# Patient Record
Sex: FEMALE | Race: WHITE | NOT HISPANIC OR LATINO | Employment: OTHER | URBAN - METROPOLITAN AREA
[De-identification: names, ages, dates, MRNs, and addresses within clinical notes are randomized per-mention and may not be internally consistent; named-entity substitution may affect disease eponyms.]

---

## 2017-01-17 ENCOUNTER — ALLSCRIPTS OFFICE VISIT (OUTPATIENT)
Dept: OTHER | Facility: OTHER | Age: 62
End: 2017-01-17

## 2017-08-21 ENCOUNTER — ALLSCRIPTS OFFICE VISIT (OUTPATIENT)
Dept: OTHER | Facility: OTHER | Age: 62
End: 2017-08-21

## 2017-09-28 ENCOUNTER — TRANSCRIBE ORDERS (OUTPATIENT)
Dept: ADMINISTRATIVE | Facility: HOSPITAL | Age: 62
End: 2017-09-28

## 2017-09-28 DIAGNOSIS — Z12.31 VISIT FOR SCREENING MAMMOGRAM: Primary | ICD-10-CM

## 2017-10-02 ENCOUNTER — ALLSCRIPTS OFFICE VISIT (OUTPATIENT)
Dept: OTHER | Facility: OTHER | Age: 62
End: 2017-10-02

## 2017-10-02 DIAGNOSIS — E04.1 NONTOXIC SINGLE THYROID NODULE: ICD-10-CM

## 2017-10-02 DIAGNOSIS — Z12.31 ENCOUNTER FOR SCREENING MAMMOGRAM FOR MALIGNANT NEOPLASM OF BREAST: ICD-10-CM

## 2017-10-03 NOTE — PROGRESS NOTES
Assessment  1  Dyslipidemia (272 4) (E78 5)   2  Obesity (BMI 30 0-34 9) (278 00) (E66 9)   3  Depression (311) (F32 9)   4  Allergic rhinitis (477 9) (J30 9)   5  Mild intermittent asthma without complication (284 47) (B01 62)   6  Left thyroid nodule (241 0) (E04 1)   7  Otitis externa, left (380 10) (H60 92)    Plan  Depression    · From  Escitalopram Oxalate 10 MG Oral Tablet TAKE 1 TABLET EVERY  OTHER DAY To Escitalopram Oxalate 10 MG Oral Tablet Take 1 tablet daily  Depression, Dyslipidemia, Mild intermittent asthma without complication, Obesity (BMI  30 0-34 9), Otitis externa, left    · Follow-up visit in 6 months Evaluation and Treatment  Follow-up  Status: Hold For -  Scheduling  Requested for: 02Oct2017  Dyslipidemia, Obesity (BMI 30 0-34  9)    · (1) LIPID PANEL, FASTING; Status:Active; Requested LSA:[id number];    · (Q) CBC (INCLUDES DIFF/PLT) (REFL); Status:Active; Requested for:02Oct2017;    · (Q) COMPREHENSIVE METABOLIC PNL W/ADJUSTED CALCIUM; Status:Active; Requested for:02Oct2017;    · (Q) TSH, 3RD GENERATION; Status:Active; Requested CZJ:[id number];   Encounter for screening mammogram for breast cancer    · * MAMMO SCREENING BILATERAL W CAD; Status:Hold For - Scheduling; Requested  for:02Oct2017;   Left thyroid nodule    · US THYROID; Status:Hold For - Scheduling; Requested for:02Oct2017;   Mild intermittent asthma without complication    · ProAir  (90 Base) MCG/ACT Inhalation Aerosol Solution; USE 2 PUFFS  EVERY 4-6 HOURS AS NEEDED  Need for influenza vaccination    · Fluzone Quadrivalent Intramuscular Suspension; INJECT 0 5  ML  Intramuscular; To Be Done: [id number]  Otitis externa, left    · Ciprodex 0 3-0 1 % Otic Suspension; use 4 drops twice daily for 7 days    Discussion/Summary    1  Dyslipidemia / Obesity - recommended to follow a low-cholesterol, low-fat diet, increase exercise, lose weight  Will check labs     recommended to increase dose of Escitalopram 10 mg to 1 tablet every day office with update on symptoms in 3-4 weeks  intermittent asthma symptoms are stable  Use ProAir HFA inhaler PRN  rhinitis take Claritin 10 mg daily PRN  thyroid nodule - will check thyroid U/S    otitis externa  use Ciprodex ear drops 4 drops twice daily for 7 days  office if symptoms persist or worsen  maintenance Fluzone administered today  Check annual mammogram, schedule colonoscopy with Dr Jeanna Espinosa  followup office visit in 6 months  The patient was counseled regarding diagnostic results,-instructions for management,-risk factor reductions,-risks and benefits of treatment options,-importance of compliance with treatment  total time of encounter was 40 [id number] minutes was spent counseling  Possible side effects of new medications were reviewed with the patient/guardian today  The treatment plan was reviewed with the patient/guardian  The patient/guardian understands and agrees with the treatment plan      Chief Complaint  Patient here for routine follow up for Dyslipidemia, Depression and Otitis externa  Complains of left ear ache and feeling spongy since yesterday  Patient is here today for follow up of chronic conditions described in HPI  History of Present Illness  Patient is 41-year-old female with H/o Dyslipidemia, Obesity, Asthma, AR, Depression, left thyroid nodule  presents for a routine follow-up office visit  was seen by Dr Lawrence Mccray in August for left otitis externa, was prescribed ear drops with some improvement in symptoms  the last few days she developed swelling of left external ear canal ear feels clogged  No significant ear pain, no drainage  Denies fever or chills  current medications  Last blood test done in 6/14  patient takes Escitalopram 10 mg every other day  feeling depressed, insomnia  No suicidal ideations  No anxiety attacks  - symptoms are stable  Patient uses ProAir HFA inhaler occasionally  is allergic to cats and pollens   She takes Loratadine 10 mg daily PRN  has H/o left thyroid nodule, had Thyroid U/S done in 2012  difficulty swallowing, hoarseness of the voice  No neck pain  history is positive for breast CA n her sister  mammogram done in October 2016    had colonoscopy done in December 2012 which was normal surgeon Dr Keya Salas recommended to repeat colonoscopy in 5 years due to family history of colon CA    done in June 2014  Review of Systems    Constitutional: no fever,-no chills-and-not feeling tired  Weight has been stable  Eyes: no eye pain,-no dryness of the eyes,-eyes not red,-no purulent discharge from the eyes-and-no itching of the eyes  No visual disturbances  ENT: clogged L ear, but-no earache,-no nosebleeds,-no sore throat,-no hearing loss,-no nasal discharge-and-no hoarseness  Cardiovascular: no chest pain,-no intermittent leg claudication,-no palpitations-and-no lower extremity edema  Respiratory: no shortness of breath,-no cough,-no wheezing-and-no shortness of breath during exertion  Gastrointestinal: occasional loose bowels, but-no abdominal pain,-no nausea,-no vomiting,-no constipation-and-no blood in stools  Genitourinary: no dysuria,-no pelvic pain-and-no incontinence  Musculoskeletal: no arthralgias,-no joint swelling-and-no myalgias  Integumentary: rosacea on face, but-no itching-and-no skin wound  Neurological: no headache,-no dizziness-and-no fainting  Psychiatric: sleep disturbances-and-depression, but-not suicidal-and-no anxiety  Hematologic/Lymphatic: No complaints of swollen glands, no swollen glands in the neck, does not bleed easily, does not bruise easily  Active Problems  1  Allergic rhinitis (477 9) (J30 9)   2  Depression (311) (F32 9)   3  Encounter for screening mammogram for breast cancer (V76 12) (Z12 31)   4  Esophageal reflux (530 81) (K21 9)   5  Need for influenza vaccination (V04 81) (Z23)   6   Need for prophylactic vaccination and inoculation against influenza (V04 81) (Z23)   7  Non-toxic uninodular goiter (241 0) (E04 1)   8  Otitis externa (380 10) (H60 90)   9  Rosacea (695 3) (L71 9)   10  Visit for routine gyn exam (V72 31) (Z01 419)    Past Medical History  1  History of Acute non-recurrent maxillary sinusitis (461 0) (J01 00)   2  History of Acute upper respiratory infection (465 9) (J06 9)   3  History of Allergic dermatitis (692 9) (L23 9)   4  History of Carotid bruit (785 9) (R09 89)   5  History of Cough (786 2) (R05)   6  History of Dysphagia (787 20) (R13 10)   7  History of acute bronchitis (V12 69) (Z87 09)   8  History of acute sinusitis (V12 69) (Z87 09)    The active problems and past medical history were reviewed and updated today  Surgical History  1  History of  Section   2  History of Complete Colonoscopy    The surgical history was reviewed and updated today  Family History  Mother    1  Family history of Acute Myocardial Infarction (V17 3)   2  Family history of Polyps Of The Sigmoid Colon  Sister    3  Family history of malignant neoplasm of breast (V16 3) (Z80 3)   4  Family history of malignant neoplasm of uterus (V16 49) (Z80 49)  Brother    5  Family history of Colon Cancer (V16 0)   6  Family history of Liver Cancer  Paternal Grandfather    7  Family history of Diabetes Mellitus (V18 0)  Maternal Uncle    8  Family history of Stroke Syndrome (V17 1)    The family history was reviewed and updated today  Social History   · Being A Social Drinker   · Never A Smoker  The social history was reviewed and updated today  Current Meds   1  Escitalopram Oxalate 10 MG Oral Tablet; TAKE 1 TABLET EVERY OTHER DAY; Therapy: 80DGX6321 to (Evaluate:2018)  Requested for: 66IRY0213; Last   Rx:68Vhk1624 Ordered   2  Loratadine 10 MG Oral Tablet; take 1 tab daily; Therapy: (Abdirahman Drummond) to Recorded   3  Multi-Vitamin Oral Tablet; take 1 tab daily; Therapy: 97FLU6515 to Recorded   4   Neomycin-Polymyxin-HC 1 % Otic Solution; INSTILL 3 DROPS IN AFFECTED EAR(S) 3-4   TIMES DAILY; Therapy: 37Bfy2710 to (Evaluate:07Gjt7258)  Requested for: 86Bok9612; Last   Rx:41Ixp7025 Ordered    The medication list was reviewed and updated today  Allergies  1  No Known Drug Allergies    Vitals  Vital Signs    Recorded: 79XAT2987 08:16AM   Temperature 98 2 F, Tympanic   Heart Rate 72, L Radial   Respiration 16   Systolic 773, LUE   Diastolic 84, LUE   Height 5 ft 10 in   Weight 231 lb 12 8 oz   BMI Calculated 33 26   BSA Calculated 2 22   Pain Scale 2     Physical Exam    Constitutional   General appearance: No acute distress, well appearing and well nourished  -Obese  Eyes   Conjunctiva and lids: No swelling, erythema or discharge  Pupils and irises: Equal, round and reactive to light  Ears, Nose, Mouth, and Throat   External inspection of ears and nose: Normal     Otoscopic examination: Abnormal   The left tympanic membrane was obscured  The left external canal was swollen-and-was erythematous  Nasal mucosa, septum, and turbinates: Normal without edema or erythema  Pulmonary   Respiratory effort: No increased work of breathing or signs of respiratory distress  Auscultation of lungs: Clear to auscultation  Cardiovascular   Auscultation of heart: Normal rate and rhythm, normal S1 and S2, without murmurs  Examination of extremities for edema and/or varicosities: Normal     Carotid pulses: Normal  -no carotid bruits  Abdomen   Abdomen: Non-tender, no masses  -no abdominal bruits  Liver and spleen: No hepatomegaly or splenomegaly  Lymphatic   Palpation of lymph nodes in neck: No lymphadenopathy  -Thyroid gland is not enlarged, no palpable nodules  Musculoskeletal   Gait and station: Normal     Digits and nails: Normal without clubbing or cyanosis  Inspection/palpation of joints, bones, and muscles: Normal     Skin   Skin and subcutaneous tissue: Abnormal   Rosacea on face     Psychiatric   Mood and affect: Normal          Signatures   Electronically signed by : MISAEL Gaitan ; Oct  2 2017  9:14AM EST                       (Author)

## 2017-10-27 ENCOUNTER — HOSPITAL ENCOUNTER (OUTPATIENT)
Dept: RADIOLOGY | Age: 62
Discharge: HOME | End: 2017-10-27
Payer: COMMERCIAL

## 2017-10-27 DIAGNOSIS — Z12.31 ENCOUNTER FOR SCREENING MAMMOGRAM FOR MALIGNANT NEOPLASM OF BREAST: ICD-10-CM

## 2017-10-27 PROCEDURE — G0202 SCR MAMMO BI INCL CAD: HCPCS

## 2017-10-28 LAB
A/G RATIO (HISTORICAL): 1.8 (CALC) (ref 1–2.5)
ALBUMIN SERPL BCP-MCNC: 4.5 G/DL (ref 3.6–5.1)
ALP SERPL-CCNC: 77 U/L (ref 33–130)
ALT SERPL W P-5'-P-CCNC: 49 U/L (ref 6–29)
AST SERPL W P-5'-P-CCNC: 36 U/L (ref 10–35)
BASOPHILS # BLD AUTO: 0.5 %
BASOPHILS # BLD AUTO: 31 CELLS/UL (ref 0–200)
BILIRUB SERPL-MCNC: 0.6 MG/DL (ref 0.2–1.2)
BUN SERPL-MCNC: 14 MG/DL (ref 7–25)
BUN/CREA RATIO (HISTORICAL): ABNORMAL (CALC) (ref 6–22)
CALCIUM (ADJUSTED FOR ALBUMIN) (HISTORICAL): 9.5 MG/DL (CALC) (ref 8.6–10.2)
CALCIUM SERPL-MCNC: 9.6 MG/DL (ref 8.6–10.4)
CHLORIDE SERPL-SCNC: 105 MMOL/L (ref 98–110)
CHOLEST SERPL-MCNC: 184 MG/DL
CHOLEST/HDLC SERPL: 4.6 (CALC)
CO2 SERPL-SCNC: 29 MMOL/L (ref 20–31)
CREAT SERPL-MCNC: 0.94 MG/DL (ref 0.5–0.99)
DEPRECATED RDW RBC AUTO: 13.8 % (ref 11–15)
EGFR AFRICAN AMERICAN (HISTORICAL): 75 ML/MIN/1.73M2
EGFR-AMERICAN CALC (HISTORICAL): 65 ML/MIN/1.73M2
EOSINOPHIL # BLD AUTO: 161 CELLS/UL (ref 15–500)
EOSINOPHIL # BLD AUTO: 2.6 %
GAMMA GLOBULIN (HISTORICAL): 2.5 G/DL (CALC) (ref 1.9–3.7)
GLUCOSE (HISTORICAL): 100 MG/DL (ref 65–99)
HCT VFR BLD AUTO: 42.4 % (ref 35–45)
HDLC SERPL-MCNC: 40 MG/DL
HGB BLD-MCNC: 14 G/DL (ref 11.7–15.5)
LDL CHOLESTEROL (HISTORICAL): 121 MG/DL (CALC)
LYMPHOCYTES # BLD AUTO: 1891 CELLS/UL (ref 850–3900)
LYMPHOCYTES # BLD AUTO: 30.5 %
MCH RBC QN AUTO: 27.2 PG (ref 27–33)
MCHC RBC AUTO-ENTMCNC: 33 G/DL (ref 32–36)
MCV RBC AUTO: 82.5 FL (ref 80–100)
MONOCYTES # BLD AUTO: 378 CELLS/UL (ref 200–950)
MONOCYTES (HISTORICAL): 6.1 %
NEUTROPHILS # BLD AUTO: 3739 CELLS/UL (ref 1500–7800)
NEUTROPHILS # BLD AUTO: 60.3 %
NON-HDL-CHOL (CHOL-HDL) (HISTORICAL): 144 MG/DL (CALC)
PLATELET # BLD AUTO: 239 THOUSAND/UL (ref 140–400)
PMV BLD AUTO: 11 FL (ref 7.5–12.5)
POTASSIUM SERPL-SCNC: 3.9 MMOL/L (ref 3.5–5.3)
RBC # BLD AUTO: 5.14 MILLION/UL (ref 3.8–5.1)
SODIUM SERPL-SCNC: 141 MMOL/L (ref 135–146)
TOTAL PROTEIN (HISTORICAL): 7 G/DL (ref 6.1–8.1)
TRIGL SERPL-MCNC: 118 MG/DL
TSH SERPL DL<=0.05 MIU/L-ACNC: 3.5 MIU/L (ref 0.4–4.5)
WBC # BLD AUTO: 6.2 THOUSAND/UL (ref 3.8–10.8)

## 2017-11-05 ENCOUNTER — GENERIC CONVERSION - ENCOUNTER (OUTPATIENT)
Dept: OTHER | Facility: OTHER | Age: 62
End: 2017-11-05

## 2017-11-05 DIAGNOSIS — R92.1 MAMMOGRAPHIC CALCIFICATION FOUND ON DIAGNOSTIC IMAGING OF BREAST: ICD-10-CM

## 2017-11-05 DIAGNOSIS — R79.89 OTHER SPECIFIED ABNORMAL FINDINGS OF BLOOD CHEMISTRY: ICD-10-CM

## 2017-11-05 DIAGNOSIS — Z01.419 ENCOUNTER FOR GYNECOLOGICAL EXAMINATION WITHOUT ABNORMAL FINDING: ICD-10-CM

## 2017-11-08 ENCOUNTER — HOSPITAL ENCOUNTER (OUTPATIENT)
Dept: MAMMOGRAPHY | Facility: CLINIC | Age: 62
Discharge: HOME | End: 2017-11-08
Payer: COMMERCIAL

## 2017-11-08 ENCOUNTER — GENERIC CONVERSION - ENCOUNTER (OUTPATIENT)
Dept: OTHER | Facility: OTHER | Age: 62
End: 2017-11-08

## 2017-11-08 DIAGNOSIS — R92.1 MAMMOGRAPHIC CALCIFICATION FOUND ON DIAGNOSTIC IMAGING OF BREAST: ICD-10-CM

## 2017-11-08 PROCEDURE — G0206 DX MAMMO INCL CAD UNI: HCPCS

## 2017-11-08 NOTE — PROGRESS NOTES
Met with patient and Dr Samson Kelly  regarding recommendation for;      __X___ RIGHT ______LEFT      _____Ultrasound guided  ___X___Stereotactic  Breast biopsy  ___X__Verbalized understanding        Blood thinners:  _____yes ___X__no    Date stopped: __N/A_________    Biopsy teaching sheet given:  ___X____yes ______no

## 2017-11-15 ENCOUNTER — HOSPITAL ENCOUNTER (OUTPATIENT)
Dept: MAMMOGRAPHY | Facility: CLINIC | Age: 62
Discharge: HOME | End: 2017-11-15
Payer: COMMERCIAL

## 2017-11-15 ENCOUNTER — HOSPITAL ENCOUNTER (OUTPATIENT)
Dept: MAMMOGRAPHY | Facility: CLINIC | Age: 62
Discharge: HOME | End: 2017-11-15
Admitting: RADIOLOGY
Payer: COMMERCIAL

## 2017-11-15 ENCOUNTER — GENERIC CONVERSION - ENCOUNTER (OUTPATIENT)
Dept: FAMILY MEDICINE CLINIC | Facility: CLINIC | Age: 62
End: 2017-11-15

## 2017-11-15 VITALS — SYSTOLIC BLOOD PRESSURE: 124 MMHG | DIASTOLIC BLOOD PRESSURE: 76 MMHG | HEART RATE: 80 BPM

## 2017-11-15 DIAGNOSIS — R92.0 MICROCALCIFICATIONS OF THE BREAST: ICD-10-CM

## 2017-11-15 DIAGNOSIS — R92.1 MAMMOGRAPHIC CALCIFICATION FOUND ON DIAGNOSTIC IMAGING OF BREAST: ICD-10-CM

## 2017-11-15 PROCEDURE — 19082 BX BREAST ADD LESION STRTCTC: CPT

## 2017-11-15 PROCEDURE — 88342 IMHCHEM/IMCYTCHM 1ST ANTB: CPT | Performed by: FAMILY MEDICINE

## 2017-11-15 PROCEDURE — 88341 IMHCHEM/IMCYTCHM EA ADD ANTB: CPT | Performed by: FAMILY MEDICINE

## 2017-11-15 PROCEDURE — 88305 TISSUE EXAM BY PATHOLOGIST: CPT | Performed by: FAMILY MEDICINE

## 2017-11-15 PROCEDURE — 19081 BX BREAST 1ST LESION STRTCTC: CPT

## 2017-11-15 RX ORDER — LIDOCAINE HYDROCHLORIDE AND EPINEPHRINE BITARTRATE 20; .01 MG/ML; MG/ML
10 INJECTION, SOLUTION SUBCUTANEOUS ONCE
Status: COMPLETED | OUTPATIENT
Start: 2017-11-15 | End: 2017-11-15

## 2017-11-15 RX ORDER — LIDOCAINE HYDROCHLORIDE 10 MG/ML
5 INJECTION, SOLUTION INFILTRATION; PERINEURAL ONCE
Status: COMPLETED | OUTPATIENT
Start: 2017-11-15 | End: 2017-11-15

## 2017-11-15 RX ADMIN — LIDOCAINE HYDROCHLORIDE AND EPINEPHRINE 10 ML: 20; 10 INJECTION, SOLUTION INFILTRATION; PERINEURAL at 09:37

## 2017-11-15 RX ADMIN — LIDOCAINE HYDROCHLORIDE 5 ML: 10 INJECTION, SOLUTION INFILTRATION; PERINEURAL at 09:15

## 2017-11-15 RX ADMIN — LIDOCAINE HYDROCHLORIDE AND EPINEPHRINE 10 ML: 20; 10 INJECTION, SOLUTION INFILTRATION; PERINEURAL at 09:15

## 2017-11-15 NOTE — PROGRESS NOTES
Procedure type: (1ST SITE)    _____ultrasound guided __X___stereotactic    Breast:    _____Left __X___Right    Location: MEDIAL    Needle: 9G STANDARD EVIVA    # of passes:  2 CORES WITH CALCS (SPECIMEN A)  REMAINING CORES WITHOUT CALCS (SPECIMEN B)    Clip: [id number] Double  [place] 13/CYLINDER    Performed by: Dr Juventino Bang held for 5 minutes by: Yolande Rodarte    Steri Strips:    __X___yes _____no    Band aid:    __X___yes_____no    Tape and guaze:    __X___yes _____no    Tolerated procedure:    __X___yes _____no      Procedure type: (2ND SITE)    _____ultrasound guided __X___stereotactic    Breast:    _____Left __X___Right    Location: LATERAL    Needle: 9G STANDARD EVIVA    # of passes:  3 CORES WITH CALCS (SPECIMEN C)  REMAINING CORES WITHOUT CALCS (SPECIMEN D)    Clip: [id number] EVIVA 2S 13    Performed by: Dr Juventino Bang held for 5 minutes by: Yolande Hani Strips:    __X___yes _____no    Band aid:    __X___yes_____no    Tape and guaze:    __X___yes _____no    Tolerated procedure:    __X___yes _____no

## 2017-11-17 ENCOUNTER — ALLSCRIPTS OFFICE VISIT (OUTPATIENT)
Dept: OTHER | Facility: OTHER | Age: 62
End: 2017-11-17

## 2017-11-19 NOTE — PROGRESS NOTES
Assessment    1  Encounter for gynecological examination with Papanicolaou smear of cervix (V72 31) (Z01 419)    Plan   · (1) THIN PREP PAP WITH IMAGING; Status:Active; Requested for:17Nov2017;    Perform:[facility]; FWI:[id number]; Ordered;for gynecological examination with Papanicolaou smear of cervix; Ordered By:Morganelli, Hobart Cooks; Maturation index required? : No  : postmenopausal  HPV? : Regardless of Interpretation    Discussion/Summary  health maintenance visit Currently, she eats a poor diet and has an inadequate exercise regimen  the risks and benefits of cervical cancer screening were discussed HPV and Pap Co-testing Done Today Breast cancer screening: the risks and benefits of breast cancer screening were discussed, self breast exam technique was taught, monthly self breast exam was advised and mammogram is current  Colorectal cancer screening: the risks and benefits of colorectal cancer screening were discussed, colorectal cancer screening is current and colorectal cancer screening is managed by Saida Lott  The immunizations are up to date  Advice and education were given regarding nutrition, aerobic exercise, weight bearing exercise, weight loss, calcium supplements, vitamin D supplements, sunscreen use, self skin examination and seat belt use  Patient discussion: discussed with the patient  Routine GYN exam and PAP performed today  with mammogram- just had right breast biopsy earlier this week- results still pending  with colonoscopy, had 12/2012 with Dr Saida Lott, due for repeat this upcoming December 2017- already received reminder card from Dr Shilpa Fritz office and plans to schedule this  are also UTD  as scheduled  Possible side effects of new medications were reviewed with the patient/guardian today  The treatment plan was reviewed with the patient/guardian   The patient/guardian understands and agrees with the treatment plan     Self Referrals: No      Chief Complaint  patient is here for a pelvic exam, she is post menopausal      Advance Directives  Advance Directive [facility]:  NO - Patient does not have an advance health care directive  History of Present Illness  HPI: Pt presents by herself today for a routine GYN exam/ PAP  acute complaints today, feels well  PAP 6/2014 which was normal   did just have a right breast biopsy done with our [address] [facility] on Wednesday, 11/15/17 for two calcification clusters (performed by Dr Emerald Perez one steri strip in place, no drainage, bleeding, rednessdiscomfort   [facility]: The patient is being seen for a gynecology evaluation  General Health: The patient's health since the last visit is described as good  She has regular dental visits  The patient reports her last dental visit was < 6 months  Dental problems: no tooth pain  -- She complains of vision problems  Vision care includes an eye examination within the last year  -- She denies hearing loss  Immunizations status: up to date  Lifestyle:  She does not have a healthy diet  -- She has weight concerns  Weight control issues: obesity  -- She exercises regularly  She exercises less than three times a week for less than 30 minutes per session  Exercise includes walking -- She does not use tobacco -- She consumes alcohol  She reports occasional alcohol use-- and-- Rare use  -- She denies drug use  Reproductive health: the patient is postmenopausal--   she reports no menstrual problems  -- she is not sexually active  -- pregnancy history: G 4P 3,-- 3(miscarriages: 1 )  Screening: Cervical cancer screening includes a pap smear performed 6/2014  Breast cancer screening includes a mammogram performed 10/2017  Colorectal cancer screening includes a colonoscopy performed 2012  Metabolic screening includes lipid profile performed 10/2017,-- glucose screening performed 10/2017-- and-- thyroid function test performed 10/2017      Cardiovascular risk factors: stress,-- obesity-- and-- sedentary lifestyle, but-- no hypertension,-- no diabetes,-- no tobacco use,-- no illicit drug use-- and-- no family history of cardiovascular disease  General health risks: no abnormal cervical cytology  Safety elements used: seat belt,-- safe driving habits,-- sunscreen-- and-- smoke detector  Review of Systems  no pelvic pain,-- no pelvic pressure,-- no vaginal pain,-- no vaginal discharge,-- no vaginal itching,-- no vaginal lump or mass,-- no vaginal odor,-- no nonmenstrual bleeding,-- no vulvar pain,-- no vulvar itching,-- no vulvar lump or mass,-- no labial swelling,-- no postmenopausal bleeding,-- no dysuria,-- no bladder pain,-- no hematuria,-- no burning sensation during urination,-- no change in urinary frequency,-- no feelings of urinary urgency,-- no flank pain,-- no abnormal urethral discharge,-- urine is not foul-smelling,-- no incomplete emptying of bladder,-- initiating urination does not require straining,-- no painful inability to urinate,-- urine stream was not smaller,-- urinary stream does not start and stop,-- no oliguria,-- urine not cloudy-- and-- no urinary loss of control  Constitutional: no fever,-- not feeling poorly,-- no chills-- and-- not feeling tired  Cardiovascular: no chest pain,-- no intermittent leg claudication,-- no palpitations-- and-- no lower extremity edema  Respiratory: no shortness of breath,-- no cough,-- no wheezing-- and-- no shortness of breath during exertion  Breasts: as noted in HPI  Gastrointestinal: no abdominal pain,-- no nausea,-- no constipation,-- no diarrhea-- and-- no blood in stools  Genitourinary: no dysuria  Musculoskeletal: no myalgias  Integumentary: skin wound, but-- as noted in HPI-- and-- no rashes  Neurological: no headache-- and-- no dizziness  ROS reviewed  Active Problems  1  Allergic rhinitis (497 9) (J30 9)   2  Breast calcification, right (793 89) (R92 1)   3  Depression (311) (T59 9)   4  Dyslipidemia (272 4) (E78 5)   5  Elevated LFTs (790 6) (R79 89)   6  Encounter for screening mammogram for breast cancer (V76 12) (Z12 31)   7  Esophageal reflux (530 81) (K21 9)   8  Left thyroid nodule (241 0) (E04 1)   9  Mild intermittent asthma without complication (523 19) (K58 66)   10  Need for influenza vaccination (V04 81) (Z23)   11  Need for prophylactic vaccination and inoculation against influenza (V04 81) (Z23)   12  Non-toxic uninodular goiter (241 0) (E04 1)   13  Obesity (BMI 30 0-34 9) (278 00) (E66 9)   14  Otitis externa (380 10) (H60 90)   15  Otitis externa, left (380 10) (H60 92)   16  Rosacea (695 3) (L71 9)   17  Visit for routine gyn exam (V72 31) (Z01 419)    Past Medical History   · History of Acute non-recurrent maxillary sinusitis (461 0) (J01 00)   · History of Acute upper respiratory infection (465 9) (J06 9)   · History of Allergic dermatitis (692 9) (L23 9)   · History of Carotid bruit (785 9) (R09 89)   · History of Cough (786 2) (R05)   · History of Dysphagia (787 20) (R13 10)   · History of acute bronchitis (V12 69) (Z87 09)   · History of acute sinusitis (V12 69) (Z87 09)    The active problems and past medical history were reviewed and updated today  Surgical History   · History of  Section   · History of Complete Colonoscopy    Family History   · Family history of Acute Myocardial Infarction (V17 3)   · Family history of Polyps Of The Sigmoid Colon   · Family history of malignant neoplasm of breast (V16 3) (Z80 3)   · Family history of malignant neoplasm of uterus (V16 49) (Z80 49)   · Family history of Colon Cancer (V16 0)   · Family history of Liver Cancer   · Family history of Diabetes Mellitus (V18 0)   · Family history of Stroke Syndrome (V17 1)    Social History     · Being A Social Drinker   · Never A Smoker  The social history was reviewed and updated today  The social history was reviewed and is unchanged  Current Meds   1   Ciprodex 0 3-0 1 % Otic Suspension; use 4 drops twice daily for 7 days; Therapy: 05ZRB9164 to (Last Rx:02Oct2017)  Requested for: 02Oct2017 Ordered   2  Escitalopram Oxalate 10 MG Oral Tablet; Take 1 tablet daily; Therapy: 33KBD1268 to (Leydi Jay)  Requested for: 33QIR7238; Last Rx:07Nov2017 Ordered   3  Loratadine 10 MG Oral Tablet; take 1 tab daily; Therapy: (Ian Mari) to Recorded   4  Multi-Vitamin Oral Tablet; take 1 tab daily; Therapy: 85EAO0312 to Recorded   5  Neomycin-Polymyxin-HC 1 % Otic Solution; INSTILL 3 DROPS IN AFFECTED EAR(S) 3-4 TIMES DAILY; Therapy: 58Lnk5006 to (Evaluate:39Gpw3786)  Requested for: 21Aug2017; Last Rx:21Aug2017 Ordered   6  ProAir  (90 Base) MCG/ACT Inhalation Aerosol Solution; USE 2 PUFFS EVERY 4-6 HOURS AS NEEDED; Therapy: 96RHH8826 to (Last Rx:02Oct2017) Ordered    Allergies  1  No Known Drug Allergies    Vitals   Recorded: 48ZLL5427 01:07PM   Temperature 98 2 F   Heart Rate 76   Respiration 16   Systolic 410   Diastolic 60   Height 5 ft 10 in   Weight 232 lb 6 4 oz   BMI Calculated 33 35   BSA Calculated 2 22       Physical Exam   Constitutional  General appearance: No acute distress, well appearing and well nourished  obese  Neck  Neck: Normal, supple, trachea midline, no masses  Thyroid: Normal, no thyromegaly  Pulmonary  Respiratory effort: No increased work of breathing or signs of respiratory distress  Cardiovascular  Auscultation of heart: Normal rate and rhythm, normal S1 and S2, no murmurs  Peripheral vascular exam: Normal pulses Throughout  Genitourinary  External genitalia: Normal and no lesions appreciated  Vagina: Normal, no lesions or dryness appreciated  Urethra: Normal    Urethral meatus: Normal    Bladder: Normal, soft, non-tender and no prolapse or masses appreciated  Cervix: Normal, no palpable masses  Uterus: Normal, non-tender, not enlarged, and no palpable masses     Adnexa/parametria: Normal, non-tender and no fullness or masses appreciated  Chest  Breasts: Normal and no dimpling or skin changes noted  Abdomen  Abdomen: Normal, non-tender, and no organomegaly noted  The abdomen was obese  Liver and spleen: No hepatomegaly or splenomegaly  Examination for hernias: No hernias appreciated  Lymphatic  Palpation of lymph nodes in neck, axillae, groin and/or other locations: No lymphadenopathy or masses noted  Skin  Skin and subcutaneous tissue: Normal skin turgor and no rashes  Palpation of skin and subcutaneous tissue: Normal    Psychiatric  Orientation to person, place, and time: Normal    Mood and affect: Normal        Attending Note  Collaborating Physician Note: Collaborating Note: I did not interview and examine the patient-- and-- I agree with the Advanced Practitioner note  Future Appointments    Date/Time Provider Specialty Site   04/02/2018 08:45 AM MISAEL Ramos  Family Medicine [facility]       Signatures   Electronically signed by : Conrado Tuttle [address] [place] [address]; Nov 17 2017  1:44PM EST                       (Author)    Electronically signed by :  Torey Keane MD; Nov 17 2017  3:07PM EST                       (Author)

## 2017-11-20 ENCOUNTER — GENERIC CONVERSION - ENCOUNTER (OUTPATIENT)
Dept: OTHER | Facility: OTHER | Age: 62
End: 2017-11-20

## 2017-11-21 ENCOUNTER — LAB CONVERSION - ENCOUNTER (OUTPATIENT)
Dept: OTHER | Facility: OTHER | Age: 62
End: 2017-11-21

## 2017-11-21 LAB
ADDITIONAL INFORMATION (HISTORICAL): NORMAL
ADEQUACY: (HISTORICAL): NORMAL
COMMENT (HISTORICAL): NORMAL
CYTOTECHNOLOGIST: (HISTORICAL): NORMAL
HPV MRNA E6/E7 (HISTORICAL): NOT DETECTED
INTERPRETATION (HISTORICAL): NORMAL
LMP (HISTORICAL): NORMAL
PREV. BX: (HISTORICAL): NORMAL
PREV. PAP (HISTORICAL): NORMAL
SOURCE (HISTORICAL): NORMAL

## 2017-11-22 ENCOUNTER — GENERIC CONVERSION - ENCOUNTER (OUTPATIENT)
Dept: OTHER | Facility: OTHER | Age: 62
End: 2017-11-22

## 2017-11-24 ENCOUNTER — GENERIC CONVERSION - ENCOUNTER (OUTPATIENT)
Dept: OTHER | Facility: OTHER | Age: 62
End: 2017-11-24

## 2017-12-01 ENCOUNTER — APPOINTMENT (OUTPATIENT)
Dept: RADIOLOGY | Facility: CLINIC | Age: 62
End: 2017-12-01
Payer: COMMERCIAL

## 2017-12-01 ENCOUNTER — APPOINTMENT (OUTPATIENT)
Dept: LAB | Facility: CLINIC | Age: 62
End: 2017-12-01
Payer: COMMERCIAL

## 2017-12-01 ENCOUNTER — GENERIC CONVERSION - ENCOUNTER (OUTPATIENT)
Dept: OTHER | Facility: OTHER | Age: 62
End: 2017-12-01

## 2017-12-01 ENCOUNTER — TRANSCRIBE ORDERS (OUTPATIENT)
Dept: LAB | Facility: CLINIC | Age: 62
End: 2017-12-01

## 2017-12-01 ENCOUNTER — GENERIC CONVERSION - ENCOUNTER (OUTPATIENT)
Dept: SURGICAL ONCOLOGY | Facility: CLINIC | Age: 62
End: 2017-12-01

## 2017-12-01 DIAGNOSIS — D05.01 LOBULAR CARCINOMA IN SITU OF RIGHT BREAST: Primary | ICD-10-CM

## 2017-12-01 DIAGNOSIS — D05.01 LOBULAR CARCINOMA IN SITU OF RIGHT BREAST: ICD-10-CM

## 2017-12-01 LAB
ALBUMIN SERPL BCP-MCNC: 3.8 G/DL (ref 3.5–5)
ALP SERPL-CCNC: 85 U/L (ref 46–116)
ALT SERPL W P-5'-P-CCNC: 64 U/L (ref 12–78)
ANION GAP SERPL CALCULATED.3IONS-SCNC: 7 MMOL/L (ref 4–13)
AST SERPL W P-5'-P-CCNC: 36 U/L (ref 5–45)
ATRIAL RATE: 76 BPM
BASOPHILS # BLD AUTO: 0.02 THOUSANDS/ΜL (ref 0–0.1)
BASOPHILS NFR BLD AUTO: 0 % (ref 0–1)
BILIRUB SERPL-MCNC: 0.3 MG/DL (ref 0.2–1)
BUN SERPL-MCNC: 15 MG/DL (ref 5–25)
CALCIUM SERPL-MCNC: 9.9 MG/DL (ref 8.3–10.1)
CHLORIDE SERPL-SCNC: 106 MMOL/L (ref 100–108)
CO2 SERPL-SCNC: 28 MMOL/L (ref 21–32)
CREAT SERPL-MCNC: 0.96 MG/DL (ref 0.6–1.3)
EOSINOPHIL # BLD AUTO: 0.15 THOUSAND/ΜL (ref 0–0.61)
EOSINOPHIL NFR BLD AUTO: 2 % (ref 0–6)
ERYTHROCYTE [DISTWIDTH] IN BLOOD BY AUTOMATED COUNT: 14 % (ref 11.6–15.1)
GFR SERPL CREATININE-BSD FRML MDRD: 63 ML/MIN/1.73SQ M
GLUCOSE SERPL-MCNC: 93 MG/DL (ref 65–140)
HCT VFR BLD AUTO: 43.6 % (ref 34.8–46.1)
HGB BLD-MCNC: 14.2 G/DL (ref 11.5–15.4)
LYMPHOCYTES # BLD AUTO: 2.16 THOUSANDS/ΜL (ref 0.6–4.47)
LYMPHOCYTES NFR BLD AUTO: 31 % (ref 14–44)
MCH RBC QN AUTO: 27 PG (ref 26.8–34.3)
MCHC RBC AUTO-ENTMCNC: 32.6 G/DL (ref 31.4–37.4)
MCV RBC AUTO: 83 FL (ref 82–98)
MONOCYTES # BLD AUTO: 0.43 THOUSAND/ΜL (ref 0.17–1.22)
MONOCYTES NFR BLD AUTO: 6 % (ref 4–12)
NEUTROPHILS # BLD AUTO: 4.29 THOUSANDS/ΜL (ref 1.85–7.62)
NEUTS SEG NFR BLD AUTO: 61 % (ref 43–75)
P AXIS: 55 DEGREES
PLATELET # BLD AUTO: 249 THOUSANDS/UL (ref 149–390)
PMV BLD AUTO: 10.9 FL (ref 8.9–12.7)
POTASSIUM SERPL-SCNC: 4.3 MMOL/L (ref 3.5–5.3)
PR INTERVAL: 138 MS
PROT SERPL-MCNC: 7.6 G/DL (ref 6.4–8.2)
QRS AXIS: 23 DEGREES
QRSD INTERVAL: 86 MS
QT INTERVAL: 392 MS
QTC INTERVAL: 441 MS
RBC # BLD AUTO: 5.26 MILLION/UL (ref 3.81–5.12)
SODIUM SERPL-SCNC: 141 MMOL/L (ref 136–145)
T WAVE AXIS: 19 DEGREES
VENTRICULAR RATE: 76 BPM
WBC # BLD AUTO: 7.05 THOUSAND/UL (ref 4.31–10.16)

## 2017-12-01 PROCEDURE — 80053 COMPREHEN METABOLIC PANEL: CPT

## 2017-12-01 PROCEDURE — 85025 COMPLETE CBC W/AUTO DIFF WBC: CPT

## 2017-12-01 PROCEDURE — 93005 ELECTROCARDIOGRAM TRACING: CPT

## 2017-12-01 PROCEDURE — 71020 HB CHEST X-RAY 2VW FRONTAL&LATL: CPT

## 2017-12-01 PROCEDURE — 36415 COLL VENOUS BLD VENIPUNCTURE: CPT

## 2017-12-04 ENCOUNTER — ANESTHESIA EVENT (OUTPATIENT)
Dept: PERIOP | Facility: HOSPITAL | Age: 62
End: 2017-12-04
Payer: COMMERCIAL

## 2017-12-15 ENCOUNTER — ALLSCRIPTS OFFICE VISIT (OUTPATIENT)
Dept: OTHER | Facility: OTHER | Age: 62
End: 2017-12-15

## 2017-12-19 ENCOUNTER — GENERIC CONVERSION - ENCOUNTER (OUTPATIENT)
Dept: SURGICAL ONCOLOGY | Facility: CLINIC | Age: 62
End: 2017-12-19

## 2017-12-19 ENCOUNTER — ANESTHESIA (OUTPATIENT)
Dept: PERIOP | Facility: HOSPITAL | Age: 62
End: 2017-12-19
Payer: COMMERCIAL

## 2017-12-19 ENCOUNTER — CONVERSION ENCOUNTER (OUTPATIENT)
Dept: MAMMOGRAPHY | Facility: HOSPITAL | Age: 62
End: 2017-12-19

## 2017-12-19 ENCOUNTER — HOSPITAL ENCOUNTER (OUTPATIENT)
Dept: MAMMOGRAPHY | Facility: HOSPITAL | Age: 62
Discharge: HOME | End: 2017-12-19
Attending: SURGERY
Payer: COMMERCIAL

## 2017-12-19 ENCOUNTER — APPOINTMENT (OUTPATIENT)
Dept: MAMMOGRAPHY | Facility: HOSPITAL | Age: 62
End: 2017-12-19
Payer: COMMERCIAL

## 2017-12-19 ENCOUNTER — HOSPITAL ENCOUNTER (OUTPATIENT)
Facility: HOSPITAL | Age: 62
Setting detail: OUTPATIENT SURGERY
Discharge: HOME | End: 2017-12-19
Attending: SURGERY | Admitting: SURGERY
Payer: COMMERCIAL

## 2017-12-19 VITALS
HEART RATE: 84 BPM | TEMPERATURE: 98.1 F | SYSTOLIC BLOOD PRESSURE: 161 MMHG | RESPIRATION RATE: 16 BRPM | BODY MASS INDEX: 32.93 KG/M2 | OXYGEN SATURATION: 94 % | DIASTOLIC BLOOD PRESSURE: 82 MMHG | HEIGHT: 70 IN | WEIGHT: 230 LBS

## 2017-12-19 DIAGNOSIS — D05.01 LOBULAR CARCINOMA IN SITU (LCIS) OF RIGHT BREAST: ICD-10-CM

## 2017-12-19 DIAGNOSIS — D05.01 BREAST NEOPLASM, TIS (LCIS), RIGHT: ICD-10-CM

## 2017-12-19 PROCEDURE — 19281 PERQ DEVICE BREAST 1ST IMAG: CPT

## 2017-12-19 PROCEDURE — 88342 IMHCHEM/IMCYTCHM 1ST ANTB: CPT | Performed by: SURGERY

## 2017-12-19 PROCEDURE — 88307 TISSUE EXAM BY PATHOLOGIST: CPT | Performed by: SURGERY

## 2017-12-19 PROCEDURE — 88341 IMHCHEM/IMCYTCHM EA ADD ANTB: CPT | Performed by: SURGERY

## 2017-12-19 PROCEDURE — 19282 PERQ DEVICE BREAST EA IMAG: CPT

## 2017-12-19 RX ORDER — SODIUM CHLORIDE, SODIUM LACTATE, POTASSIUM CHLORIDE, CALCIUM CHLORIDE 600; 310; 30; 20 MG/100ML; MG/100ML; MG/100ML; MG/100ML
125 INJECTION, SOLUTION INTRAVENOUS CONTINUOUS
Status: DISCONTINUED | OUTPATIENT
Start: 2017-12-19 | End: 2017-12-19 | Stop reason: HOSPADM

## 2017-12-19 RX ORDER — MIDAZOLAM HYDROCHLORIDE 1 MG/ML
INJECTION INTRAMUSCULAR; INTRAVENOUS AS NEEDED
Status: DISCONTINUED | OUTPATIENT
Start: 2017-12-19 | End: 2017-12-19 | Stop reason: SURG

## 2017-12-19 RX ORDER — ONDANSETRON 2 MG/ML
INJECTION INTRAMUSCULAR; INTRAVENOUS AS NEEDED
Status: DISCONTINUED | OUTPATIENT
Start: 2017-12-19 | End: 2017-12-19 | Stop reason: SURG

## 2017-12-19 RX ORDER — PROPOFOL 10 MG/ML
INJECTION, EMULSION INTRAVENOUS AS NEEDED
Status: DISCONTINUED | OUTPATIENT
Start: 2017-12-19 | End: 2017-12-19 | Stop reason: SURG

## 2017-12-19 RX ORDER — FENTANYL CITRATE 50 UG/ML
INJECTION, SOLUTION INTRAMUSCULAR; INTRAVENOUS AS NEEDED
Status: DISCONTINUED | OUTPATIENT
Start: 2017-12-19 | End: 2017-12-19 | Stop reason: SURG

## 2017-12-19 RX ORDER — LIDOCAINE HYDROCHLORIDE 10 MG/ML
INJECTION, SOLUTION INFILTRATION; PERINEURAL AS NEEDED
Status: DISCONTINUED | OUTPATIENT
Start: 2017-12-19 | End: 2017-12-19 | Stop reason: SURG

## 2017-12-19 RX ORDER — LABETALOL HYDROCHLORIDE 5 MG/ML
INJECTION, SOLUTION INTRAVENOUS AS NEEDED
Status: DISCONTINUED | OUTPATIENT
Start: 2017-12-19 | End: 2017-12-19 | Stop reason: SURG

## 2017-12-19 RX ORDER — BUPIVACAINE HYDROCHLORIDE AND EPINEPHRINE 2.5; 5 MG/ML; UG/ML
INJECTION, SOLUTION INFILTRATION; PERINEURAL AS NEEDED
Status: DISCONTINUED | OUTPATIENT
Start: 2017-12-19 | End: 2017-12-19 | Stop reason: HOSPADM

## 2017-12-19 RX ORDER — ONDANSETRON 2 MG/ML
4 INJECTION INTRAMUSCULAR; INTRAVENOUS ONCE AS NEEDED
Status: DISCONTINUED | OUTPATIENT
Start: 2017-12-19 | End: 2017-12-19 | Stop reason: HOSPADM

## 2017-12-19 RX ORDER — FENTANYL CITRATE/PF 50 MCG/ML
25 SYRINGE (ML) INJECTION
Status: DISCONTINUED | OUTPATIENT
Start: 2017-12-19 | End: 2017-12-19 | Stop reason: HOSPADM

## 2017-12-19 RX ORDER — KETOROLAC TROMETHAMINE 30 MG/ML
INJECTION, SOLUTION INTRAMUSCULAR; INTRAVENOUS AS NEEDED
Status: DISCONTINUED | OUTPATIENT
Start: 2017-12-19 | End: 2017-12-19 | Stop reason: SURG

## 2017-12-19 RX ORDER — OXYCODONE HYDROCHLORIDE AND ACETAMINOPHEN 5; 325 MG/1; MG/1
1 TABLET ORAL EVERY 4 HOURS PRN
Status: DISCONTINUED | OUTPATIENT
Start: 2017-12-19 | End: 2017-12-19 | Stop reason: HOSPADM

## 2017-12-19 RX ORDER — LIDOCAINE WITH 8.4% SOD BICARB 0.9%(10ML)
5 SYRINGE (ML) INJECTION ONCE
Status: COMPLETED | OUTPATIENT
Start: 2017-12-19 | End: 2017-12-19

## 2017-12-19 RX ADMIN — DEXAMETHASONE SODIUM PHOSPHATE 10 MG: 10 INJECTION INTRAMUSCULAR; INTRAVENOUS at 12:10

## 2017-12-19 RX ADMIN — LIDOCAINE HYDROCHLORIDE 50 MG: 10 INJECTION, SOLUTION INFILTRATION; PERINEURAL at 11:50

## 2017-12-19 RX ADMIN — SODIUM CHLORIDE, SODIUM LACTATE, POTASSIUM CHLORIDE, AND CALCIUM CHLORIDE: .6; .31; .03; .02 INJECTION, SOLUTION INTRAVENOUS at 11:46

## 2017-12-19 RX ADMIN — FENTANYL CITRATE 25 MCG: 50 INJECTION INTRAMUSCULAR; INTRAVENOUS at 13:15

## 2017-12-19 RX ADMIN — FENTANYL CITRATE 25 MCG: 50 INJECTION INTRAMUSCULAR; INTRAVENOUS at 13:08

## 2017-12-19 RX ADMIN — FENTANYL CITRATE 25 MCG: 50 INJECTION INTRAMUSCULAR; INTRAVENOUS at 12:05

## 2017-12-19 RX ADMIN — ONDANSETRON 4 MG: 2 INJECTION INTRAMUSCULAR; INTRAVENOUS at 12:10

## 2017-12-19 RX ADMIN — MIDAZOLAM HYDROCHLORIDE 2 MG: 1 INJECTION, SOLUTION INTRAMUSCULAR; INTRAVENOUS at 11:45

## 2017-12-19 RX ADMIN — CEFAZOLIN SODIUM 2000 MG: 2 SOLUTION INTRAVENOUS at 11:50

## 2017-12-19 RX ADMIN — FENTANYL CITRATE 25 MCG: 50 INJECTION INTRAMUSCULAR; INTRAVENOUS at 12:20

## 2017-12-19 RX ADMIN — FENTANYL CITRATE 50 MCG: 50 INJECTION INTRAMUSCULAR; INTRAVENOUS at 11:55

## 2017-12-19 RX ADMIN — KETOROLAC TROMETHAMINE 30 MG: 30 INJECTION, SOLUTION INTRAMUSCULAR at 12:12

## 2017-12-19 RX ADMIN — PROPOFOL 200 MG: 10 INJECTION, EMULSION INTRAVENOUS at 11:50

## 2017-12-19 RX ADMIN — OXYCODONE HYDROCHLORIDE AND ACETAMINOPHEN 1 TABLET: 5; 325 TABLET ORAL at 14:00

## 2017-12-19 RX ADMIN — LABETALOL HYDROCHLORIDE 5 MG: 5 INJECTION, SOLUTION INTRAVENOUS at 12:04

## 2017-12-19 RX ADMIN — LIDOCAINE HYDROCHLORIDE 4 ML: 10 INJECTION, SOLUTION INFILTRATION; PERINEURAL at 09:45

## 2017-12-19 NOTE — OP NOTE
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 [D05 01]    Procedure(s) (LRB):  BREAST LUMPECTOMY; BREAST BRACKETED NEEDLE LOCALIZATION (BRACKETED NEEDLE LOC AT 0900) (Right)    Specimen(s):  ID Type Source Tests Collected by Time Destination   1 : right breast lumpectomy Tissue Breast, Right TISSUE Marika Price MD 2017 1215    2 : right breast inferior margin Tissue Breast, Right TISSUE EXAM Vero Crowe MD 2017 1216        Estimated Blood Loss:   Minimal    Drains:       Anesthesia Type:   General    Operative Indications:  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 [D05 01]      Operative Findings:  Good specimen radiograph, close at inferior region so additional tissue with some calcifications were removed    Complications:   None    Procedure and Technique:  I previously reviewed the needle localization images  The region was bracketed with two wires  Lumpectomy  The patient was brought to the operation room and placed under general anesthesia  Attention was paid to ensure appropriate padding and correct positioning  The right breast was prepped and draped in a sterile fashion  I initiated a time-out, identifying the patient, the correct side and the above procedure  All parties agreed with the time out  The planned incision was then injected with 0 25% Marcaine and epinephrine for local anesthesia  The incision was sharply incised  The localizing wires were used to guide the dissection  Superior, inferior, medial and lateral planes were developed around the localizing wires and the specimen truncated posteriorly with electrocautery just beyond the tip of the wires    The specimen was then inked for orientation purposes  A specimen radiograph was taken in two dimensions and an additional inferior region was removed to optimize complete removal of the area  The additional margin was inked on the most distal area  All specimens were sent to pathology for permanent analysis  Superficial bleeding controlled with electrocautery and the wound was extensively irrigated  The wound was closed with two layers, an inner layer of 3-0 vicryl and a subcuticular layer of 4-0 monocryl  Histocryl and steri-strips were applied     A qualified resident physician was not available    Patient Disposition:  PACU     SIGNATURE: Nichole Gibbs MD  DATE: December 19, 2017  TIME: 12:35 PM

## 2017-12-19 NOTE — ANESTHESIA POSTPROCEDURE EVALUATION
Post-Op Assessment Note      CV Status:  Stable    Mental Status:  Alert    Hydration Status:  Stable    PONV Controlled:  None    Airway Patency:  Patent    Post Op Vitals Reviewed: Yes          Staff: Anesthesiologist           BP   157/74   Temp   97   Pulse 73   7Resp   12   SpO2   97

## 2017-12-19 NOTE — DISCHARGE INSTRUCTIONS
Your wound is closed with sutures on the inside, they will dissolve over several weeks  Your incision is covered with steri-stips (white pieces of tape that will come off in 1-2 weeks)  They are waterproof  You may shower 24 hr after your surgery  Use soap and water and lightly pat dry  Do not perform any heavy lifting or strenuous physical activity for 7 days  You may resume all pre-operative medications and return to your usual diet  Pain medication:  A prescription should have been given to you in the office  Use as directed  If you can take Aleve (over the counter) take one pill every 12 hours for 3 days  This will decrease the amount of pain pills that you will need  If you prefer ibuprofen that is acceptable as well  Ice packs applied to the incision area also work very well and may decrease the need for pain medicine  Please wear good breast support for the first week  A surgical bra should be provided postoperatively by the nurses, but if other apparel works better that is OK too  Call our office at [phone] if you have any questions or if you have any of the following:   -  Redness, swelling, unusual drainage or heavy bleeding from the wound  -  Fever greater than 101 degrees Fahrenheit    -  Pain not relieved by medication      Your follow-up appointment is with Dr Kendra Lang at the [facility] on:    Jan 5, 2018@ 11:30 AM

## 2017-12-19 NOTE — ANESTHESIA PREPROCEDURE EVALUATION
Review of Systems/Medical History  Patient summary reviewed  Chart reviewed  No history of anesthetic complications     Cardiovascular  Negative cardio ROS    Pulmonary  Asthma: , ,        GI/Hepatic    GERD ,        Negative  ROS No prostatic disorder,        Endo/Other  Negative endo/other ROS      GYN       Hematology  Negative hematology ROS      Musculoskeletal  Negative musculoskeletal ROS        Neurology  Negative neurology ROS      Psychology           Physical Exam    Airway    Mallampati score: II  TM Distance: >3 FB  Neck ROM: full     Dental   No notable dental hx     Cardiovascular  Comment: Negative ROS, Cardiovascular exam normal    Pulmonary  Pulmonary exam normal     Other Findings        Anesthesia Plan  ASA Score- 2       Anesthesia Type- general with ASA Monitors  Additional Monitors:   Airway Plan: LMA  Comment: Plan discussed  Consent  Obtained          Induction- intravenous  Informed Consent- Anesthetic plan and risks discussed with patient and spouse

## 2018-01-05 ENCOUNTER — GENERIC CONVERSION - ENCOUNTER (OUTPATIENT)
Dept: OTHER | Facility: OTHER | Age: 63
End: 2018-01-05

## 2018-01-05 ENCOUNTER — TRANSCRIBE ORDERS (OUTPATIENT)
Dept: ADMINISTRATIVE | Facility: HOSPITAL | Age: 63
End: 2018-01-05

## 2018-01-05 DIAGNOSIS — D05.01 LOBULAR CARCINOMA IN SITU OF RIGHT BREAST: Primary | ICD-10-CM

## 2018-01-06 NOTE — PROGRESS NOTES
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; Ordered By: Michelle Sharp Performed:  Due: 68UXH9696  Care Summary provided  : Yes   · (1) BUN; Status:Active; Requested FQF:25RCR9734; Perform:[facility]; WOS:23OND9523;MBHHKYL; For: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; Ordered By:Marco Rivera;   · (1) CREATININE; Status:Active; Requested WXW:25QQH7242; Perform:[facility]; EJJ:38RMU3431;QFYXHJY; For: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; Ordered By:Marco Rivera;   · * MRI BREAST BILATERAL W OR WO CONTRAST; Status:Need Information - Financial    Authorization; Requested [id number]; Perform:[facility]; JKY:[id number];YNTUNAU; For: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; Ordered By:Marco Rivera;    Discussion/Summary      I reviewed the pathology with the patient  I explained that she had LCIS the continued to the edge and is most likely in other portions of the breast and possibly the other breast  The goal of the surgery is not to remove all of it as to make sure there is no cancer at the site of the radiographic abnormality  I explained the various risk calculator sore place her risk of developing cancer in her lifetime somewhere between 35 and 50 percent  I have recommended she see Dr Ross Bernheim for an opinion regarding chemoprevention   I have also recommended she have clinical breast exams twice a year and annual mammography is in addition to her mammogram  Patient is agreeable to this plan  We will see her back in 6 months following her MRI  Chief Complaint   Post op appointment  Right breast lumpectomy performed on 2017  Pre op diagnosis LCIS and remains LCIS on final excision  Separate inferior margin section sent to pathology with LCIS in 7/7 sections  TC risk calculated and her lifetime risk is 53 3%, average risk is 8 1%  Patient came to visit with results of her sister's genetic testing which shows a variant of uncertain significance in the CHEK2 and the MLH1 genes  Her sister had a diagnosis of breast cancer which was treated with mastectomy and immediate reconstruction with post operative tamoxifen  Post-Op   HPI: See chief complaint  Diagnosis and Stagin: Right breast lobular carcinoma in situ    Treatment History: 2017: Right breast needle localization lumpectomy      Review of Systems        Constitutional: The patient denies new or recent history of general fatigue, no recent weight loss, no change in appetite  Eyes: No complaints of visual problems, no scleral icterus  ENT: no complaints of ear pain, no hoarseness, no difficulty swallowing,-- no tinnitus-- and-- no new masses in head, oral cavity, or neck  Cardiovascular: No complaints of chest pain, no palpitations, no ankle edema  Respiratory: No complaints of shortness of breath, no cough  Gastrointestinal: No complaints of jaundice, no bloody stools, no pale stools  Genitourinary: No complaints of dysuria, no hematuria, no nocturia, no frequent urination, no urethral discharge  Musculoskeletal: No complaints of weakness, paralysis, joint stiffness or arthralgias,  Integumentary: right breast post op ecchymosis and swelling, but-- no rashes,-- no itching,-- no breast pain,-- no skin lesions,-- no skin wound,-- no breast lump-- and-- no nipple discharge        Neurological: No complaints of convulsions, no seizures, no dizziness  Hematologic/Lymphatic: No complaints of easy bruising  Active Problems   1  Depression (311) (F32 9)   2  Dyslipidemia (272 4) (E78 5)   3  Elevated LFTs (790 6) (R79 89)   4  Esophageal reflux (530 81) (K21 9)   5  Left thyroid nodule (241 0) (E04 1)   6  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 (233 0) (D05 01)   7  Mild intermittent asthma without complication (866 54) (K14 38)   8  Non-toxic uninodular goiter (241 0) (E04 1)   9  Obesity (BMI 30 0-34 9) (278 00) (E66 9)   10  Otitis externa (380 10) (H60 90)   11  Otitis externa, left (380 10) (H60 92)   12  Rosacea (695 3) (L71 9)    Social History    · Being A Social Drinker   · Former smoker (R01 92) (V39 431)    Current Meds    1  Escitalopram Oxalate 10 MG Oral Tablet; Take 1 tablet daily; Therapy: 32GPY3006 to (Heber Aguirre)  Requested for: 88BTU6790; Last     Rx:07Nov2017 Ordered   2  Multi-Vitamin Oral Tablet; take 1 tab daily; Therapy: 70XEJ9741 to Recorded   3  ProAir  (90 Base) MCG/ACT Inhalation Aerosol Solution; USE 2 PUFFS EVERY     4-6 HOURS AS NEEDED; Therapy: 42DAU5230 to (Last Rx:02Oct2017) Ordered    Allergies   1  No Known Drug Allergies    Vitals    Recorded: 51GKK6894 11:32AM   Temperature 97 4 F   Heart Rate 89   Respiration 16   Systolic 244   Diastolic 80   Height 5 ft 10 in   Weight 232 lb    BMI Calculated 33 29   BSA Calculated 2 23   O2 Saturation 97     Physical Exam        Additional Exam:  Patient's wound is healing quite well  Future Appointments      Date/Time Provider Specialty Site   04/02/2018 08:45 AM MISAEL Hernandez   [address]     Signatures    Electronically signed by : Eric Dumont MD; Jan 5 2018 12:32PM EST                       (Author)

## 2018-01-06 NOTE — PROGRESS NOTES
Chief Complaint   Post op appointment  Right breast lumpectomy performed on 12/19/2017  Pre op diagnosis LCIS and remains LCIS on final excision  Separate inferior margin section sent to pathology with LCIS in 7/7 sections  TC risk calculated and her lifetime risk is 53 3%, average risk is 8 1%  Patient came to visit with results of her sister's genetic testing which shows a variant of uncertain significance in the CHEK2 and the MLH1 genes  Her sister had a diagnosis of breast cancer which was treated with mastectomy and immediate reconstruction with post operative tamoxifen  Post-Op   See chief complaint  2017: Right breast lobular carcinoma in situ    2017: Right breast needle localization lumpectomy      Review of Systems        Constitutional: The patient denies new or recent history of general fatigue, no recent weight loss, no change in appetite  Eyes: No complaints of visual problems, no scleral icterus  ENT: no complaints of ear pain, no hoarseness, no difficulty swallowing,-- no tinnitus-- and-- no new masses in head, oral cavity, or neck  Cardiovascular: No complaints of chest pain, no palpitations, no ankle edema  Respiratory: No complaints of shortness of breath, no cough  Gastrointestinal: No complaints of jaundice, no bloody stools, no pale stools  Genitourinary: No complaints of dysuria, no hematuria, no nocturia, no frequent urination, no urethral discharge  Musculoskeletal: No complaints of weakness, paralysis, joint stiffness or arthralgias,  Integumentary: right breast post op ecchymosis and swelling, but-- no rashes,-- no itching,-- no breast pain,-- no skin lesions,-- no skin wound,-- no breast lump-- and-- no nipple discharge  Neurological: No complaints of convulsions, no seizures, no dizziness  Hematologic/Lymphatic: No complaints of easy bruising  Active Problems   1  Depression (311) (F32 9)   2   Dyslipidemia (272 4) (E78 5) 3  Elevated LFTs (790 6) (R79 89)   4  Esophageal reflux (530 81) (K21 9)   5  Left thyroid nodule (241 0) (E04 1)   6  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 (233 0) (D05 01)   7  Mild intermittent asthma without complication (240 98) (A20 95)   8  Non-toxic uninodular goiter (241 0) (E04 1)   9  Obesity (BMI 30 0-34 9) (278 00) (E66 9)   10  Otitis externa (380 10) (H60 90)   11  Otitis externa, left (380 10) (H60 92)   12  Rosacea (695 3) (L71 9)    Social History    · Being A Social Drinker   · Former smoker (T98 20) (I35 817)    Current Meds    1  Escitalopram Oxalate 10 MG Oral Tablet; Take 1 tablet daily; Therapy: 73JCY0245 to (Flavio Fan)  Requested for: 61DJR7503; Last     Rx:07Nov2017 Ordered   2  Multi-Vitamin Oral Tablet; take 1 tab daily; Therapy: 75ERD5231 to Recorded   3  ProAir  (90 Base) MCG/ACT Inhalation Aerosol Solution; USE 2 PUFFS EVERY     4-6 HOURS AS NEEDED; Therapy: 90BZC8974 to (Last Rx:02Oct2017) Ordered    Allergies   1  No Known Drug Allergies    Vitals    Recorded: 92ONZ5514 11:32AM   Temperature 97 4 F   Heart Rate 89   Respiration 16   Systolic 519   Diastolic 80   Height 5 ft 10 in   Weight 232 lb    BMI Calculated 33 29   BSA Calculated 2 23   O2 Saturation 97     Physical Exam        Additional Exam:  Patient's wound is healing quite well  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    · Follow-up visit in 6 months Evaluation and Treatment  Follow-up  Status: Hold For -    Scheduling  Requested for: 71JLY6643   Ordered; For: 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; Ordered By: Jacquelin Friedman Performed:  Due: 38YDJ6297   · 1 - Roger Wasserman MD, [place]  (Medical Oncology) Co-Management  *  Status: Active     Requested for: 18JTJ7564   Ordered; For: 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; Ordered By: Jacquelin Friedman Performed:  Due: 22GOR7180  Care Summary provided  : Yes   · (1) BUN; Status:Active; Requested NWT:48TFZ6457; Perform:[facility]; LKP:59CSF7657;FVLPRQQ; For: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; Ordered By:Marco Rivera;    Discussion/Summary      I reviewed the pathology with the patient  I explained that she had LCIS the continued to the edge and is most likely in other portions of the breast and possibly the other breast  The goal of the surgery is not to remove all of it as to make sure there is no cancer at the site of the radiographic abnormality  I explained the various risk calculator sore place her risk of developing cancer in her lifetime somewhere between 35 and 50 percent  I have recommended she see Dr Cassandria Lombard for an opinion regarding chemoprevention  I have also recommended she have clinical breast exams twice a year and annual mammography is in addition to her mammogram  Patient is agreeable to this plan  We will see her back in 6 months following her MRI        Future Appointments     Signatures    Electronically signed by : Inderjit Eli MD; Jan 5 2018 12:32PM EST                       (Author)

## 2018-01-11 NOTE — RESULT NOTES
Verified Results  THINPREP TIS AND HPV mRNA E6/E7 49PQA9647 12:00AM Scarlet Baldwin     Test Name Result Flag Reference   CLINICAL INFORMATION: Postmenopausal     LMP: POSTMENOPAUSAL     PREV  PAP: NONE GIVEN     PREV  BX: NONE GIVEN     SOURCE: Cervix     STATEMENT OF ADEQUACY:      SATISFACTORY FOR EVALUATION   INTERPRETATION/RESULT:      Negative for intraepithelial lesion or malignancy  Atrophic pattern; predominantly parabasal cells   COMMENT:      This Pap test has been evaluated with computer  assisted technology  CYTOTECHNOLOGIST:      FLYNN MARTINEZ(ASCP)  CT screening location: [address] [place] [address]   HPV mRNA E6/E7 Not Detected  Not Detected   This test was performed using the APTIMA HPV Assay ([employer] )  This assay detects E6/E7 viral messenger RNA (mRNA) from 14  high-risk HPV types (16,18,31,33,35,39,45,51,52,56,58,59,66,68)

## 2018-01-12 VITALS
BODY MASS INDEX: 33.43 KG/M2 | SYSTOLIC BLOOD PRESSURE: 118 MMHG | TEMPERATURE: 98.2 F | HEART RATE: 60 BPM | HEIGHT: 70 IN | DIASTOLIC BLOOD PRESSURE: 80 MMHG | RESPIRATION RATE: 16 BRPM | WEIGHT: 233.5 LBS

## 2018-01-13 VITALS
RESPIRATION RATE: 16 BRPM | SYSTOLIC BLOOD PRESSURE: 134 MMHG | TEMPERATURE: 98.2 F | HEIGHT: 70 IN | DIASTOLIC BLOOD PRESSURE: 84 MMHG | WEIGHT: 231.8 LBS | BODY MASS INDEX: 33.18 KG/M2 | HEART RATE: 72 BPM

## 2018-01-14 VITALS
BODY MASS INDEX: 33.27 KG/M2 | HEIGHT: 70 IN | RESPIRATION RATE: 16 BRPM | SYSTOLIC BLOOD PRESSURE: 108 MMHG | WEIGHT: 232.4 LBS | DIASTOLIC BLOOD PRESSURE: 60 MMHG | HEART RATE: 76 BPM | TEMPERATURE: 98.2 F

## 2018-01-14 VITALS
HEART RATE: 86 BPM | TEMPERATURE: 98.9 F | BODY MASS INDEX: 32.55 KG/M2 | RESPIRATION RATE: 16 BRPM | WEIGHT: 227.38 LBS | SYSTOLIC BLOOD PRESSURE: 132 MMHG | HEIGHT: 70 IN | DIASTOLIC BLOOD PRESSURE: 84 MMHG

## 2018-01-15 ENCOUNTER — ALLSCRIPTS OFFICE VISIT (OUTPATIENT)
Dept: OTHER | Facility: OTHER | Age: 63
End: 2018-01-15

## 2018-01-15 NOTE — PROGRESS NOTES
Chief Complaint  Patient here for regular flu vaccine administrated on the left deltoid, Lot  [id number], Exp 4/30/2017  Active Problems    1  Acute bronchitis (466 0) (J20 9)   2  Allergic dermatitis (692 9) (L23 9)   3  Allergic rhinitis (477 9) (J30 9)   4  Asthma (493 90) (J45 909)   5  Carotid bruit (785 9) (R09 89)   6  Cough (786 2) (R05)   7  Depression (311) (F32 9)   8  Encounter for screening mammogram for breast cancer (V76 12) (Z12 31)   9  Esophageal reflux (530 81) (K21 9)   10  Need for influenza vaccination (V04 81) (Z23)   11  Need for prophylactic vaccination and inoculation against influenza (V04 81) (Z23)   12  Non-toxic uninodular goiter (241 0) (E04 1)   13  Rosacea (695 3) (L71 9)   14  Visit for routine gyn exam (V72 31) (Z01 419)    Current Meds   1  Azithromycin 250 MG Oral Tablet; TAKE 2 TABLETS ON DAY 1 THEN TAKE 1 TABLET A   DAY FOR 4 DAYS; Therapy: 08CPE6956 to (Last BZ:93YZE1706)  Requested for: 56YSG5231 Ordered   2  Escitalopram Oxalate 10 MG Oral Tablet; TAKE 1 TABLET EVERY OTHER DAY; Therapy: 26NFV4206 to (Evaluate:59Snp7586)  Requested for: 34UYE3942; Last   Rx:26Wud2292 Ordered   3  Fluticasone Propionate 50 MCG/ACT Nasal Suspension; USE 2 SPRAYS IN EACH   NOSTRIL ONCE DAILY; Therapy: 41ZGA6153 to (Evaluate:43Jxn1517)  Requested for: 60BXD2017; Last   Rx:70Fxr5948 Ordered   4  Loratadine 10 MG Oral Tablet; take 1 tab daily; Therapy: (JIBRAUAL:37IHN8200) to Recorded   5  Multi-Vitamin Oral Tablet; take 1 tab daily; Therapy: 51WJB3702 to Recorded   6  Promethazine-DM 6 25-15 MG/5ML Oral Syrup; TAKE 5 ML EVERY 4 TO 6 HOURS AS   NEEDED FOR COUGH; Therapy: 53SYV2209 to (Last QK:81ILG9306)  Requested for: 01CXK3498 Ordered    Allergies    1   No Known Drug Allergies    Vitals  Signs    Temperature: 98 2 F  Weight: 266 lb 6 0 oz  BMI Calculated: 38 22  BSA Calculated: 2 36    Plan  Need for influenza vaccination    · Flulaval Quadrivalent Intramuscular Suspension    Signatures   Electronically signed by : MISAEL Herrera ; Oct 18 2016  5:07PM EST                       (Author)

## 2018-01-15 NOTE — RESULT NOTES
Verified Results  * MAMMO SCREENING BILATERAL W CAD Jaspal 81 09:04AM Miracle Rivera Order Number: [id number]    - Patient Instructions: To schedule this appointment, please contact Central Scheduling at [phone]  Do not wear any perfume, powder, lotion or deodorant on breast or underarm area  Please bring your doctors order, referral (if needed) and insurance information with you on the day of the test  Failure to bring this information may result in this test being rescheduled  Arrive 15 minutes prior to your appointment time to register  On the day of your test, please bring any prior mammogram or breast studies with you that were not performed at a [facility]  Failure to bring prior exams may result in your test needing to be rescheduled  Test Name Result Flag Reference   MAMMO SCREENING BILATERAL W CAD (Report)     Patient History:   Patient is postmenopausal    Family history of breast cancer at age 47 and endometrial cancer    at age 58 in sister, colorectal cancer at age 48 in brother,    colorectal cancer at age 40 in mother, breast cancer at age 48 in   maternal cousin, endometrial cancer at age 61 in paternal    cousin, colorectal cancer at age 61 in paternal cousin, prostate    cancer at age 48 in maternal cousin  Took hormonal contraceptives for 3 years  Patient is a former smoker, and smoked for 2 years  Patient's    BMI is 33 2  Reason for exam: screening, asymptomatic  Mammo Screening Bilateral W CAD: October 27, 2017 - Check In #:    [de-identified]   Bilateral CC and MLO view(s) were taken  Technologist: RT Aguilar(R)(M)   Prior study comparison: October 26, 2016, mammo screening    bilateral W CAD performed at [address] [place] [address]  October 21, 2015, digital bilateral screening mammogram performed   [facility]  October 15, 2014, digital    bilateral screening mammogram performed at [facility]  October 9, 2013, digital bilateral screening mammogram    performed at [facility]  October 1, 2012,    digital bilateral screening mammogram performed at [facility]  There are scattered fibroglandular densities  No dominant soft tissue mass, architectural distortion are noted    in either breast  The skin and nipple contours are within normal   limits  Small cluster of calcifications are not noted in the    retroareolar right breast  Spot magnification views recommended  Musa Soto BI-RADSï¾® Assessments: BiRad:0 - Incomplete: needs additional    imaging evaluation     A breast health care nurse from our facility will be contacting    the patient regarding the need for additional imaging  Recommendation:   Further imaging  Analyzed by CAD     The patient is scheduled in a reminder system for screening    mammography  8-10% of cancers will be missed on mammography  Management of a    palpable abnormality must be based on clinical grounds  Patients   will be notified of their results via letter from our facility  Accredited by [employer] and FDA  Transcription Location: [facility] 98: [id number]     Risk Value(s):   Tyrer-Cuzick 10 Year: 7 000%, Tyrer-Cuzick Lifetime: 15 300%,    Myriad Table: 1 5%, BJ 5 Year: 3 0%, NCI Lifetime: 12 4%, MRS    : Based on personal and/or family history,    consideration of hereditary risk assessment may be warranted     Signed by:   Shakila Stanton MD   10/27/17     (Q) CBC (INCLUDES DIFF/PLT) (REFL) [id number] 07:56AM Monik Hale     Test Name Result Flag Reference   WHITE BLOOD CELL COUNT 6 2 Thousand/uL  3 8-10 8   RED BLOOD CELL COUNT 5 14 Million/uL H 3 80-5 10   HEMOGLOBIN 14 0 g/dL  11 7-15 5   HEMATOCRIT 42 4 %  35 0-45 0   MCV 82 5 fL  80 0-100 0   MCH 27 2 pg  27 0-33 0   MCHC 33 0 g/dL  32 0-36 0   RDW 13 8 %  11 0-15 0   PLATELET COUNT 624 Thousand/uL  140-400 MPV 11 0 fL  7 5-12 5   ABSOLUTE NEUTROPHILS 3739 cells/uL  4739-4415   ABSOLUTE LYMPHOCYTES 1891 cells/uL  850-3900   ABSOLUTE MONOCYTES 378 cells/uL  200-950   ABSOLUTE EOSINOPHILS 161 cells/uL     ABSOLUTE BASOPHILS 31 cells/uL  0-200   NEUTROPHILS 60 3 %     LYMPHOCYTES 30 5 %     MONOCYTES 6 1 %     EOSINOPHILS 2 6 %     BASOPHILS 0 5 %       (Q) COMPREHENSIVE METABOLIC PNL W/ADJUSTED CALCIUM 27Oct2017 07:56AM [employer]     Test Name Result Flag Reference   GLUCOSE 100 mg/dL H 65-99   Fasting reference interval     For someone without known diabetes, a glucose value  between 100 and 125 mg/dL is consistent with  prediabetes and should be confirmed with a  follow-up test    UREA NITROGEN (BUN) 14 mg/dL  7-25   CREATININE 0 94 mg/dL  0 50-0 99   For patients >52years of age, the reference limit  for Creatinine is approximately 13% higher for people  identified as -American  eGFR NON-AFR   AMERICAN 65 mL/min/1 73m2  > OR = 60   eGFR AFRICAN AMERICAN 75 mL/min/1 73m2  > OR = 60   BUN/CREATININE RATIO   7-92   NOT APPLICABLE (calc)   SODIUM 141 mmol/L  135-146   POTASSIUM 3 9 mmol/L  3 5-5 3   CHLORIDE 105 mmol/L     CARBON DIOXIDE 29 mmol/L  20-31   CALCIUM 9 6 mg/dL  8 6-10 4   CALCIUM (ADJUSTED FOR$ALBUMIN) 9 5 mg/dL (calc)  8 6-10 2   PROTEIN, TOTAL 7 0 g/dL  6 1-8 1   ALBUMIN 4 5 g/dL  3 6-5 1   GLOBULIN 2 5 g/dL (calc)  1 9-3 7   ALBUMIN/GLOBULIN RATIO 1 8 (calc)  1 0-2 5   BILIRUBIN, TOTAL 0 6 mg/dL  0 2-1 2   ALKALINE PHOSPHATASE 77 U/L     AST 36 U/L H 10-35   ALT 49 U/L H 6-29     (Q) TSH, 3RD GENERATION 27Oct2017 07:56AM Nancie Figueroa   REPORT COMMENT:  FASTING:YES  AN UPDATE OR CORRECTION HAS BEEN MADE TO NAME     Test Name Result Flag Reference   TSH 3 50 mIU/L  0 40-4 50     (1) LIPID PANEL, FASTING 27Oct2017 07:56AM [facility]     Test Name Result Flag Reference   CHOLESTEROL, TOTAL 184 mg/dL  <200   HDL CHOLESTEROL 40 mg/dL L >46   TRIGLICERIDES 118 mg/dL  <150   LDL-CHOLESTEROL 121 mg/dL (calc) H    Reference range: <100     Desirable range <100 mg/dL for patients with CHD or  diabetes and <70 mg/dL for diabetic patients with  known heart disease  LDL-C is now calculated using the Ramiro-Diego   calculation, which is a validated novel method providing   better accuracy than the Friedewald equation in the   estimation of LDL-C  Jayla Pizarro  [facility] Peter  [facility]0;396(32): 0129-2270   (http://[employer]/faq/XPN429)   CHOL/HDLC RATIO 4 6 (calc)  <5 0   NON HDL CHOLESTEROL 144 mg/dL (calc) H <130   For patients with diabetes plus 1 major ASCVD risk   factor, treating to a non-HDL-C goal of <100 mg/dL   (LDL-C of <70 mg/dL) is considered a therapeutic   option         Signatures   Electronically signed by : MISAEL Schaefer ; Nov 5 2017 11:12AM EST                       (Author)

## 2018-01-16 NOTE — CONSULTS
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; Ordered By: Dulce Krishna Performed:  Due: 16NXR7084    Discussion/Summary      A 78-year-old postmenopausal woman with 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  She underwent lumpectomy  There was no evidence of invasive carcinoma  She presents today to discuss possible chemoprevention  She has strong family history of colon cancer in her mother as well as brother  Her sister had breast cancer as well as endometrial cancer at a young age  Her sister has variant of CHEK2 and MLH1  We had extensive discussion regarding future risk of breast cancer  Since she has lobular carcinoma in situ, her lifetime risk of breast cancer is at least 30 %, up to 50%  Therefore, chemoprevention with aromatase inhibitor can be considered  With aromatase inhibitor, her risk of breast cancer will be half  Side effect of aromatase inhibitor was thoroughly discussed, including but not limited to hot flashes, musculoskeletal symptom as well as bone mineral density loss  There is no survival advantage of using aromatase inhibitor  After lengthy discussion, she feels most comfortable not to take chemoprevention and continue with close surveillance  We also discussed genetics testing  With her strong family history of colorectal cancer, uterine cancer, I think it is reasonable to have genetic testing  I will refer her our genetic counselor   She is in agreement with my recommendations  She is going to be followed by Dr Quentin Hargrove  She may see me p r n  basis  Chief Complaint   Lobular carcinoma in situ in the right breast  Diagnosed in December 2017  History of Present Illness   A 50-year-old postmenopausal woman who was recently found to have abnormal calcification in the right breast  Therefore, she underwent right breast biopsy in November 2017 which showed lobular carcinoma in situ  She subsequently underwent lumpectomy in December 19, 2017 which showed lobular carcinoma in situ  There was no evidence of invasive carcinoma  Therefore, she presents today to discuss chemoprevention  She has strong family history of colon cancer  Her mother had colon cancer at age of 39  Her brother also had colon cancer at age of 48  Her sister who is currently 70years old had breast cancer at age of 39 as well as uterine cancer at age 48  Her sister has CHEK2 and MLH1 variant of undetermined significance  She has not had genetic testing yet  She feels well  She has mild soreness in the right breast  She denied any pain  She has no respiratory symptoms  She is up to date with colonoscopy  Her performance status is normal       Review of Systems        Constitutional: No fever, no chills, feels well, no tiredness, no recent weight gain or weight loss  Eyes: No complaints of eye pain, no red eyes, no eyesight problems, no discharge, no dry eyes, no itching of eyes  ENT: no complaints of earache, no loss of hearing, no nose bleeds, no nasal discharge, no sore throat, no hoarseness  Cardiovascular: No complaints of slow heart rate, no fast heart rate, no chest pain, no palpitations, no leg claudication, no lower extremity edema  Respiratory: No complaints of shortness of breath, no wheezing, no cough, no SOB on exertion, no orthopnea, no PND        Gastrointestinal: No complaints of abdominal pain, no constipation, no nausea or vomiting, no diarrhea, no bloody stools  Genitourinary: No complaints of dysuria, no incontinence, no pelvic pain, no dysmenorrhea, no vaginal discharge or bleeding  Musculoskeletal: No complaints of arthralgias, no myalgias, no joint swelling or stiffness, no limb pain or swelling  Integumentary: No complaints of skin rash or lesions, no itching, no skin wounds, no breast pain or lump  Neurological: No complaints of headache, no confusion, no convulsions, no numbness, no dizziness or fainting, no tingling, no limb weakness, no difficulty walking  Psychiatric: Not suicidal, no sleep disturbance, no anxiety or depression, no change in personality, no emotional problems  Endocrine: No complaints of proptosis, no hot flashes, no muscle weakness, no deepening of the voice, no feelings of weakness  Hematologic/Lymphatic: No complaints of swollen glands, no swollen glands in the neck, does not bleed easily, does not bruise easily  ROS reviewed  Active Problems   1  Depression (311) (F32 9)   2  Dyslipidemia (272 4) (E78 5)   3  Elevated LFTs (790 6) (R79 89)   4  Esophageal reflux (530 81) (K21 9)   5  Left thyroid nodule (241 0) (E04 1)   6  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 (233 0) (D05 01)   7  Mild intermittent asthma without complication (440 54) (H88 00)   8  Non-toxic uninodular goiter (241 0) (E04 1)   9  Obesity (BMI 30 0-34 9) (278 00) (E66 9)   10  Otitis externa (380 10) (H60 90)   11  Otitis externa, left (380 10) (H60 92)   12  Rosacea (695 3) (L71 9)    Past Medical History   1  History of Acute non-recurrent maxillary sinusitis (461 0) (J01 00)   2  History of Acute upper respiratory infection (465 9) (J06 9)   3  History of Allergic dermatitis (692 9) (L23 9)   4  History of Carotid bruit (785 9) (R09 89)   5  History of Cough (786 2) (R05)   6  History of Dysphagia (787 20) (R13 10)   7   History of Encounter for gynecological examination with Papanicolaou smear of cervix (V72 31) (Z01 419)   8  History of acute bronchitis (V12 69) (Z87 09)   9  History of acute sinusitis (V12 69) (Z87 09)   10  History of allergic rhinitis (V12 69) (Z87 09)   11  History of influenza vaccination (V49 89) (Z92 29)   12  History of influenza vaccination (V49 89) (Z92 29)   13  History of screening mammography (V15 89) (Z92 89)   14  History of Visit for routine gyn exam (V72 31) (Z01 419)     The active problems and past medical history were reviewed and updated today  Surgical History   1  History of Biopsy Breast Percutaneous Needle Core   2  History of  Section   3  History of Complete Colonoscopy   4  History of Right Breast Lumpectomy     The surgical history was reviewed and updated today  Family History   Mother    1  Family history of Acute Myocardial Infarction (V17 3)   2  Family history of Polyps Of The Sigmoid Colon  Sister    3  Family history of malignant neoplasm of breast (V16 3) (Z80 3)   4  Family history of malignant neoplasm of uterus (V16 49) (Z80 49)  Brother    5  Family history of liver cancer (V16 0) (Z80 0)   6  Family history of Liver Cancer  Maternal Grandmother    7  Family history of malignant neoplasm of uterus (V16 49) (Z80 49)  Paternal Grandmother    6  Family history of malignant neoplasm of uterus (V16 49) (Z80 49)  Paternal Grandfather    5  Family history of Diabetes Mellitus (V18 0)  Paternal Aunt    8  Family history of malignant neoplasm of kidney (V16 51) (Z80 51)  Maternal Uncle    6  Family history of Stroke Syndrome (V17 1)  Paternal Uncle    15  Family history of Pancreatic carcinoma     The family history was reviewed and updated today  Social History    · Being A Social Drinker   · Former smoker (U43 86) (X12 213)  The social history was reviewed and updated today  The social history was reviewed and is unchanged  Current Meds    1  Escitalopram Oxalate 10 MG Oral Tablet; Take 1 tablet daily;      Therapy: 47KEW7693 to (Cong Morley)  Requested for: 02BZI0674; Last     Rx:08Jan2018 Ordered   2  Multi-Vitamin Oral Tablet; take 1 tab daily; Therapy: 14USM3402 to Recorded   3  ProAir  (90 Base) MCG/ACT Inhalation Aerosol Solution; USE 2 PUFFS EVERY     4-6 HOURS AS NEEDED; Therapy: 89YTG0367 to (Last Rx:02Oct2017) Ordered     The medication list was reviewed and updated today  Allergies   1  No Known Drug Allergies    Vitals   Vital Signs    Recorded: 96LZY4335 04:21PM   Temperature 98 8 F   Heart Rate 107   Respiration 16   Systolic 583   Diastolic 86   Height 5 ft 9 5 in   Weight 230 lb    BMI Calculated 33 48   BSA Calculated 2 2   O2 Saturation 96     Physical Exam        Constitutional      General appearance: No acute distress, well appearing and well nourished  Eyes      Conjunctiva and lids: No swelling, erythema or discharge  Pupils and irises: Equal, round and reactive to light  Ears, Nose, Mouth, and Throat      External inspection of ears and nose: Normal        Otoscopic examination: Tympanic membranes translucent with normal light reflex  Canals patent without erythema  Nasal mucosa, septum, and turbinates: Normal without edema or erythema  Oropharynx: Normal with no erythema, edema, exudate or lesions  Pulmonary      Respiratory effort: No increased work of breathing or signs of respiratory distress  Auscultation of lungs: Clear to auscultation  Cardiovascular      Palpation of heart: Normal PMI, no thrills  Auscultation of heart: Normal rate and rhythm, normal S1 and S2, without murmurs  Examination of extremities for edema and/or varicosities: Normal        Carotid pulses: Normal        Abdomen      Abdomen: Non-tender, no masses  Liver and spleen: No hepatomegaly or splenomegaly  Lymphatic      Palpation of lymph nodes in neck: No lymphadenopathy         Musculoskeletal      Gait and station: Normal        Digits and nails: Normal without clubbing or cyanosis  Inspection/palpation of joints, bones, and muscles: Normal        Skin      Skin and subcutaneous tissue: Normal without rashes or lesions  Neurologic      Cranial nerves: Cranial nerves 2-12 intact  Reflexes: 2+ and symmetric  Sensation: No sensory loss  Psychiatric      Orientation to person, place, and time: Normal        Mood and affect: Normal        Additional Exam:  Breast exam; lumpectomy scar at 9:00 position in her right breast with no palpable abnormalities  Left breast exam is negative  ECOG 0       Results/Data      Results      Pathology Lumpectomy showed lobular carcinoma in situ  No invasive carcinoma  Radiology Chest x-ray was negative for pulmonary disease  Lab Results CBC and CMP are within normal limits  Future Appointments      Date/Time Provider Specialty Site   04/02/2018 08:45 AM MISAEL Hernandez   [address]   07/11/2018 09:00 AM Kaylen Mckeon MD Surgical Oncology [facility]     Signatures    Electronically signed by : MISAEL Powell ; Omar 15 2018  4:55PM EST                       (Author)

## 2018-01-16 NOTE — RESULT NOTES
Verified Results  [facility] DIAGNOSTIC RIGHT W CAD [id number] 08:14AM [place] Order Number: [id number]    - Patient Instructions: To schedule this appointment, please contact Central Scheduling at [phone]  Do not wear any perfume, powder, lotion or deodorant on breast or underarm area  Please bring your doctors order, referral (if needed) and insurance information with you on the day of the test  Failure to bring this information may result in this test being rescheduled  Arrive 15 minutes prior to your appointment time to register  On the day of your test, please bring any prior mammogram or breast studies with you that were not performed at a [facility]  Failure to bring prior exams may result in your test needing to be rescheduled  Test Name Result Flag Reference   MAMMO DIAGNOSTIC RIGHT W CAD (Report)     Patient History:   Patient is postmenopausal    Family history of breast cancer at age 47 and endometrial cancer    at age 58 in sister, colorectal cancer at age 48 in brother,    colorectal cancer at age 40 in mother, breast cancer at age 48 in   maternal cousin, endometrial cancer at age 61 in paternal    cousin, colorectal cancer at age 61 in paternal cousin, prostate    cancer at age 48 in maternal cousin  Took hormonal contraceptives for 3 years  Patient is a former smoker, and smoked for 2 years  Patient's    BMI is 33 2  Reason for exam: addl evaluation requested from abnormal    screening  16-year-old woman recalled from screening mammogram 10/27/2017    for right breast calcifications  Family history significant for breast cancer at age 47 in sister  Mammo Diagnostic Right W CAD: November 8, 2017 - Check In #:    [de-identified]   CC and MLO view(s) were taken of the right breast      Technologist: Ernestine Mena RT(R)(M)   Prior study comparison: October 27, 2017, mammo screening    bilateral W CAD, performed at [address] [place] [address]  October 26, 2016, mammo screening bilateral W CAD, performed at    [address] [place] [address]  There are scattered fibroglandular densities  Diagnostic    mammography of the right breast was performed  There are 2    groups of new round to amorphous calcifications in the right    breast, one retroareolar and one upper-outer quadrant at 10:00,    both in the anterior 3rd depth  Both groups appears similar in    size and morphology  Elsewhere in the right breast, there are no suspicious masses or    areas of architectural distortion  1  2 groups of similar appearing indeterminate calcifications in   the right breast are new since prior screening mammogram     Stereotactic biopsy of both groups is recommended  ACR BI-RADSï¾® Assessments: BiRad:4 - Suspicious     Recommendation:   Stereotactic biopsy of the right breast    I personally discussed these findings and recommendations with    the patient, reflecting my level of concern (4A)  Analyzed by CAD     The patient is scheduled in a reminder system for screening    mammography  8-10% of cancers will be missed on mammography  Management of a    palpable abnormality must be based on clinical grounds  Patients   will be notified of their results via letter from our facility  Accredited by [employer] and FDA  Transcription Location: [facility] 98: [id number]     Risk Value(s):   Tyrer-Cuzick 10 Year: 7 000%, Tyrer-Cuzick Lifetime: 15 300%,    Myriad Table: 1 5%, BJ 5 Year: 3 0%, NCI Lifetime: 12 4%, MRS    : Based on personal and/or family history,    consideration of hereditary risk assessment may be warranted  Signed by:   Ezra Serrano MD   11/8/17       Plan  Breast calcification, right    · MAMMO STEREOTACTIC BREAST BIOPSY RIGHT (ALL INC);  Status:Hold For -  Scheduling; Requested LWR:[id number];

## 2018-01-17 NOTE — RESULT NOTES
Verified Results  MAMMO PATHOLOGY REPORT (NO CHARGE) [id number] 08:20AM [place] Warren     Test Name Result Flag Reference   MAMMO PATHOLOGY REPORT (NO CHARGE) (Report)     Mammo Pathology Letter: November 15, 2017 - Check In #: [de-identified]   DEAR DR Tracie Gee,     Thank you for the recent [facility]    [facility] of the right breast x 2  The results of her recent    procedure have been made available to me from pathology  The results are consistent with lobular carcinoma in situ  The    more medial site suggests focal changes suspicious for    microinvasion  The pathology is concordant with the radiographic appearance  The recommendation is for surgical consult/excision  Our nurse navigators, depending on your known office preferences,   will ensure that the results and recommendations have been    communicated to the patient  If there is any clarification    needed, please do not hesitate to contact the [address] at [phone]  Thank you,   MISEAL Juarez       Transcription Location: [facility] 98: [id number]

## 2018-01-22 VITALS
HEART RATE: 91 BPM | TEMPERATURE: 98 F | WEIGHT: 231.01 LBS | HEIGHT: 70 IN | SYSTOLIC BLOOD PRESSURE: 128 MMHG | BODY MASS INDEX: 33.07 KG/M2 | OXYGEN SATURATION: 97 % | DIASTOLIC BLOOD PRESSURE: 86 MMHG | RESPIRATION RATE: 16 BRPM

## 2018-01-23 VITALS
DIASTOLIC BLOOD PRESSURE: 86 MMHG | SYSTOLIC BLOOD PRESSURE: 130 MMHG | HEART RATE: 107 BPM | TEMPERATURE: 98.8 F | WEIGHT: 230 LBS | BODY MASS INDEX: 32.93 KG/M2 | HEIGHT: 70 IN | OXYGEN SATURATION: 96 % | RESPIRATION RATE: 16 BRPM

## 2018-01-23 NOTE — CONSULTS
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  Diagnosed in December 2017  History of Present Illness  A 79-year-old postmenopausal woman who was recently found to have abnormal calcification in the right breast  Therefore, she underwent right breast biopsy in November 2017 which showed lobular carcinoma in situ  She subsequently underwent lumpectomy in December 19, 2017 which showed lobular carcinoma in situ  There was no evidence of invasive carcinoma  Therefore, she presents today to discuss chemoprevention  She has strong family history of colon cancer  Her mother had colon cancer at age of 39  Her brother also had colon cancer at age of 48  Her sister who is currently 70years old had breast cancer at age of 39 as well as uterine cancer at age 48  Her sister has CHEK2 and MLH1 variant of undetermined significance  She has not had genetic testing yet  She feels well  She has mild soreness in the right breast  She denied any pain  She has no respiratory symptoms  She is up to date with colonoscopy  Her performance status is normal       Review of Systems    Constitutional: No fever, no chills, feels well, no tiredness, no recent weight gain or weight loss  Eyes: No complaints of eye pain, no red eyes, no eyesight problems, no discharge, no dry eyes, no itching of eyes  ENT: no complaints of earache, no loss of hearing, no nose bleeds, no nasal discharge, no sore throat, no hoarseness  Cardiovascular: No complaints of slow heart rate, no fast heart rate, no chest pain, no palpitations, no leg claudication, no lower extremity edema  Respiratory: No complaints of shortness of breath, no wheezing, no cough, no SOB on exertion, no orthopnea, no PND  Gastrointestinal: No complaints of abdominal pain, no constipation, no nausea or vomiting, no diarrhea, no bloody stools     Genitourinary: No complaints of dysuria, no incontinence, no pelvic pain, no dysmenorrhea, no vaginal discharge or bleeding  Musculoskeletal: No complaints of arthralgias, no myalgias, no joint swelling or stiffness, no limb pain or swelling  Integumentary: No complaints of skin rash or lesions, no itching, no skin wounds, no breast pain or lump  Neurological: No complaints of headache, no confusion, no convulsions, no numbness, no dizziness or fainting, no tingling, no limb weakness, no difficulty walking  Psychiatric: Not suicidal, no sleep disturbance, no anxiety or depression, no change in personality, no emotional problems  Endocrine: No complaints of proptosis, no hot flashes, no muscle weakness, no deepening of the voice, no feelings of weakness  Hematologic/Lymphatic: No complaints of swollen glands, no swollen glands in the neck, does not bleed easily, does not bruise easily  ROS reviewed  Active Problems    1  Depression (311) (F32 9)   2  Dyslipidemia (272 4) (E78 5)   3  Elevated LFTs (790 6) (R79 89)   4  Esophageal reflux (530 81) (K21 9)   5  Left thyroid nodule (241 0) (E04 1)   6  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 (233 0) (D05 01)   7  Mild intermittent asthma without complication (366 94) (Z97 56)   8  Non-toxic uninodular goiter (241 0) (E04 1)   9  Obesity (BMI 30 0-34 9) (278 00) (E66 9)   10  Otitis externa (380 10) (H60 90)   11  Otitis externa, left (380 10) (H60 92)   12   Rosacea (695 3) (L71 9)    Past Medical History    · History of Acute non-recurrent maxillary sinusitis (461 0) (J01 00)   · History of Acute upper respiratory infection (465 9) (J06 9)   · History of Allergic dermatitis (692 9) (L23 9)   · History of Carotid bruit (785 9) (R09 89)   · History of Cough (786 2) (R05)   · History of Dysphagia (787 20) (R13 10)   · History of Encounter for gynecological examination with Papanicolaou smear of cervix  (V72 31) (Z01 419)   · History of acute bronchitis (V12 69) (Z87 09)   · History of acute sinusitis (V12 69) (Z87 09)   · History of allergic rhinitis (V12 69) (Z87 09)   · History of influenza vaccination (V49 89) (Z92 29)   · History of influenza vaccination (V49 89) (Z92 29)   · History of screening mammography (V15 89) (Z92 89)   · History of Visit for routine gyn exam (V72 31) (Z01 419)    The active problems and past medical history were reviewed and updated today  Surgical History    · History of Biopsy Breast Percutaneous Needle Core   · History of  Section   · History of Complete Colonoscopy   · History of Right Breast Lumpectomy    The surgical history was reviewed and updated today  Family History    · Family history of Acute Myocardial Infarction (V17 3)   · Family history of Polyps Of The Sigmoid Colon    · Family history of malignant neoplasm of breast (V16 3) (Z80 3)   · Family history of malignant neoplasm of uterus (V16 49) (Z80 49)    · Family history of liver cancer (V16 0) (Z80 0)   · Family history of Liver Cancer    · Family history of malignant neoplasm of uterus (V16 49) (Z80 49)    · Family history of malignant neoplasm of uterus (V16 49) (Z80 49)    · Family history of Diabetes Mellitus (V18 0)    · Family history of malignant neoplasm of kidney (V16 51) (Z80 51)    · Family history of Stroke Syndrome (V17 1)    · Family history of Pancreatic carcinoma    The family history was reviewed and updated today  Social History    · Being A Social Drinker   · Former smoker (I99 64) (Q63 624)  The social history was reviewed and updated today  The social history was reviewed and is unchanged  Current Meds   1  Escitalopram Oxalate 10 MG Oral Tablet; Take 1 tablet daily; Therapy: 26QAM3884 to ([phone])  Requested for: 40JRU7431; Last   Rx:2018 Ordered   2  Multi-Vitamin Oral Tablet; take 1 tab daily; Therapy: 17TUN7630 to Recorded   3  ProAir  (90 Base) MCG/ACT Inhalation Aerosol Solution; USE 2 PUFFS EVERY   4-6 HOURS AS NEEDED;    Therapy: 90IYZ5768 to (Last Rx:2017) Ordered    The medication list was reviewed and updated today  Allergies    1  No Known Drug Allergies    Vitals   Recorded: 09SKM4582 04:21PM   Temperature 98 8 F   Heart Rate 107   Respiration 16   Systolic 143   Diastolic 86   Height 5 ft 9 5 in   Weight 230 lb    BMI Calculated 33 48   BSA Calculated 2 2   O2 Saturation 96     Physical Exam    Constitutional   General appearance: No acute distress, well appearing and well nourished  Eyes   Conjunctiva and lids: No swelling, erythema or discharge  Pupils and irises: Equal, round and reactive to light  Ears, Nose, Mouth, and Throat   External inspection of ears and nose: Normal     Otoscopic examination: Tympanic membranes translucent with normal light reflex  Canals patent without erythema  Nasal mucosa, septum, and turbinates: Normal without edema or erythema  Oropharynx: Normal with no erythema, edema, exudate or lesions  Pulmonary   Respiratory effort: No increased work of breathing or signs of respiratory distress  Auscultation of lungs: Clear to auscultation  Cardiovascular   Palpation of heart: Normal PMI, no thrills  Auscultation of heart: Normal rate and rhythm, normal S1 and S2, without murmurs  Examination of extremities for edema and/or varicosities: Normal     Carotid pulses: Normal     Abdomen   Abdomen: Non-tender, no masses  Liver and spleen: No hepatomegaly or splenomegaly  Lymphatic   Palpation of lymph nodes in neck: No lymphadenopathy  Musculoskeletal   Gait and station: Normal     Digits and nails: Normal without clubbing or cyanosis  Inspection/palpation of joints, bones, and muscles: Normal     Skin   Skin and subcutaneous tissue: Normal without rashes or lesions  Neurologic   Cranial nerves: Cranial nerves 2-12 intact  Reflexes: 2+ and symmetric  Sensation: No sensory loss  Psychiatric   Orientation to person, place, and time: Normal     Mood and affect: Normal     Additional Exam:   Breast exam; lumpectomy scar at 9:00 position in her right breast with no palpable abnormalities  Left breast exam is negative  ECOG 0       Results/Data    Results   Pathology Lumpectomy showed lobular carcinoma in situ  No invasive carcinoma  Radiology Chest x-ray was negative for pulmonary disease  Lab Results CBC and CMP are within normal limits  Assessment    1  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 (233 0) (D05 01)    Plan  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    · *1 -  Oncology Genetic Counseling Co-Management  *  Status: Hold For - Scheduling   Requested for: 04TMT8782   Ordered; For: 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; Ordered By: Holly Donaldson Performed:  Due: 13NVJ5125  Care Summary provided  : Yes   · Follow-up PRN Evaluation and Treatment  Follow-up  Status: Complete  Done:  45BVA9731   Ordered; For: 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; Ordered By: Holly Donaldson Performed:  Due: 35IJH2742    Discussion/Summary    A 16-year-old postmenopausal woman with 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  She underwent lumpectomy  There was no evidence of invasive carcinoma  She presents today to discuss possible chemoprevention  She has strong family history of colon cancer in her mother as well as brother  Her sister had breast cancer as well as endometrial cancer at a young age  Her sister has variant of CHEK2 and MLH1  We had extensive discussion regarding future risk of breast cancer  Since she has lobular carcinoma in situ, her lifetime risk of breast cancer is at least 30 %, up to 50%  Therefore, chemoprevention with aromatase inhibitor can be considered  With aromatase inhibitor, her risk of breast cancer will be half  Side effect of aromatase inhibitor was thoroughly discussed, including but not limited to hot flashes, musculoskeletal symptom as well as bone mineral density loss   There is no survival advantage of using aromatase inhibitor  After lengthy discussion, she feels most comfortable not to take chemoprevention and continue with close surveillance  We also discussed genetics testing  With her strong family history of colorectal cancer, uterine cancer, I think it is reasonable to have genetic testing  I will refer her our genetic counselor  She is in agreement with my recommendations  She is going to be followed by Dr Shelley Meyers  She may see me p r n  basis        Signatures   Electronically signed by : Newton Sever, M D ; Omar 15 2018  4:55PM EST                       (Author)

## 2018-01-24 VITALS
HEIGHT: 70 IN | TEMPERATURE: 97.4 F | RESPIRATION RATE: 16 BRPM | OXYGEN SATURATION: 97 % | HEART RATE: 89 BPM | DIASTOLIC BLOOD PRESSURE: 80 MMHG | BODY MASS INDEX: 33.21 KG/M2 | SYSTOLIC BLOOD PRESSURE: 120 MMHG | WEIGHT: 232 LBS

## 2018-02-01 ENCOUNTER — APPOINTMENT (OUTPATIENT)
Dept: LAB | Facility: CLINIC | Age: 63
End: 2018-02-01
Payer: COMMERCIAL

## 2018-02-01 ENCOUNTER — OFFICE VISIT (OUTPATIENT)
Dept: GYNECOLOGIC ONCOLOGY | Facility: CLINIC | Age: 63
End: 2018-02-01

## 2018-02-01 ENCOUNTER — TRANSCRIBE ORDERS (OUTPATIENT)
Dept: LAB | Facility: CLINIC | Age: 63
End: 2018-02-01

## 2018-02-01 DIAGNOSIS — D05.01 LOBULAR CARCINOMA IN SITU OF RIGHT BREAST: ICD-10-CM

## 2018-02-01 DIAGNOSIS — Z80.49 FAMILY HISTORY OF MALIGNANT NEOPLASM OF UTERUS: Primary | ICD-10-CM

## 2018-02-01 DIAGNOSIS — D05.01 LOBULAR CARCINOMA IN SITU OF RIGHT BREAST: Primary | ICD-10-CM

## 2018-02-01 DIAGNOSIS — Z80.3 FAMILY HISTORY OF MALIGNANT NEOPLASM OF BREAST: ICD-10-CM

## 2018-02-01 DIAGNOSIS — Z80.0 FAMILY HISTORY OF MALIGNANT NEOPLASM OF GASTROINTESTINAL TRACT: ICD-10-CM

## 2018-02-01 PROCEDURE — 36415 COLL VENOUS BLD VENIPUNCTURE: CPT

## 2018-02-01 PROCEDURE — 99999 PR OFFICE/OUTPT VISIT,PROCEDURE ONLY: CPT | Performed by: GENETIC COUNSELOR, MS

## 2018-02-02 LAB — MISCELLANEOUS LAB TEST RESULT: NORMAL

## 2018-03-30 RX ORDER — ALBUTEROL SULFATE 90 UG/1
2 AEROSOL, METERED RESPIRATORY (INHALATION) DAILY
COMMUNITY
Start: 2017-10-02 | End: 2019-02-19

## 2018-03-31 PROBLEM — E66.811 OBESITY (BMI 30.0-34.9): Status: ACTIVE | Noted: 2017-10-02

## 2018-03-31 PROBLEM — R79.89 ELEVATED LFTS: Status: ACTIVE | Noted: 2017-11-05

## 2018-03-31 PROBLEM — E66.9 OBESITY (BMI 30.0-34.9): Status: ACTIVE | Noted: 2017-10-02

## 2018-03-31 PROBLEM — D05.01 LOBULAR CARCINOMA IN SITU (LCIS) OF RIGHT BREAST: Status: ACTIVE | Noted: 2017-11-22

## 2018-03-31 PROBLEM — E04.1 LEFT THYROID NODULE: Status: ACTIVE | Noted: 2017-10-02

## 2018-03-31 PROBLEM — J45.20 MILD INTERMITTENT ASTHMA WITHOUT COMPLICATION: Status: ACTIVE | Noted: 2017-10-02

## 2018-03-31 PROBLEM — E78.5 DYSLIPIDEMIA: Status: ACTIVE | Noted: 2017-10-02

## 2018-04-02 ENCOUNTER — OFFICE VISIT (OUTPATIENT)
Dept: FAMILY MEDICINE CLINIC | Facility: CLINIC | Age: 63
End: 2018-04-02
Payer: COMMERCIAL

## 2018-04-02 VITALS
RESPIRATION RATE: 16 BRPM | WEIGHT: 233.6 LBS | TEMPERATURE: 97.6 F | BODY MASS INDEX: 33.44 KG/M2 | HEIGHT: 70 IN | HEART RATE: 80 BPM | DIASTOLIC BLOOD PRESSURE: 76 MMHG | SYSTOLIC BLOOD PRESSURE: 120 MMHG

## 2018-04-02 DIAGNOSIS — H91.93 BILATERAL HEARING LOSS, UNSPECIFIED HEARING LOSS TYPE: ICD-10-CM

## 2018-04-02 DIAGNOSIS — F41.8 DEPRESSION WITH ANXIETY: ICD-10-CM

## 2018-04-02 DIAGNOSIS — E78.5 DYSLIPIDEMIA: Primary | ICD-10-CM

## 2018-04-02 DIAGNOSIS — J30.1 CHRONIC SEASONAL ALLERGIC RHINITIS DUE TO POLLEN: ICD-10-CM

## 2018-04-02 DIAGNOSIS — E66.9 OBESITY (BMI 30.0-34.9): ICD-10-CM

## 2018-04-02 DIAGNOSIS — J45.20 MILD INTERMITTENT ASTHMA WITHOUT COMPLICATION: ICD-10-CM

## 2018-04-02 DIAGNOSIS — E04.1 LEFT THYROID NODULE: ICD-10-CM

## 2018-04-02 DIAGNOSIS — D05.01 LOBULAR CARCINOMA IN SITU (LCIS) OF RIGHT BREAST: ICD-10-CM

## 2018-04-02 PROCEDURE — 99214 OFFICE O/P EST MOD 30 MIN: CPT | Performed by: FAMILY MEDICINE

## 2018-04-02 RX ORDER — LORATADINE 10 MG/1
TABLET ORAL
Qty: 30 TABLET | Refills: 3 | Status: SHIPPED | OUTPATIENT
Start: 2018-04-02 | End: 2021-08-18 | Stop reason: ALTCHOICE

## 2018-04-02 NOTE — PROGRESS NOTES
Assessment/Plan: 1  Dyslipidemia / Obesity - recommended to follow a low-cholesterol, low-fat diet  Encouraged regular exercise, weight reduction       2  Depression  symptoms are stable  Continue Escitalopram 10 mg one tablet daily  3 R breast CA / S/P lumpectomy in 12/17  - follow-up with surgical oncologist Dr Shellie Santoyo      4 Mild intermittent asthma  symptoms are stable  Use ProAir HFA inhaler PRN  5 BL hearing loss - referred to ENT Dr Timo Robert for evaluation, audiology testing  6 L thyroid nodule - schedule thyroid U/S to assess stability  Schedule follow-up visit in 6 months  Check labs prior to next visit         No problem-specific Assessment & Plan notes found for this encounter  Diagnoses and all orders for this visit:    Dyslipidemia  -     Comprehensive metabolic panel; Future  -     Lipid panel; Future  -     TSH, 3rd generation with T4 reflex; Future    Depression with anxiety  -     CBC and differential; Future  -     TSH, 3rd generation with T4 reflex; Future    Obesity (BMI 30 0-34  9)    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    Bilateral hearing loss, unspecified hearing loss type  -     Ambulatory Referral to Otolaryngology; Future    Chronic seasonal allergic rhinitis due to pollen  -     loratadine (CLARITIN) 10 mg tablet; Take 1 tablet daily PRN    Mild intermittent asthma without complication    Left thyroid nodule  -     US thyroid; Future          Subjective:      Patient ID: Jayla Lewis is a 60 y o  female  HPI     Patient is 28-year-old female with  Dyslipidemia, Obesity, Asthma, AR, Depression,  R breast CA   She presents for 6 month  follow-up office visit  Reviewed current medications, last blood test results from 12/17  Depression - patient takes Escitalopram 10 mg daily  Mood has been stable  Denies anxiety attacks  No suicidal ideations  PGQ -9 score is 5  Asthma - symptoms are stable  No recent asthma exacerbations    Patient did not use  ProAir HFA inhaler since last visit  Patient is allergic to cats and pollens  She takes Loratadine 10 mg daily PRN  Patient was diagnosed with R breast CA last year,  had right breast lumpectomy performed by surgical oncologist Dr Deisy Ricks in 12/17  She did genetic testing and was not recommended additional radiation or chemotherapy  Family history is positive for breast CA in her sister, colon CA in her mother and brother  C/o mild BL hearing loss, worse on L side  No recurrent ear infections  No tinnitus  Denies earache, dizziness  Patient has H/o left thyroid nodule, had Thyroid U/S done in 2012  She did not schedule thyroid U/S as discussed at the last visit  Denies difficulty swallowing, hoarseness of the voice  No neck pain  Patient had colonoscopy done in December 2012 which was normal  Colorectal surgeon Dr Kadeem Buckner recommended to repeat colonoscopy in 5 years due to family history of colon CA  Pneumovax done in June 2014, Flu shot in 10/17  The following portions of the patient's history were reviewed and updated as appropriate: allergies, current medications, past family history, past medical history, past social history, past surgical history and problem list     Review of Systems   Constitutional: Positive for fatigue (mild)  Negative for activity change, appetite change, chills, diaphoresis and fever  HENT: Positive for hearing loss (mild BL hearing loss, L > R)  Negative for congestion, ear discharge, ear pain, nosebleeds, postnasal drip, sore throat and trouble swallowing  Eyes: Negative for discharge, redness, itching and visual disturbance  Respiratory: Negative for cough, chest tightness, shortness of breath and wheezing  Cardiovascular: Negative for chest pain, palpitations and leg swelling  Gastrointestinal: Negative for abdominal pain, constipation, diarrhea, nausea and vomiting     Genitourinary: Negative for difficulty urinating, dysuria, frequency, hematuria and pelvic pain  Musculoskeletal: Negative for arthralgias, back pain, gait problem, joint swelling and myalgias  Skin: Negative for rash and wound  Neurological: Negative for dizziness, syncope, weakness, numbness and headaches  Hematological: Negative  Psychiatric/Behavioral: Negative for suicidal ideas  Depression / Anxiety - symptoms are stable  Objective:      /76   Pulse 80   Temp 97 6 °F (36 4 °C) (Axillary)   Resp 16   Ht 5' 9 5" (1 765 m)   Wt 106 kg (233 lb 9 6 oz)   BMI 34 00 kg/m²        Physical Exam   Constitutional: She is oriented to person, place, and time  She appears well-developed and well-nourished  Obese   HENT:   Head: Normocephalic and atraumatic  Right Ear: External ear normal    Left Ear: External ear normal    Eyes: Conjunctivae are normal  Pupils are equal, round, and reactive to light  Cardiovascular: Normal rate, regular rhythm and normal heart sounds  No murmur heard  Pulmonary/Chest: Effort normal and breath sounds normal  She has no wheezes  She has no rales  Abdominal: Soft  Bowel sounds are normal  There is no tenderness  Musculoskeletal: Normal range of motion  She exhibits no edema, tenderness or deformity  Neurological: She is alert and oriented to person, place, and time  Coordination normal    Skin: Skin is warm and dry  No rash noted  Psychiatric: She has a normal mood and affect  Her behavior is normal  Judgment and thought content normal    Nursing note and vitals reviewed

## 2018-04-13 ENCOUNTER — HOSPITAL ENCOUNTER (OUTPATIENT)
Dept: RADIOLOGY | Age: 63
Discharge: HOME | End: 2018-04-13
Payer: COMMERCIAL

## 2018-04-13 DIAGNOSIS — E04.1 LEFT THYROID NODULE: ICD-10-CM

## 2018-04-13 PROCEDURE — 76536 US EXAM OF HEAD AND NECK: CPT

## 2018-05-01 ENCOUNTER — DOCUMENTATION (OUTPATIENT)
Dept: GYNECOLOGIC ONCOLOGY | Facility: CLINIC | Age: 63
End: 2018-05-01

## 2018-05-01 NOTE — PROGRESS NOTES
Genetic Testing Results  Genetic testing results are NEGATIVE for mutations and large rearrangements in:  APC, OSBALDO, BARD1, BMPR1A, BRCA1, BRCA2, BRIP1, CDH1, CDK4,CDKN2A, CHEK2, DICER1, HOXB13, EPCAM (del/dup only), GREM1 (del/dup only) MLH1, MRE11A, MSH2, MSH6, MUTYH, NBN, NF1, PALB2, PMS2, POLD1, POLE, PTEN, RAD50, RAD51C, RAD51D, SMAD4, SMARCA4, STK11, and TP53  (Jamison Or)        Discussion  Genetic testing results were disclosed to the patient  No mutations were identified in any of the hereditary cancer genes tested  We discussed that while reassuring, these results do not explain the history of cancer in the family, and it is possible that an unidentifiable mutation or a mutation in another gene(s) is leading to an increased risk of cancer in the family  Therefore it is important that the patient and family members maintain regular cancer screening as directed by their physicians  The patients questions were answered and she was encouraged to call with any future questions or concerns

## 2018-06-01 LAB
BUN SERPL-MCNC: 13 MG/DL (ref 7–25)
CREAT SERPL-MCNC: 1.05 MG/DL (ref 0.5–0.99)
SL AMB EGFR AFRICAN AMERICAN: 65 ML/MIN/1.73M2
SL AMB EGFR NON AFRICAN AMERICAN: 56 ML/MIN/1.73M2

## 2018-06-05 DIAGNOSIS — D05.01 LOBULAR CARCINOMA IN SITU OF RIGHT BREAST: ICD-10-CM

## 2018-06-08 ENCOUNTER — HOSPITAL ENCOUNTER (OUTPATIENT)
Dept: RADIOLOGY | Facility: HOSPITAL | Age: 63
Discharge: HOME | End: 2018-06-08
Attending: SURGERY
Payer: COMMERCIAL

## 2018-06-08 DIAGNOSIS — D05.01 LOBULAR CARCINOMA IN SITU OF RIGHT BREAST: ICD-10-CM

## 2018-06-08 PROCEDURE — 0159T HB CAD BREAST MRI: CPT

## 2018-06-08 PROCEDURE — A9585 GADOBUTROL INJECTION: HCPCS | Performed by: SURGERY

## 2018-06-08 PROCEDURE — C8908 MRI W/O FOL W/CONT, BREAST,: HCPCS

## 2018-06-08 RX ADMIN — GADOBUTROL 10 ML: 604.72 INJECTION INTRAVENOUS at 14:46

## 2018-06-14 ENCOUNTER — TELEPHONE (OUTPATIENT)
Dept: SURGICAL ONCOLOGY | Facility: CLINIC | Age: 63
End: 2018-06-14

## 2018-06-14 DIAGNOSIS — F41.8 DEPRESSION WITH ANXIETY: Primary | ICD-10-CM

## 2018-06-14 DIAGNOSIS — R92.8 ABNORMAL FINDINGS ON DIAGNOSTIC IMAGING OF BREAST: Primary | ICD-10-CM

## 2018-06-14 RX ORDER — ESCITALOPRAM OXALATE 10 MG/1
TABLET ORAL
Qty: 90 TABLET | Refills: 1 | Status: SHIPPED | OUTPATIENT
Start: 2018-06-14 | End: 2019-04-09 | Stop reason: SDUPTHER

## 2018-06-14 NOTE — TELEPHONE ENCOUNTER
As per Dr Shira Abel, pt was scheduled for a left breast ultrasound with possible biopsy at the [facility] through Kristen Brandon RN  Appt is scheduled for 06/18/18 at 10 AM  Pt informed, her questions were reviewed and she verbalized understanding

## 2018-06-14 NOTE — TELEPHONE ENCOUNTER
Called patient explained that her MRI showed some activity in the left side for which the 1 on ultrasound  The patient is expecting a phone call to coordinate this    We have a follow-up visit scheduled for August

## 2018-06-18 ENCOUNTER — HOSPITAL ENCOUNTER (OUTPATIENT)
Dept: ULTRASOUND IMAGING | Facility: CLINIC | Age: 63
Discharge: HOME | End: 2018-06-18

## 2018-06-18 ENCOUNTER — HOSPITAL ENCOUNTER (OUTPATIENT)
Dept: ULTRASOUND IMAGING | Facility: CLINIC | Age: 63
Discharge: HOME | End: 2018-06-18
Payer: COMMERCIAL

## 2018-06-18 DIAGNOSIS — R92.8 ABNORMAL MRI, BREAST: ICD-10-CM

## 2018-06-18 DIAGNOSIS — R92.8 ABNORMAL FINDINGS ON DIAGNOSTIC IMAGING OF BREAST: ICD-10-CM

## 2018-06-18 PROCEDURE — 76642 ULTRASOUND BREAST LIMITED: CPT

## 2018-08-15 ENCOUNTER — OFFICE VISIT (OUTPATIENT)
Dept: SURGICAL ONCOLOGY | Facility: CLINIC | Age: 63
End: 2018-08-15
Payer: COMMERCIAL

## 2018-08-15 VITALS
SYSTOLIC BLOOD PRESSURE: 122 MMHG | HEIGHT: 70 IN | HEART RATE: 78 BPM | DIASTOLIC BLOOD PRESSURE: 70 MMHG | WEIGHT: 232.5 LBS | BODY MASS INDEX: 33.28 KG/M2 | RESPIRATION RATE: 16 BRPM | TEMPERATURE: 97.8 F

## 2018-08-15 DIAGNOSIS — D05.01 LOBULAR CARCINOMA IN SITU (LCIS) OF RIGHT BREAST: Primary | ICD-10-CM

## 2018-08-15 DIAGNOSIS — Z12.31 SCREENING MAMMOGRAM, ENCOUNTER FOR: ICD-10-CM

## 2018-08-15 PROCEDURE — 99213 OFFICE O/P EST LOW 20 MIN: CPT | Performed by: SURGERY

## 2018-08-15 NOTE — PROGRESS NOTES
Surgical Oncology Follow Up       [address] [facility],[address] [place] [address]  CANCER CARE [facility] SURGICAL ONCOLOGY [address]  1955  [id number]  [address] [facility],[address] [place] [address]  CANCER [facility] SURGICAL ONCOLOGY [address] [address]    Chief Complaint   Patient presents with    Follow-up     6 month follow up LCIS          Assessment & Plan:   The patient has a history of LCIS with an elevated risk  She has had a recent MRI which demonstrated abnormality however the ultrasound correlate for this was not merritorious of biopsy however they recommended a six-month follow-up MRI which will be due in December  The patient is due for screening mammogram in November, I will move that to after the MRI  We will see her following that  The patient's clinical breast exam shows no worrisome findings  Cancer History:      No history exists  Interval History:   See above    Review of Systems:   Review of Systems   All other systems reviewed and are negative  Past Medical History     Patient Active Problem List   Diagnosis    Family history of malignant neoplasm of uterus    Family history of malignant neoplasm of gastrointestinal tract    Family history of malignant neoplasm of breast    Depression with anxiety    Dyslipidemia    Elevated LFTs    Esophageal reflux    Left thyroid nodule    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    Mild intermittent asthma without complication    Obesity (BMI 30 0-34  9)    Rosacea     Past Medical History:   Diagnosis Date    Allergic rhinitis     last assessed-10/2/2017    Asthma     Carotid bruit     R-last assessed-6/10/2014    Dysphagia     last assessed-2013    GERD (gastroesophageal reflux disease)      Past Surgical History:   Procedure Laterality Date    BREAST BIOPSY      percutaneous needle core    BREAST LUMPECTOMY Right      SECTION      COLONOSCOPY complete-12/3/2012-internal/external hemorrhoids, perianal or excoriation, mild sigmoid diverticulosis    [place] PERQ DEVICE PLACEMT BREAST LOC 1ST LES W GUIDNCE Right 12/19/2017    Procedure: BREAST LUMPECTOMY; BREAST BRACKETED NEEDLE LOCALIZATION (BRACKETED NEEDLE LOC AT 0900); Surgeon: Vanessa Coffey MD;  [facility];  Service: Surgical Oncology    VAGINAL DILATATION  1978    d&e     Family History   Problem Relation Age of Onset    Heart attack Mother         acute MI    Colon polyps Mother         polyps of the sigmoid colon    Breast cancer Sister     Uterine cancer Sister     Liver cancer Brother         x2    Uterine cancer Maternal Grandmother     Uterine cancer Paternal Grandmother     Diabetes Paternal Grandfather     Stroke Maternal Uncle     Kidney cancer Paternal Aunt     Pancreatic cancer Paternal Uncle      Social History     Social History    Marital status: /Civil Union     Spouse name: N/A    Number of children: N/A    Years of education: N/A     Occupational History    Not on file       Social History Main Topics    Smoking status: Former Smoker     Years: 2 00     Types: Cigarettes     Quit date: 4/21/1976    Smokeless tobacco: Never Used    Alcohol use 0 6 oz/week     1 Glasses of wine per week      Comment: per allscripts, social drinker    Drug use: No    Sexual activity: Not on file     Other Topics Concern    Not on file     Social History Narrative    No narrative on file       Current Outpatient Prescriptions:     albuterol (PROAIR HFA) 90 mcg/act inhaler, Inhale 2 puffs daily, Disp: , Rfl:     escitalopram (LEXAPRO) 10 mg tablet, TAKE 1 TABLET DAILY, Disp: 90 tablet, Rfl: 1    loratadine (CLARITIN) 10 mg tablet, Take 1 tablet daily PRN, Disp: 30 tablet, Rfl: 3    Multiple Vitamin (MULTI-VITAMIN DAILY PO), Take by mouth, Disp: , Rfl:   Allergies   Allergen Reactions    Other      SEASONAL       Physical Exam:     Vitals:    08/15/18 0824   BP: 122/70   Pulse: 78   Resp: 16   Temp: 97 8 °F (36 6 °C)     Physical Exam   Pulmonary/Chest:     Both breasts were examined in the sitting and supine position  There are no worrisome skin lesions, no nipple retraction and no nipple discharge  There are no dominant masses, axillary adenopathy or supraclavicular adenopathy on either side  Results:   I have reviewed her MRI and ultrasound results  We will coordinate a follow-up MRI and mammogram as outlined above  Advance Care Planning/Advance Directives:  I discussed the disease status, treatment plans and follow-up with the patient

## 2018-08-15 NOTE — LETTER
August 15, 2018     Garry Ge, [address]    Patient: Simone Cobb   YOB: 1955   Date of Visit: 8/15/2018       Dear Dr Suzy Melissa:    Thank you for referring Danuta Mistry to me for evaluation  Below are my notes for this consultation  If you have questions, please do not hesitate to call me  I look forward to following your patient along with you  Sincerely,        Pauline Cadena MD        CC: No Recipients  Pauline Cadena MD  8/15/2018  8:46 AM  Sign at close encounter     Surgical Oncology Follow Up       [address] [place] [address] [address]  1955  [id number]  [address] [facility],[address]  [facility] [address] [address]    Chief Complaint   Patient presents with    Follow-up     6 month follow up LCIS          Assessment & Plan:   The patient has a history of LCIS with an elevated risk  She has had a recent MRI which demonstrated abnormality however the ultrasound correlate for this was not merritorious of biopsy however they recommended a six-month follow-up MRI which will be due in December  The patient is due for screening mammogram in November, I will move that to after the MRI  We will see her following that  The patient's clinical breast exam shows no worrisome findings  Cancer History:      No history exists  Interval History:   See above    Review of Systems:   Review of Systems   All other systems reviewed and are negative        Past Medical History     Patient Active Problem List   Diagnosis    Family history of malignant neoplasm of uterus    Family history of malignant neoplasm of gastrointestinal tract    Family history of malignant neoplasm of breast    Depression with anxiety    Dyslipidemia    Elevated LFTs    Esophageal reflux    Left thyroid nodule    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    Mild intermittent asthma without complication    Obesity (BMI 30 0-34  9)    Rosacea     Past Medical History:   Diagnosis Date    Allergic rhinitis     last assessed-10/2/2017    Asthma     Carotid bruit     R-last assessed-6/10/2014    Dysphagia     last assessed-2013    GERD (gastroesophageal reflux disease)      Past Surgical History:   Procedure Laterality Date    BREAST BIOPSY      percutaneous needle core    BREAST LUMPECTOMY Right      SECTION      COLONOSCOPY      complete-12/3/2012-internal/external hemorrhoids, perianal or excoriation, mild sigmoid diverticulosis    AL PERQ DEVICE PLACEMT BREAST LOC 1ST LES W GUIDNCE Right 2017    Procedure: BREAST LUMPECTOMY; BREAST BRACKETED NEEDLE LOCALIZATION (BRACKETED NEEDLE LOC AT 0900); Surgeon: Baylee Cisneros MD;  [facility];  Service: Surgical Oncology    VAGINAL DILATATION      d&e     Family History   Problem Relation Age of Onset    Heart attack Mother         acute MI    Colon polyps Mother         polyps of the sigmoid colon    Breast cancer Sister     Uterine cancer Sister     Liver cancer Brother         x2    Uterine cancer Maternal Grandmother     Uterine cancer Paternal Grandmother     Diabetes Paternal Grandfather     Stroke Maternal Uncle     Kidney cancer Paternal Aunt     Pancreatic cancer Paternal Uncle      Social History     Social History    Marital status: /Civil Union     Spouse name: N/A    Number of children: N/A    Years of education: N/A     Occupational History    Not on file       Social History Main Topics    Smoking status: Former Smoker     Years: 2 00     Types: Cigarettes     Quit date: 1976    Smokeless tobacco: Never Used    Alcohol use 0 6 oz/week     1 Glasses of wine per week      Comment: per allscripts, social drinker    Drug use: No    Sexual activity: Not on file     Other Topics Concern    Not on file Social History Narrative    No narrative on file       Current Outpatient Prescriptions:     albuterol (PROAIR HFA) 90 mcg/act inhaler, Inhale 2 puffs daily, Disp: , Rfl:     escitalopram (LEXAPRO) 10 mg tablet, TAKE 1 TABLET DAILY, Disp: 90 tablet, Rfl: 1    loratadine (CLARITIN) 10 mg tablet, Take 1 tablet daily PRN, Disp: 30 tablet, Rfl: 3    Multiple Vitamin (MULTI-VITAMIN DAILY PO), Take by mouth, Disp: , Rfl:   Allergies   Allergen Reactions    Other      SEASONAL       Physical Exam:     Vitals:    08/15/18 0824   BP: 122/70   Pulse: 78   Resp: 16   Temp: 97 8 °F (36 6 °C)     Physical Exam   Pulmonary/Chest:     Both breasts were examined in the sitting and supine position  There are no worrisome skin lesions, no nipple retraction and no nipple discharge  There are no dominant masses, axillary adenopathy or supraclavicular adenopathy on either side  Results:   I have reviewed her MRI and ultrasound results  We will coordinate a follow-up MRI and mammogram as outlined above  Advance Care Planning/Advance Directives:  I discussed the disease status, treatment plans and follow-up with the patient

## 2018-09-29 LAB
ALBUMIN SERPL-MCNC: 4.2 G/DL (ref 3.6–5.1)
ALBUMIN/GLOB SERPL: 1.5 (CALC) (ref 1–2.5)
ALP SERPL-CCNC: 74 U/L (ref 33–130)
ALT SERPL-CCNC: 49 U/L (ref 6–29)
AST SERPL-CCNC: 39 U/L (ref 10–35)
BASOPHILS # BLD AUTO: 28 CELLS/UL (ref 0–200)
BASOPHILS NFR BLD AUTO: 0.5 %
BILIRUB SERPL-MCNC: 0.4 MG/DL (ref 0.2–1.2)
BUN SERPL-MCNC: 13 MG/DL (ref 7–25)
BUN/CREAT SERPL: ABNORMAL (CALC) (ref 6–22)
CALCIUM SERPL-MCNC: 9.4 MG/DL (ref 8.6–10.4)
CHLORIDE SERPL-SCNC: 106 MMOL/L (ref 98–110)
CHOLEST SERPL-MCNC: 170 MG/DL
CHOLEST/HDLC SERPL: 4.5 (CALC)
CO2 SERPL-SCNC: 27 MMOL/L (ref 20–32)
CREAT SERPL-MCNC: 0.93 MG/DL (ref 0.5–0.99)
EOSINOPHIL # BLD AUTO: 190 CELLS/UL (ref 15–500)
EOSINOPHIL NFR BLD AUTO: 3.4 %
ERYTHROCYTE [DISTWIDTH] IN BLOOD BY AUTOMATED COUNT: 14 % (ref 11–15)
GLOBULIN SER CALC-MCNC: 2.8 G/DL (CALC) (ref 1.9–3.7)
GLUCOSE SERPL-MCNC: 100 MG/DL (ref 65–99)
HCT VFR BLD AUTO: 42.5 % (ref 35–45)
HDLC SERPL-MCNC: 38 MG/DL
HGB BLD-MCNC: 14.2 G/DL (ref 11.7–15.5)
LDLC SERPL CALC-MCNC: 108 MG/DL (CALC)
LYMPHOCYTES # BLD AUTO: 1719 CELLS/UL (ref 850–3900)
LYMPHOCYTES NFR BLD AUTO: 30.7 %
MCH RBC QN AUTO: 27.6 PG (ref 27–33)
MCHC RBC AUTO-ENTMCNC: 33.4 G/DL (ref 32–36)
MCV RBC AUTO: 82.7 FL (ref 80–100)
MONOCYTES # BLD AUTO: 342 CELLS/UL (ref 200–950)
MONOCYTES NFR BLD AUTO: 6.1 %
NEUTROPHILS # BLD AUTO: 3321 CELLS/UL (ref 1500–7800)
NEUTROPHILS NFR BLD AUTO: 59.3 %
NONHDLC SERPL-MCNC: 132 MG/DL (CALC)
PLATELET # BLD AUTO: 229 THOUSAND/UL (ref 140–400)
PMV BLD REES-ECKER: 10.6 FL (ref 7.5–12.5)
POTASSIUM SERPL-SCNC: 3.8 MMOL/L (ref 3.5–5.3)
PROT SERPL-MCNC: 7 G/DL (ref 6.1–8.1)
RBC # BLD AUTO: 5.14 MILLION/UL (ref 3.8–5.1)
SL AMB EGFR AFRICAN AMERICAN: 75 ML/MIN/1.73M2
SL AMB EGFR NON AFRICAN AMERICAN: 65 ML/MIN/1.73M2
SODIUM SERPL-SCNC: 140 MMOL/L (ref 135–146)
TRIGL SERPL-MCNC: 127 MG/DL
TSH SERPL-ACNC: 2.86 MIU/L (ref 0.4–4.5)
WBC # BLD AUTO: 5.6 THOUSAND/UL (ref 3.8–10.8)

## 2018-10-02 ENCOUNTER — OFFICE VISIT (OUTPATIENT)
Dept: FAMILY MEDICINE CLINIC | Facility: CLINIC | Age: 63
End: 2018-10-02
Payer: COMMERCIAL

## 2018-10-02 VITALS
HEART RATE: 60 BPM | WEIGHT: 232.6 LBS | SYSTOLIC BLOOD PRESSURE: 134 MMHG | BODY MASS INDEX: 33.86 KG/M2 | TEMPERATURE: 97.4 F | RESPIRATION RATE: 16 BRPM | DIASTOLIC BLOOD PRESSURE: 80 MMHG

## 2018-10-02 DIAGNOSIS — D05.01 LOBULAR CARCINOMA IN SITU (LCIS) OF RIGHT BREAST: ICD-10-CM

## 2018-10-02 DIAGNOSIS — E66.9 OBESITY (BMI 30.0-34.9): ICD-10-CM

## 2018-10-02 DIAGNOSIS — R79.89 ELEVATED LFTS: ICD-10-CM

## 2018-10-02 DIAGNOSIS — E04.1 LEFT THYROID NODULE: ICD-10-CM

## 2018-10-02 DIAGNOSIS — F41.8 DEPRESSION WITH ANXIETY: ICD-10-CM

## 2018-10-02 DIAGNOSIS — J45.20 MILD INTERMITTENT ASTHMA WITHOUT COMPLICATION: ICD-10-CM

## 2018-10-02 DIAGNOSIS — Z12.11 SCREENING FOR COLON CANCER: ICD-10-CM

## 2018-10-02 DIAGNOSIS — E78.5 DYSLIPIDEMIA: Primary | ICD-10-CM

## 2018-10-02 DIAGNOSIS — Z23 NEED FOR INFLUENZA VACCINATION: ICD-10-CM

## 2018-10-02 PROCEDURE — 99214 OFFICE O/P EST MOD 30 MIN: CPT | Performed by: FAMILY MEDICINE

## 2018-10-02 PROCEDURE — 90682 RIV4 VACC RECOMBINANT DNA IM: CPT

## 2018-10-02 PROCEDURE — 90471 IMMUNIZATION ADMIN: CPT

## 2018-10-02 NOTE — ASSESSMENT & PLAN NOTE
Goal for HDL > 45  Recommended to follow low-cholesterol, low-fat diet, increase exercise  Will recheck lipid panel in 6 months

## 2018-10-02 NOTE — ASSESSMENT & PLAN NOTE
Patient is S/P right breast lumpectomy in December 2017  Follow- up with surgical oncologist Deepa Malik

## 2018-10-02 NOTE — ASSESSMENT & PLAN NOTE
Liver enzymes are mildly elevated likely secondary to fatty liver  Recommended to follow a low fat diet, lose weight  Will recheck with next blood work

## 2018-10-02 NOTE — ASSESSMENT & PLAN NOTE
Thyroid U/S done in April 2018 showed 2 small stable left thyroid nodules  Will repeat thyroid U/S next year

## 2018-10-02 NOTE — PROGRESS NOTES
Chief Complaint   Patient presents with    Follow-up     6 month follow up  Health Maintenance   Topic Date Due    Pneumococcal PPSV23 Highest Risk Adult (3 of 3 - PCV13) 01/01/2017    CRC Screening: Colonoscopy  12/03/2017    INFLUENZA VACCINE  09/01/2018    Depression Screening PHQ  04/02/2019    MAMMOGRAM  12/19/2019    PAP SMEAR  11/17/2020    DTaP,Tdap,and Td Vaccines (2 - Td) 06/07/2023     Assessment/Plan:    Dyslipidemia  Goal for HDL > 45  Recommended to follow low-cholesterol, low-fat diet, increase exercise  Will recheck lipid panel in 6 months  Elevated LFTs  Liver enzymes are mildly elevated likely secondary to fatty liver  Recommended to follow a low fat diet, lose weight  Will recheck with next blood work  Obesity (BMI 30 0-34  9)  Encouraged weight reduction  Mild intermittent asthma without complication  Symptoms are stable  Use Ventolin HFA inhaler PRN  Left thyroid nodule  Thyroid U/S done in April 2018 showed 2 small stable left thyroid nodules  Will repeat thyroid U/S next year  Depression with anxiety  Mood has been stable  Continue Escitalopram 10 mg daily  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  Patient is S/P right breast lumpectomy in December 2017  Follow- up with surgical oncologist Maricela Mcardle  HM: Flublok administered today  Discussed a new vaccine for shingles  Patient will check with insurance regarding coverage  Schedule colonoscopy with Dr Litzy Munoz  Schedule follow-up office visit in 6 months  Check labs prior to next visit  Diagnoses and all orders for this visit:    Dyslipidemia  -     Comprehensive metabolic panel; Future  -     Lipid panel; Future  -     CBC and differential; Future    Elevated LFTs  -     Comprehensive metabolic panel; Future    Obesity (BMI 30 0-34  9)    Mild intermittent asthma without complication    Left thyroid nodule    Depression with anxiety    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    Screening for colon cancer  -     Ambulatory referral to Gastroenterology; Future    Need for influenza vaccination  -     influenza vaccine, [id number], quadrivalent, recombinant, PF, 0 5 mL, for patients 18 yr+ (FLUBLOK)          Subjective:      Patient ID: [place] is a 58 y o  female  HPI     Patient presents for 6 month follow-up of chronic medical conditions  Patient has H/o dyslipidemia, obesity, asthma, allergic rhinitis, depression, right breast CA  Reviewed current medications, blood work results from September 28, 2018  TSH 2 86, cholesterol 170, HDL 38, triglycerides 127,   , AST 39, ALT 49  Fasting blood sugar 100, creatinine 0 93  Patient denies chest pain, shortness of breath, dizziness  She has not been exercising for the past few months due to hot weather  Weight has been stable  She plans to start walking  Asthma -symptoms are stable  Patient uses ProAir HFA inhaler occasionally  No recent asthma exacerbations  Patient is allergic to cats and pollens  She takes Claritin 10 mg daily PRN during allergy seasons  Patient was diagnosed with right breast CA last year, had R breast lumpectomy performed by surgical oncologist Dr Randi Herr in December 2017  She was seen last month for follow-up visit by Dr Adan Ramachandran   who recommended to schedule MRI of both breast in December  Genetic testing was negative for Jiang syndrome  Family history is positive for breast CA in her sister, colon CA in her mother and brother  Patient has history of left thyroid nodules  She had thyroid U/S  done in April 2018 which showed   2 small stable thyroid nodules  She denies difficulty swallowing, neck pain, hoarseness of the voice  Depression - mood has been stable  Currently taking Escitalopram 10 mg daily  No suicidal ideations      Colonoscopy done in December 2012 by colorectal surgeon Dr Christina Gibbons who recommended to repeat colonoscopy in 5 years due to family history of colon cancer  Pneumovax done in 2014  The following portions of the patient's history were reviewed and updated as appropriate: current medications, past family history, past medical history, past social history, past surgical history and problem list     Review of Systems   Constitutional: Negative for activity change, appetite change, chills, fatigue and fever  HENT: Positive for hearing loss (mild) and postnasal drip  Negative for congestion, ear pain, nosebleeds, sinus pressure, sore throat, tinnitus and trouble swallowing  Eyes: Negative for pain, discharge, redness, itching and visual disturbance  Respiratory: Negative for cough, chest tightness, shortness of breath and wheezing  Cardiovascular: Negative for chest pain, palpitations and leg swelling  Gastrointestinal: Negative for abdominal pain, blood in stool, constipation, diarrhea, nausea and vomiting  Genitourinary: Negative for difficulty urinating, dysuria, flank pain, frequency, hematuria and pelvic pain  Musculoskeletal: Negative for arthralgias, back pain, gait problem, joint swelling and myalgias  Skin: Negative for rash and wound  Neurological: Negative for dizziness, syncope and headaches  Hematological: Negative  Psychiatric/Behavioral: Negative for sleep disturbance and suicidal ideas  The patient is not nervous/anxious  Depression - mood has been stable         Objective:      /80 (BP Location: Right arm, Patient Position: Sitting, Cuff Size: Large)   Pulse 60   Temp (!) 97 4 °F (36 3 °C) (Tympanic)   Resp 16   Wt 106 kg (232 lb 9 6 oz)   BMI 33 86 kg/m²          Physical Exam   Constitutional: She appears well-developed and well-nourished  Obese   HENT:   Head: Normocephalic and atraumatic  Right Ear: External ear normal    Mouth/Throat: Oropharynx is clear and moist    Eyes: Pupils are equal, round, and reactive to light   Conjunctivae are normal    Neck: Normal range of motion  Neck supple  No JVD present  No thyromegaly present  Cardiovascular: Normal rate, regular rhythm and normal heart sounds  No murmur heard  No carotid bruits BL  No BL LE edema   Pulmonary/Chest: Effort normal and breath sounds normal  She has no wheezes  She has no rales  Abdominal: Soft  Bowel sounds are normal  There is no tenderness  Musculoskeletal: Normal range of motion  She exhibits no edema, tenderness or deformity  Skin: Skin is warm and dry  Rosacea on face   Psychiatric: She has a normal mood and affect  Her behavior is normal    Nursing note and vitals reviewed

## 2018-11-28 LAB
BUN SERPL-MCNC: 15 MG/DL (ref 7–25)
CREAT SERPL-MCNC: 1.02 MG/DL (ref 0.5–0.99)
SL AMB EGFR AFRICAN AMERICAN: 67 ML/MIN/1.73M2
SL AMB EGFR NON AFRICAN AMERICAN: 58 ML/MIN/1.73M2

## 2018-12-03 ENCOUNTER — HOSPITAL ENCOUNTER (OUTPATIENT)
Dept: RADIOLOGY | Facility: HOSPITAL | Age: 63
Discharge: HOME | End: 2018-12-03
Attending: SURGERY
Payer: COMMERCIAL

## 2018-12-03 DIAGNOSIS — D05.01 LOBULAR CARCINOMA IN SITU (LCIS) OF RIGHT BREAST: ICD-10-CM

## 2018-12-03 PROCEDURE — A9585 GADOBUTROL INJECTION: HCPCS | Performed by: SURGERY

## 2018-12-03 PROCEDURE — C8908 MRI W/O FOL W/CONT, BREAST,: HCPCS

## 2018-12-03 RX ADMIN — GADOBUTROL 10 ML: 604.72 INJECTION INTRAVENOUS at 15:58

## 2018-12-17 ENCOUNTER — HOSPITAL ENCOUNTER (OUTPATIENT)
Dept: MAMMOGRAPHY | Facility: HOSPITAL | Age: 63
Discharge: HOME | End: 2018-12-17
Attending: SURGERY
Payer: COMMERCIAL

## 2018-12-17 VITALS — HEIGHT: 70 IN | WEIGHT: 225 LBS | BODY MASS INDEX: 32.21 KG/M2

## 2018-12-17 DIAGNOSIS — Z12.31 SCREENING MAMMOGRAM, ENCOUNTER FOR: ICD-10-CM

## 2018-12-17 PROCEDURE — 77063 BREAST TOMOSYNTHESIS BI: CPT

## 2018-12-17 PROCEDURE — 77067 SCR MAMMO BI INCL CAD: CPT

## 2019-02-19 ENCOUNTER — OFFICE VISIT (OUTPATIENT)
Dept: SURGICAL ONCOLOGY | Facility: CLINIC | Age: 64
End: 2019-02-19
Payer: COMMERCIAL

## 2019-02-19 VITALS
BODY MASS INDEX: 33.36 KG/M2 | HEART RATE: 102 BPM | TEMPERATURE: 98.1 F | WEIGHT: 233 LBS | HEIGHT: 70 IN | DIASTOLIC BLOOD PRESSURE: 80 MMHG | RESPIRATION RATE: 16 BRPM | SYSTOLIC BLOOD PRESSURE: 122 MMHG

## 2019-02-19 DIAGNOSIS — R92.8 ABNORMAL MRI, BREAST: ICD-10-CM

## 2019-02-19 DIAGNOSIS — Z80.3 FAMILY HISTORY OF MALIGNANT NEOPLASM OF BREAST: ICD-10-CM

## 2019-02-19 DIAGNOSIS — Z86.000 HISTORY OF LOBULAR CARCINOMA IN SITU (LCIS) OF BREAST: Primary | ICD-10-CM

## 2019-02-19 PROBLEM — Z91.89 INCREASED RISK OF BREAST CANCER: Status: ACTIVE | Noted: 2019-02-19

## 2019-02-19 PROCEDURE — 99213 OFFICE O/P EST LOW 20 MIN: CPT | Performed by: NURSE PRACTITIONER

## 2019-02-19 RX ORDER — IBUPROFEN 200 MG
1 CAPSULE ORAL DAILY
COMMUNITY

## 2019-02-19 NOTE — PROGRESS NOTES
Surgical Oncology Follow Up       [address]  [facility] [place] [address]  1955  [id number]      Chief Complaint   Patient presents with    Breast Cancer     Pt is here for 6 month follow up        Assessment/Plan:  1  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  - 6 mo f/u visit    2  Family history of malignant neoplasm of breast  - genetic testing negative  - recommend colonoscopy- pt will call dr Vahid Philip to schedule    3  Abnormal MRI, breast  - BUN; Future  - Creatinine, serum; Future  - MRI breast bilateral w wo contrast w cad; Future       Discussion/Summary:  Patient is a 74-year-old female who presents today for a six-month follow-up visit for a history of LCIS and an increased risk of breast cancer  She underwent a lumpectomy by Dr Brenda Bhatti in December 2017  She underwent genetic testing which was negative  I have calculated her lifetime tie review 6 risk to be 37 2%  She has met with Medical Oncology and has elected not to pursue chemoprevention  We are following her with twice a year clinical breast exams, annual 3D mammography and breast MRIs  She had an MRI of the breast performed on December 3, 2018 which revealed a stable 9 x 7 mm lesion at the 2 o'clock position of the left breast (benign appearing on u/s) and stable subcentimeter enhancing nodules throughout the right breast   Continued MRI surveillance was recommended- BIRADS 3  She had a bilateral 3D screening mammogram on December 17, 2018 which was BI-RADS 2  I will order a bilateral breast MRI for June 2019 and we will see the patient back in 6 months or sooner if the need arises  I did encourage her to follow up with Dr Rock Sierra to schedule a colonoscopy as she is overdue  Also encouraged healthy diet, achieving a healthy weight and regular exercise  She was instructed to call with any new concerns or symptoms    All of her questions were answered  History of Present Illness:     Genetic testing results are NEGATIVE for mutations and large rearrangements in:  APC, OSBALDO, BARD1, BMPR1A, BRCA1, BRCA2, BRIP1, CDH1, CDK4,CDKN2A, CHEK2, DICER1, HOXB13, EPCAM (del/dup only), GREM1 (del/dup only) MLH1, MRE11A, MSH2, MSH6, MUTYH, NBN, NF1, PALB2, PMS2, POLD1, POLE, PTEN, RAD50, RAD51C, RAD51D, SMAD4, SMARCA4, STK11, and TP53  (Jumana Mann)      -Interval History:  Patient presents today for six-month follow-up visit for LCIS and the increased risk of breast cancer  She had an MRI of the breast performed on December 3, 2018 which revealed a stable 9 x 7 mm lesion at the 2 o'clock position of the left breast (benign appearing on u/s) and stable subcentimeter enhancing nodules throughout the right breast   Continued MRI surveillance was recommended  She had a bilateral 3D screening mammogram on December 17, 2018 which was BI-RADS 2  Review of Systems:  Review of Systems   Constitutional: Negative for activity change, appetite change, chills, fatigue, fever and unexpected weight change  HENT: Negative for trouble swallowing  Eyes: Negative for pain, redness and visual disturbance  Respiratory: Negative for cough, shortness of breath and wheezing  Cardiovascular: Negative for chest pain, palpitations and leg swelling  Gastrointestinal: Negative for abdominal pain, constipation, diarrhea, nausea and vomiting  Endocrine: Negative for cold intolerance and heat intolerance  Musculoskeletal: Negative for arthralgias, back pain, gait problem and myalgias  Skin: Negative for color change and rash  Neurological: Negative for dizziness, syncope, light-headedness, numbness and headaches  Hematological: Negative for adenopathy  Psychiatric/Behavioral: Negative for agitation and confusion  All other systems reviewed and are negative        Patient Active Problem List   Diagnosis    Family history of malignant neoplasm of uterus    Family history of malignant neoplasm of gastrointestinal tract    Family history of malignant neoplasm of breast    Depression with anxiety    Dyslipidemia    Elevated LFTs    Esophageal reflux    Left thyroid nodule    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    Mild intermittent asthma without complication    Obesity (BMI 30 0-34  9)    Rosacea    Increased risk of breast cancer     Past Medical History:   Diagnosis Date    Allergic rhinitis     last assessed-10/2/2017    Asthma     Carotid bruit     R-last assessed-6/10/2014    Dysphagia     last assessed-2013    GERD (gastroesophageal reflux disease)      Past Surgical History:   Procedure Laterality Date    BREAST BIOPSY      percutaneous needle core    BREAST LUMPECTOMY Right      SECTION      COLONOSCOPY      complete-12/3/2012-internal/external hemorrhoids, perianal or excoriation, mild sigmoid diverticulosis    MAMMO NEEDLE LOCALIZATION RIGHT (ALL INC) Right 2017    MAMMO NEEDLE LOCALIZATION RIGHT (ALL INC) EACH ADD Right 2017    MAMMO STEREOTACTIC BREAST BIOPSY RIGHT (ALL INC) Right 11/15/2017    MAMMO STEREOTACTIC BREAST BIOPSY RIGHT (ALL INC) EACH ADD Right 11/15/2017    MA PERQ DEVICE PLACEMT BREAST LOC 1ST LES W GUIDNCE Right 2017    Procedure: BREAST LUMPECTOMY; BREAST BRACKETED NEEDLE LOCALIZATION (BRACKETED NEEDLE LOC AT 0900);   Surgeon: Judith Edgar MD;  [facility];  Service: Surgical Oncology    VAGINAL DILATATION      d&e     Family History   Problem Relation Age of Onset    Heart attack Mother         acute MI    Colon polyps Mother         polyps of the sigmoid colon    Breast cancer Sister     Uterine cancer Sister     Liver cancer Brother         x2    Uterine cancer Maternal Grandmother     Uterine cancer Paternal Grandmother     Diabetes Paternal Grandfather     Stroke Maternal Uncle     Kidney cancer Paternal Aunt     Pancreatic cancer Paternal Uncle      Social History     Socioeconomic History    Marital status: /Civil Union     Spouse name: Not on file    Number of children: Not on file    Years of education: Not on file    Highest education level: Not on file   Occupational History    Not on file   Social Needs    Financial resource strain: Not on file    Food insecurity:     Worry: Not on file     Inability: Not on file    Transportation needs:     Medical: Not on file     Non-medical: Not on file   Tobacco Use    Smoking status: Former Smoker     Years: 2 00     Types: Cigarettes     Last attempt to quit: 1976     Years since quittin 8    Smokeless tobacco: Never Used   Substance and Sexual Activity    Alcohol use:  Yes     Alcohol/week: 0 6 oz     Types: 1 Glasses of wine per week     Comment: per allscripts, social drinker    Drug use: No    Sexual activity: Not on file   Lifestyle    Physical activity:     Days per week: Not on file     Minutes per session: Not on file    Stress: Not on file   Relationships    Social connections:     Talks on phone: Not on file     Gets together: Not on file     Attends [employer] service: Not on file     Active member of club or organization: Not on file     Attends meetings of clubs or organizations: Not on file     Relationship status: Not on file    Intimate partner violence:     Fear of current or ex partner: Not on file     Emotionally abused: Not on file     Physically abused: Not on file     Forced sexual activity: Not on file   Other Topics Concern    Not on file   Social History Narrative    Not on file       Current Outpatient Medications:     calcium citrate (CALCITRATE) 950 MG tablet, Take 1 tablet by mouth daily, Disp: , Rfl:     escitalopram (LEXAPRO) 10 mg tablet, TAKE 1 TABLET DAILY, Disp: 90 tablet, Rfl: 1    loratadine (CLARITIN) 10 mg tablet, Take 1 tablet daily PRN, Disp: 30 tablet, Rfl: 3    Multiple Vitamin (MULTI-VITAMIN DAILY PO), Take by mouth, Disp: , Rfl:   Allergies   Allergen Reactions    Other Sneezing     SEASONAL     Vitals:    02/19/19 0857   BP: 122/80   Pulse: 102   Resp: 16   Temp: 98 1 °F (36 7 °C)       Physical Exam   Constitutional: She is oriented to person, place, and time  Vital signs are normal  She appears well-developed and well-nourished  No distress  HENT:   Head: Normocephalic and atraumatic  Neck: Normal range of motion  Cardiovascular: Normal rate, regular rhythm and normal heart sounds  Pulmonary/Chest: Effort normal and breath sounds normal    Bilateral breasts were examined in the sitting and supine position  Right breast surgical scar  There are no masses, skin nodules, nipple changes or nipple discharge  There is no bilateral supraclavicular or axillary lymphadenopathy noted  Abdominal: Soft  Normal appearance  She exhibits no mass  There is no hepatosplenomegaly  There is no tenderness  Musculoskeletal: Normal range of motion  Lymphadenopathy:     She has no axillary adenopathy  Right: No supraclavicular adenopathy present  Left: No supraclavicular adenopathy present  Neurological: She is alert and oriented to person, place, and time  Skin: Skin is warm, dry and intact  No rash noted  She is not diaphoretic  Psychiatric: She has a normal mood and affect  Her speech is normal    Vitals reviewed  Advance Care Planning/Advance Directives:  Discussed disease status and treatment goals with the patient

## 2019-04-02 LAB
ALBUMIN SERPL-MCNC: 4.2 G/DL (ref 3.6–5.1)
ALBUMIN/GLOB SERPL: 1.4 (CALC) (ref 1–2.5)
ALP SERPL-CCNC: 84 U/L (ref 33–130)
ALT SERPL-CCNC: 56 U/L (ref 6–29)
AST SERPL-CCNC: 53 U/L (ref 10–35)
BASOPHILS # BLD AUTO: 43 CELLS/UL (ref 0–200)
BASOPHILS NFR BLD AUTO: 0.6 %
BILIRUB SERPL-MCNC: 0.4 MG/DL (ref 0.2–1.2)
BUN SERPL-MCNC: 15 MG/DL (ref 7–25)
BUN/CREAT SERPL: ABNORMAL (CALC) (ref 6–22)
CALCIUM SERPL-MCNC: 9.9 MG/DL (ref 8.6–10.4)
CHLORIDE SERPL-SCNC: 105 MMOL/L (ref 98–110)
CHOLEST SERPL-MCNC: 201 MG/DL
CHOLEST/HDLC SERPL: 4.9 (CALC)
CO2 SERPL-SCNC: 29 MMOL/L (ref 20–32)
CREAT SERPL-MCNC: 0.96 MG/DL (ref 0.5–0.99)
EOSINOPHIL # BLD AUTO: 149 CELLS/UL (ref 15–500)
EOSINOPHIL NFR BLD AUTO: 2.1 %
ERYTHROCYTE [DISTWIDTH] IN BLOOD BY AUTOMATED COUNT: 13.8 % (ref 11–15)
GLOBULIN SER CALC-MCNC: 3 G/DL (CALC) (ref 1.9–3.7)
GLUCOSE SERPL-MCNC: 97 MG/DL (ref 65–99)
HCT VFR BLD AUTO: 43.9 % (ref 35–45)
HDLC SERPL-MCNC: 41 MG/DL
HGB BLD-MCNC: 14.8 G/DL (ref 11.7–15.5)
LDLC SERPL CALC-MCNC: 133 MG/DL (CALC)
LYMPHOCYTES # BLD AUTO: 2173 CELLS/UL (ref 850–3900)
LYMPHOCYTES NFR BLD AUTO: 30.6 %
MCH RBC QN AUTO: 28.6 PG (ref 27–33)
MCHC RBC AUTO-ENTMCNC: 33.7 G/DL (ref 32–36)
MCV RBC AUTO: 84.7 FL (ref 80–100)
MONOCYTES # BLD AUTO: 362 CELLS/UL (ref 200–950)
MONOCYTES NFR BLD AUTO: 5.1 %
NEUTROPHILS # BLD AUTO: 4374 CELLS/UL (ref 1500–7800)
NEUTROPHILS NFR BLD AUTO: 61.6 %
NONHDLC SERPL-MCNC: 160 MG/DL (CALC)
PLATELET # BLD AUTO: 255 THOUSAND/UL (ref 140–400)
PMV BLD REES-ECKER: 10.9 FL (ref 7.5–12.5)
POTASSIUM SERPL-SCNC: 4.4 MMOL/L (ref 3.5–5.3)
PROT SERPL-MCNC: 7.2 G/DL (ref 6.1–8.1)
RBC # BLD AUTO: 5.18 MILLION/UL (ref 3.8–5.1)
SL AMB EGFR AFRICAN AMERICAN: 72 ML/MIN/1.73M2
SL AMB EGFR NON AFRICAN AMERICAN: 62 ML/MIN/1.73M2
SODIUM SERPL-SCNC: 142 MMOL/L (ref 135–146)
TRIGL SERPL-MCNC: 143 MG/DL
WBC # BLD AUTO: 7.1 THOUSAND/UL (ref 3.8–10.8)

## 2019-04-09 ENCOUNTER — OFFICE VISIT (OUTPATIENT)
Dept: FAMILY MEDICINE CLINIC | Facility: CLINIC | Age: 64
End: 2019-04-09
Payer: COMMERCIAL

## 2019-04-09 VITALS
DIASTOLIC BLOOD PRESSURE: 60 MMHG | TEMPERATURE: 97.6 F | BODY MASS INDEX: 33.44 KG/M2 | HEIGHT: 70 IN | HEART RATE: 84 BPM | SYSTOLIC BLOOD PRESSURE: 120 MMHG | WEIGHT: 233.6 LBS | RESPIRATION RATE: 16 BRPM

## 2019-04-09 DIAGNOSIS — Z12.11 SCREENING FOR COLON CANCER: ICD-10-CM

## 2019-04-09 DIAGNOSIS — J45.20 MILD INTERMITTENT ASTHMA WITHOUT COMPLICATION: ICD-10-CM

## 2019-04-09 DIAGNOSIS — E04.1 LEFT THYROID NODULE: ICD-10-CM

## 2019-04-09 DIAGNOSIS — L30.9 HAND ECZEMA: ICD-10-CM

## 2019-04-09 DIAGNOSIS — Z11.59 ENCOUNTER FOR HEPATITIS C VIRUS SCREENING TEST FOR HIGH RISK PATIENT: ICD-10-CM

## 2019-04-09 DIAGNOSIS — F41.8 DEPRESSION WITH ANXIETY: ICD-10-CM

## 2019-04-09 DIAGNOSIS — R79.89 ELEVATED LFTS: ICD-10-CM

## 2019-04-09 DIAGNOSIS — Z91.89 ENCOUNTER FOR HEPATITIS C VIRUS SCREENING TEST FOR HIGH RISK PATIENT: ICD-10-CM

## 2019-04-09 DIAGNOSIS — E78.5 DYSLIPIDEMIA: Primary | ICD-10-CM

## 2019-04-09 DIAGNOSIS — E66.9 OBESITY (BMI 30.0-34.9): ICD-10-CM

## 2019-04-09 PROCEDURE — 3008F BODY MASS INDEX DOCD: CPT | Performed by: FAMILY MEDICINE

## 2019-04-09 PROCEDURE — 99215 OFFICE O/P EST HI 40 MIN: CPT | Performed by: FAMILY MEDICINE

## 2019-04-09 PROCEDURE — 1036F TOBACCO NON-USER: CPT | Performed by: FAMILY MEDICINE

## 2019-04-09 PROCEDURE — 1101F PT FALLS ASSESS-DOCD LE1/YR: CPT | Performed by: FAMILY MEDICINE

## 2019-04-09 RX ORDER — ESCITALOPRAM OXALATE 10 MG/1
TABLET ORAL
Qty: 180 TABLET | Refills: 1
Start: 2019-04-09 | End: 2019-04-19 | Stop reason: SDUPTHER

## 2019-04-10 ENCOUNTER — TELEPHONE (OUTPATIENT)
Dept: FAMILY MEDICINE CLINIC | Facility: CLINIC | Age: 64
End: 2019-04-10

## 2019-04-19 DIAGNOSIS — F41.8 DEPRESSION WITH ANXIETY: ICD-10-CM

## 2019-04-19 RX ORDER — ESCITALOPRAM OXALATE 10 MG/1
TABLET ORAL
Qty: 60 TABLET | Refills: 6 | Status: SHIPPED | OUTPATIENT
Start: 2019-04-19 | End: 2019-05-08 | Stop reason: ALTCHOICE

## 2019-04-26 ENCOUNTER — OFFICE VISIT (OUTPATIENT)
Dept: BEHAVIORAL/MENTAL HEALTH CLINIC | Facility: CLINIC | Age: 64
End: 2019-04-26
Payer: COMMERCIAL

## 2019-04-26 ENCOUNTER — TELEPHONE (OUTPATIENT)
Dept: FAMILY MEDICINE CLINIC | Facility: CLINIC | Age: 64
End: 2019-04-26

## 2019-04-26 DIAGNOSIS — F41.8 DEPRESSION WITH ANXIETY: Primary | ICD-10-CM

## 2019-04-26 PROCEDURE — 90834 PSYTX W PT 45 MINUTES: CPT | Performed by: SOCIAL WORKER

## 2019-05-08 ENCOUNTER — OFFICE VISIT (OUTPATIENT)
Dept: FAMILY MEDICINE CLINIC | Facility: CLINIC | Age: 64
End: 2019-05-08
Payer: COMMERCIAL

## 2019-05-08 VITALS
WEIGHT: 233.6 LBS | BODY MASS INDEX: 33.44 KG/M2 | SYSTOLIC BLOOD PRESSURE: 120 MMHG | RESPIRATION RATE: 16 BRPM | TEMPERATURE: 97.4 F | OXYGEN SATURATION: 98 % | HEART RATE: 80 BPM | DIASTOLIC BLOOD PRESSURE: 80 MMHG | HEIGHT: 70 IN

## 2019-05-08 DIAGNOSIS — F51.04 CHRONIC INSOMNIA: Primary | ICD-10-CM

## 2019-05-08 DIAGNOSIS — F41.8 DEPRESSION WITH ANXIETY: ICD-10-CM

## 2019-05-08 PROCEDURE — 3008F BODY MASS INDEX DOCD: CPT | Performed by: FAMILY MEDICINE

## 2019-05-08 PROCEDURE — 99214 OFFICE O/P EST MOD 30 MIN: CPT | Performed by: FAMILY MEDICINE

## 2019-05-08 RX ORDER — LANOLIN ALCOHOL/MO/W.PET/CERES
3 CREAM (GRAM) TOPICAL
Qty: 30 TABLET | Refills: 5
Start: 2019-05-08 | End: 2019-10-08 | Stop reason: ALTCHOICE

## 2019-05-08 RX ORDER — PAROXETINE HYDROCHLORIDE 20 MG/1
TABLET, FILM COATED ORAL
Qty: 30 TABLET | Refills: 5 | Status: SHIPPED | OUTPATIENT
Start: 2019-05-08 | End: 2019-11-18 | Stop reason: SDUPTHER

## 2019-06-03 ENCOUNTER — HOSPITAL ENCOUNTER (OUTPATIENT)
Dept: RADIOLOGY | Facility: HOSPITAL | Age: 64
Discharge: HOME | End: 2019-06-03
Payer: COMMERCIAL

## 2019-06-03 DIAGNOSIS — R92.8 ABNORMAL MRI, BREAST: ICD-10-CM

## 2019-06-03 PROCEDURE — C8908 MRI W/O FOL W/CONT, BREAST,: HCPCS

## 2019-06-03 PROCEDURE — A9585 GADOBUTROL INJECTION: HCPCS | Performed by: NURSE PRACTITIONER

## 2019-06-03 RX ADMIN — GADOBUTROL 10 ML: 604.72 INJECTION INTRAVENOUS at 14:43

## 2019-06-05 ENCOUNTER — TELEPHONE (OUTPATIENT)
Dept: SURGICAL ONCOLOGY | Facility: CLINIC | Age: 64
End: 2019-06-05

## 2019-06-06 ENCOUNTER — TELEPHONE (OUTPATIENT)
Dept: SURGICAL ONCOLOGY | Facility: CLINIC | Age: 64
End: 2019-06-06

## 2019-06-06 DIAGNOSIS — R92.8 ABNORMAL MRI, BREAST: Primary | ICD-10-CM

## 2019-06-12 ENCOUNTER — TELEPHONE (OUTPATIENT)
Dept: SURGICAL ONCOLOGY | Facility: CLINIC | Age: 64
End: 2019-06-12

## 2019-06-18 ENCOUNTER — HOSPITAL ENCOUNTER (OUTPATIENT)
Dept: RADIOLOGY | Facility: HOSPITAL | Age: 64
Discharge: HOME | End: 2019-06-18
Payer: COMMERCIAL

## 2019-06-18 DIAGNOSIS — R92.8 ABNORMAL MRI, BREAST: ICD-10-CM

## 2019-06-18 PROCEDURE — 88341 IMHCHEM/IMCYTCHM EA ADD ANTB: CPT | Performed by: PATHOLOGY

## 2019-06-18 PROCEDURE — 88342 IMHCHEM/IMCYTCHM 1ST ANTB: CPT | Performed by: PATHOLOGY

## 2019-06-18 PROCEDURE — 88305 TISSUE EXAM BY PATHOLOGIST: CPT | Performed by: PATHOLOGY

## 2019-06-18 PROCEDURE — A9585 GADOBUTROL INJECTION: HCPCS | Performed by: NURSE PRACTITIONER

## 2019-06-18 PROCEDURE — 19085 BX BREAST 1ST LESION MR IMAG: CPT

## 2019-06-18 RX ORDER — LIDOCAINE HYDROCHLORIDE 10 MG/ML
5 INJECTION, SOLUTION EPIDURAL; INFILTRATION; INTRACAUDAL; PERINEURAL ONCE
Status: COMPLETED | OUTPATIENT
Start: 2019-06-18 | End: 2019-06-18

## 2019-06-18 RX ORDER — LIDOCAINE HYDROCHLORIDE AND EPINEPHRINE BITARTRATE 20; .01 MG/ML; MG/ML
20 INJECTION, SOLUTION SUBCUTANEOUS ONCE
Status: COMPLETED | OUTPATIENT
Start: 2019-06-18 | End: 2019-06-18

## 2019-06-18 RX ADMIN — LIDOCAINE HYDROCHLORIDE 5 ML: 10 INJECTION, SOLUTION EPIDURAL; INFILTRATION; INTRACAUDAL; PERINEURAL at 07:50

## 2019-06-18 RX ADMIN — LIDOCAINE HYDROCHLORIDE,EPINEPHRINE BITARTRATE 20 ML: 20; .01 INJECTION, SOLUTION INFILTRATION; PERINEURAL at 07:50

## 2019-06-18 RX ADMIN — GADOBUTROL 10 ML: 604.72 INJECTION INTRAVENOUS at 08:47

## 2019-06-21 ENCOUNTER — TELEPHONE (OUTPATIENT)
Dept: SURGICAL ONCOLOGY | Facility: CLINIC | Age: 64
End: 2019-06-21

## 2019-07-11 ENCOUNTER — OFFICE VISIT (OUTPATIENT)
Dept: SURGICAL ONCOLOGY | Facility: CLINIC | Age: 64
End: 2019-07-11
Payer: MEDICARE

## 2019-07-11 ENCOUNTER — TELEPHONE (OUTPATIENT)
Dept: SURGICAL ONCOLOGY | Facility: CLINIC | Age: 64
End: 2019-07-11

## 2019-07-11 VITALS
BODY MASS INDEX: 33.64 KG/M2 | DIASTOLIC BLOOD PRESSURE: 80 MMHG | TEMPERATURE: 98.3 F | HEIGHT: 70 IN | RESPIRATION RATE: 16 BRPM | SYSTOLIC BLOOD PRESSURE: 120 MMHG | HEART RATE: 93 BPM | WEIGHT: 235 LBS

## 2019-07-11 DIAGNOSIS — S21.002A: Primary | ICD-10-CM

## 2019-07-11 PROCEDURE — 99212 OFFICE O/P EST SF 10 MIN: CPT | Performed by: NURSE PRACTITIONER

## 2019-07-11 NOTE — PROGRESS NOTES
Surgical Oncology Follow Up       [address] [facility],[address]  [facility] [place]  [address] [facility] [address]    Deena Jones  1955  [id number]      Chief Complaint   Patient presents with    Breast Cancer     Patient is here for skin tear in left breast        Assessment/Plan:  1  Wound, breast, left, initial encounter  - Xeroform to wound daily  - Call with s/s infection or no improvement in wound  - f/u as scheduled next month       Discussion/Summary:  Patient is a 74-year-old female with a history of LCIS who underwent a left breast biopsy on June 18, 2019  When she removed the Band-Aids, she appreciated a skin tear  She presents today for evaluation as she feels that the wound is not healing  On today's exam, this does not appear infected at this time  I recommended that the patient apply 0 form to the wound and monitor for signs or symptoms of infection  I did instruct her to call with any fevers or chills, purulent discharge or erythema  I will plan to see her back as previously scheduled next month or sooner if the need arises  All of her questions were answered  History of Present Illness:     -Interval History:  Patient presents today for a wound check  She states that after her biopsy, which she removed the Band-Aid, she noted a skin tear of the left lateral breast   She has been applying antibiotic ointment for 1 week however then stopped antibiotic ointment and was using peroxide to dry the wound  She denies fevers or chills but does report some tenderness at the wound  She appreciates a faint discoloration surrounding the wound  Denies discharge  Review of Systems:  Review of Systems   Constitutional: Negative for chills, fatigue and fever  Skin: Positive for wound         Patient Active Problem List   Diagnosis    Family history of malignant neoplasm of uterus    Family history of malignant neoplasm of gastrointestinal tract    Family history of malignant neoplasm of breast    Depression with anxiety    Dyslipidemia    Elevated LFTs    Esophageal reflux    Left thyroid nodule    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    Mild intermittent asthma without complication    Obesity (BMI 30 0-34  9)    Rosacea    Increased risk of breast cancer    Abnormal MRI, breast    Hand eczema    Chronic insomnia    Wound, breast, left, initial encounter     Past Medical History:   Diagnosis Date    Allergic rhinitis     last assessed-10/2/2017    Asthma     Carotid bruit     R-last assessed-6/10/2014    Dysphagia     last assessed-2013    GERD (gastroesophageal reflux disease)      Past Surgical History:   Procedure Laterality Date    BREAST BIOPSY      percutaneous needle core    BREAST LUMPECTOMY Right      SECTION      COLONOSCOPY      complete-12/3/2012-internal/external hemorrhoids, perianal or excoriation, mild sigmoid diverticulosis    MAMMO NEEDLE LOCALIZATION RIGHT (ALL INC) Right 2017    MAMMO NEEDLE LOCALIZATION RIGHT (ALL INC) EACH ADD Right 2017    MAMMO STEREOTACTIC BREAST BIOPSY RIGHT (ALL INC) Right 11/15/2017    MAMMO STEREOTACTIC BREAST BIOPSY RIGHT (ALL INC) EACH ADD Right 11/15/2017    MRI BREAST BIOPSY LEFT (ALL INCLUSIVE) Left 2019    DE PERQ DEVICE PLACEMT BREAST LOC 1ST LES W GUIDNCE Right 2017    Procedure: BREAST LUMPECTOMY; BREAST BRACKETED NEEDLE LOCALIZATION (BRACKETED NEEDLE LOC AT 0900);   Surgeon: Jossue Uriostegui MD;  Location: [facility];  Service: Surgical Oncology    VAGINAL DILATATION      d&e     Family History   Problem Relation Age of Onset    Heart attack Mother         acute MI    Colon polyps Mother         polyps of the sigmoid colon    Breast cancer Sister     Uterine cancer Sister     Liver cancer Brother         x2    Uterine cancer Maternal Grandmother     Uterine cancer Paternal Grandmother     Diabetes Paternal Grandfather     Stroke Maternal Uncle     Kidney cancer Paternal Aunt     Pancreatic cancer Paternal Uncle      Social History     Socioeconomic History    Marital status: /Civil Union     Spouse name: Not on file    Number of children: Not on file    Years of education: Not on file    Highest education level: Not on file   Occupational History    Not on file   Social Needs    Financial resource strain: Not on file    Food insecurity:     Worry: Not on file     Inability: Not on file    Transportation needs:     Medical: Not on file     Non-medical: Not on file   Tobacco Use    Smoking status: Former Smoker     Years: 2 00     Types: Cigarettes     Last attempt to quit: 1976     Years since quittin 2    Smokeless tobacco: Never Used   Substance and Sexual Activity    Alcohol use:  Yes     Alcohol/week: 1 0 standard drinks     Types: 1 Glasses of wine per week     Comment: per allscripts, social drinker    Drug use: No    Sexual activity: Not on file   Lifestyle    Physical activity:     Days per week: Not on file     Minutes per session: Not on file    Stress: Not on file   Relationships    Social connections:     Talks on phone: Not on file     Gets together: Not on file     Attends Protestant service: Not on file     Active member of club or organization: Not on file     Attends meetings of clubs or organizations: Not on file     Relationship status: Not on file    Intimate partner violence:     Fear of current or ex partner: Not on file     Emotionally abused: Not on file     Physically abused: Not on file     Forced sexual activity: Not on file   Other Topics Concern    Not on file   Social History Narrative    Not on file       Current Outpatient Medications:     calcium citrate (CALCITRATE) 950 MG tablet, Take 1 tablet by mouth daily, Disp: , Rfl:     loratadine (CLARITIN) 10 mg tablet, Take 1 tablet daily PRN, Disp: 30 tablet, Rfl: 3    Multiple Vitamin (MULTI-VITAMIN DAILY PO), Take by mouth, Disp: , Rfl:     PARoxetine (PAXIL) 20 mg tablet, Take 1/2 tab  daily for 1 week, then increase dose to 1 tab  daily, Disp: 30 tablet, Rfl: 5    fluocinonide (LIDEX) 0 05 % cream, Apply topically 2 (two) times a day (Patient not taking: Reported on 5/8/2019), Disp: 30 g, Rfl: 1    melatonin 3 mg, Take 1 tablet (3 mg total) by mouth daily at bedtime (Patient not taking: Reported on 7/11/2019), Disp: 30 tablet, Rfl: 5  Allergies   Allergen Reactions    Other Sneezing     SEASONAL     Vitals:    07/11/19 1256   BP: 120/80   Pulse: 93   Resp: 16   Temp: 98 3 °F (36 8 °C)       Physical Exam   Constitutional: She is oriented to person, place, and time  She appears well-developed and well-nourished  HENT:   Head: Normocephalic and atraumatic  Pulmonary/Chest: Effort normal    Left lateral breast wound  No drainage  This is a faint discoloration of the breast but no erythema  Is not warm to touch  No fluctuance  Two well healing biopsy sites  See photo  Neurological: She is alert and oriented to person, place, and time  Psychiatric: She has a normal mood and affect  Vitals reviewed  Media Information      Document Information     Clinical Image - Mobile Device      07/11/2019 1:13 PM   Attached To: Office Visit on 7/11/19 with Elie sAtudillo, [address] [place] [address] Planning/Advance Directives:  Discussed disease status and treatment goals with the patient

## 2019-07-11 NOTE — TELEPHONE ENCOUNTER
Spoke with patient who states that the skin tear from the steri strip removal is not healing  No fevers  Some tenderness at the site  I recommended the patient be evaluated  Appt given at 1230 today

## 2019-08-16 ENCOUNTER — OFFICE VISIT (OUTPATIENT)
Dept: FAMILY MEDICINE CLINIC | Facility: CLINIC | Age: 64
End: 2019-08-16
Payer: MEDICARE

## 2019-08-16 VITALS
DIASTOLIC BLOOD PRESSURE: 74 MMHG | HEART RATE: 78 BPM | SYSTOLIC BLOOD PRESSURE: 110 MMHG | WEIGHT: 231.8 LBS | TEMPERATURE: 97.6 F | BODY MASS INDEX: 33.18 KG/M2 | RESPIRATION RATE: 16 BRPM | HEIGHT: 70 IN | OXYGEN SATURATION: 96 %

## 2019-08-16 DIAGNOSIS — J45.20 MILD INTERMITTENT ASTHMA WITHOUT COMPLICATION: ICD-10-CM

## 2019-08-16 DIAGNOSIS — K21.9 GASTROESOPHAGEAL REFLUX DISEASE, ESOPHAGITIS PRESENCE NOT SPECIFIED: ICD-10-CM

## 2019-08-16 DIAGNOSIS — E66.9 OBESITY (BMI 30.0-34.9): ICD-10-CM

## 2019-08-16 DIAGNOSIS — E78.5 DYSLIPIDEMIA: Primary | ICD-10-CM

## 2019-08-16 DIAGNOSIS — R79.89 ELEVATED LFTS: ICD-10-CM

## 2019-08-16 DIAGNOSIS — F41.8 DEPRESSION WITH ANXIETY: ICD-10-CM

## 2019-08-16 PROCEDURE — 99214 OFFICE O/P EST MOD 30 MIN: CPT | Performed by: FAMILY MEDICINE

## 2019-08-16 RX ORDER — RANITIDINE 300 MG/1
300 TABLET ORAL
Qty: 30 TABLET | Refills: 5 | Status: SHIPPED | OUTPATIENT
Start: 2019-08-16 | End: 2019-10-29

## 2019-08-16 NOTE — ASSESSMENT & PLAN NOTE
PHQ 9 score 12  Patient reports no improvement in symptoms after starting on Paroxetine 20 mg daily  Continues with insomnia  She stopped taking Melatonin      Recommended evaluation by psychiatrist

## 2019-08-16 NOTE — PROGRESS NOTES
Chief Complaint   Patient presents with    Follow-up     3 month follow up     Health Maintenance   Topic Date Due    Hepatitis C Screening  1955   [employer] Annual Wellness Visit (AWV)  1955    CRC Screening: Colonoscopy  12/03/2017    Pneumococcal Vaccine: 65+ Years (1 of 2 - PCV13) 04/03/2019    INFLUENZA VACCINE  10/10/2019 (Originally 7/1/2019)    Fall Risk  04/09/2020    Depression Screening PHQ  04/09/2020    Urinary Incontinence Screening  04/09/2020    BMI: Followup Plan  04/09/2020    BMI: Adult  08/16/2020    MAMMOGRAM  12/17/2020    DTaP,Tdap,and Td Vaccines (2 - Td) 06/07/2023    Pneumococcal Vaccine: Pediatrics (0 to 5 Years) and At-Risk Patients (6 to 59 Years)  Aged Out    HEPATITIS B VACCINES  Aged Out     Assessment/Plan:    Dyslipidemia  Discussed a low-cholesterol, low-fat diet with patient  Encouraged regular exercise  Depression with anxiety  PHQ 9 score 12  Patient reports no improvement in symptoms after starting on Paroxetine 20 mg daily  Continues with insomnia  She stopped taking Melatonin  Recommended evaluation by psychiatrist       Mild intermittent asthma without complication  Symptoms are stable  Use Ventolin HFA inhaler PRN  Esophageal reflux  Discussed anti-reflux measures with patient  Recommended to avoid caffeine, fried, fatty foods, tomato sauce, late meals  Start Zantac 300 mg at bedtime  Recommended to call with update on symptoms in 4 weeks  If continues with heartburn will refer o gastroenterologist, EGD  Elevated LFTs  Recommended to get blood work done and schedule U/S abdomen as ordered in April  Obesity (BMI 30 0-34  9)  Encouraged weight reduction  Schedule follow-up office visit 6 months  Diagnoses and all orders for this visit:    Dyslipidemia    Depression with anxiety  -     Ambulatory referral to Psychiatry;  Future    Mild intermittent asthma without complication    Gastroesophageal reflux disease, esophagitis presence not specified  -     ranitidine (ZANTAC) 300 MG tablet; Take 1 tablet (300 mg total) by mouth daily at bedtime    Elevated LFTs    Obesity (BMI 30 0-34  9)          Subjective:      Patient ID: Mikaela Shore is a 71 y o  female  HPI     Patient presents for 3 month follow-up office visit  PMHx: Dyslipidemia, [place], Obesity, elevated LFT's, GERD, Depression, Anxiety, Insomnia, H/o R breast CA  Reviewed current medications  Patient did not go for blood work as was ordered in April 2019  Patient was seen in May 2019 for Depression, Anxiety, insomnia  She requested to change medication from Escitalopram to Paroxetine 20 mg daily  Patient states that she did not see any significant improvement in symptoms after starting on Paxil  Still c/o feeling depressed, nervous, having difficulty to sleep at night  Denies suicidal ideations  She was seen by Sherry Sharp LCSW for psychotherapy  She would like to see a psychiatrist to discuss medical regimen  Asthma- symptoms are stable  No recent asthma exacerbations  Patient has allergies to cats and pollens  She takes Claritin 10 mg daily PRN during allergy seasons  Denies tobacco use  Patient denies chest pain, shortness of breath, dizziness  No chest tightness or wheezing  Patient c/o heartburn mainly at night time  Denies abdominal pain, nausea, vomiting, diarrhea  Patient states that she was taking medication for GERD  in the past but it made her feel nauseous  Obesity - weight has been stable  Patient does not exercise on a regular basis  Family history is positive for breast CA in her sister, colon cancer in her mother and brother  Genetic testing was negative for Jiang syndrome        Patient was diagnosed with R breast CA in 2017, S/P R breast lumpectomy performed by surgical oncologist Dr Nichole Gibbs in 12/17      Patient had MRI of BL  breasts done in 6/19 which showed left breast non mass enhancement  She was recommended to proceed with left breast biopsy which was done June 18, 2019  No evidence of malignancy  Patient had colonoscopy performed by colorectal surgeon Dr Keya Salas  in 2012  Pneumovax done in January 2016  The following portions of the patient's history were reviewed and updated as appropriate: allergies, current medications, past medical history, past social history, past surgical history and problem list     Review of Systems   Constitutional: Positive for fatigue  Negative for activity change, appetite change, chills and fever (mild)  HENT: Negative for congestion, ear pain, hearing loss, mouth sores, nosebleeds, postnasal drip, sore throat, tinnitus and trouble swallowing  Eyes: Negative for pain, discharge, redness, itching and visual disturbance  Respiratory: Negative for cough, chest tightness, shortness of breath and wheezing  Cardiovascular: Negative for chest pain, palpitations and leg swelling  Gastrointestinal: Negative for abdominal pain, blood in stool, constipation, diarrhea, nausea and vomiting         C/o heartburn at night   Genitourinary: Negative for difficulty urinating, dysuria, flank pain, frequency, hematuria and pelvic pain  Musculoskeletal: Negative for arthralgias, back pain, gait problem, joint swelling, myalgias and neck pain  Skin: Negative for rash and wound  Neurological: Negative for dizziness, syncope and headaches  Hematological: Negative  Psychiatric/Behavioral: Positive for sleep disturbance  Negative for suicidal ideas  Depression/ Anxiety          Objective:      /74 (BP Location: Left arm, Patient Position: Sitting, Cuff Size: Adult)   Pulse 78   Temp 97 6 °F (36 4 °C) (Tympanic)   Resp 16   Ht 5' 9 5" (1 765 m)   Wt 105 kg (231 lb 12 8 oz)   SpO2 96%   BMI 33 74 kg/m²        Physical Exam   Constitutional: She appears well-developed and well-nourished     Obese HENT:   Head: Normocephalic and atraumatic  Right Ear: External ear normal    Left Ear: External ear normal    Mouth/Throat: Oropharynx is clear and moist    Eyes: Pupils are equal, round, and reactive to light  Conjunctivae are normal    Cardiovascular: Normal rate, regular rhythm and normal heart sounds  No murmur heard  No carotid bruits BL  No BL LE edema   Pulmonary/Chest: Effort normal and breath sounds normal    Abdominal: Soft  Bowel sounds are normal  There is no tenderness  Musculoskeletal: Normal range of motion  She exhibits no edema, tenderness or deformity  Skin: Skin is warm and dry  Rosacea on face   Psychiatric:   Patient feels depressed, anxious   Nursing note and vitals reviewed

## 2019-08-16 NOTE — ASSESSMENT & PLAN NOTE
Discussed anti-reflux measures with patient  Recommended to avoid caffeine, fried, fatty foods, tomato sauce, late meals  Start Zantac 300 mg at bedtime  Recommended to call with update on symptoms in 4 weeks  If continues with heartburn will refer o gastroenterologist, EGD

## 2019-08-20 ENCOUNTER — OFFICE VISIT (OUTPATIENT)
Dept: SURGICAL ONCOLOGY | Facility: CLINIC | Age: 64
End: 2019-08-20
Payer: MEDICARE

## 2019-08-20 ENCOUNTER — LAB (OUTPATIENT)
Dept: LAB | Facility: CLINIC | Age: 64
End: 2019-08-20
Payer: MEDICARE

## 2019-08-20 VITALS
HEART RATE: 96 BPM | WEIGHT: 233 LBS | DIASTOLIC BLOOD PRESSURE: 90 MMHG | HEIGHT: 70 IN | SYSTOLIC BLOOD PRESSURE: 140 MMHG | BODY MASS INDEX: 33.36 KG/M2 | RESPIRATION RATE: 16 BRPM | TEMPERATURE: 98 F

## 2019-08-20 DIAGNOSIS — Z91.89 ENCOUNTER FOR HEPATITIS C VIRUS SCREENING TEST FOR HIGH RISK PATIENT: ICD-10-CM

## 2019-08-20 DIAGNOSIS — Z80.3 FAMILY HISTORY OF MALIGNANT NEOPLASM OF BREAST: ICD-10-CM

## 2019-08-20 DIAGNOSIS — R92.8 ABNORMAL MRI, BREAST: Primary | ICD-10-CM

## 2019-08-20 DIAGNOSIS — Z11.59 ENCOUNTER FOR HEPATITIS C VIRUS SCREENING TEST FOR HIGH RISK PATIENT: ICD-10-CM

## 2019-08-20 DIAGNOSIS — R79.89 ELEVATED LFTS: ICD-10-CM

## 2019-08-20 DIAGNOSIS — Z12.31 VISIT FOR SCREENING MAMMOGRAM: ICD-10-CM

## 2019-08-20 DIAGNOSIS — Z86.000 HISTORY OF LOBULAR CARCINOMA IN SITU (LCIS) OF BREAST: ICD-10-CM

## 2019-08-20 DIAGNOSIS — E78.5 DYSLIPIDEMIA: ICD-10-CM

## 2019-08-20 LAB
ALBUMIN SERPL BCP-MCNC: 3.8 G/DL (ref 3.5–5)
ALP SERPL-CCNC: 87 U/L (ref 46–116)
ALT SERPL W P-5'-P-CCNC: 68 U/L (ref 12–78)
ANION GAP SERPL CALCULATED.3IONS-SCNC: 11 MMOL/L (ref 4–13)
AST SERPL W P-5'-P-CCNC: 51 U/L (ref 5–45)
BILIRUB SERPL-MCNC: 0.4 MG/DL (ref 0.2–1)
BUN SERPL-MCNC: 13 MG/DL (ref 5–25)
CALCIUM SERPL-MCNC: 9.3 MG/DL (ref 8.3–10.1)
CHLORIDE SERPL-SCNC: 107 MMOL/L (ref 100–108)
CHOLEST SERPL-MCNC: 177 MG/DL (ref 50–200)
CO2 SERPL-SCNC: 26 MMOL/L (ref 21–32)
CREAT SERPL-MCNC: 1.03 MG/DL (ref 0.6–1.3)
GFR SERPL CREATININE-BSD FRML MDRD: 57 ML/MIN/1.73SQ M
GLUCOSE P FAST SERPL-MCNC: 100 MG/DL (ref 65–99)
HBV SURFACE AG SER QL: NORMAL
HCV AB SER QL: NORMAL
HDLC SERPL-MCNC: 37 MG/DL (ref 40–60)
LDLC SERPL CALC-MCNC: 115 MG/DL (ref 0–100)
NONHDLC SERPL-MCNC: 140 MG/DL
POTASSIUM SERPL-SCNC: 4 MMOL/L (ref 3.5–5.3)
PROT SERPL-MCNC: 7.8 G/DL (ref 6.4–8.2)
SODIUM SERPL-SCNC: 144 MMOL/L (ref 136–145)
TRIGL SERPL-MCNC: 125 MG/DL

## 2019-08-20 PROCEDURE — 80061 LIPID PANEL: CPT

## 2019-08-20 PROCEDURE — 86038 ANTINUCLEAR ANTIBODIES: CPT

## 2019-08-20 PROCEDURE — 99213 OFFICE O/P EST LOW 20 MIN: CPT | Performed by: NURSE PRACTITIONER

## 2019-08-20 PROCEDURE — 87340 HEPATITIS B SURFACE AG IA: CPT

## 2019-08-20 PROCEDURE — 36415 COLL VENOUS BLD VENIPUNCTURE: CPT

## 2019-08-20 PROCEDURE — 86803 HEPATITIS C AB TEST: CPT

## 2019-08-20 PROCEDURE — 80053 COMPREHEN METABOLIC PANEL: CPT

## 2019-08-20 NOTE — PROGRESS NOTES
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    3  Family history of malignant neoplasm of breast    4  Visit for screening mammogram  - Mammo screening bilateral w 3d & cad; Future    Discussion/Summary:  Patient is a 76-year-old female who presents today for a six-month follow-up visit for a personal history of LCIS and a family history of breast cancer  She underwent a lumpectomy by Dr Shira Abel in December of 2017  She underwent genetic testing which was negative  She does have an elevated Tyrer- Cuzick risk  We have been following her with twice a year clinical breast exams, annual mammography an annual breast MRI  She had a bilateral 3D mammogram performed on December 17, 2018 which was BI-RADS 2, category 1 density  She underwent a bilateral breast MRI on [place] 3, 2019 which revealed linear non mass enhancement of the left breast at the 2 o'clock position  A biopsy was recommended  This was performed on June 18, 2019 and pathology revealed intraductal micropapillomata, ductal hyperplasia and columnar cell changes  There was no evidence of atypia or malignancy  The pathology was concordant and surgical consultation was recommended  I discussed her case with Dr Shira Abel and we have recommended a short-term follow-up MRI  The patient has no new complaints today and there are no concerns on today's exam   Her superficial skin wound is almost completely healed  I have recommended continuing the topical antibiotic ointment until it is completely healed    I will order a bilateral mammogram and a bilateral breast MRI for December and we will plan to see the patient back in 6 months or sooner if the need arises  She was instructed to call with any new concerns or symptoms prior to that time  I have also recommended the patient have a colonoscopy as she is now overdue and does have a family history of colon cancer  She is in agreement with this plan  All of her questions were answered  History of Present Illness:     -Interval History:  Patient presents today for a six-month follow-up visit for an increased risk of breast cancer  Her she underwent a left breast biopsy in June of 2019 and pathology was benign  She did have a left breast superficial wound from a skin tear from the Steri-Strips after her biopsy  She states this is essentially completely healed  She notices no changes on self-breast exam   Denies changes in family history  Denies headaches, back pain, bone pain, cough, shortness of breath, abdominal pain  Review of Systems:  Review of Systems   Constitutional: Negative for activity change, appetite change, chills, fatigue, fever and unexpected weight change  HENT: Negative for trouble swallowing  Eyes: Negative for pain, redness and visual disturbance  Respiratory: Negative for cough, shortness of breath and wheezing  Cardiovascular: Negative for chest pain, palpitations and leg swelling  Gastrointestinal: Negative for abdominal pain, constipation, diarrhea, nausea and vomiting  Endocrine: Negative for cold intolerance and heat intolerance  Musculoskeletal: Negative for arthralgias, back pain, gait problem and myalgias  Skin: Negative for color change and rash  Neurological: Negative for dizziness, syncope, light-headedness, numbness and headaches  Hematological: Negative for adenopathy  Psychiatric/Behavioral: Negative for agitation and confusion  All other systems reviewed and are negative        Patient Active Problem List   Diagnosis    Family history of malignant neoplasm of uterus    Family history of malignant neoplasm of gastrointestinal tract    Family history of malignant neoplasm of breast    Depression with anxiety    Dyslipidemia    Elevated LFTs    Esophageal reflux    Left thyroid nodule    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    Mild intermittent asthma without complication    Obesity (BMI 30 0-34  9)    Rosacea    Increased risk of breast cancer    Abnormal MRI, breast    Hand eczema    Chronic insomnia    Wound, breast, left, initial encounter     Past Medical History:   Diagnosis Date    Allergic rhinitis     last assessed-10/2/2017    Asthma     Carotid bruit     R-last assessed-6/10/2014    Dysphagia     last assessed-2013    GERD (gastroesophageal reflux disease)      Past Surgical History:   Procedure Laterality Date    BREAST BIOPSY      percutaneous needle core    BREAST LUMPECTOMY Right      SECTION      COLONOSCOPY      complete-12/3/2012-internal/external hemorrhoids, perianal or excoriation, mild sigmoid diverticulosis    MAMMO NEEDLE LOCALIZATION RIGHT (ALL INC) Right 2017    MAMMO NEEDLE LOCALIZATION RIGHT (ALL INC) EACH ADD Right 2017    MAMMO STEREOTACTIC BREAST BIOPSY RIGHT (ALL INC) Right 11/15/2017    MAMMO STEREOTACTIC BREAST BIOPSY RIGHT (ALL INC) EACH ADD Right 11/15/2017    MRI BREAST BIOPSY LEFT (ALL INCLUSIVE) Left 2019    SD PERQ DEVICE PLACEMT BREAST LOC 1ST LES W GUIDNCE Right 2017    Procedure: BREAST LUMPECTOMY; BREAST BRACKETED NEEDLE LOCALIZATION (BRACKETED NEEDLE LOC AT 0900);   Surgeon: Melinda Padilla MD;  [facility];  Service: Surgical Oncology    VAGINAL DILATATION      d&e     Family History   Problem Relation Age of Onset    Heart attack Mother         acute MI    Colon polyps Mother         polyps of the sigmoid colon    Breast cancer Sister     Uterine cancer Sister     Liver cancer Brother         x2    Uterine cancer Maternal Grandmother     Uterine cancer Paternal Grandmother     Diabetes Paternal Grandfather     Stroke Maternal Uncle     Kidney cancer Paternal Aunt     Pancreatic cancer Paternal Uncle      Social History     Socioeconomic History    Marital status: /Civil Union     Spouse name: Not on file    Number of children: Not on file    Years of education: Not on file    Highest education level: Not on file   Occupational History    Not on file   Social Needs    Financial resource strain: Not on file    Food insecurity:     Worry: Not on file     Inability: Not on file    Transportation needs:     Medical: Not on file     Non-medical: Not on file   Tobacco Use    Smoking status: Former Smoker     Years: 2 00     Types: Cigarettes     Last attempt to quit: 1976     Years since quittin 3    Smokeless tobacco: Never Used   Substance and Sexual Activity    Alcohol use:  Yes     Alcohol/week: 1 0 standard drinks     Types: 1 Glasses of wine per week     Comment: per allscripts, social drinker    Drug use: No    Sexual activity: Not on file   Lifestyle    Physical activity:     Days per week: Not on file     Minutes per session: Not on file    Stress: Not on file   Relationships    Social connections:     Talks on phone: Not on file     Gets together: Not on file     Attends Tenriism service: Not on file     Active member of club or organization: Not on file     Attends meetings of clubs or organizations: Not on file     Relationship status: Not on file    Intimate partner violence:     Fear of current or ex partner: Not on file     Emotionally abused: Not on file     Physically abused: Not on file     Forced sexual activity: Not on file   Other Topics Concern    Not on file   Social History Narrative    Not on file       Current Outpatient Medications:     calcium citrate (CALCITRATE) 950 MG tablet, Take 1 tablet by mouth daily, Disp: , Rfl:     loratadine (CLARITIN) 10 mg tablet, Take 1 tablet daily PRN, Disp: 30 tablet, Rfl: 3    Multiple Vitamin (MULTI-VITAMIN DAILY PO), Take by mouth, Disp: , Rfl:     PARoxetine (PAXIL) 20 mg tablet, Take 1/2 tab  daily for 1 week, then increase dose to 1 tab  daily, Disp: 30 tablet, Rfl: 5    ranitidine (ZANTAC) 300 MG tablet, Take 1 tablet (300 mg total) by mouth daily at bedtime, Disp: 30 tablet, Rfl: 5    fluocinonide (LIDEX) 0 05 % cream, Apply topically 2 (two) times a day (Patient not taking: Reported on 5/8/2019), Disp: 30 g, Rfl: 1    melatonin 3 mg, Take 1 tablet (3 mg total) by mouth daily at bedtime (Patient not taking: Reported on 7/11/2019), Disp: 30 tablet, Rfl: 5  Allergies   Allergen Reactions    Other Sneezing     SEASONAL     Vitals:    08/20/19 0849   BP: 140/90   Pulse:    Resp:    Temp:        Physical Exam   Constitutional: She is oriented to person, place, and time  Vital signs are normal  She appears well-developed and well-nourished  No distress  HENT:   Head: Normocephalic and atraumatic  Neck: Normal range of motion  Cardiovascular: Normal rate, regular rhythm and normal heart sounds  Pulmonary/Chest: Effort normal and breath sounds normal    Bilateral breasts were examined in the sitting and supine position  The left breast wound is almost completely healed except for a small superficial skin opening  Right breast surgical scar present  There are no masses, skin nodules, nipple changes or nipple discharge  There is no bilateral supraclavicular or axillary lymphadenopathy noted  Abdominal: Soft  Normal appearance  She exhibits no mass  There is no hepatosplenomegaly  There is no tenderness  Musculoskeletal: Normal range of motion  Lymphadenopathy:     She has no axillary adenopathy  Right: No supraclavicular adenopathy present  Left: No supraclavicular adenopathy present  Neurological: She is alert and oriented to person, place, and time     Skin: Skin is warm, dry and intact  No rash noted  She is not diaphoretic  Psychiatric: She has a normal mood and affect  Her speech is normal    Vitals reviewed  Advance Care Planning/Advance Directives:  Discussed disease status and treatment goals with the patient

## 2019-08-21 LAB — RYE IGE QN: NEGATIVE

## 2019-08-26 DIAGNOSIS — R79.89 ELEVATED LFTS: ICD-10-CM

## 2019-08-26 DIAGNOSIS — E78.5 DYSLIPIDEMIA: ICD-10-CM

## 2019-08-26 DIAGNOSIS — E66.9 OBESITY (BMI 30.0-34.9): Primary | ICD-10-CM

## 2019-10-03 ENCOUNTER — HOSPITAL ENCOUNTER (OUTPATIENT)
Dept: ULTRASOUND IMAGING | Facility: HOSPITAL | Age: 64
Discharge: HOME | End: 2019-10-03
Payer: MEDICARE

## 2019-10-03 DIAGNOSIS — R79.89 ELEVATED LFTS: ICD-10-CM

## 2019-10-03 PROCEDURE — 76700 US EXAM ABDOM COMPLETE: CPT

## 2019-10-06 PROBLEM — Z00.00 WELCOME TO MEDICARE PREVENTIVE VISIT: Status: ACTIVE | Noted: 2019-10-06

## 2019-10-06 PROBLEM — K76.0 FATTY LIVER: Status: ACTIVE | Noted: 2019-10-06

## 2019-10-08 ENCOUNTER — OFFICE VISIT (OUTPATIENT)
Dept: FAMILY MEDICINE CLINIC | Facility: CLINIC | Age: 64
End: 2019-10-08
Payer: MEDICARE

## 2019-10-08 VITALS
HEART RATE: 76 BPM | BODY MASS INDEX: 33.07 KG/M2 | RESPIRATION RATE: 16 BRPM | SYSTOLIC BLOOD PRESSURE: 124 MMHG | TEMPERATURE: 97.9 F | WEIGHT: 231 LBS | DIASTOLIC BLOOD PRESSURE: 72 MMHG | OXYGEN SATURATION: 96 % | HEIGHT: 70 IN

## 2019-10-08 DIAGNOSIS — E78.5 DYSLIPIDEMIA: ICD-10-CM

## 2019-10-08 DIAGNOSIS — R79.89 ELEVATED LFTS: ICD-10-CM

## 2019-10-08 DIAGNOSIS — F41.8 DEPRESSION WITH ANXIETY: ICD-10-CM

## 2019-10-08 DIAGNOSIS — Z00.00 WELCOME TO MEDICARE PREVENTIVE VISIT: Primary | ICD-10-CM

## 2019-10-08 DIAGNOSIS — Z23 NEED FOR INFLUENZA VACCINATION: ICD-10-CM

## 2019-10-08 DIAGNOSIS — Z12.11 SCREENING FOR COLON CANCER: ICD-10-CM

## 2019-10-08 DIAGNOSIS — K76.0 FATTY LIVER: ICD-10-CM

## 2019-10-08 DIAGNOSIS — R94.31 ABNORMAL EKG: ICD-10-CM

## 2019-10-08 DIAGNOSIS — E66.9 OBESITY (BMI 30.0-34.9): ICD-10-CM

## 2019-10-08 PROCEDURE — G0008 ADMIN INFLUENZA VIRUS VAC: HCPCS

## 2019-10-08 PROCEDURE — 90662 IIV NO PRSV INCREASED AG IM: CPT

## 2019-10-08 PROCEDURE — G0402 INITIAL PREVENTIVE EXAM: HCPCS

## 2019-10-08 PROCEDURE — G0403 EKG FOR INITIAL PREVENT EXAM: HCPCS

## 2019-10-08 NOTE — ASSESSMENT & PLAN NOTE
Patient has mild elevation of LFT's  She had abdominal U/S which showed mild liver infiltration  Recommended to follow a low-fat diet, increase exercise, work on weight reduction

## 2019-10-08 NOTE — PATIENT INSTRUCTIONS
Medicare Preventive Visit Patient Instructions  Thank you for completing your Welcome to Medicare Visit or Medicare Annual Wellness Visit today  Your next wellness visit will be due in one year (10/8/2020)  The screening/preventive services that you may require over the next 5-10 years are detailed below  Some tests may not apply to you based off risk factors and/or age  Screening tests ordered at today's visit but not completed yet may show as past due  Also, please note that scanned in results may not display below  Preventive Screenings:  Service Recommendations Previous Testing/Comments   Colorectal Cancer Screening  * Colonoscopy    * Fecal Occult Blood Test (FOBT)/Fecal Immunochemical Test (FIT)  * Fecal DNA/Cologuard Test  * Flexible Sigmoidoscopy Age: 54-65 years old   Colonoscopy: every 10 years (may be performed more frequently if at higher risk)  OR  FOBT/FIT: every 1 year  OR  Cologuard: every 3 years  OR  Sigmoidoscopy: every 5 years  Screening may be recommended earlier than age 48 if at higher risk for colorectal cancer  Also, an individualized decision between you and your healthcare provider will decide whether screening between the ages of 74-80 would be appropriate  Colonoscopy: 12/03/2012  FOBT/FIT: Not on file  Cologuard: Not on file  Sigmoidoscopy: Not on file    Screening Current     Breast Cancer Screening Age: 36 years old  Frequency: every 1-2 years  Not required if history of left and right mastectomy Mammogram: 12/17/2018    Screening Current   Cervical Cancer Screening Between the ages of 21-29, pap smear recommended once every 3 years  Between the ages of 33-67, can perform pap smear with HPV co-testing every 5 years     Recommendations may differ for women with a history of total hysterectomy, cervical cancer, or abnormal pap smears in past  Pap Smear: Not on file    Screening Not Indicated   Hepatitis C Screening Once for adults born between 1945 and 1965  More frequently in patients at high risk for Hepatitis C Hep C Antibody: 08/20/2019    Screening Current   Diabetes Screening 1-2 times per year if you're at risk for diabetes or have pre-diabetes Fasting glucose: 100 mg/dL   A1C: No results in last 5 years    Screening Current   Cholesterol Screening Once every 5 years if you don't have a lipid disorder  May order more often based on risk factors  Lipid panel: 08/20/2019    Screening Current     Other Preventive Screenings Covered by Medicare:  1  Abdominal Aortic Aneurysm (AAA) Screening: covered once if your at risk  You're considered to be at risk if you have a family history of AAA  2  Lung Cancer Screening: covers low dose CT scan once per year if you meet all of the following conditions: (1) Age 50-69; (2) No signs or symptoms of lung cancer; (3) Current smoker or have quit smoking within the last 15 years; (4) You have a tobacco smoking history of at least 30 pack years (packs per day multiplied by number of years you smoked); (5) You get a written order from a healthcare provider  3  Glaucoma Screening: covered annually if you're considered high risk: (1) You have diabetes OR (2) Family history of glaucoma OR (3)  aged 48 and older OR (3)  American aged 72 and older  3  Osteoporosis Screening: covered every 2 years if you meet one of the following conditions: (1) You're estrogen deficient and at risk for osteoporosis based off medical history and other findings; (2) Have a vertebral abnormality; (3) On glucocorticoid therapy for more than 3 months; (4) Have primary hyperparathyroidism; (5) On osteoporosis medications and need to assess response to drug therapy  · Last bone density test (DXA Scan): Not on file  5  HIV Screening: covered annually if you're between the age of 12-76  Also covered annually if you are younger than 13 and older than 72 with risk factors for HIV infection   For pregnant patients, it is covered up to 3 times per pregnancy  Immunizations:  Immunization Recommendations   Influenza Vaccine Annual influenza vaccination during flu season is recommended for all persons aged >= 6 months who do not have contraindications   Pneumococcal Vaccine (Prevnar and Pneumovax)  * Prevnar = PCV13  * Pneumovax = PPSV23   Adults 25-60 years old: 1-3 doses may be recommended based on certain risk factors  Adults 72 years old: Prevnar (PCV13) vaccine recommended followed by Pneumovax (PPSV23) vaccine  If already received PPSV23 since turning 65, then PCV13 recommended at least one year after PPSV23 dose  Hepatitis B Vaccine 3 dose series if at intermediate or high risk (ex: diabetes, end stage renal disease, liver disease)   Tetanus (Td) Vaccine - COST NOT COVERED BY MEDICARE PART B Following completion of primary series, a booster dose should be given every 10 years to maintain immunity against tetanus  Td may also be given as tetanus wound prophylaxis  Tdap Vaccine - COST NOT COVERED BY MEDICARE PART B Recommended at least once for all adults  For pregnant patients, recommended with each pregnancy  Shingles Vaccine (Shingrix) - COST NOT COVERED BY MEDICARE PART B  2 shot series recommended in those aged 48 and above     Health Maintenance Due:      Topic Date Due    CRC Screening: Colonoscopy  12/03/2017    MAMMOGRAM  12/17/2020    Hepatitis C Screening  Completed     Immunizations Due:      Topic Date Due    Pneumococcal Vaccine: 65+ Years (1 of 2 - PCV13) 04/03/2019     Advance Directives   What are advance directives? Advance directives are legal documents that state your wishes and plans for medical care  These plans are made ahead of time in case you lose your ability to make decisions for yourself  Advance directives can apply to any medical decision, such as the treatments you want, and if you want to donate organs  What are the types of advance directives?   There are many types of advance directives, and each state has rules about how to use them  You may choose a combination of any of the following:  · Living will: This is a written record of the treatment you want  You can also choose which treatments you do not want, which to limit, and which to stop at a certain time  This includes surgery, medicine, IV fluid, and tube feedings  · Durable power of  for healthcare [facility]): This is a written record that states who you want to make healthcare choices for you when you are unable to make them for yourself  This person, called a proxy, is usually a family member or a friend  You may choose more than 1 proxy  · Do not resuscitate (DNR) order:  A DNR order is used in case your heart stops beating or you stop breathing  It is a request not to have certain forms of treatment, such as CPR  A DNR order may be included in other types of advance directives  · Medical directive: This covers the care that you want if you are in a coma, near death, or unable to make decisions for yourself  You can list the treatments you want for each condition  Treatment may include pain medicine, surgery, blood transfusions, dialysis, IV or tube feedings, and a ventilator (breathing machine)  · Values history: This document has questions about your views, beliefs, and how you feel and think about life  This information can help others choose the care that you would choose  Why are advance directives important? An advance directive helps you control your care  Although spoken wishes may be used, it is better to have your wishes written down  Spoken wishes can be misunderstood, or not followed  Treatments may be given even if you do not want them  An advance directive may make it easier for your family to make difficult choices about your care     Weight Management   Why it is important to manage your weight:  Being overweight increases your risk of health conditions such as heart disease, high blood pressure, type 2 diabetes, and certain types of cancer  It can also increase your risk for osteoarthritis, sleep apnea, and other respiratory problems  Aim for a slow, steady weight loss  Even a small amount of weight loss can lower your risk of health problems  How to lose weight safely:  A safe and healthy way to lose weight is to eat fewer calories and get regular exercise  You can lose up about 1 pound a week by decreasing the number of calories you eat by 500 calories each day  Healthy meal plan for weight management:  A healthy meal plan includes a variety of foods, contains fewer calories, and helps you stay healthy  A healthy meal plan includes the following:  · Eat whole-grain foods more often  A healthy meal plan should contain fiber  Fiber is the part of grains, fruits, and vegetables that is not broken down by your body  Whole-grain foods are healthy and provide extra fiber in your diet  Some examples of whole-grain foods are whole-wheat breads and pastas, oatmeal, brown rice, and bulgur  · Eat a variety of vegetables every day  Include dark, leafy greens such as spinach, kale, bryce greens, and mustard greens  Eat yellow and orange vegetables such as carrots, sweet potatoes, and winter squash  · Eat a variety of fruits every day  Choose fresh or canned fruit (canned in its own juice or light syrup) instead of juice  Fruit juice has very little or no fiber  · Eat low-fat dairy foods  Drink fat-free (skim) milk or 1% milk  Eat fat-free yogurt and low-fat cottage cheese  Try low-fat cheeses such as mozzarella and other reduced-fat cheeses  · Choose meat and other protein foods that are low in fat  Choose beans or other legumes such as split peas or lentils  Choose fish, skinless poultry (chicken or turkey), or lean cuts of red meat (beef or pork)  Before you cook meat or poultry, cut off any visible fat  · Use less fat and oil  Try baking foods instead of frying them   Add less fat, such as margarine, sour cream, regular salad dressing and mayonnaise to foods  Eat fewer high-fat foods  Some examples of high-fat foods include french fries, doughnuts, ice cream, and cakes  · Eat fewer sweets  Limit foods and drinks that are high in sugar  This includes candy, cookies, regular soda, and sweetened drinks  Exercise:  Exercise at least 30 minutes per day on most days of the week  Some examples of exercise include walking, biking, dancing, and swimming  You can also fit in more physical activity by taking the stairs instead of the elevator or parking farther away from stores  Ask your healthcare provider about the best exercise plan for you  © Copyright [employer] 2018 Information is for End User's use only and may not be sold, redistributed or otherwise used for commercial purposes   All illustrations and images included in CareNotes® are the copyrighted property of A D A M , Inc  or [address] [place] [address] [facility]

## 2019-10-08 NOTE — ASSESSMENT & PLAN NOTE
Mood has been stable  Continue Paxil 20 mg daily  Schedule appointment with psychiatrist as discussed previously

## 2019-10-08 NOTE — ASSESSMENT & PLAN NOTE
Fluzone high-dose administered today  Discussed Shingrix  Vaccination  Patient will check with insurance regarding coverage  Recommended Prevnar 13 vaccination  Patient would like to wait till next visit 3 months  Schedule colonoscopy with colorectal surgeon Dr Pio Higginbotham    Patient is up-to-date with screening mammogram

## 2019-10-08 NOTE — ASSESSMENT & PLAN NOTE
EKG done in the office today showed sinus rhythm, 71 bpm   Probable inferior infarct  No acute ST-T wave changes  No prior EKG available for comparison        Family history is positive for CAD, MI in her mother and grandmother  Will refer patient to cardiologist for further evaluation

## 2019-10-08 NOTE — PROGRESS NOTES
Assessment and Plan:     Problem List Items Addressed This Visit        Digestive    Fatty liver     Patient has mild elevation of LFT's  She had abdominal U/S which showed mild liver infiltration  Recommended to follow a low-fat diet, increase exercise, work on weight reduction  Relevant Orders    Comprehensive metabolic panel       Other    Depression with anxiety     Mood has been stable  Continue Paxil 20 mg daily  Schedule appointment with psychiatrist as discussed previously  Dyslipidemia     Follow a low-cholesterol, low-fat diet  Encouraged regular exercise  Relevant Orders    TSH, 3rd generation with Free T4 reflex    Lipid panel    Elevated LFTs    Relevant Orders    Comprehensive metabolic panel    Obesity (BMI 30 0-34  9)     Discussed dietary and lifestyle modifications  Encouraged weight reduction  Relevant Orders    CBC and differential    Comprehensive metabolic panel    Welcome to Medicare preventive visit - Primary     Fluzone high-dose administered today  Discussed Shingrix  Vaccination  Patient will check with insurance regarding coverage  Recommended Prevnar 13 vaccination  Patient would like to wait till next visit 3 months  Schedule colonoscopy with colorectal surgeon Dr Matthew Johnson  Patient is up-to-date with screening mammogram          Relevant Orders    POCT ECG (Completed)    Abnormal EKG     EKG done in the office today showed sinus rhythm, 71 bpm   Probable inferior infarct  No acute ST-T wave changes  No prior EKG available for comparison        Family history is positive for CAD, MI in her mother and grandmother  Will refer patient to cardiologist for further evaluation           Relevant Orders    Ambulatory referral to Cardiology      Other Visit Diagnoses     Screening for colon cancer        Relevant Orders    Ambulatory referral to Colorectal Surgery    Need for influenza vaccination        Relevant Orders    influenza vaccine, [id number], high-dose, PF 0 5 mL (FLUZONE HIGH-DOSE) (Completed)           Preventive health issues were discussed with patient, and age appropriate screening tests were ordered as noted in patient's After Visit Summary  Personalized health advice and appropriate referrals for health education or preventive services given if needed, as noted in patient's After Visit Summary  History of Present Illness:     Patient presents for Welcome to Medicare visit  Patient Care Team:  Raffaele Carlisle MD as PCP - General  Twin Anthony, Cindi Sylvester MD as Surgeon (Surgical Oncology)  Bharath Lemus as Nurse Practitioner (Surgical Oncology)     Review of Systems:     Review of Systems   Constitutional: Negative for activity change, appetite change, chills, fatigue and fever  HENT: Negative for congestion, dental problem, ear pain, mouth sores, nosebleeds, postnasal drip, sore throat, tinnitus and trouble swallowing  Eyes: Negative for pain, discharge, redness, itching and visual disturbance  Wears glasses   Respiratory: Negative for cough, chest tightness, shortness of breath and wheezing  Cardiovascular: Negative for chest pain, palpitations and leg swelling  Gastrointestinal: Negative for abdominal pain, blood in stool, constipation, diarrhea, nausea and vomiting  Genitourinary: Negative for difficulty urinating, dysuria, flank pain, frequency, hematuria, pelvic pain and urgency  Musculoskeletal: Negative for arthralgias, back pain, gait problem, joint swelling, myalgias and neck pain  Skin: Negative for rash and wound  Neurological: Positive for dizziness (occasionally when looking up)  Negative for seizures, syncope, numbness and headaches  Hematological: Negative for adenopathy  Does not bruise/bleed easily  Psychiatric/Behavioral: Positive for sleep disturbance  Negative for suicidal ideas          Anxiety / Depression - mood has been stable      Problem List:     Patient Active Problem List   Diagnosis    Family history of malignant neoplasm of uterus    Family history of malignant neoplasm of gastrointestinal tract    Family history of malignant neoplasm of breast    Depression with anxiety    Dyslipidemia    Elevated LFTs    Esophageal reflux    Left thyroid nodule    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    Mild intermittent asthma without complication    Obesity (BMI 30 0-34  9)    Rosacea    Increased risk of breast cancer    Abnormal MRI, breast    Hand eczema    Chronic insomnia    Wound, breast, left, initial encounter    Welcome to Medicare preventive visit    Fatty liver    Abnormal EKG      Past Medical and Surgical History:     Past Medical History:   Diagnosis Date    Allergic rhinitis     last assessed-10/2/2017    Asthma     Carotid bruit     R-last assessed-6/10/2014    Dysphagia     last assessed-2013    GERD (gastroesophageal reflux disease)      Past Surgical History:   Procedure Laterality Date    BREAST BIOPSY      percutaneous needle core    BREAST LUMPECTOMY Right      SECTION      COLONOSCOPY      complete-12/3/2012-internal/external hemorrhoids, perianal or excoriation, mild sigmoid diverticulosis    MAMMO NEEDLE LOCALIZATION RIGHT (ALL INC) Right 2017    MAMMO NEEDLE LOCALIZATION RIGHT (ALL INC) EACH ADD Right 2017    MAMMO STEREOTACTIC BREAST BIOPSY RIGHT (ALL INC) Right 11/15/2017    MAMMO STEREOTACTIC BREAST BIOPSY RIGHT (ALL INC) EACH ADD Right 11/15/2017    MRI BREAST BIOPSY LEFT (ALL INCLUSIVE) Left 2019    NM PERQ DEVICE PLACEMT BREAST LOC 1ST LES W GUIDNCE Right 2017    Procedure: BREAST LUMPECTOMY; BREAST BRACKETED NEEDLE LOCALIZATION (BRACKETED NEEDLE LOC AT 0900);   Surgeon: Lizeth Rhoades MD;  Location: [facility];  Service: Surgical Oncology    VAGINAL DILATATION      d&e      Family History:     Family History   Problem Relation Age of Onset    Heart attack Mother         acute MI    Colon polyps Mother         polyps of the sigmoid colon    Breast cancer Sister     Uterine cancer Sister     Liver cancer Brother         x2    Uterine cancer Maternal Grandmother     Uterine cancer Paternal Grandmother     Diabetes Paternal Grandfather     Stroke Maternal Uncle     Kidney cancer Paternal Aunt     Pancreatic cancer Paternal Uncle       Social History:     Social History     Socioeconomic History    Marital status: /Civil Union     Spouse name: None    Number of children: None    Years of education: None    Highest education level: None   Occupational History    None   Social Needs    Financial resource strain: None    Food insecurity:     Worry: None     Inability: None    Transportation needs:     Medical: None     Non-medical: None   Tobacco Use    Smoking status: Former Smoker     Years: 2 00     Types: Cigarettes     Last attempt to quit: 1976     Years since quittin 4    Smokeless tobacco: Never Used   Substance and Sexual Activity    Alcohol use:  Yes     Alcohol/week: 1 0 standard drinks     Types: 1 Glasses of wine per week     Comment: per allscripts, social drinker    Drug use: No    Sexual activity: None   Lifestyle    Physical activity:     Days per week: None     Minutes per session: None    Stress: None   Relationships    Social connections:     Talks on phone: None     Gets together: None     Attends [employer] service: None     Active member of club or organization: None     Attends meetings of clubs or organizations: None     Relationship status: None    Intimate partner violence:     Fear of current or ex partner: None     Emotionally abused: None     Physically abused: None     Forced sexual activity: None   Other Topics Concern    None   Social History Narrative    None      Medications and Allergies:     Current Outpatient Medications   Medication Sig Dispense Refill    calcium citrate (CALCITRATE) 950 MG tablet Take 1 tablet by mouth daily      fluocinonide (LIDEX) 0 05 % cream Apply topically 2 (two) times a day 30 g 1    loratadine (CLARITIN) 10 mg tablet Take 1 tablet daily PRN 30 tablet 3    Multiple Vitamin (MULTI-VITAMIN DAILY PO) Take by mouth      PARoxetine (PAXIL) 20 mg tablet Take 1/2 tab  daily for 1 week, then increase dose to 1 tab  daily 30 tablet 5    ranitidine (ZANTAC) 300 MG tablet Take 1 tablet (300 mg total) by mouth daily at bedtime 30 tablet 5     No current facility-administered medications for this visit  No Known Allergies   Immunizations:     Immunization History   Administered Date(s) Administered    INFLUENZA 01/07/2013, 10/14/2013, 10/18/2016, 10/02/2017    Influenza Quadrivalent, 6-35 Months IM 10/16/2015, 10/18/2016, 10/02/2017    Influenza TIV (IM) 01/07/2013, 10/14/2013, 10/02/2014    Influenza, high dose seasonal 0 5 mL 10/08/2019    Influenza, recombinant, quadrivalent,injectable, preservative free 10/02/2018    Pneumococcal Polysaccharide PPV23 06/10/2014, 01/01/2016    Tdap 06/07/2013    Varicella 01/01/2016    Zoster 07/08/2014      Health Maintenance:         Topic Date Due    CRC Screening: Colonoscopy  12/03/2017    MAMMOGRAM  12/17/2020    Hepatitis C Screening  Completed         Topic Date Due    Pneumococcal Vaccine: 65+ Years (1 of 2 - PCV13) 04/03/2019      Medicare Screening Tests and Risk Assessments:     [place] is here for her Welcome to Medicare visit  Health Risk Assessment:   Patient rates overall health as good  Patient feels that their physical health rating is same  Eyesight was rated as same  Hearing was rated as same  Patient feels that their emotional and mental health rating is same  Pain experienced in the last 7 days has been none  Depression Screening:   PHQ-2 Score: 0  PHQ-9 Score: 0      Fall Risk Screening:    In the past year, patient has experienced: no history of falling in past year Urinary Incontinence Screening:   Patient has not leaked urine accidently in the last six months  Home Safety:  Patient does not have trouble with stairs inside or outside of their home  Patient has working smoke alarms and has working carbon monoxide detector  Home safety hazards include: none  Nutrition:   Current diet is Regular and Limited junk food  Medications:   Patient is not currently taking any over-the-counter supplements  Patient is able to manage medications  Activities of Daily Living (ADLs)/Instrumental Activities of Daily Living (IADLs):   Walk and transfer into and out of bed and chair?: Yes  Dress and groom yourself?: Yes    Bathe or shower yourself?: Yes    Feed yourself?  Yes  Do your laundry/housekeeping?: Yes  Manage your money, pay your bills and track your expenses?: Yes  Make your own meals?: Yes    Do your own shopping?: Yes    Previous Hospitalizations:   Any hospitalizations or ED visits within the last 12 months?: No      Advance Care Planning:   Living will: No    Durable POA for healthcare: No    Advanced directive: No    Advanced directive counseling given: Yes    Five wishes given: Yes    End of Life Decisions reviewed with patient: Yes      PREVENTIVE SCREENINGS      Cardiovascular Screening:    General: Screening Current      Diabetes Screening:     General: Screening Current      Colorectal Cancer Screening:     General: Screening Current      Breast Cancer Screening:     General: Screening Current      Cervical Cancer Screening:    General: Screening Not Indicated      Osteoporosis Screening:    General: Risks and Benefits Discussed      Abdominal Aortic Aneurysm (AAA) Screening:        General: Risks and Benefits Discussed and Screening Not Indicated      Lung Cancer Screening:     General: Screening Not Indicated      Hepatitis C Screening:    General: Screening Current    Other Counseling Topics:   Car/seat belt/driving safety, skin self-exam and calcium and vitamin D intake and regular weightbearing exercise  No exam data present     Physical Exam:     /72 (BP Location: Left arm, Patient Position: Sitting, Cuff Size: Adult)   Pulse 76   Temp 97 9 °F (36 6 °C) (Tympanic)   Resp 16   Ht 5' 9 5" (1 765 m)   Wt 105 kg (231 lb)   SpO2 96%   BMI 33 62 kg/m²     Physical Exam   Constitutional: She is oriented to person, place, and time  She appears well-developed and well-nourished  Obese   HENT:   Head: Normocephalic and atraumatic  Right Ear: External ear normal    Left Ear: External ear normal    Mouth/Throat: Oropharynx is clear and moist    Eyes: Pupils are equal, round, and reactive to light  Conjunctivae are normal    Neck: Normal range of motion  Neck supple  No JVD present  Cardiovascular: Normal rate, regular rhythm and normal heart sounds  No murmur heard  No carotid bruits BL  No BL LE edema   Pulmonary/Chest: Effort normal and breath sounds normal    Abdominal: Soft  Bowel sounds are normal  There is no tenderness  Musculoskeletal: Normal range of motion  She exhibits no edema, tenderness or deformity  Neurological: She is alert and oriented to person, place, and time  No cranial nerve deficit  Coordination normal    Skin: Skin is warm and dry  Rosacea on face   Psychiatric: She has a normal mood and affect  Her behavior is normal    Nursing note and vitals reviewed  Mini-cog score 4  EKG done in the office today showed sinus rhythm, 71 bpm   Probable inferior infarct  No acute ST-T wave changes  No prior EKG available for comparison  Family history is positive for CAD, MI in her mother and grandmother  Will refer patient to cardiologist for further evaluation      Elias Peralta MD

## 2019-10-29 ENCOUNTER — CONSULT (OUTPATIENT)
Dept: CARDIOLOGY CLINIC | Facility: CLINIC | Age: 64
End: 2019-10-29
Payer: MEDICARE

## 2019-10-29 VITALS
HEIGHT: 70 IN | WEIGHT: 229.5 LBS | HEART RATE: 76 BPM | SYSTOLIC BLOOD PRESSURE: 120 MMHG | BODY MASS INDEX: 32.86 KG/M2 | DIASTOLIC BLOOD PRESSURE: 70 MMHG | OXYGEN SATURATION: 98 %

## 2019-10-29 DIAGNOSIS — R94.31 ABNORMAL EKG: ICD-10-CM

## 2019-10-29 DIAGNOSIS — E78.5 DYSLIPIDEMIA: Primary | ICD-10-CM

## 2019-10-29 PROCEDURE — 99203 OFFICE O/P NEW LOW 30 MIN: CPT | Performed by: INTERNAL MEDICINE

## 2019-10-29 NOTE — PROGRESS NOTES
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  The patient currently is asymptomatic from a cardiac point of view denies any chest pain, perhaps mild dyspnea class 1 in the setting of a mild asthma, she does not use inhalers  She admits to be sedentary, and admitted to be have poor dietary habits  Her most recent lipid profile total cholesterol 177, HDL 37, , she is not on any therapy for that  There is family history of premature coronary disease in the paternal grandparents  Patient is nonsmoker  She does have liver steatosis and mildly elevated LFTs  Patient Active Problem List   Diagnosis    Family history of malignant neoplasm of uterus    Family history of malignant neoplasm of gastrointestinal tract    Family history of malignant neoplasm of breast    Depression with anxiety    Dyslipidemia    Elevated LFTs    Esophageal reflux    Left thyroid nodule    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    Mild intermittent asthma without complication    Obesity (BMI 30 0-34  9)    Rosacea    Increased risk of breast cancer    Abnormal MRI, breast    Hand eczema    Chronic insomnia    Wound, breast, left, initial encounter    Welcome to Medicare preventive visit    Fatty liver    Abnormal EKG     Past Medical History:   Diagnosis Date    Allergic rhinitis     last assessed-10/2/2017    Asthma     Carotid bruit     R-last assessed-6/10/2014    Dysphagia     last assessed-6/6/2013    GERD (gastroesophageal reflux disease)      Social History     Socioeconomic History    Marital status: /Civil Union     Spouse name: Not on file    Number of children: Not on file    Years of education: Not on file    Highest education level: Not on file   Occupational History    Not on file   Social Needs    Financial resource strain: Not on file    Food insecurity:     Worry: Not on file     Inability: Not on file    Transportation needs:     Medical: Not on file     Non-medical: Not on file   Tobacco Use    Smoking status: Former Smoker     Years: 2 00     Types: Cigarettes     Last attempt to quit: 1976     Years since quittin 5    Smokeless tobacco: Never Used   Substance and Sexual Activity    Alcohol use:  Yes     Alcohol/week: 1 0 standard drinks     Types: 1 Glasses of wine per week     Comment: per allscripts, social drinker    Drug use: No    Sexual activity: Not on file   Lifestyle    Physical activity:     Days per week: Not on file     Minutes per session: Not on file    Stress: Not on file   Relationships    Social connections:     Talks on phone: Not on file     Gets together: Not on file     Attends Hinduism service: Not on file     Active member of club or organization: Not on file     Attends meetings of clubs or organizations: Not on file     Relationship status: Not on file    Intimate partner violence:     Fear of current or ex partner: Not on file     Emotionally abused: Not on file     Physically abused: Not on file     Forced sexual activity: Not on file   Other Topics Concern    Not on file   Social History Narrative    Not on file      Family History   Problem Relation Age of Onset    Heart attack Mother         acute MI    Colon polyps Mother         polyps of the sigmoid colon    Breast cancer Sister     Uterine cancer Sister     Liver cancer Brother         x2    Uterine cancer Maternal Grandmother     Uterine cancer Paternal Grandmother     Diabetes Paternal Grandfather     Stroke Maternal Uncle     Kidney cancer Paternal Aunt     Pancreatic cancer Paternal Uncle      Past Surgical History:   Procedure Laterality Date    BREAST BIOPSY      percutaneous needle core    BREAST LUMPECTOMY Right      SECTION  1984    COLONOSCOPY      complete-12/3/2012-internal/external hemorrhoids, perianal or excoriation, mild sigmoid diverticulosis    MAMMO NEEDLE LOCALIZATION RIGHT (ALL INC) Right 2017    MAMMO NEEDLE LOCALIZATION RIGHT (ALL INC) EACH ADD Right 2017    MAMMO STEREOTACTIC BREAST BIOPSY RIGHT (ALL INC) Right 11/15/2017    MAMMO STEREOTACTIC BREAST BIOPSY RIGHT (ALL INC) EACH ADD Right 11/15/2017    MRI BREAST BIOPSY LEFT (ALL INCLUSIVE) Left 2019    TN PERQ DEVICE PLACEMT BREAST LOC 1ST LES W GUIDNCE Right 2017    Procedure: BREAST LUMPECTOMY; BREAST BRACKETED NEEDLE LOCALIZATION (BRACKETED NEEDLE LOC AT 0900);   Surgeon: Jayda Perez MD;  [facility];  Service: Surgical Oncology    VAGINAL DILATATION  1978    d&e       Current Outpatient Medications:     calcium citrate (CALCITRATE) 950 MG tablet, Take 1 tablet by mouth daily, Disp: , Rfl:     fluocinonide (LIDEX) 0 05 % cream, Apply topically 2 (two) times a day, Disp: 30 g, Rfl: 1    loratadine (CLARITIN) 10 mg tablet, Take 1 tablet daily PRN, Disp: 30 tablet, Rfl: 3    Multiple Vitamin (MULTI-VITAMIN DAILY PO), Take by mouth, Disp: , Rfl:     PARoxetine (PAXIL) 20 mg tablet, Take 1/2 tab  daily for 1 week, then increase dose to 1 tab  daily, Disp: 30 tablet, Rfl: 5  No Known Allergies    Labs:  Lab on 2019   Component Date Value    Hepatitis C Ab 2019 Non-reactive     Sodium 2019 144     Potassium 2019 4 0     Chloride 2019 107     CO2 2019 26     ANION GAP 2019 11     BUN 2019 13     Creatinine 2019 1 03     Glucose, Fasting 2019 100*    Calcium 2019 9 3     AST 2019 51*    ALT 08/20/2019 68     Alkaline Phosphatase 08/20/2019 87     Total Protein 08/20/2019 7 8     Albumin 08/20/2019 3 8     Total Bilirubin 08/20/2019 0 40     eGFR 08/20/2019 57     GÓMEZ 08/20/2019 Negative     Hepatitis B Surface Ag 08/20/2019 Non-reactive     Cholesterol 08/20/2019 177     Triglycerides 08/20/2019 125     HDL, Direct 08/20/2019 37*    LDL Calculated 08/20/2019 115*    Non-HDL-Chol (CHOL-HDL) 08/20/2019 [address] on 06/18/2019   Component Date Value    Case Report 06/18/2019                      Value:Surgical Pathology Report                         Case: [id number]                                   Authorizing Provider:  Harrie Boxer, CRNP    Collected:           06/18/2019 0281              Ordering Location:     [address] [place] [address]      Received:            06/18/2019 [facility],[address] MRI                                                                 Pathologist:           Alexi Zee MD                                                         Specimen:    Breast, Left, left breast biopsy                                                           Final Diagnosis 06/18/2019                      Value: This result contains rich text formatting which cannot be displayed here   Note 06/18/2019                      Value: This result contains rich text formatting which cannot be displayed here   Additional Information 06/18/2019                      Value: This result contains rich text formatting which cannot be displayed here  Harris Moose Gross Description 06/18/2019                      Value: This result contains rich text formatting which cannot be displayed here   Clinical Information 06/18/2019                      Value:Left breast biopsy 7 passes     Imaging: Us Abdomen Complete    Result Date: 10/4/2019  Narrative: ABDOMEN ULTRASOUND, COMPLETE INDICATION:   R94 5: Abnormal results of liver function studies   COMPARISON: None TECHNIQUE:   Real-time ultrasound of the abdomen was performed with a curvilinear transducer with both volumetric sweeps and still imaging techniques  FINDINGS: PANCREAS: Pancreatic tail obscured by overlying bowel gas  Visualized portions of the pancreas are within normal limits  AORTA AND IVC:  Visualized portions are normal for patient age  LIVER: Size:  Within normal range  The liver measures 15 9 cm in the midclavicular line  Contour:  Surface contour is smooth  Parenchyma:  Elevated parenchymal echogenicity  Diminished through transmission  No evidence of suspicious mass  Limited imaging of the main portal vein shows it to be patent and hepatopetal  BILIARY: The gallbladder is normal in caliber  No wall thickening or pericholecystic fluid  No stones or sludge identified  No sonographic Mena's sign  No intrahepatic biliary dilatation  CBD measures 4 mm  No choledocholithiasis  KIDNEY: Right kidney measures 11 9 x 4 1 cm  Within normal limits  Left kidney measures 11 8 x 5 8 cm  Within normal limits  SPLEEN: Measures 13 1 x 12 6 x 4 5 cm  Within normal limits  ASCITES:  None  Impression: Pancreatic tail obscured by overlying bowel gas  Mild fatty infiltration of liver  Mild splenomegaly  Otherwise unremarkable exam  Workstation performed: [id number]       Review of Systems:  Review of Systems   Constitutional: Positive for fatigue  Negative for activity change, appetite change, chills, diaphoresis, fever and unexpected weight change  HENT: Negative for hearing loss, nosebleeds and trouble swallowing  Eyes: Negative for visual disturbance  Respiratory: Positive for cough, shortness of breath and wheezing  Negative for apnea, choking, chest tightness and stridor  Cardiovascular: Negative for chest pain, palpitations and leg swelling  Gastrointestinal: Negative for abdominal distention, abdominal pain, anal bleeding, blood in stool, constipation, diarrhea and nausea     Endocrine: Negative for cold intolerance and heat intolerance  Genitourinary: Negative for difficulty urinating, dysuria, flank pain, frequency, hematuria and urgency  Musculoskeletal: Negative for arthralgias, back pain, gait problem, joint swelling and myalgias  Skin: Negative for color change, pallor, rash and wound  Allergic/Immunologic: Negative for immunocompromised state  Neurological: Negative for dizziness, syncope, facial asymmetry, speech difficulty, weakness, light-headedness, numbness and headaches  Hematological: Does not bruise/bleed easily  Psychiatric/Behavioral: Negative for decreased concentration and sleep disturbance  The patient is nervous/anxious  Physical Exam:  Physical Exam   Constitutional: She is oriented to person, place, and time  She appears well-developed  No distress  HENT:   Head: Normocephalic  Eyes: Conjunctivae are normal  No scleral icterus  Neck: No JVD present  No tracheal deviation present  No thyromegaly present  Cardiovascular: Normal rate, regular rhythm and normal heart sounds  Exam reveals no gallop and no friction rub  No murmur heard  Pulmonary/Chest: Effort normal and breath sounds normal  No stridor  No respiratory distress  She has no wheezes  She has no rales  She exhibits no tenderness  Abdominal: Soft  Bowel sounds are normal  She exhibits no distension and no mass  There is no tenderness  There is no rebound and no guarding  Musculoskeletal: She exhibits no edema  Neurological: She is alert and oriented to person, place, and time  Skin: Skin is warm and dry  Capillary refill takes less than 2 seconds  No rash noted  She is not diaphoretic  No erythema  Psychiatric: She has a normal mood and affect  Her behavior is normal  Judgment and thought content normal    Vitals reviewed  Discussion/Summary:  Abnormal EKG suggestive coronary disease, will do noninvasive evaluation  I asked her to start an aspirin regimen 81 mg daily  Consideration for statin therapy however given her elevated LFTs will hold on that  Asked him to be more adherent to low-cholesterol diet  And as well as regular exercise if above testing is unrevealing  This note was completed in part utilizing [employer] fluency direct voice recognition software  Grammatical errors, random word insertion, spelling mistakes, and incomplete sentences may be an occasional consequence of the system secondary to software limitations, ambient noise and hardware issues  At the time of dictation, efforts were made to edit, clarify and /or correct errors  Please read the chart carefully and recognize, using context, where substitutions have occurred  If you have any questions or concerns about the context, text or information contained within the body of this dictation, please contact myself, the provider, for further clarification

## 2019-11-18 DIAGNOSIS — F41.8 DEPRESSION WITH ANXIETY: ICD-10-CM

## 2019-11-18 RX ORDER — PAROXETINE HYDROCHLORIDE 20 MG/1
TABLET, FILM COATED ORAL
Qty: 30 TABLET | Refills: 5 | Status: SHIPPED | OUTPATIENT
Start: 2019-11-18 | End: 2020-05-19 | Stop reason: SDUPTHER

## 2019-12-05 ENCOUNTER — HOSPITAL ENCOUNTER (OUTPATIENT)
Dept: RADIOLOGY | Facility: HOSPITAL | Age: 64
Discharge: HOME | End: 2019-12-05
Payer: MEDICARE

## 2019-12-05 DIAGNOSIS — R92.8 ABNORMAL MRI, BREAST: ICD-10-CM

## 2019-12-05 PROCEDURE — A9585 GADOBUTROL INJECTION: HCPCS | Performed by: NURSE PRACTITIONER

## 2019-12-05 PROCEDURE — C8908 MRI W/O FOL W/CONT, BREAST,: HCPCS

## 2019-12-05 PROCEDURE — C8937 CAD BREAST MRI: HCPCS

## 2019-12-05 RX ADMIN — GADOBUTROL 10 ML: 604.72 INJECTION INTRAVENOUS at 16:52

## 2019-12-09 ENCOUNTER — HOSPITAL ENCOUNTER (OUTPATIENT)
Dept: NON INVASIVE DIAGNOSTICS | Facility: CLINIC | Age: 64
Discharge: HOME | End: 2019-12-09
Payer: MEDICARE

## 2019-12-09 DIAGNOSIS — E78.5 DYSLIPIDEMIA: ICD-10-CM

## 2019-12-09 DIAGNOSIS — R94.31 ABNORMAL EKG: ICD-10-CM

## 2019-12-09 LAB
CHEST PAIN STATEMENT: NORMAL
MAX DIASTOLIC BP: 92 MMHG
MAX HEART RATE: 155 BPM
MAX PREDICTED HEART RATE: 155 BPM
MAX. SYSTOLIC BP: 220 MMHG
PROTOCOL NAME: NORMAL
REASON FOR TERMINATION: NORMAL
TARGET HR FORMULA: NORMAL
TEST INDICATION: NORMAL
TIME IN EXERCISE PHASE: NORMAL

## 2019-12-09 PROCEDURE — 93016 CV STRESS TEST SUPVJ ONLY: CPT | Performed by: INTERNAL MEDICINE

## 2019-12-09 PROCEDURE — 93306 TTE W/DOPPLER COMPLETE: CPT

## 2019-12-09 PROCEDURE — 93017 CV STRESS TEST TRACING ONLY: CPT

## 2019-12-09 PROCEDURE — 93018 CV STRESS TEST I&R ONLY: CPT | Performed by: INTERNAL MEDICINE

## 2019-12-09 PROCEDURE — 93306 TTE W/DOPPLER COMPLETE: CPT | Performed by: INTERNAL MEDICINE

## 2019-12-18 ENCOUNTER — HOSPITAL ENCOUNTER (OUTPATIENT)
Dept: MAMMOGRAPHY | Facility: HOSPITAL | Age: 64
Discharge: HOME | End: 2019-12-18
Payer: MEDICARE

## 2019-12-18 DIAGNOSIS — Z12.31 VISIT FOR SCREENING MAMMOGRAM: ICD-10-CM

## 2019-12-18 PROCEDURE — 77063 BREAST TOMOSYNTHESIS BI: CPT

## 2019-12-18 PROCEDURE — 77067 SCR MAMMO BI INCL CAD: CPT

## 2020-01-03 ENCOUNTER — LAB REQUISITION (OUTPATIENT)
Dept: LAB | Facility: HOSPITAL | Age: 65
End: 2020-01-03
Payer: MEDICARE

## 2020-01-03 DIAGNOSIS — Z80.0 FAMILY HISTORY OF MALIGNANT NEOPLASM OF DIGESTIVE ORGANS: ICD-10-CM

## 2020-01-03 DIAGNOSIS — D12.2 BENIGN NEOPLASM OF ASCENDING COLON: ICD-10-CM

## 2020-01-03 PROCEDURE — 88305 TISSUE EXAM BY PATHOLOGIST: CPT | Performed by: PATHOLOGY

## 2020-02-11 ENCOUNTER — APPOINTMENT (OUTPATIENT)
Dept: LAB | Facility: CLINIC | Age: 65
End: 2020-02-11
Payer: MEDICARE

## 2020-02-11 DIAGNOSIS — E78.5 DYSLIPIDEMIA: ICD-10-CM

## 2020-02-11 DIAGNOSIS — R79.89 ELEVATED LFTS: ICD-10-CM

## 2020-02-11 DIAGNOSIS — K76.0 FATTY LIVER: ICD-10-CM

## 2020-02-11 DIAGNOSIS — E66.9 OBESITY (BMI 30.0-34.9): ICD-10-CM

## 2020-02-11 LAB
ALBUMIN SERPL BCP-MCNC: 3.7 G/DL (ref 3.5–5)
ALP SERPL-CCNC: 90 U/L (ref 46–116)
ALT SERPL W P-5'-P-CCNC: 73 U/L (ref 12–78)
ANION GAP SERPL CALCULATED.3IONS-SCNC: 9 MMOL/L (ref 4–13)
AST SERPL W P-5'-P-CCNC: 68 U/L (ref 5–45)
BASOPHILS # BLD AUTO: 0.03 THOUSANDS/ΜL (ref 0–0.1)
BASOPHILS NFR BLD AUTO: 1 % (ref 0–1)
BILIRUB SERPL-MCNC: 0.44 MG/DL (ref 0.2–1)
BUN SERPL-MCNC: 15 MG/DL (ref 5–25)
CALCIUM SERPL-MCNC: 9.5 MG/DL (ref 8.3–10.1)
CHLORIDE SERPL-SCNC: 107 MMOL/L (ref 100–108)
CHOLEST SERPL-MCNC: 190 MG/DL (ref 50–200)
CO2 SERPL-SCNC: 28 MMOL/L (ref 21–32)
CREAT SERPL-MCNC: 0.95 MG/DL (ref 0.6–1.3)
EOSINOPHIL # BLD AUTO: 0.17 THOUSAND/ΜL (ref 0–0.61)
EOSINOPHIL NFR BLD AUTO: 3 % (ref 0–6)
ERYTHROCYTE [DISTWIDTH] IN BLOOD BY AUTOMATED COUNT: 13.7 % (ref 11.6–15.1)
GFR SERPL CREATININE-BSD FRML MDRD: 63 ML/MIN/1.73SQ M
GLUCOSE P FAST SERPL-MCNC: 102 MG/DL (ref 65–99)
HCT VFR BLD AUTO: 44.7 % (ref 34.8–46.1)
HDLC SERPL-MCNC: 39 MG/DL
HGB BLD-MCNC: 14.8 G/DL (ref 11.5–15.4)
IMM GRANULOCYTES # BLD AUTO: 0.01 THOUSAND/UL (ref 0–0.2)
IMM GRANULOCYTES NFR BLD AUTO: 0 % (ref 0–2)
LDLC SERPL CALC-MCNC: 128 MG/DL (ref 0–100)
LYMPHOCYTES # BLD AUTO: 1.88 THOUSANDS/ΜL (ref 0.6–4.47)
LYMPHOCYTES NFR BLD AUTO: 30 % (ref 14–44)
MCH RBC QN AUTO: 28.5 PG (ref 26.8–34.3)
MCHC RBC AUTO-ENTMCNC: 33.1 G/DL (ref 31.4–37.4)
MCV RBC AUTO: 86 FL (ref 82–98)
MONOCYTES # BLD AUTO: 0.37 THOUSAND/ΜL (ref 0.17–1.22)
MONOCYTES NFR BLD AUTO: 6 % (ref 4–12)
NEUTROPHILS # BLD AUTO: 3.76 THOUSANDS/ΜL (ref 1.85–7.62)
NEUTS SEG NFR BLD AUTO: 60 % (ref 43–75)
NONHDLC SERPL-MCNC: 151 MG/DL
NRBC BLD AUTO-RTO: 0 /100 WBCS
PLATELET # BLD AUTO: 246 THOUSANDS/UL (ref 149–390)
PMV BLD AUTO: 10.7 FL (ref 8.9–12.7)
POTASSIUM SERPL-SCNC: 3.8 MMOL/L (ref 3.5–5.3)
PROT SERPL-MCNC: 7.7 G/DL (ref 6.4–8.2)
RBC # BLD AUTO: 5.19 MILLION/UL (ref 3.81–5.12)
SODIUM SERPL-SCNC: 144 MMOL/L (ref 136–145)
T4 FREE SERPL-MCNC: 1 NG/DL (ref 0.76–1.46)
TRIGL SERPL-MCNC: 116 MG/DL
TSH SERPL DL<=0.05 MIU/L-ACNC: 3.87 UIU/ML (ref 0.36–3.74)
WBC # BLD AUTO: 6.22 THOUSAND/UL (ref 4.31–10.16)

## 2020-02-11 PROCEDURE — 80053 COMPREHEN METABOLIC PANEL: CPT

## 2020-02-11 PROCEDURE — 84439 ASSAY OF FREE THYROXINE: CPT

## 2020-02-11 PROCEDURE — 85025 COMPLETE CBC W/AUTO DIFF WBC: CPT

## 2020-02-11 PROCEDURE — 84443 ASSAY THYROID STIM HORMONE: CPT

## 2020-02-11 PROCEDURE — 80061 LIPID PANEL: CPT

## 2020-02-11 PROCEDURE — 36415 COLL VENOUS BLD VENIPUNCTURE: CPT

## 2020-02-19 ENCOUNTER — OFFICE VISIT (OUTPATIENT)
Dept: FAMILY MEDICINE CLINIC | Facility: CLINIC | Age: 65
End: 2020-02-19
Payer: MEDICARE

## 2020-02-19 VITALS
WEIGHT: 232 LBS | OXYGEN SATURATION: 97 % | HEIGHT: 70 IN | SYSTOLIC BLOOD PRESSURE: 126 MMHG | HEART RATE: 80 BPM | BODY MASS INDEX: 33.21 KG/M2 | RESPIRATION RATE: 16 BRPM | DIASTOLIC BLOOD PRESSURE: 74 MMHG | TEMPERATURE: 97.3 F

## 2020-02-19 DIAGNOSIS — Z23 NEED FOR PNEUMOCOCCAL VACCINATION: ICD-10-CM

## 2020-02-19 DIAGNOSIS — R79.89 ELEVATED LFTS: ICD-10-CM

## 2020-02-19 DIAGNOSIS — E78.5 DYSLIPIDEMIA: Primary | ICD-10-CM

## 2020-02-19 DIAGNOSIS — K21.9 GASTROESOPHAGEAL REFLUX DISEASE, ESOPHAGITIS PRESENCE NOT SPECIFIED: ICD-10-CM

## 2020-02-19 DIAGNOSIS — F41.8 DEPRESSION WITH ANXIETY: ICD-10-CM

## 2020-02-19 DIAGNOSIS — E66.9 OBESITY (BMI 30.0-34.9): ICD-10-CM

## 2020-02-19 DIAGNOSIS — K76.0 FATTY LIVER: ICD-10-CM

## 2020-02-19 PROCEDURE — 1036F TOBACCO NON-USER: CPT | Performed by: FAMILY MEDICINE

## 2020-02-19 PROCEDURE — 90670 PCV13 VACCINE IM: CPT

## 2020-02-19 PROCEDURE — 99214 OFFICE O/P EST MOD 30 MIN: CPT | Performed by: FAMILY MEDICINE

## 2020-02-19 PROCEDURE — 4040F PNEUMOC VAC/ADMIN/RCVD: CPT | Performed by: FAMILY MEDICINE

## 2020-02-19 PROCEDURE — 3008F BODY MASS INDEX DOCD: CPT | Performed by: FAMILY MEDICINE

## 2020-02-19 PROCEDURE — G0009 ADMIN PNEUMOCOCCAL VACCINE: HCPCS

## 2020-02-19 NOTE — PROGRESS NOTES
Chief Complaint   Patient presents with    Follow-up     6 month follow up     Health Maintenance   Topic Date Due    HIV Screening  04/03/1970    BMI: Followup Plan  04/09/2020    Fall Risk  10/08/2020    Medicare Annual Wellness Visit (AWV)  10/08/2020    Cervical Cancer Screening  11/17/2020    BMI: Adult  02/19/2021    Pneumococcal Vaccine: 65+ Years (2 of 2 - PPSV23) 02/19/2021    MAMMOGRAM  12/18/2021    CRC Screening: Colonoscopy  01/03/2023    DTaP,Tdap,and Td Vaccines (2 - Td) 06/07/2023    Hepatitis C Screening  Completed    Influenza Vaccine  Completed    Pneumococcal Vaccine: Pediatrics (0 to 5 Years) and At-Risk Patients (6 to 59 Years)  Aged Out    HIB Vaccine  Aged Out    Hepatitis B Vaccine  Aged Out    IPV Vaccine  Aged Out    Hepatitis A Vaccine  Aged Out    Meningococcal ACWY Vaccine  Aged Out    HPV Vaccine  Aged Out     Assessment/Plan:    Dyslipidemia  Discussed dietary and lifestyle modifications  Recommended follow a low-cholesterol, low-fat diet, increase exercise  Rcheck lipid panel in 6 months  Depression with anxiety  Mood has been stable  Continue Paxil 20 mg daily  Elevated LFTs  Patient has elevated LFTs likely secondary to fatty liver  Follow a low-fat diet, lose weight  Obesity (BMI 30 0-34  9)  Patient will start regular exercise, will work on weight reduction  Fatty liver  Abdominal U/S done in 10/19 showed mild liver infiltration  Will continue to monitor LFT's  Esophageal reflux  Symptoms improved  Recommended to avoid caffeine, fried, fatty foods, late meals  Take Tums PRN  HM: Prevnar 13 administered today      I have spent 25 minutes with Patient  today in which greater than 50% of this time was spent in counseling/coordination of care regarding Diagnostic results, Risks and benefits of tx options, Intructions for management, Patient and family education, Importance of tx compliance, Risk factor reductions and Impressions  Schedule follow-up office visit in 6 months  Check labs prior to next visit  Diagnoses and all orders for this visit:    Dyslipidemia  -     Comprehensive metabolic panel; Future  -     TSH, 3rd generation with Free T4 reflex; Future  -     Lipid panel; Future    Depression with anxiety    Elevated LFTs  -     Comprehensive metabolic panel; Future    Obesity (BMI 30 0-34 9)  -     Comprehensive metabolic panel; Future    Fatty liver    Gastroesophageal reflux disease, esophagitis presence not specified    Need for pneumococcal vaccination  -     PNEUMOCOCCAL CONJUGATE VACCINE 13-VALENT GREATER THAN 6 MONTHS    Other orders  -     aspirin 81 MG tablet; Take 81 mg by mouth daily          Subjective:      Patient ID: Markie Maldonado is a 71 y o  female  HPI    Patient is 40-year-old female with PMHx of Dyslipidemia, [place], Obesity, elevated LFT's, GERD, Depression, Anxiety, Insomnia, H/o R breast CA  Reviewed current medications, blood test results February 11, 2020  Fasting blood sugar 102, creatinine 0 95,  potassium 3 8  Cholesterol 190, triglycerides 116, , HDL 39  TSH 3 866, T4 free 1 00  Patient was seen in October 2019 for welcome to Medicare visit  EKG showed possible old inferior infarct  She was referred to cardiology for evaluation  She was evaluated by cardiologist Dr Milton Bravo on October 29, 2019  Exercise stress test done in 12/19 showed no evidence of exercise induced ischemia  Patient was advised to start on Aspirin 81 mg daily  Statin therapy was not initiated due to elevated liver enzymes  Patient denies chest pain, shortness of breath, dizziness  Depression/ Anxiety - mood has stable  Patient takes Paroxetine 20 mg daily  Dyslipidemia - patient states that she was not  following a good diet over the holidays  She plans to start on a healthy diet, start exercise, work on weight reduction      Patient has allergies to cats and pollens  She takes Claritin 10 mg daily PRN during allergy seasons  Denies tobacco use  GERD - symptoms are stable  Family history is positive for breast CA in her sister, colon cancer in her mother and brother  Genetic testing was negative for Jiang syndrome  Patient was diagnosed with R breast CA in 2017, S/P right breast lumpectomy performed by surgical oncologist Dr Henny Browning  Patient had BL screening mammogram done in 12/19 which showed no evidence of malignancy  Colonoscopy was performed by colorectal surgeon Dr Reji Vann in 2012  Pneumovax done in January 2016  She received Flu shot in October 2019  The following portions of the patient's history were reviewed and updated as appropriate: allergies, current medications, past medical history, past social history, past surgical history and problem list     Review of Systems   Constitutional: Negative for activity change, appetite change, fatigue and fever  HENT: Negative for congestion, ear pain, hearing loss, mouth sores, nosebleeds, sore throat, tinnitus and trouble swallowing  Eyes: Negative for pain, discharge, redness, itching and visual disturbance  Respiratory: Negative for cough, chest tightness, shortness of breath and wheezing  Cardiovascular: Negative for chest pain and leg swelling  Gastrointestinal: Negative for abdominal pain, blood in stool, constipation, diarrhea, nausea and vomiting  Genitourinary: Negative for difficulty urinating, dysuria, flank pain, frequency, hematuria and pelvic pain  Musculoskeletal: Negative for arthralgias, back pain, gait problem, joint swelling, myalgias and neck pain  Skin: Negative for rash  Neurological: Negative for dizziness and headaches  Hematological: Negative  Psychiatric/Behavioral: Negative for sleep disturbance and suicidal ideas  Anxiety / Depression - mood has been stable             Objective:      /74 (BP Location: Left arm, Patient Position: Sitting, Cuff Size: Adult)   Pulse 80   Temp (!) 97 3 °F (36 3 °C) (Tympanic)   Resp 16   Ht 5' 9 5" (1 765 m)   Wt 105 kg (232 lb)   SpO2 97%   BMI 33 77 kg/m²          Physical Exam   Constitutional: She appears well-developed and well-nourished  Obese   HENT:   Head: Normocephalic and atraumatic  Right Ear: External ear normal    Left Ear: External ear normal    Mouth/Throat: Oropharynx is clear and moist    Eyes: Pupils are equal, round, and reactive to light  Conjunctivae are normal    Cardiovascular: Normal rate, regular rhythm and normal heart sounds  No murmur heard  No carotid bruits BL  No BL LE edema   Pulmonary/Chest: Effort normal and breath sounds normal    Abdominal: Soft  Bowel sounds are normal  There is no tenderness  Musculoskeletal: Normal range of motion  She exhibits no edema, tenderness or deformity  Skin: Skin is warm and dry  Rash noted  Rosacea on face   Psychiatric: She has a normal mood and affect  Nursing note and vitals reviewed

## 2020-02-19 NOTE — ASSESSMENT & PLAN NOTE
Discussed dietary and lifestyle modifications  Recommended follow a low-cholesterol, low-fat diet, increase exercise  Rcheck lipid panel in 6 months

## 2020-02-26 ENCOUNTER — OFFICE VISIT (OUTPATIENT)
Dept: SURGICAL ONCOLOGY | Facility: CLINIC | Age: 65
End: 2020-02-26
Payer: MEDICARE

## 2020-02-26 VITALS
BODY MASS INDEX: 34.51 KG/M2 | HEIGHT: 69 IN | HEART RATE: 84 BPM | TEMPERATURE: 98 F | RESPIRATION RATE: 16 BRPM | SYSTOLIC BLOOD PRESSURE: 124 MMHG | DIASTOLIC BLOOD PRESSURE: 76 MMHG | WEIGHT: 233 LBS

## 2020-02-26 DIAGNOSIS — R92.8 ABNORMAL MRI, BREAST: ICD-10-CM

## 2020-02-26 DIAGNOSIS — Z86.000 HISTORY OF LOBULAR CARCINOMA IN SITU (LCIS) OF BREAST: Primary | ICD-10-CM

## 2020-02-26 DIAGNOSIS — Z91.89 INCREASED RISK OF BREAST CANCER: ICD-10-CM

## 2020-02-26 PROCEDURE — 99213 OFFICE O/P EST LOW 20 MIN: CPT | Performed by: NURSE PRACTITIONER

## 2020-02-26 PROCEDURE — 3008F BODY MASS INDEX DOCD: CPT | Performed by: NURSE PRACTITIONER

## 2020-02-26 PROCEDURE — 1036F TOBACCO NON-USER: CPT | Performed by: NURSE PRACTITIONER

## 2020-02-26 PROCEDURE — 4040F PNEUMOC VAC/ADMIN/RCVD: CPT | Performed by: NURSE PRACTITIONER

## 2020-02-26 NOTE — PROGRESS NOTES
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  - 6 mo f/u visit    2  Increased risk of breast cancer  - MRI breast bilateral w and wo contrast w cad; Future    3  Abnormal MRI, breast  - MRI breast bilateral w and wo contrast w cad; Future      Discussion/Summary: Patient is a 69-year-old female who presents today for a six-month follow-up visit for a personal history of LCIS and a family history of breast cancer  She underwent a lumpectomy by Dr Giulia Merritt in December of 2017  She underwent genetic testing which was negative  She is a high risk patient  We have been following her with twice a year clinical breast exams, annual mammography and annual breast MRI  She had a bilateral 3D mammogram performed on December 17, 2018 which was BI-RADS 2, category 1 density  She underwent a bilateral breast MRI on [place] 3, 2019 which revealed linear non mass enhancement of the left breast at the 2 o'clock position  A biopsy  was performed on June 18, 2019 and pathology revealed intraductal micropapillomata, ductal hyperplasia and columnar cell changes  There was no evidence of atypia or malignancy  The pathology was concordant and surgical consultation was recommended  I discussed her case with Dr Giulia Merritt and recommended a short-term follow-up MRI  She had the follow up MRI on December 5, 2019 which revealed stable linear area of non mass enhancement in the upper outer left breast   A six-month follow-up MRI was recommended to ensure stability  She has had a bilateral 3D screening mammogram on December 18, 2019 which was BI-RADS 2, category 1 density   There are no concerns on today's exam   I will repeat her MRI in June and we will plan to see the patient back in 6 months or sooner if the need arises  She was instructed to call with any new concerns or symptoms prior to that time  All of her questions were answered today  History of Present Illness:     Genetic testing results are NEGATIVE for mutations and large rearrangements in:  APC, OSBALDO, BARD1, BMPR1A, BRCA1, BRCA2, BRIP1, CDH1, CDK4,CDKN2A, CHEK2, DICER1, HOXB13, EPCAM (del/dup only), GREM1 (del/dup only) MLH1, MRE11A, MSH2, MSH6, MUTYH, NBN, NF1, PALB2, PMS2, POLD1, POLE, PTEN, RAD50, RAD51C, RAD51D, SMAD4, SMARCA4, STK11, and TP53  ([facility])      -Interval History:  Patient presents today for a six-month follow-up visit for LCIS and an abnormal breast MRI  She had an MRI on December 5, 2019 which revealed stable linear area of non mass enhancement in the upper outer left breast   A six-month follow-up MRI was recommended to ensure stability  She has had a bilateral 3D screening mammogram on December 18, 2019 which was BI-RADS 2, category 1 density  She notices no changes on self-exam  She has no other complaints today  Review of Systems:  Review of Systems   Constitutional: Negative for activity change, appetite change, chills, fatigue, fever and unexpected weight change  HENT: Negative for trouble swallowing  Eyes: Negative for pain, redness and visual disturbance  Respiratory: Negative for cough, shortness of breath and wheezing  Cardiovascular: Negative for chest pain, palpitations and leg swelling  Gastrointestinal: Negative for abdominal pain, constipation, diarrhea, nausea and vomiting  Endocrine: Negative for cold intolerance and heat intolerance  Musculoskeletal: Negative for arthralgias, back pain, gait problem and myalgias  Skin: Negative for color change and rash  Neurological: Negative for dizziness, syncope, light-headedness, numbness and headaches  Hematological: Negative for adenopathy  Psychiatric/Behavioral: Negative for agitation and confusion  All other systems reviewed and are negative  Patient Active Problem List   Diagnosis    Family history of malignant neoplasm of uterus    Family history of malignant neoplasm of gastrointestinal tract    Family history of malignant neoplasm of breast    Depression with anxiety    Dyslipidemia    Elevated LFTs    Esophageal reflux    Left thyroid nodule    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    Mild intermittent asthma without complication    Obesity (BMI 30 0-34  9)    Rosacea    Increased risk of breast cancer    Abnormal MRI, breast    Hand eczema    Chronic insomnia    Wound, breast, left, initial encounter    Welcome to Medicare preventive visit    Fatty liver    Abnormal EKG     Past Medical History:   Diagnosis Date    Allergic rhinitis     last assessed-10/2/2017    Asthma     Carotid bruit     R-last assessed-6/10/2014    Dysphagia     last assessed-2013    GERD (gastroesophageal reflux disease)      Past Surgical History:   Procedure Laterality Date    BREAST BIOPSY      percutaneous needle core    BREAST LUMPECTOMY Right      SECTION      COLONOSCOPY      complete-12/3/2012-internal/external hemorrhoids, perianal or excoriation, mild sigmoid diverticulosis    MAMMO NEEDLE LOCALIZATION RIGHT (ALL INC) Right 2017    MAMMO NEEDLE LOCALIZATION RIGHT (ALL INC) EACH ADD Right 2017    MAMMO STEREOTACTIC BREAST BIOPSY RIGHT (ALL INC) Right 11/15/2017    MAMMO STEREOTACTIC BREAST BIOPSY RIGHT (ALL INC) EACH ADD Right 11/15/2017    MRI BREAST BIOPSY LEFT (ALL INCLUSIVE) Left 2019    [place] PERQ DEVICE PLACEMT BREAST LOC 1ST LES W GUIDNCE Right 2017    Procedure: BREAST LUMPECTOMY; BREAST BRACKETED NEEDLE LOCALIZATION (BRACKETED NEEDLE LOC AT 0900);   Surgeon: Anahi Wilkinson MD;  [facility]: [facility];  Service: Surgical Oncology    VAGINAL DILATATION      d&e Family History   Problem Relation Age of Onset    Heart attack Mother         acute MI    Colon polyps Mother         polyps of the sigmoid colon    Breast cancer Sister 47    Uterine cancer Sister 59    Liver cancer Brother         x2    Uterine cancer Maternal Grandmother     Uterine cancer Paternal Grandmother     Diabetes Paternal Grandfather     Stroke Maternal Uncle     Kidney cancer Paternal Aunt     Pancreatic cancer Paternal Uncle     No Known Problems Daughter     No Known Problems Daughter     No Known Problems Son     No Known Problems Brother     No Known Problems Brother     No Known Problems Brother     No Known Problems Brother     No Known Problems Brother      Social History     Socioeconomic History    Marital status: /Civil Union     Spouse name: Not on file    Number of children: Not on file    Years of education: Not on file    Highest education level: Not on file   Occupational History    Not on file   Social Needs    Financial resource strain: Not on file    Food insecurity:     Worry: Not on file     Inability: Not on file    Transportation needs:     Medical: Not on file     Non-medical: Not on file   Tobacco Use    Smoking status: Former Smoker     Years: 2 00     Types: Cigarettes     Last attempt to quit: 1976     Years since quittin 8    Smokeless tobacco: Never Used   Substance and Sexual Activity    Alcohol use:  Yes     Alcohol/week: 1 0 standard drinks     Types: 1 Glasses of wine per week     Comment: per allscripts, social drinker    Drug use: No    Sexual activity: Not on file   Lifestyle    Physical activity:     Days per week: Not on file     Minutes per session: Not on file    Stress: Not on file   Relationships    Social connections:     Talks on phone: Not on file     Gets together: Not on file     Attends Oriental orthodox service: Not on file     Active member of club or organization: Not on file     Attends meetings of clubs or organizations: Not on file     Relationship status: Not on file    Intimate partner violence:     Fear of current or ex partner: Not on file     Emotionally abused: Not on file     Physically abused: Not on file     Forced sexual activity: Not on file   Other Topics Concern    Not on file   Social History Narrative    Not on file       Current Outpatient Medications:     aspirin 81 MG tablet, Take 81 mg by mouth daily, Disp: , Rfl:     calcium citrate (CALCITRATE) 950 MG tablet, Take 1 tablet by mouth daily, Disp: , Rfl:     fluocinonide (LIDEX) 0 05 % cream, Apply topically 2 (two) times a day, Disp: 30 g, Rfl: 1    loratadine (CLARITIN) 10 mg tablet, Take 1 tablet daily PRN, Disp: 30 tablet, Rfl: 3    Multiple Vitamin (MULTI-VITAMIN DAILY PO), Take by mouth, Disp: , Rfl:     PARoxetine (PAXIL) 20 mg tablet, Take 1 tab  daily, Disp: 30 tablet, Rfl: 5  No Known Allergies  Vitals:    02/26/20 0756   BP: 124/76   Pulse: 84   Resp: 16   Temp: 98 °F (36 7 °C)       Physical Exam   Constitutional: She is oriented to person, place, and time  Vital signs are normal  She appears well-developed and well-nourished  No distress  HENT:   Head: Normocephalic and atraumatic  Neck: Normal range of motion  Cardiovascular: Normal rate, regular rhythm and normal heart sounds  Pulmonary/Chest: Effort normal and breath sounds normal    Bilateral breasts were examined in the sitting and supine position  Right breast surgical scar present  Left breast with well healed wound  There are no masses, skin nodules, nipple changes or nipple discharge  There is no bilateral supraclavicular or axillary lymphadenopathy noted  Abdominal: Soft  Normal appearance  She exhibits no mass  There is no hepatosplenomegaly  There is no tenderness  Musculoskeletal: Normal range of motion  Lymphadenopathy:     She has no axillary adenopathy  Right: No supraclavicular adenopathy present          Left: No supraclavicular adenopathy present  Neurological: She is alert and oriented to person, place, and time  Skin: Skin is warm, dry and intact  No rash noted  She is not diaphoretic  Psychiatric: She has a normal mood and affect  Her speech is normal    Vitals reviewed  Advance Care Planning/Advance Directives:  Discussed disease status and treatment goals with the patient

## 2020-05-19 DIAGNOSIS — F41.8 DEPRESSION WITH ANXIETY: ICD-10-CM

## 2020-05-19 RX ORDER — PAROXETINE HYDROCHLORIDE 20 MG/1
20 TABLET, FILM COATED ORAL DAILY
Qty: 90 TABLET | Refills: 3 | Status: SHIPPED | OUTPATIENT
Start: 2020-05-19 | End: 2021-05-10

## 2020-06-08 ENCOUNTER — HOSPITAL ENCOUNTER (OUTPATIENT)
Dept: RADIOLOGY | Facility: HOSPITAL | Age: 65
Discharge: HOME | End: 2020-06-08
Payer: MEDICARE

## 2020-06-08 VITALS — BODY MASS INDEX: 32.75 KG/M2 | WEIGHT: 225 LBS

## 2020-06-08 DIAGNOSIS — Z91.89 INCREASED RISK OF BREAST CANCER: ICD-10-CM

## 2020-06-08 DIAGNOSIS — R92.8 ABNORMAL MRI, BREAST: ICD-10-CM

## 2020-06-08 PROCEDURE — C8908 MRI W/O FOL W/CONT, BREAST,: HCPCS

## 2020-06-08 PROCEDURE — C8937 CAD BREAST MRI: HCPCS

## 2020-06-08 PROCEDURE — A9585 GADOBUTROL INJECTION: HCPCS | Performed by: NURSE PRACTITIONER

## 2020-06-08 RX ADMIN — GADOBUTROL 10 ML: 604.72 INJECTION INTRAVENOUS at 14:26

## 2020-08-13 ENCOUNTER — APPOINTMENT (OUTPATIENT)
Dept: LAB | Facility: CLINIC | Age: 65
End: 2020-08-13
Payer: MEDICARE

## 2020-08-13 DIAGNOSIS — E66.9 OBESITY (BMI 30.0-34.9): ICD-10-CM

## 2020-08-13 DIAGNOSIS — R79.89 ELEVATED LFTS: ICD-10-CM

## 2020-08-13 DIAGNOSIS — E78.5 DYSLIPIDEMIA: ICD-10-CM

## 2020-08-13 LAB
ALBUMIN SERPL BCP-MCNC: 3.7 G/DL (ref 3.5–5)
ALP SERPL-CCNC: 85 U/L (ref 46–116)
ALT SERPL W P-5'-P-CCNC: 73 U/L (ref 12–78)
ANION GAP SERPL CALCULATED.3IONS-SCNC: 12 MMOL/L (ref 4–13)
AST SERPL W P-5'-P-CCNC: 60 U/L (ref 5–45)
BILIRUB SERPL-MCNC: 0.45 MG/DL (ref 0.2–1)
BUN SERPL-MCNC: 13 MG/DL (ref 5–25)
CALCIUM SERPL-MCNC: 9.3 MG/DL (ref 8.3–10.1)
CHLORIDE SERPL-SCNC: 103 MMOL/L (ref 100–108)
CHOLEST SERPL-MCNC: 177 MG/DL (ref 50–200)
CO2 SERPL-SCNC: 25 MMOL/L (ref 21–32)
CREAT SERPL-MCNC: 0.98 MG/DL (ref 0.6–1.3)
GFR SERPL CREATININE-BSD FRML MDRD: 60 ML/MIN/1.73SQ M
GLUCOSE P FAST SERPL-MCNC: 100 MG/DL (ref 65–99)
HDLC SERPL-MCNC: 38 MG/DL
LDLC SERPL CALC-MCNC: 119 MG/DL (ref 0–100)
NONHDLC SERPL-MCNC: 139 MG/DL
POTASSIUM SERPL-SCNC: 3.7 MMOL/L (ref 3.5–5.3)
PROT SERPL-MCNC: 7.6 G/DL (ref 6.4–8.2)
SODIUM SERPL-SCNC: 140 MMOL/L (ref 136–145)
TRIGL SERPL-MCNC: 98 MG/DL
TSH SERPL DL<=0.05 MIU/L-ACNC: 3.6 UIU/ML (ref 0.36–3.74)

## 2020-08-13 PROCEDURE — 80061 LIPID PANEL: CPT

## 2020-08-13 PROCEDURE — 36415 COLL VENOUS BLD VENIPUNCTURE: CPT

## 2020-08-13 PROCEDURE — 84443 ASSAY THYROID STIM HORMONE: CPT

## 2020-08-13 PROCEDURE — 80053 COMPREHEN METABOLIC PANEL: CPT

## 2020-08-19 ENCOUNTER — OFFICE VISIT (OUTPATIENT)
Dept: FAMILY MEDICINE CLINIC | Facility: CLINIC | Age: 65
End: 2020-08-19
Payer: MEDICARE

## 2020-08-19 VITALS
WEIGHT: 236 LBS | HEART RATE: 82 BPM | TEMPERATURE: 98 F | OXYGEN SATURATION: 97 % | HEIGHT: 69 IN | RESPIRATION RATE: 14 BRPM | BODY MASS INDEX: 34.96 KG/M2 | SYSTOLIC BLOOD PRESSURE: 140 MMHG | DIASTOLIC BLOOD PRESSURE: 78 MMHG

## 2020-08-19 DIAGNOSIS — K76.0 FATTY LIVER: ICD-10-CM

## 2020-08-19 DIAGNOSIS — E78.5 DYSLIPIDEMIA: Primary | ICD-10-CM

## 2020-08-19 DIAGNOSIS — Z86.000 HISTORY OF LOBULAR CARCINOMA IN SITU (LCIS) OF BREAST: ICD-10-CM

## 2020-08-19 DIAGNOSIS — R79.89 ELEVATED LFTS: ICD-10-CM

## 2020-08-19 DIAGNOSIS — K21.9 GASTROESOPHAGEAL REFLUX DISEASE, ESOPHAGITIS PRESENCE NOT SPECIFIED: ICD-10-CM

## 2020-08-19 DIAGNOSIS — E66.9 OBESITY (BMI 30.0-34.9): ICD-10-CM

## 2020-08-19 DIAGNOSIS — F41.8 DEPRESSION WITH ANXIETY: ICD-10-CM

## 2020-08-19 PROCEDURE — 4040F PNEUMOC VAC/ADMIN/RCVD: CPT | Performed by: FAMILY MEDICINE

## 2020-08-19 PROCEDURE — 99214 OFFICE O/P EST MOD 30 MIN: CPT | Performed by: FAMILY MEDICINE

## 2020-08-19 PROCEDURE — 1036F TOBACCO NON-USER: CPT | Performed by: FAMILY MEDICINE

## 2020-08-19 PROCEDURE — 3008F BODY MASS INDEX DOCD: CPT | Performed by: FAMILY MEDICINE

## 2020-08-19 PROCEDURE — 1160F RVW MEDS BY RX/DR IN RCRD: CPT | Performed by: FAMILY MEDICINE

## 2020-08-19 NOTE — PROGRESS NOTES
Chief Complaint   Patient presents with    Follow-up     6 month follow up     Health Maintenance   Topic Date Due    BMI: Followup Plan  04/09/2020    Influenza Vaccine  07/01/2020    Fall Risk  10/08/2020    Medicare Annual Wellness Visit (AWV)  10/08/2020    Cervical Cancer Screening  11/17/2020    Pneumococcal Vaccine: 65+ Years (2 of 2 - PPSV23) 02/19/2021    BMI: Adult  08/19/2021    MAMMOGRAM  12/18/2021    Colonoscopy Surveillance  01/03/2023    DTaP,Tdap,and Td Vaccines (2 - Td) 06/07/2023    Colorectal Cancer Screening  01/03/2030    Hepatitis C Screening  Completed    HIB Vaccine  Aged Out    Hepatitis B Vaccine  Aged Out    IPV Vaccine  Aged Out    Hepatitis A Vaccine  Aged Out    Meningococcal ACWY Vaccine  Aged Out    HPV Vaccine  Aged Out     Assessment/Plan:    Dyslipidemia  Lipid panel improved since last visit  Goal for HDL >  45  Recommended to follow a low-cholesterol, low-fat diet, increase exercise  Recheck lipid panel in 6 months  Elevated LFTs  Patient has elevated AST level  Recommended follow a low-fat diet, work on weight reduction, avoid alcohol  Recheck CMP in 6 months  Fatty liver  U/S abdomen done in October 2019 showed mild liver infiltration  Recommended to follow a low-fat diet, lose weight  Will continue to monitor LFT's  Esophageal reflux  Symptoms improved  Advised patient to avoid fried, fatty foods, caffeine, late night meals  Takes Tums PRN  Obesity (BMI 30 0-34  9)  Encouraged regular exercise, weight reduction  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  Patient is S/P right breast lumpectomy in December 2017  She is getting breast MRI every 6 months, screening mammogram annually  Follow- up with surgical oncologist Dr Tyra Paez  Depression with anxiety  Symptoms are stable  Continue Paroxetine 20 mg daily  HM: recommended annual Flu vaccination  Discussed Shingrix vaccine      I have spent 25 minutes with Patient  today in which greater than 50% of this time was spent in counseling/coordination of care regarding Diagnostic results, Risks and benefits of tx options, Intructions for management, Patient and family education, Importance of tx compliance, Risk factor reductions and Impressions  Schedule follow-up office visit and medicare AWV in 6 months  Check labs prior to next visit  Diagnoses and all orders for this visit:    Dyslipidemia  -     Lipid panel; Future    Elevated LFTs  -     Comprehensive metabolic panel; Future    Fatty liver  -     Comprehensive metabolic panel; Future    Gastroesophageal reflux disease, esophagitis presence not specified    Obesity (BMI 30 0-34  9)    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    Depression with anxiety          Subjective:      Patient ID: Michelle Moyer is a 76 y o  female  HPI     Patient presents for 6 month follow-up visit  PMHx: Dyslipidemia, [place], Obesity, elevated LFT's, GERD, Depression, Anxiety, Insomnia, H/o R breast CA  Reviewed all current medications, blood work results from August 13, 2020  TSH 3 603, potassium 3 7, fasting blood sugar 100, creatinine 0 98  Cholesterol 177, triglycerides 98, HDL 38,   AST 60, ALT 73  Patient denies chest pain, shortness of breath, dizziness  She walks a few times per week  Dyslipidemia- lipid panel improved since last visit  Patient tries to follow a low-cholesterol diet  She was seen by cardiologist Dr Al Shane  in October 2019  Exercise stress test done in 12/ 2019 showed no evidence of exercise-induced ischemia  Patient was advised to start Aspirin 81 mg daily  Statin therapy was not initiated due to elevated liver enzymes  Patient has allergies to cats and pollens  She takes Claritin 10 mg daily PRN during allergy seasons  Denies tobacco use  Depression/anxiety mood has been stable    Currently taking paroxetine 20 mg daily  Reports no side effects  GERD - symptoms are stable  Patient has positive family history for breast CA in her sister, colon cancer in her mother and brother  Genetic testing was negative for Jiang syndrome  Patient was diagnosed with R breast CA in 2017, S/P right breast lumpectomy performed by surgical oncologist Dr Shameka Villegas  Patient is getting screening mammogram annually and MRI of the breasts every 6 months  Her breast MRI done in June 2020 showed no evidence of malignancy  Last mammogram done in December 2019  Colonoscopy was performed by colorectal surgeon Dr Brook Zayas in January 2020  Two polyps were removed  Patient was advised to repeat colonoscopy in 3 years  The following portions of the patient's history were reviewed and updated as appropriate: allergies, current medications, past medical history, past social history, past surgical history and problem list     Review of Systems   Constitutional: Negative for activity change, appetite change, chills, fatigue and fever  HENT: Negative for congestion, ear pain, hearing loss, mouth sores, nosebleeds, sore throat, tinnitus and trouble swallowing  Eyes: Negative for pain, discharge, redness, itching and visual disturbance  Respiratory: Negative for cough, chest tightness, shortness of breath and wheezing  Cardiovascular: Negative for chest pain, palpitations and leg swelling  Gastrointestinal: Negative for abdominal pain, blood in stool, constipation, diarrhea, nausea and vomiting  Denies heartburn   Genitourinary: Negative for difficulty urinating, dysuria, flank pain, frequency, hematuria and pelvic pain  Musculoskeletal: Negative for arthralgias, back pain, gait problem, joint swelling, myalgias and neck pain  Skin: Positive for rash (rosacea on face)  Negative for wound  Neurological: Negative for dizziness, syncope, numbness and headaches  Hematological: Negative  Psychiatric/Behavioral: Positive for sleep disturbance  Negative for suicidal ideas  Anxiety / Depression - mood has been stable         Objective:      /78 (BP Location: Right arm, Patient Position: Sitting, Cuff Size: Large)   Pulse 82   Temp 98 °F (36 7 °C) (Oral)   Resp 14   Ht 5' 9" (1 753 m)   Wt 107 kg (236 lb)   SpO2 97%   BMI 34 85 kg/m²          Physical Exam  Vitals signs and nursing note reviewed  Constitutional:       Appearance: Normal appearance  She is obese  HENT:      Head: Normocephalic and atraumatic  Eyes:      Conjunctiva/sclera: Conjunctivae normal       Pupils: Pupils are equal, round, and reactive to light  Neck:      Musculoskeletal: Normal range of motion and neck supple  Vascular: No carotid bruit  Cardiovascular:      Rate and Rhythm: Normal rate and regular rhythm  Heart sounds: No murmur  Pulmonary:      Effort: Pulmonary effort is normal       Breath sounds: Normal breath sounds  Abdominal:      General: Bowel sounds are normal  There is no distension  Palpations: Abdomen is soft  Tenderness: There is no abdominal tenderness  Musculoskeletal: Normal range of motion  General: No swelling, tenderness or deformity  Right lower leg: No edema  Left lower leg: No edema  Skin:     General: Skin is warm and dry  Comments: Rosacea on face   Neurological:      Mental Status: She is alert  Sensory: No sensory deficit  Motor: No weakness        Coordination: Coordination normal       Gait: Gait normal    Psychiatric:         Mood and Affect: Mood normal

## 2020-08-19 NOTE — ASSESSMENT & PLAN NOTE
Symptoms improved  Advised patient to avoid fried, fatty foods, caffeine, late night meals  Takes Tums PRN

## 2020-08-19 NOTE — ASSESSMENT & PLAN NOTE
Patient is S/P right breast lumpectomy in December 2017  She is getting breast MRI every 6 months, screening mammogram annually  Follow- up with surgical oncologist Dr Aime Bravo

## 2020-08-19 NOTE — ASSESSMENT & PLAN NOTE
Patient has elevated AST level  Recommended follow a low-fat diet, work on weight reduction, avoid alcohol  Recheck CMP in 6 months

## 2020-08-19 NOTE — ASSESSMENT & PLAN NOTE
U/S abdomen done in October 2019 showed mild liver infiltration  Recommended to follow a low-fat diet, lose weight  Will continue to monitor LFT's

## 2020-08-19 NOTE — ASSESSMENT & PLAN NOTE
Lipid panel improved since last visit  Goal for HDL >  45  Recommended to follow a low-cholesterol, low-fat diet, increase exercise  Recheck lipid panel in 6 months

## 2020-09-03 ENCOUNTER — OFFICE VISIT (OUTPATIENT)
Dept: SURGICAL ONCOLOGY | Facility: CLINIC | Age: 65
End: 2020-09-03
Payer: MEDICARE

## 2020-09-03 VITALS
HEIGHT: 69 IN | WEIGHT: 236 LBS | BODY MASS INDEX: 34.96 KG/M2 | DIASTOLIC BLOOD PRESSURE: 88 MMHG | RESPIRATION RATE: 16 BRPM | TEMPERATURE: 97.7 F | HEART RATE: 74 BPM | SYSTOLIC BLOOD PRESSURE: 140 MMHG

## 2020-09-03 DIAGNOSIS — Z91.89 INCREASED RISK OF BREAST CANCER: Primary | ICD-10-CM

## 2020-09-03 DIAGNOSIS — Z12.31 VISIT FOR SCREENING MAMMOGRAM: ICD-10-CM

## 2020-09-03 DIAGNOSIS — Z86.000 HISTORY OF LOBULAR CARCINOMA IN SITU (LCIS) OF BREAST: ICD-10-CM

## 2020-09-03 DIAGNOSIS — Z80.3 FAMILY HISTORY OF MALIGNANT NEOPLASM OF BREAST: ICD-10-CM

## 2020-09-03 PROCEDURE — 99213 OFFICE O/P EST LOW 20 MIN: CPT | Performed by: NURSE PRACTITIONER

## 2020-09-03 NOTE — PROGRESS NOTES
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    4  Visit for screening mammogram  - Mammo screening bilateral w 3d & cad; Future    Discussion/Summary: Patient is a 71-year-old female who presents today for a six-month follow-up visit for a personal history of LCIS and a family history of breast cancer  She did undergo genetic testing which was negative   She underwent a lumpectomy by Dr Pratik Richardson in December of 2017  She underwent genetic testing which was negative  Nato Purvis is a high risk patient  We have been following her with twice a year clinical breast exams, annual mammography and annual breast MRI   She had a bilateral breast MRI on June 8, 2020 which was BI-RADS 2  She had a bilateral 3D screening mammogram on December 18, 2019 which was BI-RADS 2, category 1 density  She has no new complaints today and there are no concerns on today's exam   I will order a bilateral mammogram for December and I will plan to see the patient back in 6 months or sooner should the need arise  She was instructed to call with any new concerns or symptoms prior to that time  All of her questions were answered today  History of Present Illness:   Genetic testing results are NEGATIVE for mutations and large rearrangements in:  APC, OSBALDO, BARD1, BMPR1A, BRCA1, BRCA2, BRIP1, CDH1, CDK4,CDKN2A, CHEK2, DICER1, HOXB13, EPCAM (del/dup only), GREM1 (del/dup only) MLH1, MRE11A, MSH2, MSH6, MUTYH, NBN, NF1, PALB2, PMS2, POLD1, POLE, PTEN, RAD50, RAD51C, RAD51D, SMAD4, SMARCA4, STK11, and TP53   [facility])      -Interval History:  Patient presents today for a follow-up visit for an increased risk of breast cancer  She notices no changes on her self breast exam   She had a bilateral breast MRI in June which revealed no worrisome findings  She reports no changes in her family history  Review of Systems:  Review of Systems   Constitutional: Negative for activity change, appetite change, chills, fatigue, fever and unexpected weight change  HENT: Negative for trouble swallowing  Eyes: Negative for pain, redness and visual disturbance  Respiratory: Negative for cough, shortness of breath and wheezing  Cardiovascular: Negative for chest pain, palpitations and leg swelling  Gastrointestinal: Negative for abdominal pain, constipation, diarrhea, nausea and vomiting  Endocrine: Negative for cold intolerance and heat intolerance  Musculoskeletal: Negative for arthralgias, back pain, gait problem and myalgias  Skin: Negative for color change and rash  Neurological: Negative for dizziness, syncope, light-headedness, numbness and headaches  Hematological: Negative for adenopathy  Psychiatric/Behavioral: Negative for agitation and confusion  All other systems reviewed and are negative  Patient Active Problem List   Diagnosis    Family history of malignant neoplasm of uterus    Family history of malignant neoplasm of gastrointestinal tract    Family history of malignant neoplasm of breast    Depression with anxiety    Dyslipidemia    Elevated LFTs    Esophageal reflux    Left thyroid nodule    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    Mild intermittent asthma without complication    Obesity (BMI 30 0-34  9)    Rosacea    Increased risk of breast cancer    Abnormal MRI, breast    Hand eczema    Chronic insomnia    Wound, breast, left, initial encounter    Welcome to Medicare preventive visit    Fatty liver    Abnormal EKG     Past Medical History: Diagnosis Date    Allergic rhinitis     last assessed-10/2/2017    Asthma     Carotid bruit     R-last assessed-6/10/2014    Dysphagia     last assessed-2013    GERD (gastroesophageal reflux disease)      Past Surgical History:   Procedure Laterality Date    BREAST BIOPSY      percutaneous needle core    BREAST LUMPECTOMY Right      SECTION  1984    COLONOSCOPY      complete-12/3/2012-internal/external hemorrhoids, perianal or excoriation, mild sigmoid diverticulosis    MAMMO NEEDLE LOCALIZATION RIGHT (ALL INC) Right 2017    MAMMO NEEDLE LOCALIZATION RIGHT (ALL INC) EACH ADD Right 2017    MAMMO STEREOTACTIC BREAST BIOPSY RIGHT (ALL INC) Right 11/15/2017    MAMMO STEREOTACTIC BREAST BIOPSY RIGHT (ALL INC) EACH ADD Right 11/15/2017    MRI BREAST BIOPSY LEFT (ALL INCLUSIVE) Left 2019    OR PERQ DEVICE PLACEMT BREAST LOC 1ST LES W GUIDNCE Right 2017    Procedure: BREAST LUMPECTOMY; BREAST BRACKETED NEEDLE LOCALIZATION (BRACKETED NEEDLE LOC AT 0900);   Surgeon: Elias Ochoa MD;  [facility];  Service: Surgical Oncology    VAGINAL DILATATION      d&e     Family History   Problem Relation Age of Onset    Heart attack Mother         acute MI    Colon polyps Mother         polyps of the sigmoid colon    Breast cancer Sister 47    Uterine cancer Sister 59    Liver cancer Brother         x2    Uterine cancer Maternal Grandmother     Uterine cancer Paternal Grandmother     Diabetes Paternal Grandfather     Stroke Maternal Uncle     Kidney cancer Paternal Aunt     Pancreatic cancer Paternal Uncle     No Known Problems Daughter     No Known Problems Daughter     No Known Problems Son     No Known Problems Brother     No Known Problems Brother     No Known Problems Brother     No Known Problems Brother     No Known Problems Brother      Social History     Socioeconomic History    Marital status: /Civil Union     Spouse name: Not on file    Number of children: Not on file    Years of education: Not on file    Highest education level: Not on file   Occupational History    Not on file   Social Needs    Financial resource strain: Not on file    Food insecurity     Worry: Not on file     Inability: Not on file    Transportation needs     Medical: Not on file     Non-medical: Not on file   Tobacco Use    Smoking status: Former Smoker     Years: 2 00     Types: Cigarettes     Last attempt to quit: 1976     Years since quittin 4    Smokeless tobacco: Never Used   Substance and Sexual Activity    Alcohol use:  Yes     Alcohol/week: 1 0 standard drinks     Types: 1 Glasses of wine per week     Comment: per allscripts, social drinker    Drug use: No    Sexual activity: Not on file   Lifestyle    Physical activity     Days per week: Not on file     Minutes per session: Not on file    Stress: Not on file   Relationships    Social connections     Talks on phone: Not on file     Gets together: Not on file     Attends [employer] service: Not on file     Active member of club or organization: Not on file     Attends meetings of clubs or organizations: Not on file     Relationship status: Not on file    Intimate partner violence     Fear of current or ex partner: Not on file     Emotionally abused: Not on file     Physically abused: Not on file     Forced sexual activity: Not on file   Other Topics Concern    Not on file   Social History Narrative    Not on file       Current Outpatient Medications:     aspirin 81 MG tablet, Take 81 mg by mouth daily, Disp: , Rfl:     calcium citrate (CALCITRATE) 950 MG tablet, Take 1 tablet by mouth daily, Disp: , Rfl:     fluocinonide (LIDEX) 0 05 % cream, Apply topically 2 (two) times a day, Disp: 30 g, Rfl: 1    loratadine (CLARITIN) 10 mg tablet, Take 1 tablet daily PRN, Disp: 30 tablet, Rfl: 3    Multiple Vitamin (MULTI-VITAMIN DAILY PO), Take by mouth, Disp: , Rfl:     PARoxetine (PAXIL) 20 mg tablet, Take 1 tablet (20 mg total) by mouth daily Take 1 tab  daily, Disp: 90 tablet, Rfl: 3  No Known Allergies  Vitals:    09/03/20 1109   BP: 140/88   Pulse: 74   Resp: 16   Temp: 97 7 °F (36 5 °C)       Physical Exam  Vitals signs reviewed  Constitutional:       General: She is not in acute distress  Appearance: Normal appearance  She is well-developed  She is not diaphoretic  HENT:      Head: Normocephalic and atraumatic  Neck:      Musculoskeletal: Normal range of motion  Cardiovascular:      Rate and Rhythm: Normal rate and regular rhythm  Heart sounds: Normal heart sounds  Pulmonary:      Effort: Pulmonary effort is normal       Breath sounds: Normal breath sounds  Chest:      Breasts:         Right: Skin change (surgical scar present) present  No swelling, bleeding, inverted nipple, mass, nipple discharge or tenderness  Left: No swelling, bleeding, inverted nipple, mass, nipple discharge, skin change or tenderness  Abdominal:      Palpations: Abdomen is soft  There is no mass  Tenderness: There is no abdominal tenderness  Musculoskeletal: Normal range of motion  Lymphadenopathy:      Upper Body:      Right upper body: No supraclavicular or axillary adenopathy  Left upper body: No supraclavicular or axillary adenopathy  Skin:     General: Skin is warm and dry  Findings: No rash  Neurological:      Mental Status: She is alert and oriented to person, place, and time  Psychiatric:         Speech: Speech normal          Advance Care Planning/Advance Directives:  Discussed disease status and treatment goals with the patient

## 2020-10-09 ENCOUNTER — IMMUNIZATIONS (OUTPATIENT)
Dept: FAMILY MEDICINE CLINIC | Facility: CLINIC | Age: 65
End: 2020-10-09
Payer: MEDICARE

## 2020-10-09 DIAGNOSIS — Z23 ENCOUNTER FOR IMMUNIZATION: Primary | ICD-10-CM

## 2020-10-09 PROCEDURE — 90662 IIV NO PRSV INCREASED AG IM: CPT

## 2020-10-09 PROCEDURE — G0008 ADMIN INFLUENZA VIRUS VAC: HCPCS

## 2020-12-03 ENCOUNTER — OFFICE VISIT (OUTPATIENT)
Dept: CARDIOLOGY CLINIC | Facility: CLINIC | Age: 65
End: 2020-12-03
Payer: MEDICARE

## 2020-12-03 VITALS
HEART RATE: 84 BPM | HEIGHT: 69 IN | WEIGHT: 233.4 LBS | SYSTOLIC BLOOD PRESSURE: 128 MMHG | BODY MASS INDEX: 34.57 KG/M2 | DIASTOLIC BLOOD PRESSURE: 82 MMHG

## 2020-12-03 DIAGNOSIS — E78.5 DYSLIPIDEMIA: ICD-10-CM

## 2020-12-03 DIAGNOSIS — R94.31 ABNORMAL EKG: Primary | ICD-10-CM

## 2020-12-03 PROCEDURE — 99213 OFFICE O/P EST LOW 20 MIN: CPT | Performed by: INTERNAL MEDICINE

## 2021-02-13 DIAGNOSIS — Z23 ENCOUNTER FOR IMMUNIZATION: ICD-10-CM

## 2021-02-17 ENCOUNTER — LAB (OUTPATIENT)
Dept: LAB | Facility: AMBULARY SURGERY CENTER | Age: 66
End: 2021-02-17
Payer: MEDICARE

## 2021-02-17 ENCOUNTER — IMMUNIZATIONS (OUTPATIENT)
Dept: FAMILY MEDICINE CLINIC | Facility: HOSPITAL | Age: 66
End: 2021-02-17
Payer: MEDICARE

## 2021-02-17 DIAGNOSIS — E78.5 DYSLIPIDEMIA: ICD-10-CM

## 2021-02-17 DIAGNOSIS — R79.89 ELEVATED LFTS: ICD-10-CM

## 2021-02-17 DIAGNOSIS — K76.0 FATTY LIVER: ICD-10-CM

## 2021-02-17 DIAGNOSIS — Z23 ENCOUNTER FOR IMMUNIZATION: Primary | ICD-10-CM

## 2021-02-17 LAB
ALBUMIN SERPL BCP-MCNC: 3.8 G/DL (ref 3.5–5)
ALP SERPL-CCNC: 102 U/L (ref 46–116)
ALT SERPL W P-5'-P-CCNC: 70 U/L (ref 12–78)
ANION GAP SERPL CALCULATED.3IONS-SCNC: 6 MMOL/L (ref 4–13)
AST SERPL W P-5'-P-CCNC: 64 U/L (ref 5–45)
BILIRUB SERPL-MCNC: 0.96 MG/DL (ref 0.2–1)
BUN SERPL-MCNC: 13 MG/DL (ref 5–25)
CALCIUM SERPL-MCNC: 9.8 MG/DL (ref 8.3–10.1)
CHLORIDE SERPL-SCNC: 110 MMOL/L (ref 100–108)
CHOLEST SERPL-MCNC: 193 MG/DL (ref 50–200)
CO2 SERPL-SCNC: 27 MMOL/L (ref 21–32)
CREAT SERPL-MCNC: 0.91 MG/DL (ref 0.6–1.3)
GFR SERPL CREATININE-BSD FRML MDRD: 66 ML/MIN/1.73SQ M
GLUCOSE P FAST SERPL-MCNC: 99 MG/DL (ref 65–99)
HDLC SERPL-MCNC: 43 MG/DL
LDLC SERPL CALC-MCNC: 127 MG/DL (ref 0–100)
NONHDLC SERPL-MCNC: 150 MG/DL
POTASSIUM SERPL-SCNC: 4 MMOL/L (ref 3.5–5.3)
PROT SERPL-MCNC: 7.6 G/DL (ref 6.4–8.2)
SODIUM SERPL-SCNC: 143 MMOL/L (ref 136–145)
TRIGL SERPL-MCNC: 117 MG/DL

## 2021-02-17 PROCEDURE — 80061 LIPID PANEL: CPT

## 2021-02-17 PROCEDURE — 91300 SARS-COV-2 / COVID-19 MRNA VACCINE (PFIZER-BIONTECH) 30 MCG: CPT

## 2021-02-17 PROCEDURE — 80053 COMPREHEN METABOLIC PANEL: CPT

## 2021-02-17 PROCEDURE — 36415 COLL VENOUS BLD VENIPUNCTURE: CPT

## 2021-02-17 PROCEDURE — 0001A SARS-COV-2 / COVID-19 MRNA VACCINE (PFIZER-BIONTECH) 30 MCG: CPT

## 2021-02-18 ENCOUNTER — TELEMEDICINE (OUTPATIENT)
Dept: FAMILY MEDICINE CLINIC | Facility: CLINIC | Age: 66
End: 2021-02-18
Payer: MEDICARE

## 2021-02-18 VITALS — BODY MASS INDEX: 33.74 KG/M2 | SYSTOLIC BLOOD PRESSURE: 122 MMHG | DIASTOLIC BLOOD PRESSURE: 88 MMHG | WEIGHT: 228.5 LBS

## 2021-02-18 DIAGNOSIS — Z00.00 MEDICARE ANNUAL WELLNESS VISIT, SUBSEQUENT: ICD-10-CM

## 2021-02-18 DIAGNOSIS — R79.89 ELEVATED LFTS: ICD-10-CM

## 2021-02-18 DIAGNOSIS — E78.5 DYSLIPIDEMIA: Primary | ICD-10-CM

## 2021-02-18 DIAGNOSIS — Z86.000 HISTORY OF LOBULAR CARCINOMA IN SITU (LCIS) OF BREAST: ICD-10-CM

## 2021-02-18 DIAGNOSIS — L30.9 HAND ECZEMA: ICD-10-CM

## 2021-02-18 DIAGNOSIS — Z13.820 SCREENING FOR OSTEOPOROSIS: ICD-10-CM

## 2021-02-18 DIAGNOSIS — K76.0 FATTY LIVER: ICD-10-CM

## 2021-02-18 DIAGNOSIS — F41.8 DEPRESSION WITH ANXIETY: ICD-10-CM

## 2021-02-18 DIAGNOSIS — E66.9 OBESITY (BMI 30.0-34.9): ICD-10-CM

## 2021-02-18 DIAGNOSIS — Z78.0 MENOPAUSE: ICD-10-CM

## 2021-02-18 DIAGNOSIS — K21.9 GASTROESOPHAGEAL REFLUX DISEASE, UNSPECIFIED WHETHER ESOPHAGITIS PRESENT: ICD-10-CM

## 2021-02-18 PROCEDURE — G0438 PPPS, INITIAL VISIT: HCPCS | Performed by: FAMILY MEDICINE

## 2021-02-18 PROCEDURE — 1123F ACP DISCUSS/DSCN MKR DOCD: CPT | Performed by: FAMILY MEDICINE

## 2021-02-18 PROCEDURE — 99214 OFFICE O/P EST MOD 30 MIN: CPT | Performed by: FAMILY MEDICINE

## 2021-02-18 NOTE — ASSESSMENT & PLAN NOTE
Abdominal ultrasound done in October 2019 showed fatty liver  Follow a low fat diet, work on weight reduction

## 2021-02-18 NOTE — PROGRESS NOTES
Assessment/Plan:    Dyslipidemia  HDL 43 -  improved  Continue to follow a low-cholesterol, low-fat diet, regular exercise  Recheck lipid panel 6 months  Elevated LFTs  AST level remains elevated  Recommended patient to follow a low-fat diet, lose weight, avoid alcohol  Will continue to monitor labs  Fatty liver  Abdominal ultrasound done in October 2019 showed fatty liver  Follow a low fat diet, work on weight reduction  Obesity (BMI 30 0-34  9)  Recommended regular exercise and weight management  Esophageal reflux  Symptoms improved  Take Tums PRN  Hand eczema  Advised patient to use moisturizing cream for dry skin  Refilled prescription for Fluocinonide 0 05% cream - apply twice daily PRN to affected areas on hands  Depression with anxiety  Mood has been stable  Continue Paxil 20 mg daily  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  Patient is S/P right breast lumpectomy in December 2017  Follow-up with surgical oncologist Dr Shawna Jenkins  HM: patient is up-to-date with his screening colonoscopy, mammogram, immunizations  Schedule DEXA scan  Schedule follow-up office visit in 6 months  Check labs prior to next visit  Diagnoses and all orders for this visit:    Dyslipidemia  -     Lipid panel; Future  -     TSH, 3rd generation with Free T4 reflex; Future    Fatty liver  -     Comprehensive metabolic panel; Future    Elevated LFTs  -     Comprehensive metabolic panel; Future    Obesity (BMI 30 0-34  9)    Depression with anxiety    Gastroesophageal reflux disease, unspecified whether esophagitis present  -     CBC and differential; Future    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    Medicare annual wellness visit, subsequent    Screening for osteoporosis  -     DXA bone density spine hip and pelvis; Future    Menopause  -     DXA bone density spine hip and pelvis;  Future    Hand eczema  -     fluocinonide (LIDEX) 0 05 % cream; Apply topically 2 (two) times a day          Subjective:      Patient ID: Juanita Odonnell is a 76 y o  female  HPI     Patient presents for virtual video follow-up office visit  PMHx: Dyslipidemia, [place], Obesity, elevated LFT's, GERD, Depression, Anxiety, Insomnia, H/o R breast CA  Reviewed all current medications, blood test results from February 17, 2021  Cholesterol 193, triglycerides 117, HDL 43 (  improved from 38 in August 2020)   Potassium 4 0, creatinine 0 91, AST 64, ALT 70  Dyslipidemia - patient follows a low-cholesterol diet  She was exercising 3 times per week from September till  December  Due to cold weather she does not walk outside, remains active at home  Denies chest pain  SOB, dizziness  Patient was seen by cardiologist Dr Fausto Woodard in 1/2020  Dr Fausto Woodard recommended to take aspirin 81 mg daily  Statin therapy was not started due to elevated liver enzymes  Family history is positive for Hyperlipidemia, MI in her mother  Patient has allergies to cats and pollens  She takes Claritin 10 mg daily PRN during allergy seasons  GERD - symptoms improved  C/o red scaly patches on hand due to eczema  Requesting prescription refill for Fluocinonide 0 05% cream     Patient has positive family history for breast CA in her sister, colon cancer in her mother and brother  Genetic testing was negative for Jiang syndrome  Patient was diagnosed with right breast CA in 2017, S/P right breast lumpectomy performed by surgical oncologist Dr Pasha Kingston  Patient stopped going for MRI breast every 6 months as per oncology  She will continue annual mammography  Colonoscopy done by colorectal surgeon Dr Pio Higginbotham in January 2020  Two polyps were removed  Patient was recommended to repeat colonoscopy in 3 years  She received COVID vaccine # 1 yesterday  Reports no side effects      The following portions of the patient's history were reviewed and updated as appropriate: allergies, current medications, past family history, past social history, past surgical history and problem list     Review of Systems   Constitutional: Negative for activity change, appetite change, chills, fatigue and fever  HENT: Negative for congestion, ear pain, hearing loss, mouth sores, nosebleeds, sore throat, tinnitus and trouble swallowing  Eyes: Negative for pain, discharge, redness, itching and visual disturbance  Respiratory: Negative for cough, chest tightness, shortness of breath and wheezing  Cardiovascular: Negative for chest pain, palpitations and leg swelling  Gastrointestinal: Negative for abdominal pain, blood in stool, constipation, diarrhea, nausea and vomiting  Denies heartburn   Genitourinary: Negative for difficulty urinating, dysuria, flank pain, frequency and hematuria  Neurological: Negative for dizziness, syncope and headaches  Hematological: Negative  Psychiatric/Behavioral: Negative for sleep disturbance and suicidal ideas  Anxiety / Depression - stable         Objective:      /88   Wt 104 kg (228 lb 8 oz)   BMI 33 74 kg/m²          Physical Exam  Vitals signs and nursing note reviewed  Constitutional:       Appearance: Normal appearance  She is obese  HENT:      Head: Normocephalic and atraumatic  Eyes:      Conjunctiva/sclera: Conjunctivae normal       Pupils: Pupils are equal, round, and reactive to light  Neck:      Musculoskeletal: Normal range of motion and neck supple  Cardiovascular:      Comments: Denies chest pain   Pulmonary:      Effort: Pulmonary effort is normal       Breath sounds: No wheezing  Abdominal:      General: There is no distension  Palpations: Abdomen is soft  Tenderness: There is no abdominal tenderness  Musculoskeletal: Normal range of motion  General: No swelling, tenderness or deformity  Right lower leg: No edema        Left lower leg: No edema    Skin:     Findings: Rash (erythematous scaly patches on hands) present  Neurological:      Mental Status: She is alert     Psychiatric:         Mood and Affect: Mood normal

## 2021-02-18 NOTE — ASSESSMENT & PLAN NOTE
Advised patient to use moisturizing cream for dry skin  Refilled prescription for Fluocinonide 0 05% cream - apply twice daily PRN to affected areas on hands

## 2021-02-18 NOTE — ASSESSMENT & PLAN NOTE
HDL 43 -  improved  Continue to follow a low-cholesterol, low-fat diet, regular exercise  Recheck lipid panel 6 months

## 2021-02-18 NOTE — ASSESSMENT & PLAN NOTE
Patient is S/P right breast lumpectomy in December 2017  Follow-up with surgical oncologist Dr Radha Georges

## 2021-02-18 NOTE — ASSESSMENT & PLAN NOTE
AST level remains elevated  Recommended patient to follow a low-fat diet, lose weight, avoid alcohol  Will continue to monitor labs

## 2021-02-18 NOTE — PROGRESS NOTES
Virtual AWV Consent    Reason for visit is     Encounter provider Tej Durand MD    Provider located at [address] [place] [id number]      Recent Visits  No visits were found meeting these conditions  Showing recent visits within past 7 days and meeting all other requirements     Future Appointments  No visits were found meeting these conditions  Showing future appointments within next 150 days and meeting all other requirements        After connecting through [employer], the patient was identified by name and date of birth  Nalini Carpenter was informed that this is a telemedicine visit and that the visit is being conducted through [employer] and patient was informed that this is not a secure, HIPAA-compliant platform  She agrees to proceed  My office door was closed  No one else was in the room  She acknowledged consent and understanding of privacy and security of the video platform  The patient has agreed to participate and understands they can discontinue the visit at any time    Patient is aware this is a billable service  Assessment and Plan:     Problem List Items Addressed This Visit     None        BMI Counseling: There is no height or weight on file to calculate BMI  The BMI is above normal  Nutrition recommendations include decreasing portion sizes, encouraging healthy choices of fruits and vegetables, decreasing fast food intake, consuming healthier snacks and limiting drinks that contain sugar  Exercise recommendations include exercising 3-5 times per week  No pharmacotherapy was ordered  Preventive health issues were discussed with patient, and age appropriate screening tests were ordered as noted in patient's After Visit Summary  Personalized health advice and appropriate referrals for health education or preventive services given if needed, as noted in patient's After Visit Summary       History of Present Illness:     Patient presents for Medicare Annual Wellness visit    Patient Care Team:  Alvin Dominguez MD as PCP - General  Zainab Dennison, Maria De Jesus Johnson MD as Surgeon (Surgical Oncology)  Natali Corona as Nurse Practitioner (Surgical Oncology)     Problem List:     Patient Active Problem List   Diagnosis    Family history of malignant neoplasm of uterus    Family history of malignant neoplasm of gastrointestinal tract    Family history of malignant neoplasm of breast    Depression with anxiety    Dyslipidemia    Elevated LFTs    Esophageal reflux    Left thyroid nodule    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    Mild intermittent asthma without complication    Obesity (BMI 30 0-34  9)    Rosacea    Increased risk of breast cancer    Abnormal MRI, breast    Hand eczema    Chronic insomnia    Wound, breast, left, initial encounter    Welcome to Medicare preventive visit    Fatty liver    Abnormal EKG      Past Medical and Surgical History:     Past Medical History:   Diagnosis Date    Allergic rhinitis     last assessed-10/2/2017    Asthma     Carotid bruit     R-last assessed-6/10/2014    Dysphagia     last assessed-2013    GERD (gastroesophageal reflux disease)      Past Surgical History:   Procedure Laterality Date    BREAST BIOPSY      percutaneous needle core    BREAST LUMPECTOMY Right      SECTION      COLONOSCOPY      complete-12/3/2012-internal/external hemorrhoids, perianal or excoriation, mild sigmoid diverticulosis    MAMMO NEEDLE LOCALIZATION RIGHT (ALL INC) Right 2017    MAMMO NEEDLE LOCALIZATION RIGHT (ALL INC) EACH ADD Right 2017    MAMMO STEREOTACTIC BREAST BIOPSY RIGHT (ALL INC) Right 11/15/2017    MAMMO STEREOTACTIC BREAST BIOPSY RIGHT (ALL INC) EACH ADD Right 11/15/2017    MRI BREAST BIOPSY LEFT (ALL INCLUSIVE) Left 2019    DE PERQ DEVICE PLACEMT BREAST LOC 1ST LES W GUIDNCE Right 2017    Procedure: BREAST LUMPECTOMY; BREAST BRACKETED NEEDLE LOCALIZATION (BRACKETED NEEDLE LOC AT 0900); Surgeon: Fela Schroeder MD;  [facility];  Service: Surgical Oncology    VAGINAL DILATATION      d&e      Family History:     Family History   Problem Relation Age of Onset    Heart attack Mother         acute MI    Colon polyps Mother         polyps of the sigmoid colon    Breast cancer Sister 47    Uterine cancer Sister 59    Liver cancer Brother         x2    Uterine cancer Maternal Grandmother     Uterine cancer Paternal Grandmother     Diabetes Paternal Grandfather     Stroke Maternal Uncle     Kidney cancer Paternal Aunt     Pancreatic cancer Paternal Uncle     No Known Problems Daughter     No Known Problems Daughter     No Known Problems Son     No Known Problems Brother     No Known Problems Brother     No Known Problems Brother     No Known Problems Brother     No Known Problems Brother       Social History:        Social History     Socioeconomic History    Marital status: /Civil Union     Spouse name: Not on file    Number of children: Not on file    Years of education: Not on file    Highest education level: Not on file   Occupational History    Not on file   Social Needs    Financial resource strain: Not on file    Food insecurity     Worry: Not on file     Inability: Not on file    Transportation needs     Medical: Not on file     Non-medical: Not on file   Tobacco Use    Smoking status: Former Smoker     Years: 2 00     Types: Cigarettes     Quit date: 1976     Years since quittin 8    Smokeless tobacco: Never Used   Substance and Sexual Activity    Alcohol use:  Yes     Alcohol/week: 1 0 standard drinks     Types: 1 Glasses of wine per week     Comment: per mary social drinker    Drug use: No    Sexual activity: Not on file   Lifestyle    Physical activity     Days per week: Not on file     Minutes per session: Not on file  Stress: Not on file   Relationships    Social connections     Talks on phone: Not on file     Gets together: Not on file     Attends Anabaptism service: Not on file     Active member of club or organization: Not on file     Attends meetings of clubs or organizations: Not on file     Relationship status: Not on file    Intimate partner violence     Fear of current or ex partner: Not on file     Emotionally abused: Not on file     Physically abused: Not on file     Forced sexual activity: Not on file   Other Topics Concern    Not on file   Social History Narrative    Not on file      Medications and Allergies:     Current Outpatient Medications   Medication Sig Dispense Refill    aspirin 81 MG tablet Take 81 mg by mouth daily      calcium citrate (CALCITRATE) 950 MG tablet Take 1 tablet by mouth daily      fluocinonide (LIDEX) 0 05 % cream Apply topically 2 (two) times a day 30 g 1    loratadine (CLARITIN) 10 mg tablet Take 1 tablet daily PRN (Patient not taking: Reported on 12/3/2020) 30 tablet 3    Multiple Vitamin (MULTI-VITAMIN DAILY PO) Take by mouth      PARoxetine (PAXIL) 20 mg tablet Take 1 tablet (20 mg total) by mouth daily Take 1 tab  daily 90 tablet 3     No current facility-administered medications for this visit        No Known Allergies   Immunizations:     Immunization History   Administered Date(s) Administered    INFLUENZA 01/07/2013, 10/14/2013, 10/18/2016, 10/02/2017    Influenza Quadrivalent, 6-35 Months IM 10/16/2015, 10/18/2016, 10/02/2017    Influenza, high dose seasonal 0 7 mL 10/08/2019, 10/09/2020    Influenza, recombinant, quadrivalent,injectable, preservative free 10/02/2018    Influenza, seasonal, injectable 01/07/2013, 10/14/2013, 10/02/2014    Pneumococcal Conjugate 13-Valent 02/19/2020    Pneumococcal Polysaccharide PPV23 06/10/2014, 01/01/2016    SARS-CoV-2 / COVID-19 mRNA IM (Pfizer-BioNTech) 02/17/2021    Tdap 06/07/2013    Varicella 01/01/2016    Zoster 07/08/2014      Health Maintenance:         Topic Date Due    Cervical Cancer Screening  11/17/2020    MAMMOGRAM  12/18/2021    Colonoscopy Surveillance  01/03/2023    Colorectal Cancer Screening  01/03/2030    Hepatitis C Screening  Completed     There are no preventive care reminders to display for this patient  Medicare Health Risk Assessment:     There were no vitals taken for this visit  [place] is here for her Subsequent Wellness visit  Health Risk Assessment:   Patient rates overall health as good  Patient feels that their physical health rating is same  Eyesight was rated as same  Hearing was rated as same  Patient feels that their emotional and mental health rating is same  Pain experienced in the last 7 days has been none  Depression Screening:   PHQ-2 Score: 0  PHQ-9 Score: 0      Fall Risk Screening: In the past year, patient has experienced: no history of falling in past year      Urinary Incontinence Screening:   Patient has leaked urine accidently in the last six months  Home Safety:  Patient does not have trouble with stairs inside or outside of their home  Patient has working smoke alarms and has no working carbon monoxide detector  Home safety hazards include: none  Nutrition:   Current diet is Regular  Medications:   Patient is currently taking over-the-counter supplements  OTC medications include: see medication list  Patient is able to manage medications  Activities of Daily Living (ADLs)/Instrumental Activities of Daily Living (IADLs):   Walk and transfer into and out of bed and chair?: Yes  Dress and groom yourself?: Yes    Bathe or shower yourself?: Yes    Feed yourself?  Yes  Do your laundry/housekeeping?: Yes  Manage your money, pay your bills and track your expenses?: Yes  Make your own meals?: Yes    Do your own shopping?: Yes    Previous Hospitalizations:   Any hospitalizations or ED visits within the last 12 months?: No      Advance Care Planning: Living will: No    Durable POA for healthcare: No    Advanced directive: No    Advanced directive counseling given: Yes      Cognitive Screening:   Provider or family/friend/caregiver concerned regarding cognition?: No    PREVENTIVE SCREENINGS      Cardiovascular Screening:    General: Screening Current      Diabetes Screening:     General: Screening Current      Colorectal Cancer Screening:     General: Screening Current      Breast Cancer Screening:     General: Screening Current      Cervical Cancer Screening:    General: Screening Not Indicated      Osteoporosis Screening:    General: Risks and Benefits Discussed    Due for: DXA Axial      Lung Cancer Screening:     General: Screening Not Indicated      Hepatitis C Screening:    General: Screening Current    Other Counseling Topics:   Car/seat belt/driving safety, skin self-exam and calcium and vitamin D intake and regular weightbearing exercise         Harry Weaver MD

## 2021-02-18 NOTE — PATIENT INSTRUCTIONS
Medicare Preventive Visit Patient Instructions  Thank you for completing your Welcome to Medicare Visit or Medicare Annual Wellness Visit today  Your next wellness visit will be due in one year (2/18/2022)  The screening/preventive services that you may require over the next 5-10 years are detailed below  Some tests may not apply to you based off risk factors and/or age  Screening tests ordered at today's visit but not completed yet may show as past due  Also, please note that scanned in results may not display below  Preventive Screenings:  Service Recommendations Previous Testing/Comments   Colorectal Cancer Screening  * Colonoscopy    * Fecal Occult Blood Test (FOBT)/Fecal Immunochemical Test (FIT)  * Fecal DNA/Cologuard Test  * Flexible Sigmoidoscopy Age: 54-65 years old   Colonoscopy: every 10 years (may be performed more frequently if at higher risk)  OR  FOBT/FIT: every 1 year  OR  Cologuard: every 3 years  OR  Sigmoidoscopy: every 5 years  Screening may be recommended earlier than age 48 if at higher risk for colorectal cancer  Also, an individualized decision between you and your healthcare provider will decide whether screening between the ages of 74-80 would be appropriate  Colonoscopy: 01/03/2020  FOBT/FIT: Not on file  Cologuard: Not on file  Sigmoidoscopy: Not on file         Breast Cancer Screening Age: 36 years old  Frequency: every 1-2 years  Not required if history of left and right mastectomy Mammogram: 12/18/2019       Cervical Cancer Screening Between the ages of 21-29, pap smear recommended once every 3 years  Between the ages of 33-67, can perform pap smear with HPV co-testing every 5 years     Recommendations may differ for women with a history of total hysterectomy, cervical cancer, or abnormal pap smears in past  Pap Smear: 11/17/2017       Hepatitis C Screening Once for adults born between 1945 and 1965  More frequently in patients at high risk for Hepatitis C Hep C Antibody: 08/20/2019       Diabetes Screening 1-2 times per year if you're at risk for diabetes or have pre-diabetes Fasting glucose: 99 mg/dL   A1C: No results in last 5 years       Cholesterol Screening Once every 5 years if you don't have a lipid disorder  May order more often based on risk factors  Lipid panel: 02/17/2021         Other Preventive Screenings Covered by Medicare:  1  Abdominal Aortic Aneurysm (AAA) Screening: covered once if your at risk  You're considered to be at risk if you have a family history of AAA  2  Lung Cancer Screening: covers low dose CT scan once per year if you meet all of the following conditions: (1) Age 50-69; (2) No signs or symptoms of lung cancer; (3) Current smoker or have quit smoking within the last 15 years; (4) You have a tobacco smoking history of at least 30 pack years (packs per day multiplied by number of years you smoked); (5) You get a written order from a healthcare provider  3  Glaucoma Screening: covered annually if you're considered high risk: (1) You have diabetes OR (2) Family history of glaucoma OR (3)  aged 48 and older OR (3)  American aged 72 and older  3  Osteoporosis Screening: covered every 2 years if you meet one of the following conditions: (1) You're estrogen deficient and at risk for osteoporosis based off medical history and other findings; (2) Have a vertebral abnormality; (3) On glucocorticoid therapy for more than 3 months; (4) Have primary hyperparathyroidism; (5) On osteoporosis medications and need to assess response to drug therapy  · Last bone density test (DXA Scan): Not on file  5  HIV Screening: covered annually if you're between the age of 12-76  Also covered annually if you are younger than 13 and older than 72 with risk factors for HIV infection  For pregnant patients, it is covered up to 3 times per pregnancy      Immunizations:  Immunization Recommendations   Influenza Vaccine Annual influenza vaccination during flu season is recommended for all persons aged >= 6 months who do not have contraindications   Pneumococcal Vaccine (Prevnar and Pneumovax)  * Prevnar = PCV13  * Pneumovax = PPSV23   Adults 25-60 years old: 1-3 doses may be recommended based on certain risk factors  Adults 72 years old: Prevnar (PCV13) vaccine recommended followed by Pneumovax (PPSV23) vaccine  If already received PPSV23 since turning 65, then PCV13 recommended at least one year after PPSV23 dose  Hepatitis B Vaccine 3 dose series if at intermediate or high risk (ex: diabetes, end stage renal disease, liver disease)   Tetanus (Td) Vaccine - COST NOT COVERED BY MEDICARE PART B Following completion of primary series, a booster dose should be given every 10 years to maintain immunity against tetanus  Td may also be given as tetanus wound prophylaxis  Tdap Vaccine - COST NOT COVERED BY MEDICARE PART B Recommended at least once for all adults  For pregnant patients, recommended with each pregnancy  Shingles Vaccine (Shingrix) - COST NOT COVERED BY MEDICARE PART B  2 shot series recommended in those aged 48 and above     Health Maintenance Due:      Topic Date Due    Cervical Cancer Screening  11/17/2020    MAMMOGRAM  12/18/2021    Colonoscopy Surveillance  01/03/2023    Colorectal Cancer Screening  01/03/2030    Hepatitis C Screening  Completed     Immunizations Due:  There are no preventive care reminders to display for this patient  Advance Directives   What are advance directives? Advance directives are legal documents that state your wishes and plans for medical care  These plans are made ahead of time in case you lose your ability to make decisions for yourself  Advance directives can apply to any medical decision, such as the treatments you want, and if you want to donate organs  What are the types of advance directives? There are many types of advance directives, and each state has rules about how to use them   You may choose a combination of any of the following:  · Living will: This is a written record of the treatment you want  You can also choose which treatments you do not want, which to limit, and which to stop at a certain time  This includes surgery, medicine, IV fluid, and tube feedings  · Durable power of  for healthcare [facility]): This is a written record that states who you want to make healthcare choices for you when you are unable to make them for yourself  This person, called a proxy, is usually a family member or a friend  You may choose more than 1 proxy  · Do not resuscitate (DNR) order:  A DNR order is used in case your heart stops beating or you stop breathing  It is a request not to have certain forms of treatment, such as CPR  A DNR order may be included in other types of advance directives  · Medical directive: This covers the care that you want if you are in a coma, near death, or unable to make decisions for yourself  You can list the treatments you want for each condition  Treatment may include pain medicine, surgery, blood transfusions, dialysis, IV or tube feedings, and a ventilator (breathing machine)  · Values history: This document has questions about your views, beliefs, and how you feel and think about life  This information can help others choose the care that you would choose  Why are advance directives important? An advance directive helps you control your care  Although spoken wishes may be used, it is better to have your wishes written down  Spoken wishes can be misunderstood, or not followed  Treatments may be given even if you do not want them  An advance directive may make it easier for your family to make difficult choices about your care  Weight Management   Why it is important to manage your weight:  Being overweight increases your risk of health conditions such as heart disease, high blood pressure, type 2 diabetes, and certain types of cancer   It can also increase your risk for osteoarthritis, sleep apnea, and other respiratory problems  Aim for a slow, steady weight loss  Even a small amount of weight loss can lower your risk of health problems  How to lose weight safely:  A safe and healthy way to lose weight is to eat fewer calories and get regular exercise  You can lose up about 1 pound a week by decreasing the number of calories you eat by 500 calories each day  Healthy meal plan for weight management:  A healthy meal plan includes a variety of foods, contains fewer calories, and helps you stay healthy  A healthy meal plan includes the following:  · Eat whole-grain foods more often  A healthy meal plan should contain fiber  Fiber is the part of grains, fruits, and vegetables that is not broken down by your body  Whole-grain foods are healthy and provide extra fiber in your diet  Some examples of whole-grain foods are whole-wheat breads and pastas, oatmeal, brown rice, and bulgur  · Eat a variety of vegetables every day  Include dark, leafy greens such as spinach, kale, bryce greens, and mustard greens  Eat yellow and orange vegetables such as carrots, sweet potatoes, and winter squash  · Eat a variety of fruits every day  Choose fresh or canned fruit (canned in its own juice or light syrup) instead of juice  Fruit juice has very little or no fiber  · Eat low-fat dairy foods  Drink fat-free (skim) milk or 1% milk  Eat fat-free yogurt and low-fat cottage cheese  Try low-fat cheeses such as mozzarella and other reduced-fat cheeses  · Choose meat and other protein foods that are low in fat  Choose beans or other legumes such as split peas or lentils  Choose fish, skinless poultry (chicken or turkey), or lean cuts of red meat (beef or pork)  Before you cook meat or poultry, cut off any visible fat  · Use less fat and oil  Try baking foods instead of frying them  Add less fat, such as margarine, sour cream, regular salad dressing and mayonnaise to foods   Eat fewer high-fat foods  Some examples of high-fat foods include french fries, doughnuts, ice cream, and cakes  · Eat fewer sweets  Limit foods and drinks that are high in sugar  This includes candy, cookies, regular soda, and sweetened drinks  Exercise:  Exercise at least 30 minutes per day on most days of the week  Some examples of exercise include walking, biking, dancing, and swimming  You can also fit in more physical activity by taking the stairs instead of the elevator or parking farther away from stores  Ask your healthcare provider about the best exercise plan for you  © Copyright [employer] 2018 Information is for End User's use only and may not be sold, redistributed or otherwise used for commercial purposes   All illustrations and images included in CareNotes® are the copyrighted property of [employer]  or [address] [place] [address]

## 2021-03-09 ENCOUNTER — IMMUNIZATIONS (OUTPATIENT)
Dept: FAMILY MEDICINE CLINIC | Facility: HOSPITAL | Age: 66
End: 2021-03-09

## 2021-03-09 DIAGNOSIS — Z23 ENCOUNTER FOR IMMUNIZATION: Primary | ICD-10-CM

## 2021-03-09 PROCEDURE — 0002A SARS-COV-2 / COVID-19 MRNA VACCINE (PFIZER-BIONTECH) 30 MCG: CPT

## 2021-03-09 PROCEDURE — 91300 SARS-COV-2 / COVID-19 MRNA VACCINE (PFIZER-BIONTECH) 30 MCG: CPT

## 2021-03-26 ENCOUNTER — HOSPITAL ENCOUNTER (OUTPATIENT)
Dept: MAMMOGRAPHY | Facility: HOSPITAL | Age: 66
Discharge: HOME | End: 2021-03-26
Payer: MEDICARE

## 2021-03-26 VITALS — WEIGHT: 228 LBS | BODY MASS INDEX: 33.77 KG/M2 | HEIGHT: 69 IN

## 2021-03-26 DIAGNOSIS — Z12.31 ENCOUNTER FOR SCREENING MAMMOGRAM FOR MALIGNANT NEOPLASM OF BREAST: ICD-10-CM

## 2021-03-26 PROCEDURE — 77067 SCR MAMMO BI INCL CAD: CPT

## 2021-03-26 PROCEDURE — 77063 BREAST TOMOSYNTHESIS BI: CPT

## 2021-04-01 ENCOUNTER — OFFICE VISIT (OUTPATIENT)
Dept: SURGICAL ONCOLOGY | Facility: CLINIC | Age: 66
End: 2021-04-01
Payer: MEDICARE

## 2021-04-01 VITALS
HEIGHT: 69 IN | WEIGHT: 233 LBS | HEART RATE: 80 BPM | BODY MASS INDEX: 34.51 KG/M2 | TEMPERATURE: 97.7 F | SYSTOLIC BLOOD PRESSURE: 140 MMHG | DIASTOLIC BLOOD PRESSURE: 84 MMHG

## 2021-04-01 DIAGNOSIS — Z91.89 INCREASED RISK OF BREAST CANCER: ICD-10-CM

## 2021-04-01 DIAGNOSIS — Z80.3 FAMILY HISTORY OF MALIGNANT NEOPLASM OF BREAST: ICD-10-CM

## 2021-04-01 DIAGNOSIS — Z86.000 HISTORY OF LOBULAR CARCINOMA IN SITU (LCIS) OF BREAST: Primary | ICD-10-CM

## 2021-04-01 PROBLEM — S21.002A: Status: RESOLVED | Noted: 2019-07-11 | Resolved: 2021-04-01

## 2021-04-01 PROCEDURE — 99213 OFFICE O/P EST LOW 20 MIN: CPT | Performed by: NURSE PRACTITIONER

## 2021-04-01 NOTE — PROGRESS NOTES
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  - MRI breast bilateral w and wo contrast w cad; Future    2  Increased risk of breast cancer  - MRI breast bilateral w and wo contrast w cad; Future    3  Family history of malignant neoplasm of breast  - genetic testing negative    Discussion/Summary: Patient is a 77-year-old female who presents today for a six-month follow-up visit for a personal history of LCIS and a family history of breast cancer  She underwent genetic testing which was negative   She underwent a lumpectomy by Dr Mercedes Huddleston in December of 2017  She is a high risk patient  We have been following her with twice a year clinical breast exams, annual mammography and annual breast MRI   She had a bilateral breast MRI on June 8, 2020 which was BI-RADS 2  She had a bilateral 3D screening mammogram on March 26, 2021 which was BI-RADS 2, category 1 density  She offers no new complaints today  She states that when she was performing a recent self-breast exam she noticed a tiny lump in the left breast  On today's exam, this is not suspicious  This may be a palpable calcifications that is only appreciated with patient lying in the lateral position  Clinically it is about 1 mm  I instructed her to monitor and contact me with changes  There are no other worrisome findings  I will order a bilateral breast MRI for June and plan to see the patient back in 6 months or sooner should the need arise  She was instructed to call with any new concerns or symptoms prior to that time  All of her questions were answered today        History of Present Illness:     -Interval History:  Patient was performing a recent self-breast exam and feels a "grain of rice" sized lump in the left breast only in the lateral position  This is in the retroareolar region  Otherwise she notices no changes on self-exam   She reports no changes in her family history  She had a bilateral mammogram on March 26, 2021 which was BI-RADS 2  Genetic testing results are NEGATIVE for mutations and large rearrangements in:  APC, OSBALDO, BARD1, BMPR1A, BRCA1, BRCA2, BRIP1, CDH1, CDK4,CDKN2A, CHEK2, DICER1, HOXB13, EPCAM (del/dup only), GREM1 (del/dup only) MLH1, MRE11A, MSH2, MSH6, MUTYH, NBN, NF1, PALB2, PMS2, POLD1, POLE, PTEN, RAD50, RAD51C, RAD51D, SMAD4, SMARCA4, STK11, and TP53  (Kitty Sanches)     Review of Systems:  Review of Systems   Constitutional: Negative for activity change, appetite change, chills, fatigue, fever and unexpected weight change  Respiratory: Negative for cough and shortness of breath  Cardiovascular: Negative for chest pain  Skin: Negative for color change and rash  Neurological: Negative for headaches  Hematological: Negative for adenopathy  Psychiatric/Behavioral: Negative for agitation and confusion  All other systems reviewed and are negative  Patient Active Problem List   Diagnosis    Family history of malignant neoplasm of uterus    Family history of malignant neoplasm of gastrointestinal tract    Family history of malignant neoplasm of breast    Depression with anxiety    Dyslipidemia    Elevated LFTs    Esophageal reflux    Left thyroid nodule    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    Mild intermittent asthma without complication    Obesity (BMI 30 0-34  9)    Rosacea    Increased risk of breast cancer    Abnormal MRI, breast    Hand eczema    Chronic insomnia    Medicare annual wellness visit, subsequent    Fatty liver    Abnormal EKG     Past Medical History:   Diagnosis Date    Allergic rhinitis     last assessed-10/2/2017    Asthma     Carotid bruit     R-last assessed-6/10/2014    Dysphagia     last assessed-2013    GERD (gastroesophageal reflux disease)      Past Surgical History:   Procedure Laterality Date    BREAST BIOPSY  2017    percutaneous needle core    BREAST LUMPECTOMY Right 2017    LCIS     SECTION  1984    COLONOSCOPY      complete-12/3/2012-internal/external hemorrhoids, perianal or excoriation, mild sigmoid diverticulosis    MAMMO NEEDLE LOCALIZATION RIGHT (ALL INC) Right 2017    MAMMO NEEDLE LOCALIZATION RIGHT (ALL INC) EACH ADD Right 2017    MAMMO STEREOTACTIC BREAST BIOPSY RIGHT (ALL INC) Right 11/15/2017    MAMMO STEREOTACTIC BREAST BIOPSY RIGHT (ALL INC) EACH ADD Right 11/15/2017    MRI BREAST BIOPSY LEFT (ALL INCLUSIVE) Left 2019    ME PERQ DEVICE PLACEMT BREAST LOC 1ST LES W GUIDNCE Right 2017    Procedure: BREAST LUMPECTOMY; BREAST BRACKETED NEEDLE LOCALIZATION (BRACKETED NEEDLE LOC AT 0900);   Surgeon: Sylvia Gasca MD;  [facility];  Service: Surgical Oncology    VAGINAL DILATATION      d&e     Family History   Problem Relation Age of Onset    Heart attack Mother         acute MI    Colon polyps Mother         polyps of the sigmoid colon    Breast cancer Sister 47    Uterine cancer Sister 59    Liver cancer Brother         x2    Uterine cancer Maternal Grandmother         unknown age-maybe 63's    Uterine cancer Paternal Grandmother         unknown age , maybe 61's    Diabetes Paternal Grandfather     Stroke Maternal Uncle     Kidney cancer Paternal Aunt 54    Pancreatic cancer Paternal Uncle     No Known Problems Daughter     No Known Problems Daughter     No Known Problems Son     No Known Problems Brother     No Known Problems Brother     No Known Problems Brother     No Known Problems Brother     No Known Problems Brother     Breast cancer Cousin 62    Uterine cancer Cousin 54    Colon cancer Cousin 61     Social History     Socioeconomic History    Marital status: /Civil Union     Spouse name: Not on file    Number of children: Not on file    Years of education: Not on file    Highest education level: Not on file   Occupational History    Not on file   Social Needs    Financial resource strain: Not on file    Food insecurity     Worry: Not on file     Inability: Not on file    Transportation needs     Medical: Not on file     Non-medical: Not on file   Tobacco Use    Smoking status: Former Smoker     Years: 2 00     Types: Cigarettes     Quit date: 1976     Years since quittin 9    Smokeless tobacco: Never Used   Substance and Sexual Activity    Alcohol use:  Yes     Alcohol/week: 1 0 standard drinks     Types: 1 Glasses of wine per week     Comment: per allscripts, social drinker    Drug use: No    Sexual activity: Not on file   Lifestyle    Physical activity     Days per week: Not on file     Minutes per session: Not on file    Stress: Not on file   Relationships    Social connections     Talks on phone: Not on file     Gets together: Not on file     Attends Muslim service: Not on file     Active member of club or organization: Not on file     Attends meetings of clubs or organizations: Not on file     Relationship status: Not on file    Intimate partner violence     Fear of current or ex partner: Not on file     Emotionally abused: Not on file     Physically abused: Not on file     Forced sexual activity: Not on file   Other Topics Concern    Not on file   Social History Narrative    Not on file       Current Outpatient Medications:     aspirin 81 MG tablet, Take 81 mg by mouth daily, Disp: , Rfl:     calcium citrate (CALCITRATE) 950 MG tablet, Take 1 tablet by mouth daily, Disp: , Rfl:     fluocinonide (LIDEX) 0 05 % cream, Apply topically 2 (two) times a day, Disp: 30 g, Rfl: 1    Multiple Vitamin (MULTI-VITAMIN DAILY PO), Take by mouth, Disp: , Rfl:     loratadine (CLARITIN) 10 mg tablet, Take 1 tablet daily PRN (Patient not taking: Reported on 12/3/2020), Disp: 30 tablet, Rfl: 3    PARoxetine (PAXIL) 20 mg tablet, Take 1 tablet (20 mg total) by mouth daily Take 1 tab  daily, Disp: 90 tablet, Rfl: 3  No Known Allergies  Vitals:    04/01/21 1022   BP: 140/84   Pulse: 80   Temp: 97 7 °F (36 5 °C)       Physical Exam  Vitals signs reviewed  Constitutional:       Appearance: Normal appearance  HENT:      Head: Normocephalic and atraumatic  Pulmonary:      Effort: Pulmonary effort is normal    Chest:      Breasts:         Right: Skin change (surgical scar) present  No swelling, bleeding, inverted nipple, mass, nipple discharge or tenderness  Left: No swelling, bleeding, inverted nipple, mass, nipple discharge, skin change or tenderness  Comments: There is a 1mm tiny firm lump in the retro areolar region  Suspect this may represent a palpable calcification or fibrocystic tissue  Not suspicious  Instructed patient to monitor and call with changes  Lymphadenopathy:      Upper Body:      Right upper body: No supraclavicular or axillary adenopathy  Left upper body: No supraclavicular or axillary adenopathy  Skin:     General: Skin is warm  Neurological:      General: No focal deficit present  Mental Status: She is alert and oriented to person, place, and time  Psychiatric:         Mood and Affect: Mood normal            Results:    Imaging  Mammo Screening Bilateral W 3d & Cad    Result Date: 3/26/2021  Narrative: DIAGNOSIS: Encounter for screening mammogram for malignant neoplasm of breast TECHNIQUE: Digital screening mammography was performed  Computer Aided Detection (CAD) analyzed all applicable images  COMPARISONS:  Multiple prior exams dating between 01/03/2005 and 12/18/2019  RELEVANT HISTORY: Family Breast Cancer History: History of breast cancer in [place]   Family Medical History: Family medical history includes breast cancer in 2 relatives (cousin, sister) and colon cancer in cousin  Personal History: Hormone history includes birth control  Surgical history includes breast biopsy and lumpectomy  No known relevant medical history  The patient is scheduled in a reminder system for screening mammography  8-10% of cancers will be missed on mammography  Management of a palpable abnormality must be based on clinical grounds  Patients will be notified of their results via letter from our facility  Accredited by [employer] of [employer] and FDA  RISK ASSESSMENT: 5 Year Tyrer-Cuzick: 2 22 % 10 Year Tyrer-Cuzick: 4 19 % Lifetime Tyrer-Cuzick: 8 31 % TISSUE DENSITY: The breasts are almost entirely fatty  INDICATION: Yuko Knight is a 76 y o  female presenting for screening mammography  FINDINGS: Bilateral There are no suspicious masses, grouped microcalcifications or areas of architectural distortion  The skin and nipple areolar complex are unremarkable  Postsurgical changes are noted in the right breast   Biopsy noted in the left breast   Benign-appearing calcifications are noted in each breast      Impression: No mammographic evidence of malignancy  ASSESSMENT/BI-RADS CATEGORY: Left: 2 - Benign Right: 2 - Benign Overall: 2 - Benign RECOMMENDATION:      - Routine screening mammogram in 1 year for both breasts  Workstation ID: [id number]       I reviewed the above imaging data  Advance Care Planning/Advance Directives:  Discussed disease status and treatment goals with the patient

## 2021-05-08 DIAGNOSIS — F41.8 DEPRESSION WITH ANXIETY: ICD-10-CM

## 2021-05-10 RX ORDER — PAROXETINE HYDROCHLORIDE 20 MG/1
TABLET, FILM COATED ORAL
Qty: 90 TABLET | Refills: 3 | Status: SHIPPED | OUTPATIENT
Start: 2021-05-10 | End: 2022-05-03

## 2021-05-11 DIAGNOSIS — F41.8 DEPRESSION WITH ANXIETY: ICD-10-CM

## 2021-05-11 RX ORDER — PAROXETINE HYDROCHLORIDE 20 MG/1
TABLET, FILM COATED ORAL
Qty: 90 TABLET | Refills: 3 | OUTPATIENT
Start: 2021-05-11

## 2021-06-10 ENCOUNTER — HOSPITAL ENCOUNTER (OUTPATIENT)
Dept: RADIOLOGY | Facility: HOSPITAL | Age: 66
Discharge: HOME | End: 2021-06-10
Payer: MEDICARE

## 2021-06-10 DIAGNOSIS — Z91.89 INCREASED RISK OF BREAST CANCER: ICD-10-CM

## 2021-06-10 DIAGNOSIS — Z86.000 HISTORY OF LOBULAR CARCINOMA IN SITU (LCIS) OF BREAST: ICD-10-CM

## 2021-06-10 PROCEDURE — C8908 MRI W/O FOL W/CONT, BREAST,: HCPCS

## 2021-06-10 PROCEDURE — A9585 GADOBUTROL INJECTION: HCPCS | Performed by: NURSE PRACTITIONER

## 2021-06-10 PROCEDURE — C8937 CAD BREAST MRI: HCPCS

## 2021-06-10 PROCEDURE — G1004 CDSM NDSC: HCPCS

## 2021-06-10 RX ADMIN — GADOBUTROL 10 ML: 604.72 INJECTION INTRAVENOUS at 18:06

## 2021-08-17 ENCOUNTER — APPOINTMENT (OUTPATIENT)
Dept: LAB | Facility: CLINIC | Age: 66
End: 2021-08-17
Payer: MEDICARE

## 2021-08-17 DIAGNOSIS — E78.5 DYSLIPIDEMIA: ICD-10-CM

## 2021-08-17 DIAGNOSIS — R79.89 ELEVATED LFTS: ICD-10-CM

## 2021-08-17 DIAGNOSIS — K76.0 FATTY LIVER: ICD-10-CM

## 2021-08-17 DIAGNOSIS — K21.9 GASTROESOPHAGEAL REFLUX DISEASE, UNSPECIFIED WHETHER ESOPHAGITIS PRESENT: ICD-10-CM

## 2021-08-17 LAB
ALBUMIN SERPL BCP-MCNC: 3.8 G/DL (ref 3.5–5)
ALP SERPL-CCNC: 101 U/L (ref 46–116)
ALT SERPL W P-5'-P-CCNC: 79 U/L (ref 12–78)
ANION GAP SERPL CALCULATED.3IONS-SCNC: 11 MMOL/L (ref 4–13)
AST SERPL W P-5'-P-CCNC: 79 U/L (ref 5–45)
BASOPHILS # BLD AUTO: 0.03 THOUSANDS/ΜL (ref 0–0.1)
BASOPHILS NFR BLD AUTO: 0 % (ref 0–1)
BILIRUB SERPL-MCNC: 0.46 MG/DL (ref 0.2–1)
BUN SERPL-MCNC: 18 MG/DL (ref 5–25)
CALCIUM SERPL-MCNC: 9.6 MG/DL (ref 8.3–10.1)
CHLORIDE SERPL-SCNC: 106 MMOL/L (ref 100–108)
CHOLEST SERPL-MCNC: 200 MG/DL (ref 50–200)
CO2 SERPL-SCNC: 27 MMOL/L (ref 21–32)
CREAT SERPL-MCNC: 1.07 MG/DL (ref 0.6–1.3)
EOSINOPHIL # BLD AUTO: 0.27 THOUSAND/ΜL (ref 0–0.61)
EOSINOPHIL NFR BLD AUTO: 4 % (ref 0–6)
ERYTHROCYTE [DISTWIDTH] IN BLOOD BY AUTOMATED COUNT: 14.3 % (ref 11.6–15.1)
GFR SERPL CREATININE-BSD FRML MDRD: 54 ML/MIN/1.73SQ M
GLUCOSE P FAST SERPL-MCNC: 106 MG/DL (ref 65–99)
HCT VFR BLD AUTO: 46.5 % (ref 34.8–46.1)
HDLC SERPL-MCNC: 38 MG/DL
HGB BLD-MCNC: 15 G/DL (ref 11.5–15.4)
IMM GRANULOCYTES # BLD AUTO: 0.02 THOUSAND/UL (ref 0–0.2)
IMM GRANULOCYTES NFR BLD AUTO: 0 % (ref 0–2)
LDLC SERPL CALC-MCNC: 134 MG/DL (ref 0–100)
LYMPHOCYTES # BLD AUTO: 2.19 THOUSANDS/ΜL (ref 0.6–4.47)
LYMPHOCYTES NFR BLD AUTO: 30 % (ref 14–44)
MCH RBC QN AUTO: 28 PG (ref 26.8–34.3)
MCHC RBC AUTO-ENTMCNC: 32.3 G/DL (ref 31.4–37.4)
MCV RBC AUTO: 87 FL (ref 82–98)
MONOCYTES # BLD AUTO: 0.39 THOUSAND/ΜL (ref 0.17–1.22)
MONOCYTES NFR BLD AUTO: 5 % (ref 4–12)
NEUTROPHILS # BLD AUTO: 4.31 THOUSANDS/ΜL (ref 1.85–7.62)
NEUTS SEG NFR BLD AUTO: 61 % (ref 43–75)
NONHDLC SERPL-MCNC: 162 MG/DL
NRBC BLD AUTO-RTO: 0 /100 WBCS
PLATELET # BLD AUTO: 235 THOUSANDS/UL (ref 149–390)
PMV BLD AUTO: 10.3 FL (ref 8.9–12.7)
POTASSIUM SERPL-SCNC: 4.1 MMOL/L (ref 3.5–5.3)
PROT SERPL-MCNC: 8 G/DL (ref 6.4–8.2)
RBC # BLD AUTO: 5.35 MILLION/UL (ref 3.81–5.12)
SODIUM SERPL-SCNC: 144 MMOL/L (ref 136–145)
TRIGL SERPL-MCNC: 140 MG/DL
TSH SERPL DL<=0.05 MIU/L-ACNC: 3.55 UIU/ML (ref 0.36–3.74)
WBC # BLD AUTO: 7.21 THOUSAND/UL (ref 4.31–10.16)

## 2021-08-17 PROCEDURE — 80053 COMPREHEN METABOLIC PANEL: CPT

## 2021-08-17 PROCEDURE — 85025 COMPLETE CBC W/AUTO DIFF WBC: CPT

## 2021-08-17 PROCEDURE — 80061 LIPID PANEL: CPT

## 2021-08-17 PROCEDURE — 36415 COLL VENOUS BLD VENIPUNCTURE: CPT

## 2021-08-17 PROCEDURE — 84443 ASSAY THYROID STIM HORMONE: CPT

## 2021-08-20 ENCOUNTER — OFFICE VISIT (OUTPATIENT)
Dept: FAMILY MEDICINE CLINIC | Facility: CLINIC | Age: 66
End: 2021-08-20
Payer: MEDICARE

## 2021-08-20 VITALS
SYSTOLIC BLOOD PRESSURE: 120 MMHG | TEMPERATURE: 98.5 F | DIASTOLIC BLOOD PRESSURE: 72 MMHG | HEART RATE: 90 BPM | BODY MASS INDEX: 34.51 KG/M2 | RESPIRATION RATE: 16 BRPM | WEIGHT: 233 LBS | HEIGHT: 69 IN | OXYGEN SATURATION: 97 %

## 2021-08-20 DIAGNOSIS — R71.8 ELEVATED HEMATOCRIT: ICD-10-CM

## 2021-08-20 DIAGNOSIS — Z12.4 SCREENING FOR CERVICAL CANCER: ICD-10-CM

## 2021-08-20 DIAGNOSIS — E66.9 OBESITY (BMI 30.0-34.9): ICD-10-CM

## 2021-08-20 DIAGNOSIS — F41.8 ANXIETY ASSOCIATED WITH DEPRESSION: ICD-10-CM

## 2021-08-20 DIAGNOSIS — F33.41 RECURRENT MAJOR DEPRESSIVE DISORDER, IN PARTIAL REMISSION (HCC): ICD-10-CM

## 2021-08-20 DIAGNOSIS — Z86.000 HISTORY OF LOBULAR CARCINOMA IN SITU (LCIS) OF BREAST: ICD-10-CM

## 2021-08-20 DIAGNOSIS — R79.89 ELEVATED LFTS: ICD-10-CM

## 2021-08-20 DIAGNOSIS — E78.5 DYSLIPIDEMIA: Primary | ICD-10-CM

## 2021-08-20 DIAGNOSIS — K76.0 FATTY LIVER: ICD-10-CM

## 2021-08-20 PROCEDURE — 99214 OFFICE O/P EST MOD 30 MIN: CPT | Performed by: FAMILY MEDICINE

## 2021-09-29 ENCOUNTER — IMMUNIZATIONS (OUTPATIENT)
Dept: FAMILY MEDICINE CLINIC | Facility: CLINIC | Age: 66
End: 2021-09-29
Payer: MEDICARE

## 2021-09-29 DIAGNOSIS — Z23 ENCOUNTER FOR IMMUNIZATION: Primary | ICD-10-CM

## 2021-09-29 PROCEDURE — G0008 ADMIN INFLUENZA VIRUS VAC: HCPCS

## 2021-09-29 PROCEDURE — 90662 IIV NO PRSV INCREASED AG IM: CPT

## 2021-10-23 ENCOUNTER — IMMUNIZATIONS (OUTPATIENT)
Dept: FAMILY MEDICINE CLINIC | Facility: HOSPITAL | Age: 66
End: 2021-10-23

## 2021-10-23 DIAGNOSIS — Z23 ENCOUNTER FOR IMMUNIZATION: Primary | ICD-10-CM

## 2021-10-23 PROCEDURE — 91300 COVID-19 PFIZER VACC 0.3 ML: CPT

## 2021-10-23 PROCEDURE — 0001A COVID-19 PFIZER VACC 0.3 ML: CPT

## 2021-11-10 ENCOUNTER — OFFICE VISIT (OUTPATIENT)
Dept: SURGICAL ONCOLOGY | Facility: CLINIC | Age: 66
End: 2021-11-10
Payer: MEDICARE

## 2021-11-10 VITALS
TEMPERATURE: 97.8 F | HEIGHT: 69 IN | HEART RATE: 82 BPM | RESPIRATION RATE: 17 BRPM | DIASTOLIC BLOOD PRESSURE: 72 MMHG | OXYGEN SATURATION: 95 % | BODY MASS INDEX: 34.8 KG/M2 | SYSTOLIC BLOOD PRESSURE: 116 MMHG | WEIGHT: 235 LBS

## 2021-11-10 DIAGNOSIS — N63.20 LEFT BREAST LUMP: ICD-10-CM

## 2021-11-10 DIAGNOSIS — Z80.3 FAMILY HISTORY OF MALIGNANT NEOPLASM OF BREAST: ICD-10-CM

## 2021-11-10 DIAGNOSIS — Z86.000 HISTORY OF LOBULAR CARCINOMA IN SITU (LCIS) OF BREAST: ICD-10-CM

## 2021-11-10 DIAGNOSIS — Z91.89 INCREASED RISK OF BREAST CANCER: Primary | ICD-10-CM

## 2021-11-10 DIAGNOSIS — N63.42 UNSPECIFIED LUMP IN LEFT BREAST, SUBAREOLAR: ICD-10-CM

## 2021-11-10 DIAGNOSIS — Z12.31 VISIT FOR SCREENING MAMMOGRAM: ICD-10-CM

## 2021-11-10 PROCEDURE — 99213 OFFICE O/P EST LOW 20 MIN: CPT | Performed by: NURSE PRACTITIONER

## 2021-11-30 ENCOUNTER — HOSPITAL ENCOUNTER (OUTPATIENT)
Dept: MAMMOGRAPHY | Facility: CLINIC | Age: 66
Discharge: HOME | End: 2021-11-30
Payer: MEDICARE

## 2021-11-30 ENCOUNTER — HOSPITAL ENCOUNTER (OUTPATIENT)
Dept: ULTRASOUND IMAGING | Facility: CLINIC | Age: 66
Discharge: HOME | End: 2021-11-30
Payer: MEDICARE

## 2021-11-30 DIAGNOSIS — N63.20 LEFT BREAST LUMP: ICD-10-CM

## 2021-11-30 DIAGNOSIS — N63.42 UNSPECIFIED LUMP IN LEFT BREAST, SUBAREOLAR: ICD-10-CM

## 2021-11-30 PROCEDURE — 77065 DX MAMMO INCL CAD UNI: CPT

## 2021-11-30 PROCEDURE — 76642 ULTRASOUND BREAST LIMITED: CPT

## 2021-11-30 PROCEDURE — G0279 TOMOSYNTHESIS, MAMMO: HCPCS

## 2021-12-21 ENCOUNTER — HOSPITAL ENCOUNTER (OUTPATIENT)
Dept: RADIOLOGY | Age: 66
Discharge: HOME | End: 2021-12-21
Payer: MEDICARE

## 2021-12-21 DIAGNOSIS — Z13.820 SCREENING FOR OSTEOPOROSIS: ICD-10-CM

## 2021-12-21 DIAGNOSIS — Z78.0 MENOPAUSE: ICD-10-CM

## 2021-12-21 PROCEDURE — 77080 DXA BONE DENSITY AXIAL: CPT

## 2022-02-22 ENCOUNTER — APPOINTMENT (OUTPATIENT)
Dept: LAB | Facility: CLINIC | Age: 67
End: 2022-02-22
Payer: MEDICARE

## 2022-02-22 ENCOUNTER — HOSPITAL ENCOUNTER (OUTPATIENT)
Dept: ULTRASOUND IMAGING | Facility: HOSPITAL | Age: 67
Discharge: HOME | End: 2022-02-22
Payer: MEDICARE

## 2022-02-22 DIAGNOSIS — E78.5 DYSLIPIDEMIA: ICD-10-CM

## 2022-02-22 DIAGNOSIS — K76.0 FATTY LIVER: ICD-10-CM

## 2022-02-22 DIAGNOSIS — R79.89 ELEVATED LFTS: ICD-10-CM

## 2022-02-22 DIAGNOSIS — R71.8 ELEVATED HEMATOCRIT: ICD-10-CM

## 2022-02-22 LAB
ALBUMIN SERPL BCP-MCNC: 3.8 G/DL (ref 3.5–5)
ALP SERPL-CCNC: 96 U/L (ref 46–116)
ALT SERPL W P-5'-P-CCNC: 82 U/L (ref 12–78)
ANION GAP SERPL CALCULATED.3IONS-SCNC: 7 MMOL/L (ref 4–13)
AST SERPL W P-5'-P-CCNC: 88 U/L (ref 5–45)
BASOPHILS # BLD AUTO: 0.05 THOUSANDS/ΜL (ref 0–0.1)
BASOPHILS NFR BLD AUTO: 1 % (ref 0–1)
BILIRUB SERPL-MCNC: 0.53 MG/DL (ref 0.2–1)
BUN SERPL-MCNC: 14 MG/DL (ref 5–25)
CALCIUM SERPL-MCNC: 9.8 MG/DL (ref 8.3–10.1)
CHLORIDE SERPL-SCNC: 106 MMOL/L (ref 100–108)
CHOLEST SERPL-MCNC: 194 MG/DL
CO2 SERPL-SCNC: 27 MMOL/L (ref 21–32)
CREAT SERPL-MCNC: 1.01 MG/DL (ref 0.6–1.3)
EOSINOPHIL # BLD AUTO: 0.19 THOUSAND/ΜL (ref 0–0.61)
EOSINOPHIL NFR BLD AUTO: 3 % (ref 0–6)
ERYTHROCYTE [DISTWIDTH] IN BLOOD BY AUTOMATED COUNT: 14 % (ref 11.6–15.1)
GFR SERPL CREATININE-BSD FRML MDRD: 57 ML/MIN/1.73SQ M
GLUCOSE P FAST SERPL-MCNC: 106 MG/DL (ref 65–99)
HCT VFR BLD AUTO: 45.1 % (ref 34.8–46.1)
HDLC SERPL-MCNC: 42 MG/DL
HGB BLD-MCNC: 15 G/DL (ref 11.5–15.4)
IMM GRANULOCYTES # BLD AUTO: 0.02 THOUSAND/UL (ref 0–0.2)
IMM GRANULOCYTES NFR BLD AUTO: 0 % (ref 0–2)
LDLC SERPL CALC-MCNC: 129 MG/DL (ref 0–100)
LYMPHOCYTES # BLD AUTO: 1.86 THOUSANDS/ΜL (ref 0.6–4.47)
LYMPHOCYTES NFR BLD AUTO: 30 % (ref 14–44)
MCH RBC QN AUTO: 28.9 PG (ref 26.8–34.3)
MCHC RBC AUTO-ENTMCNC: 33.3 G/DL (ref 31.4–37.4)
MCV RBC AUTO: 87 FL (ref 82–98)
MONOCYTES # BLD AUTO: 0.4 THOUSAND/ΜL (ref 0.17–1.22)
MONOCYTES NFR BLD AUTO: 6 % (ref 4–12)
NEUTROPHILS # BLD AUTO: 3.72 THOUSANDS/ΜL (ref 1.85–7.62)
NEUTS SEG NFR BLD AUTO: 60 % (ref 43–75)
NONHDLC SERPL-MCNC: 152 MG/DL
NRBC BLD AUTO-RTO: 0 /100 WBCS
PLATELET # BLD AUTO: 237 THOUSANDS/UL (ref 149–390)
PMV BLD AUTO: 10.9 FL (ref 8.9–12.7)
POTASSIUM SERPL-SCNC: 4.1 MMOL/L (ref 3.5–5.3)
PROT SERPL-MCNC: 7.9 G/DL (ref 6.4–8.2)
RBC # BLD AUTO: 5.19 MILLION/UL (ref 3.81–5.12)
SODIUM SERPL-SCNC: 140 MMOL/L (ref 136–145)
TRIGL SERPL-MCNC: 113 MG/DL
WBC # BLD AUTO: 6.24 THOUSAND/UL (ref 4.31–10.16)

## 2022-02-22 PROCEDURE — 36415 COLL VENOUS BLD VENIPUNCTURE: CPT

## 2022-02-22 PROCEDURE — 76700 US EXAM ABDOM COMPLETE: CPT

## 2022-02-22 PROCEDURE — 80053 COMPREHEN METABOLIC PANEL: CPT

## 2022-02-22 PROCEDURE — 80061 LIPID PANEL: CPT

## 2022-02-22 PROCEDURE — 85025 COMPLETE CBC W/AUTO DIFF WBC: CPT

## 2022-02-23 ENCOUNTER — RA CDI HCC (OUTPATIENT)
Dept: OTHER | Facility: HOSPITAL | Age: 67
End: 2022-02-23

## 2022-02-23 NOTE — PROGRESS NOTES
[facility]  coding opportunities       Chart reviewed, no opportunity found: CHART REVIEWED, NO OPPORTUNITY FOUND                        Patients insurance company: [employer]

## 2022-02-24 ENCOUNTER — TELEPHONE (OUTPATIENT)
Dept: FAMILY MEDICINE CLINIC | Facility: CLINIC | Age: 67
End: 2022-02-24

## 2022-02-24 NOTE — TELEPHONE ENCOUNTER
[facility] (Phone: [phone]) states there are significant findings in Epic for patient's 2/22/22 Ultrasound

## 2022-02-25 DIAGNOSIS — R16.2 HEPATOSPLENOMEGALY: ICD-10-CM

## 2022-02-25 DIAGNOSIS — R79.89 ELEVATED LFTS: Primary | ICD-10-CM

## 2022-02-25 DIAGNOSIS — K82.4 GALLBLADDER POLYP: ICD-10-CM

## 2022-02-28 NOTE — TELEPHONE ENCOUNTER
Pt called back let her know PCP's recommendation  Pt scheduled appointment already with gastroenterology and will be here at the office on 03/02/22

## 2022-03-02 ENCOUNTER — OFFICE VISIT (OUTPATIENT)
Dept: FAMILY MEDICINE CLINIC | Facility: CLINIC | Age: 67
End: 2022-03-02
Payer: MEDICARE

## 2022-03-02 VITALS
BODY MASS INDEX: 34.8 KG/M2 | HEART RATE: 100 BPM | TEMPERATURE: 99.2 F | HEIGHT: 69 IN | WEIGHT: 235 LBS | RESPIRATION RATE: 16 BRPM | DIASTOLIC BLOOD PRESSURE: 78 MMHG | SYSTOLIC BLOOD PRESSURE: 120 MMHG

## 2022-03-02 DIAGNOSIS — R79.89 ELEVATED LFTS: ICD-10-CM

## 2022-03-02 DIAGNOSIS — F33.41 RECURRENT MAJOR DEPRESSIVE DISORDER, IN PARTIAL REMISSION (HCC): ICD-10-CM

## 2022-03-02 DIAGNOSIS — Z00.00 MEDICARE ANNUAL WELLNESS VISIT, SUBSEQUENT: ICD-10-CM

## 2022-03-02 DIAGNOSIS — Z86.000 HISTORY OF LOBULAR CARCINOMA IN SITU (LCIS) OF BREAST: ICD-10-CM

## 2022-03-02 DIAGNOSIS — E78.5 DYSLIPIDEMIA: Primary | ICD-10-CM

## 2022-03-02 DIAGNOSIS — K82.4 GALLBLADDER POLYP: ICD-10-CM

## 2022-03-02 DIAGNOSIS — K76.0 FATTY LIVER: ICD-10-CM

## 2022-03-02 DIAGNOSIS — R16.2 HEPATOSPLENOMEGALY: ICD-10-CM

## 2022-03-02 DIAGNOSIS — J45.20 MILD INTERMITTENT ASTHMA WITHOUT COMPLICATION: ICD-10-CM

## 2022-03-02 DIAGNOSIS — R71.8 ELEVATED HEMATOCRIT: ICD-10-CM

## 2022-03-02 DIAGNOSIS — E66.9 OBESITY (BMI 30.0-34.9): ICD-10-CM

## 2022-03-02 PROBLEM — L91.8 CUTANEOUS SKIN TAGS: Status: ACTIVE | Noted: 2022-03-02

## 2022-03-02 PROCEDURE — G0439 PPPS, SUBSEQ VISIT: HCPCS | Performed by: FAMILY MEDICINE

## 2022-03-02 PROCEDURE — 1123F ACP DISCUSS/DSCN MKR DOCD: CPT | Performed by: FAMILY MEDICINE

## 2022-03-02 PROCEDURE — 99214 OFFICE O/P EST MOD 30 MIN: CPT | Performed by: FAMILY MEDICINE

## 2022-03-02 RX ORDER — ALBUTEROL SULFATE 90 UG/1
2 AEROSOL, METERED RESPIRATORY (INHALATION) EVERY 4 HOURS PRN
Qty: 18 G | Refills: 5 | Status: SHIPPED | OUTPATIENT
Start: 2022-03-02

## 2022-03-02 NOTE — PROGRESS NOTES
Chief Complaint   Patient presents with   [facility]     Health Maintenance   Topic Date Due    Cervical Cancer Screening  11/17/2020    Fall Risk  02/18/2022    Medicare Annual Wellness Visit (AWV)  02/18/2022    BMI: Followup Plan  02/18/2022    BMI: Adult  11/10/2022    Colorectal Cancer Screening  01/03/2023    Depression Remission PHQ  03/02/2023    DTaP,Tdap,and Td Vaccines (2 - Td or Tdap) 06/07/2023    Breast Cancer Screening: Mammogram  11/30/2023    Pneumococcal Vaccine: 65+ Years (2 of 2 - PPSV23) 02/19/2025    Hepatitis C Screening  Completed    Osteoporosis Screening  Completed    Influenza Vaccine  Completed    COVID-19 Vaccine  Completed    HIB Vaccine  Aged Out    Hepatitis B Vaccine  Aged Out    IPV Vaccine  Aged Out    Hepatitis A Vaccine  Aged Out    Meningococcal ACWY Vaccine  Aged Out    HPV Vaccine  Aged Out        Assessment and Plan:     Problem List Items Addressed This Visit        Digestive    Fatty liver    Hepatosplenomegaly    Gallbladder polyp       Other    Recurrent major depressive disorder, in partial remission (HCC)    Dyslipidemia - Primary    Elevated LFTs    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    Obesity (BMI 30 0-34  9)    Medicare annual wellness visit, subsequent    Elevated hematocrit        BMI Counseling: Body mass index is 34 7 kg/m²  The BMI is above normal  Nutrition recommendations include decreasing portion sizes, encouraging healthy choices of fruits and vegetables, decreasing fast food intake, consuming healthier snacks, limiting drinks that contain sugar, moderation in carbohydrate intake, increasing intake of lean protein, reducing intake of saturated and trans fat and reducing intake of cholesterol  Exercise recommendations include exercising 3-5 times per week  No pharmacotherapy was ordered  Rationale for BMI follow-up plan is due to patient being overweight or obese         Preventive health issues were discussed with patient, and age appropriate screening tests were ordered as noted in patient's After Visit Summary  Personalized health advice and appropriate referrals for health education or preventive services given if needed, as noted in patient's After Visit Summary  History of Present Illness:     Patient presents for Medicare Annual Wellness visit    Patient Care Team:  Gaynelle Sever, MD as PCP - General  Nancy Otero MD as Surgeon (Surgical Oncology)  Gerardo Paz as Nurse Practitioner (Surgical Oncology)     Problem List:     Patient Active Problem List   Diagnosis    Family history of malignant neoplasm of uterus    Family history of malignant neoplasm of gastrointestinal tract    Family history of malignant neoplasm of breast    Recurrent major depressive disorder, in partial remission ([facility] )    Dyslipidemia    Elevated LFTs    Esophageal reflux    Left thyroid nodule    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    Mild intermittent asthma without complication    Obesity (BMI 30 0-34  9)    Rosacea    Increased risk of breast cancer    Abnormal MRI, breast    Hand eczema    Chronic insomnia    Medicare annual wellness visit, subsequent    Fatty liver    Abnormal EKG    Anxiety associated with depression    Elevated hematocrit    Hepatosplenomegaly    Gallbladder polyp      Past Medical and Surgical History:     Past Medical History:   Diagnosis Date    Allergic rhinitis     last assessed-10/2/2017    Asthma     Carotid bruit     R-last assessed-6/10/2014    Dysphagia     last assessed-2013    GERD (gastroesophageal reflux disease)      Past Surgical History:   Procedure Laterality Date    BREAST BIOPSY  2017    percutaneous needle core    BREAST LUMPECTOMY Right 2017    LCIS     SECTION  1984    COLONOSCOPY      complete-12/3/2012-internal/external hemorrhoids, perianal or excoriation, mild sigmoid diverticulosis    MAMMO NEEDLE LOCALIZATION RIGHT (ALL INC) Right 2017    MAMMO NEEDLE LOCALIZATION RIGHT (ALL INC) EACH ADD Right 2017    MAMMO STEREOTACTIC BREAST BIOPSY RIGHT (ALL INC) Right 11/15/2017    MAMMO STEREOTACTIC BREAST BIOPSY RIGHT (ALL INC) EACH ADD Right 11/15/2017    MRI BREAST BIOPSY LEFT (ALL INCLUSIVE) Left 2019    NE PERQ DEVICE PLACEMT BREAST LOC 1ST LES W GUIDNCE Right 2017    Procedure: BREAST LUMPECTOMY; BREAST BRACKETED NEEDLE LOCALIZATION (BRACKETED NEEDLE LOC AT 0900);   Surgeon: Devora Anne MD;  [facility];  Service: Surgical Oncology    VAGINAL DILATATION      d&e      Family History:     Family History   Problem Relation Age of Onset    Heart attack Mother         acute MI    Colon polyps Mother         polyps of the sigmoid colon    Breast cancer Sister 47    Uterine cancer Sister 59    Liver cancer Brother         x2    Uterine cancer Maternal Grandmother         unknown age-maybe 63's    Uterine cancer Paternal Grandmother         unknown age , maybe 63's    Diabetes Paternal Grandfather     Stroke Maternal Uncle     Kidney cancer Paternal Aunt 54    Pancreatic cancer Paternal Uncle     No Known Problems Daughter     No Known Problems Daughter     No Known Problems Son     No Known Problems Brother     No Known Problems Brother     No Known Problems Brother     No Known Problems Brother     No Known Problems Brother     Breast cancer Cousin 62    Uterine cancer Cousin 54    Colon cancer Cousin 61      Social History:     Social History     Socioeconomic History    Marital status: /Civil Union     Spouse name: None    Number of children: None    Years of education: None    Highest education level: None   Occupational History    None   Tobacco Use    Smoking status: Former Smoker     Years: 2 00     Types: Cigarettes     Quit date: 1976     Years since quittin 8    Smokeless tobacco: Never Used Substance and Sexual Activity    Alcohol use: Yes     Alcohol/week: 1 0 standard drink     Types: 1 Glasses of wine per week     Comment: per allscripts, social drinker    Drug use: No    Sexual activity: None   Other Topics Concern    None   Social History Narrative    None     Social Determinants of Health     Financial Resource Strain: Not on file   Food Insecurity: Not on file   Transportation Needs: Not on file   Physical Activity: Not on file   Stress: Not on file   Social Connections: Not on file   Intimate Partner Violence: Not on file   Housing Stability: Not on file      Medications and Allergies:     Current Outpatient Medications   Medication Sig Dispense Refill    aspirin 81 MG tablet Take 81 mg by mouth daily      calcium citrate (CALCITRATE) 950 MG tablet Take 1 tablet by mouth daily      fluocinonide (LIDEX) 0 05 % cream Apply topically 2 (two) times a day 30 g 1    Multiple Vitamin (MULTI-VITAMIN DAILY PO) Take by mouth      PARoxetine (PAXIL) 20 mg tablet TAKE 1 TABLET BY MOUTH DAILY 90 tablet 3     No current facility-administered medications for this visit       No Known Allergies   Immunizations:     Immunization History   Administered Date(s) Administered    COVID-19 PFIZER VACCINE 0 3 ML IM 02/17/2021, 03/09/2021, 10/23/2021    INFLUENZA 01/07/2013, 10/14/2013, 10/18/2016, 10/02/2017    Influenza Quadrivalent, 6-35 Months IM 10/16/2015, 10/18/2016, 10/02/2017    Influenza, high dose seasonal 0 7 mL 10/08/2019, 10/09/2020, 09/29/2021    Influenza, recombinant, quadrivalent,injectable, preservative free 10/02/2018    Influenza, seasonal, injectable 01/07/2013, 10/14/2013, 10/02/2014    Pneumococcal Conjugate 13-Valent 02/19/2020    Pneumococcal Polysaccharide PPV23 06/10/2014, 01/01/2016    Tdap 06/07/2013    Varicella 01/01/2016    Zoster 07/08/2014      Health Maintenance:         Topic Date Due    Cervical Cancer Screening  11/17/2020    Colorectal Cancer Screening 01/03/2023    Breast Cancer Screening: Mammogram  11/30/2023    Hepatitis C Screening  Completed     There are no preventive care reminders to display for this patient  Medicare Health Risk Assessment:     /78   Pulse 100   Temp 99 2 °F (37 3 °C) (Tympanic)   Resp 16   Ht 5' 9" (1 753 m)   Wt 107 kg (235 lb)   BMI 34 70 kg/m²      [place] is here for her Subsequent Wellness visit  Health Risk Assessment:   Patient rates overall health as good  Patient feels that their physical health rating is same  Patient is satisfied with their life  Eyesight was rated as same  Hearing was rated as same  Patient feels that their emotional and mental health rating is same  Patients states they are never, rarely angry  Patient states they are never, rarely unusually tired/fatigued  Pain experienced in the last 7 days has been none  Depression Screening:   PHQ-9 Score: 9      Fall Risk Screening: In the past year, patient has experienced: no history of falling in past year      Urinary Incontinence Screening:   Patient has leaked urine accidently in the last six months  Home Safety:  Patient does not have trouble with stairs inside or outside of their home  Patient has working smoke alarms and has working carbon monoxide detector  Home safety hazards include: none  Nutrition:   Current diet is Regular  Medications:   Patient is currently taking over-the-counter supplements  OTC medications include: see medication list  Patient is able to manage medications  Activities of Daily Living (ADLs)/Instrumental Activities of Daily Living (IADLs):   Walk and transfer into and out of bed and chair?: Yes  Dress and groom yourself?: Yes    Bathe or shower yourself?: Yes    Feed yourself?  Yes  Do your laundry/housekeeping?: Yes  Manage your money, pay your bills and track your expenses?: Yes  Make your own meals?: Yes    Do your own shopping?: Yes    Previous Hospitalizations:   Any hospitalizations or ED visits within the last 12 months?: No      Advance Care Planning:   Living will: No    Durable POA for healthcare: No    Advanced directive: No    Advanced directive counseling given: Yes      Cognitive Screening:   Provider or family/friend/caregiver concerned regarding cognition?: No    PREVENTIVE SCREENINGS      Cardiovascular Screening:    General: Screening Current      Diabetes Screening:     General: Screening Current      Colorectal Cancer Screening:     General: Screening Current      Breast Cancer Screening:     General: Screening Current      Cervical Cancer Screening:    General: Screening Not Indicated      Osteoporosis Screening:    General: Risks and Benefits Discussed      Abdominal Aortic Aneurysm (AAA) Screening:        General: Screening Not Indicated      Lung Cancer Screening:     General: Screening Not Indicated      Hepatitis C Screening:    General: Screening Current    Screening, Brief Intervention, and Referral to Treatment (SBIRT)    Screening      AUDIT-C Screenin) How often did you have a drink containing alcohol in the past year? 2 to 4 times a month  2) How many drinks did you have on a typical day when you were drinking in the past year? 1 to 2  3) How often did you have 6 or more drinks on one occasion in the past year? never    AUDIT-C Score: 2  Interpretation: Score 0-2 (female): Negative screen for alcohol misuse    Single Item Drug Screening:  How often have you used an illegal drug (including marijuana) or a prescription medication for non-medical reasons in the past year? never    Single Item Drug Screen Score: 0  Interpretation: Negative screen for possible drug use disorder    Other Counseling Topics:   Car/seat belt/driving safety, skin self-exam and calcium and vitamin D intake and regular weightbearing exercise         Ralph Hickey MD

## 2022-03-02 NOTE — PROGRESS NOTES
Assessment/Plan:    Dyslipidemia  Lipid panel improved  Recommended to follow a low-cholesterol, low-fat diet, regular exercise  Elevated LFTs  Liver enzymes remain elevated, likely secondary to hepatic steatosis  Appointment scheduled with gastroenterology on 3/15/22  Hepatosplenomegaly  U/S abdomen done last month showed hepatosplenomegaly  No prior history of hematological or liver disease  Refer to gastroenterology for evaluation  Fatty liver  Recommended to follow a low-fat diet, regular exercise, lose weight  Follow-up with gastroenterology Dr Tyson Scheuermann on 3/15/22  Obesity (BMI 30 0-34  9)  Encouraged patient to work on weight reduction  Recurrent major depressive disorder, in partial remission (HCC)  Symptoms are stable  Continue Paxil 20 mg daily  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  Follow-up with surgical oncology Dr Quentin Hargrove  Mild intermittent asthma without complication   Symptoms are stable  No recent asthma exacerbation  Refilled prescription for Ventolin HFA inhaler for PRN use  Elevated hematocrit  Hct 45 1 ( was 46 5 in 8/21)  Continue aspirin 81 mg daily  Encouraged patient to stay well hydrated  Will continue to monitor labs  Recheck CBC with dif in 6 months  Schedule follow-up office visit in 6 months  Check labs prior to next visit  Diagnoses and all orders for this visit:    Dyslipidemia  -     Lipid panel; Future    Elevated LFTs  -     Comprehensive metabolic panel; Future    Hepatosplenomegaly    Fatty liver  -     Comprehensive metabolic panel; Future    Gallbladder polyp    Obesity (BMI 30 0-34  9)    Recurrent major depressive disorder, in partial remission ([facility] [address] )    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    Medicare annual wellness visit, subsequent    Elevated hematocrit  -     CBC and differential; Future    Mild intermittent asthma without complication  -     albuterol (Ventolin HFA) 90 mcg/act inhaler; Inhale 2 puffs every 4 (four) hours as needed for wheezing          Subjective:      Patient ID: Randy Nuñez is a 78 y o  female  HPI     Patient is a very pleasant 25-year-old female with PMHx of Dyslipidemia, Asthma, Obesity, elevated LFT's, fatty liver, GERD, Depression, Anxiety, Insomnia, H/o right breast CA  Reviewed current medications, blood test results from 2/22/22  Cholesterol 194, triglycerides 00203, HDL 42,   Fasting blood sugar 106, potassium 4 1, creatinine 1 01  Hematocrit 45 1, RBC 5 19 thousands,  Hb 15  AST 88, ALT 82  Mild intermittent asthma- symptoms are stable  Patient reports no recent asthma exacerbation  Requesting Rx for Ventolin inhaler for PRN use  Patient has allergies to cats,  pollens  Takes Claritin 10 mg daily PRN during allergy seasons  U/S abdomen done on 2/22/22 showed hepatosplenomegaly, hepatic moderate steatosis, 7 mm gallbladder polyp  Patient was referred to Gastroenterology for further evaluation  Appointment scheduled with Dr Scott Haines on March 15, 2022  R breast CA diagnosed in 2017, S/P right breast lumpectomy  Patient has been followed by surgical oncologist Dr Trinity Lazo annually, was seen by surgical oncology CRNP in 11/2021  She is getting mammogram and MRI of the breast annually now  Colonoscopy performed by colorectal surgeon Dr Dede Almazan in January 2020, 2 polyps were removed  Dr Dede Almazan recommended to repeat colonoscopy in 3 years  Patient received COVID booster in October 2021  The following portions of the patient's history were reviewed and updated as appropriate: allergies, past family history, past medical history, past social history, past surgical history and problem list     Review of Systems   Constitutional: Positive for fatigue (mild)  Negative for activity change, appetite change, chills and fever     HENT: Negative for congestion, ear pain, nosebleeds, sore throat and trouble swallowing  Eyes: Negative  Respiratory: Negative for cough, chest tightness, shortness of breath and wheezing  Cardiovascular: Negative for chest pain, palpitations and leg swelling  Gastrointestinal: Negative for abdominal pain, blood in stool, constipation, diarrhea, nausea and vomiting  Genitourinary: Negative for difficulty urinating, dysuria, flank pain, frequency and hematuria  Musculoskeletal: Negative for arthralgias, back pain and joint swelling  Skin: Negative for rash  Skin tags in right axillary area   Neurological: Negative for dizziness, syncope and headaches  Hematological: Negative  Psychiatric/Behavioral: Positive for sleep disturbance  Negative for suicidal ideas  Anxiety / Depression - mood has been stable         Objective:      /78   Pulse 100   Temp 99 2 °F (37 3 °C) (Tympanic)   Resp 16   Ht 5' 9" (1 753 m)   Wt 107 kg (235 lb)   BMI 34 70 kg/m²     /78  Physical Exam  Vitals and nursing note reviewed  Constitutional:       Appearance: Normal appearance  She is obese  HENT:      Head: Normocephalic and atraumatic  Eyes:      Conjunctiva/sclera: Conjunctivae normal       Pupils: Pupils are equal, round, and reactive to light  Neck:      Vascular: No carotid bruit  Cardiovascular:      Rate and Rhythm: Normal rate and regular rhythm  Heart sounds: No murmur heard  Pulmonary:      Effort: Pulmonary effort is normal       Breath sounds: Normal breath sounds  Abdominal:      General: Bowel sounds are normal  There is no distension  Palpations: Abdomen is soft  Tenderness: There is no abdominal tenderness  Musculoskeletal:         General: No swelling, tenderness or deformity  Normal range of motion  Cervical back: Normal range of motion and neck supple  Right lower leg: No edema  Left lower leg: No edema  Skin:     General: Skin is warm and dry  Findings: No rash     Neurological: Mental Status: She is alert     Psychiatric:         Mood and Affect: Mood normal

## 2022-03-02 NOTE — ASSESSMENT & PLAN NOTE
Recommended to follow a low-fat diet, regular exercise, lose weight  Follow-up with gastroenterology Dr Janna Duran on 3/15/22

## 2022-03-02 NOTE — ASSESSMENT & PLAN NOTE
U/S abdomen done last month showed hepatosplenomegaly  No prior history of hematological or liver disease  Refer to gastroenterology for evaluation

## 2022-03-02 NOTE — ASSESSMENT & PLAN NOTE
Symptoms are stable  No recent asthma exacerbation  Refilled prescription for Ventolin HFA inhaler for PRN use

## 2022-03-02 NOTE — ASSESSMENT & PLAN NOTE
Hct 45 1 ( was 46 5 in 8/21)  Continue aspirin 81 mg daily  Encouraged patient to stay well hydrated  Will continue to monitor labs  Recheck CBC with dif in 6 months

## 2022-03-02 NOTE — PATIENT INSTRUCTIONS
Medicare Preventive Visit Patient Instructions  Thank you for completing your Welcome to Medicare Visit or Medicare Annual Wellness Visit today  Your next wellness visit will be due in one year (3/3/2023)  The screening/preventive services that you may require over the next 5-10 years are detailed below  Some tests may not apply to you based off risk factors and/or age  Screening tests ordered at today's visit but not completed yet may show as past due  Also, please note that scanned in results may not display below  Preventive Screenings:  Service Recommendations Previous Testing/Comments   Colorectal Cancer Screening  * Colonoscopy    * Fecal Occult Blood Test (FOBT)/Fecal Immunochemical Test (FIT)  * Fecal DNA/Cologuard Test  * Flexible Sigmoidoscopy Age: 54-65 years old   Colonoscopy: every 10 years (may be performed more frequently if at higher risk)  OR  FOBT/FIT: every 1 year  OR  Cologuard: every 3 years  OR  Sigmoidoscopy: every 5 years  Screening may be recommended earlier than age 48 if at higher risk for colorectal cancer  Also, an individualized decision between you and your healthcare provider will decide whether screening between the ages of 74-80 would be appropriate  Colonoscopy: 01/03/2020  FOBT/FIT: Not on file  Cologuard: Not on file  Sigmoidoscopy: Not on file          Breast Cancer Screening Age: 36 years old  Frequency: every 1-2 years  Not required if history of left and right mastectomy Mammogram: 11/30/2021        Cervical Cancer Screening Between the ages of 21-29, pap smear recommended once every 3 years  Between the ages of 33-67, can perform pap smear with HPV co-testing every 5 years     Recommendations may differ for women with a history of total hysterectomy, cervical cancer, or abnormal pap smears in past  Pap Smear: 11/17/2017        Hepatitis C Screening Once for adults born between 1945 and 1965  More frequently in patients at high risk for Hepatitis C Hep C Antibody: 08/20/2019        Diabetes Screening 1-2 times per year if you're at risk for diabetes or have pre-diabetes Fasting glucose: 106 mg/dL   A1C: No results in last 5 years        Cholesterol Screening Once every 5 years if you don't have a lipid disorder  May order more often based on risk factors  Lipid panel: 02/22/2022          Other Preventive Screenings Covered by Medicare:  1  Abdominal Aortic Aneurysm (AAA) Screening: covered once if your at risk  You're considered to be at risk if you have a family history of AAA  2  Lung Cancer Screening: covers low dose CT scan once per year if you meet all of the following conditions: (1) Age 50-69; (2) No signs or symptoms of lung cancer; (3) Current smoker or have quit smoking within the last 15 years; (4) You have a tobacco smoking history of at least 30 pack years (packs per day multiplied by number of years you smoked); (5) You get a written order from a healthcare provider  3  Glaucoma Screening: covered annually if you're considered high risk: (1) You have diabetes OR (2) Family history of glaucoma OR (3)  aged 48 and older OR (3)  American aged 72 and older  3  Osteoporosis Screening: covered every 2 years if you meet one of the following conditions: (1) You're estrogen deficient and at risk for osteoporosis based off medical history and other findings; (2) Have a vertebral abnormality; (3) On glucocorticoid therapy for more than 3 months; (4) Have primary hyperparathyroidism; (5) On osteoporosis medications and need to assess response to drug therapy  · Last bone density test (DXA Scan): 12/21/2021   5  HIV Screening: covered annually if you're between the age of 15-65  Also covered annually if you are younger than 13 and older than 72 with risk factors for HIV infection  For pregnant patients, it is covered up to 3 times per pregnancy      Immunizations:  Immunization Recommendations   Influenza Vaccine Annual influenza vaccination during flu season is recommended for all persons aged >= 6 months who do not have contraindications   Pneumococcal Vaccine (Prevnar and Pneumovax)  * Prevnar = PCV13  * Pneumovax = PPSV23   Adults 25-60 years old: 1-3 doses may be recommended based on certain risk factors  Adults 72 years old: Prevnar (PCV13) vaccine recommended followed by Pneumovax (PPSV23) vaccine  If already received PPSV23 since turning 65, then PCV13 recommended at least one year after PPSV23 dose  Hepatitis B Vaccine 3 dose series if at intermediate or high risk (ex: diabetes, end stage renal disease, liver disease)   Tetanus (Td) Vaccine - COST NOT COVERED BY MEDICARE PART B Following completion of primary series, a booster dose should be given every 10 years to maintain immunity against tetanus  Td may also be given as tetanus wound prophylaxis  Tdap Vaccine - COST NOT COVERED BY MEDICARE PART B Recommended at least once for all adults  For pregnant patients, recommended with each pregnancy  Shingles Vaccine (Shingrix) - COST NOT COVERED BY MEDICARE PART B  2 shot series recommended in those aged 48 and above     Health Maintenance Due:      Topic Date Due    Cervical Cancer Screening  11/17/2020    Colorectal Cancer Screening  01/03/2023    Breast Cancer Screening: Mammogram  11/30/2023    Hepatitis C Screening  Completed     Immunizations Due:  There are no preventive care reminders to display for this patient  Advance Directives   What are advance directives? Advance directives are legal documents that state your wishes and plans for medical care  These plans are made ahead of time in case you lose your ability to make decisions for yourself  Advance directives can apply to any medical decision, such as the treatments you want, and if you want to donate organs  What are the types of advance directives? There are many types of advance directives, and each state has rules about how to use them   You may choose a combination of any of the following:  · Living will: This is a written record of the treatment you want  You can also choose which treatments you do not want, which to limit, and which to stop at a certain time  This includes surgery, medicine, IV fluid, and tube feedings  · Durable power of  for healthcare [facility]): This is a written record that states who you want to make healthcare choices for you when you are unable to make them for yourself  This person, called a proxy, is usually a family member or a friend  You may choose more than 1 proxy  · Do not resuscitate (DNR) order:  A DNR order is used in case your heart stops beating or you stop breathing  It is a request not to have certain forms of treatment, such as CPR  A DNR order may be included in other types of advance directives  · Medical directive: This covers the care that you want if you are in a coma, near death, or unable to make decisions for yourself  You can list the treatments you want for each condition  Treatment may include pain medicine, surgery, blood transfusions, dialysis, IV or tube feedings, and a ventilator (breathing machine)  · Values history: This document has questions about your views, beliefs, and how you feel and think about life  This information can help others choose the care that you would choose  Why are advance directives important? An advance directive helps you control your care  Although spoken wishes may be used, it is better to have your wishes written down  Spoken wishes can be misunderstood, or not followed  Treatments may be given even if you do not want them  An advance directive may make it easier for your family to make difficult choices about your care  Weight Management   Why it is important to manage your weight:  Being overweight increases your risk of health conditions such as heart disease, high blood pressure, type 2 diabetes, and certain types of cancer   It can also increase your risk for osteoarthritis, sleep apnea, and other respiratory problems  Aim for a slow, steady weight loss  Even a small amount of weight loss can lower your risk of health problems  How to lose weight safely:  A safe and healthy way to lose weight is to eat fewer calories and get regular exercise  You can lose up about 1 pound a week by decreasing the number of calories you eat by 500 calories each day  Healthy meal plan for weight management:  A healthy meal plan includes a variety of foods, contains fewer calories, and helps you stay healthy  A healthy meal plan includes the following:  · Eat whole-grain foods more often  A healthy meal plan should contain fiber  Fiber is the part of grains, fruits, and vegetables that is not broken down by your body  Whole-grain foods are healthy and provide extra fiber in your diet  Some examples of whole-grain foods are whole-wheat breads and pastas, oatmeal, brown rice, and bulgur  · Eat a variety of vegetables every day  Include dark, leafy greens such as spinach, kale, bryce greens, and mustard greens  Eat yellow and orange vegetables such as carrots, sweet potatoes, and winter squash  · Eat a variety of fruits every day  Choose fresh or canned fruit (canned in its own juice or light syrup) instead of juice  Fruit juice has very little or no fiber  · Eat low-fat dairy foods  Drink fat-free (skim) milk or 1% milk  Eat fat-free yogurt and low-fat cottage cheese  Try low-fat cheeses such as mozzarella and other reduced-fat cheeses  · Choose meat and other protein foods that are low in fat  Choose beans or other legumes such as split peas or lentils  Choose fish, skinless poultry (chicken or turkey), or lean cuts of red meat (beef or pork)  Before you cook meat or poultry, cut off any visible fat  · Use less fat and oil  Try baking foods instead of frying them  Add less fat, such as margarine, sour cream, regular salad dressing and mayonnaise to foods  Eat fewer high-fat foods   Some examples of high-fat foods include french fries, doughnuts, ice cream, and cakes  · Eat fewer sweets  Limit foods and drinks that are high in sugar  This includes candy, cookies, regular soda, and sweetened drinks  Exercise:  Exercise at least 30 minutes per day on most days of the week  Some examples of exercise include walking, biking, dancing, and swimming  You can also fit in more physical activity by taking the stairs instead of the elevator or parking farther away from stores  Ask your healthcare provider about the best exercise plan for you  © Copyright [employer] 2018 Information is for End User's use only and may not be sold, redistributed or otherwise used for commercial purposes   All illustrations and images included in CareNotes® are the copyrighted property of [employer] M [employer]  or [address] [place], [place] [address]lilibeth

## 2022-03-02 NOTE — ASSESSMENT & PLAN NOTE
Liver enzymes remain elevated, likely secondary to hepatic steatosis  Appointment scheduled with gastroenterology on 3/15/22

## 2022-03-08 ENCOUNTER — TELEPHONE (OUTPATIENT)
Dept: GYNECOLOGIC ONCOLOGY | Facility: CLINIC | Age: 67
End: 2022-03-08

## 2022-03-10 ENCOUNTER — TELEPHONE (OUTPATIENT)
Dept: HEMATOLOGY ONCOLOGY | Facility: CLINIC | Age: 67
End: 2022-03-10

## 2022-03-10 NOTE — TELEPHONE ENCOUNTER
Appointment Cancellation Or Reschedule     Person calling in patient   Provider Jacques Cade   Office Visit Date and Time 5/10 @ 2pm   Office Visit Location [facility]   Did patient want to reschedule their office appointment? If so, when was it scheduled to? Yes 5/10 @ 2:30    Is this patient calling to reschedule an infusion appointment? no   When is their next infusion appointment? n/a   Is this patient a Chemo patient? no   Reason for Cancellation or Reschedule Provider location change     If the patient is a treatment patient, please route this to the office nurse  If the patient is not on treatment, please route to the office MA

## 2022-03-15 ENCOUNTER — CONSULT (OUTPATIENT)
Dept: GASTROENTEROLOGY | Facility: AMBULARY SURGERY CENTER | Age: 67
End: 2022-03-15
Payer: MEDICARE

## 2022-03-15 VITALS
BODY MASS INDEX: 34.98 KG/M2 | HEART RATE: 86 BPM | WEIGHT: 236.2 LBS | OXYGEN SATURATION: 100 % | HEIGHT: 69 IN | DIASTOLIC BLOOD PRESSURE: 70 MMHG | RESPIRATION RATE: 18 BRPM | SYSTOLIC BLOOD PRESSURE: 124 MMHG

## 2022-03-15 DIAGNOSIS — K82.4 GALLBLADDER POLYP: ICD-10-CM

## 2022-03-15 DIAGNOSIS — R16.2 HEPATOSPLENOMEGALY: ICD-10-CM

## 2022-03-15 DIAGNOSIS — Z80.0 FAMILY HISTORY OF COLON CANCER: ICD-10-CM

## 2022-03-15 DIAGNOSIS — Z11.59 ENCOUNTER FOR SCREENING FOR OTHER VIRAL DISEASES: ICD-10-CM

## 2022-03-15 DIAGNOSIS — R79.89 ELEVATED LFTS: ICD-10-CM

## 2022-03-15 DIAGNOSIS — D12.6 TUBULAR ADENOMA OF COLON: Primary | ICD-10-CM

## 2022-03-15 PROCEDURE — 99204 OFFICE O/P NEW MOD 45 MIN: CPT | Performed by: INTERNAL MEDICINE

## 2022-03-15 NOTE — PROGRESS NOTES
[facility] - Outpatient Consultation  Bill Delgado 78 y o  female MRN: [id number]  Encounter: [id number]      PCP: Stanley Corona MD  Referring: Stanley Corona MD  [address] [place] 1939,  [address]      ASSESSMENT AND PLAN:      1  Elevated LFTs  2  Hepatosplenomegaly  - FIB-4 2 64 pts consistent with approx fibrosis stage 2-3  - APRI 0 9 points  No evidence of cirrhosis  - clinical presentation, and mildly elevated liver enzymes since 2017, most consistent with NAFLD  - evaluate for NAQVI with elastography, define stage of fibrosis  Discussion natural course/clinical history of NAFLD, cirrhosis   Encourage weight loss 7-10%, discuss medical management with primary physician vs referral to weight loss medicine or bariatric surgery was discussed  Benefits from the [id number] encouraged  - Ambulatory Referral to Gastroenterology  - GÓMEZ Screen w/ Reflex to Titer/Pattern; Future  - Anti-smooth muscle antibody, IgG; Future  - Celiac Disease Antibody Profile; Future  - Chronic Hepatitis Panel; Future  - IgG, IgA, IgM; Future  - US elastography; Future  - Iron Panel (Includes Ferritin, Iron Sat%, Iron, and TIBC); Future    3  Gallbladder polyp  7 mm gallbladder polyp seen incidentally on ultrasound  Repeat ultrasound in six months, if gallbladder polyp greater than 1 cm would benefit from cholecystectomy  - Ambulatory Referral to Gastroenterology    4  Encounter for screening for other viral diseases   - Chronic Hepatitis Panel; Future    5  Tubular adenoma of the colon  6   Family history of colon cancer  - family history of colon cancer in her brother, diagnosed in his 46s  - last colonoscopy 2020 with two ascending tubular adenomas  - repeat colonoscopy 2023      ______________________________________________________________________    CC:  Chief Complaint   Patient presents with    Other     enlarged liver       HPI:      Patient is a 14-year-old female referred for elevated liver enzymes, hepatosplenomegaly, gallbladder polyp  She has GERD, allergic rhinitis, asthma, anxiety/depression, lobular carcinoma in Situ of the breast, BMI of 35  She has persistent mildly elevated AST/ALT since 2017, recent labs with AST/ALT in the 80s  Right upper quadrant ultrasound performed last month demonstrates hepatosplenomegaly, hepatic moderate steatosis, 7 mm gallbladder polyp  She has rare right-sided abdominal pain, and rare dysphagia, this does not affect her quality of life, nor did she have unintentional weight loss with this  She denies any signs or symptoms associated with cirrhosis  She drinks one glass of wine per week  She denies family history liver disease  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 which she had an accidental needle stick - viral hepatitis panel in 2019 is negative  Her last colonoscopy was performed in 2020 by Dr Donnia Osgood, she had two tubular adenomas of the ascending colon, and she was recommended for repeat in three years  Her brother had colon cancer in his 46s  REVIEW OF SYSTEMS:    CONSTITUTIONAL: Denies any fever, chills, rigors, and weight loss  HEENT: No earache or tinnitus  Denies hearing loss or visual disturbances  CARDIOVASCULAR: No chest pain or palpitations  RESPIRATORY: Denies any cough, hemoptysis, shortness of breath or dyspnea on exertion  GASTROINTESTINAL: As noted in the History of Present Illness  GENITOURINARY: No problems with urination  Denies any hematuria or dysuria  NEUROLOGIC: No dizziness or vertigo, denies headaches  MUSCULOSKELETAL: Denies any muscle or joint pain  SKIN: Denies skin rashes or itching  ENDOCRINE: Denies excessive thirst  Denies intolerance to heat or cold  PSYCHOSOCIAL: Denies depression or anxiety  Denies any recent memory loss         Historical Information   Past Medical History:   Diagnosis Date    Allergic rhinitis     last assessed-10/2/2017    Asthma     Carotid bruit R-last assessed-6/10/2014    Dysphagia     last assessed-2013    GERD (gastroesophageal reflux disease)      Past Surgical History:   Procedure Laterality Date    BREAST BIOPSY  2017    percutaneous needle core    BREAST LUMPECTOMY Right 2017    LCIS     SECTION  1984    COLONOSCOPY      complete-12/3/2012-internal/external hemorrhoids, perianal or excoriation, mild sigmoid diverticulosis    MAMMO NEEDLE LOCALIZATION RIGHT (ALL INC) Right 2017    MAMMO NEEDLE LOCALIZATION RIGHT (ALL INC) EACH ADD Right 2017    MAMMO STEREOTACTIC BREAST BIOPSY RIGHT (ALL INC) Right 11/15/2017    MAMMO STEREOTACTIC BREAST BIOPSY RIGHT (ALL INC) EACH ADD Right 11/15/2017    MRI BREAST BIOPSY LEFT (ALL INCLUSIVE) Left 2019    ND PERQ DEVICE PLACEMT BREAST LOC 1ST LES W GUIDNCE Right 2017    Procedure: BREAST LUMPECTOMY; BREAST BRACKETED NEEDLE LOCALIZATION (BRACKETED NEEDLE LOC AT 0900);   Surgeon: Jade Quezada MD;  [facility];  Service: Surgical Oncology    VAGINAL DILATATION      d&e     Social History   Social History     Substance and Sexual Activity   Alcohol Use Yes    Alcohol/week: 1 0 standard drink    Types: 1 Glasses of wine per week    Comment: per allscripts, social drinker     Social History     Substance and Sexual Activity   Drug Use No     Social History     Tobacco Use   Smoking Status Former Smoker    Years: 2 00    Types: Cigarettes    Quit date: 1976    Years since quittin 9   Smokeless Tobacco Never Used     Family History   Problem Relation Age of Onset    Heart attack Mother         acute MI    Colon polyps Mother         polyps of the sigmoid colon    Breast cancer Sister 47    Uterine cancer Sister 59    Liver cancer Brother         x2    Uterine cancer Maternal Grandmother         unknown age-maybe 63's    Uterine cancer Paternal Grandmother         unknown age , maybe 61's    Diabetes Paternal Grandfather     Stroke Maternal Uncle     Kidney cancer Paternal Aunt 54    Pancreatic cancer Paternal Uncle     No Known Problems Daughter     No Known Problems Daughter     No Known Problems Son     No Known Problems Brother     No Known Problems Brother     No Known Problems Brother     No Known Problems Brother     No Known Problems Brother     Breast cancer Cousin 62    Uterine cancer Cousin 54    Colon cancer Cousin 61       Meds/Allergies       Current Outpatient Medications:     albuterol (Ventolin HFA) 90 mcg/act inhaler    aspirin 81 MG tablet    calcium citrate (CALCITRATE) 950 MG tablet    fluocinonide (LIDEX) 0 05 % cream    Multiple Vitamin (MULTI-VITAMIN DAILY PO)    PARoxetine (PAXIL) 20 mg tablet    No Known Allergies        Objective     Blood pressure 124/70, pulse 86, resp  rate 18, height 5' 9" (1 753 m), weight 107 kg (236 lb 3 2 oz), SpO2 100 %  Body mass index is 34 88 kg/m²  PHYSICAL EXAM:      General Appearance:   Alert, cooperative, no distress   HEENT:   Normocephalic, atraumatic, anicteric  Neck:  Supple, symmetrical, trachea midline   Lungs:   Clear to auscultation bilaterally; no rales, rhonchi or wheezing; respirations unlabored    Heart[de-identified]   Regular rate and rhythm; no murmur, rub, or gallop     Abdomen:   Soft, non-tender, non-distended; normal bowel sounds; no masses, no organomegaly    Genitalia:   Deferred    Rectal:   Deferred    Extremities:  No cyanosis, clubbing or edema    Pulses:  2+ and symmetric    Skin:  No jaundice, rashes, or lesions    Lymph nodes:  No palpable cervical lymphadenopathy        Lab Results:     Lab Results   Component Value Date    WBC 6 24 02/22/2022    HGB 15 0 02/22/2022    HCT 45 1 02/22/2022    MCV 87 02/22/2022     02/22/2022       Lab Results   Component Value Date     10/27/2017    K 4 1 02/22/2022     02/22/2022    CO2 27 02/22/2022    BUN 14 02/22/2022    CREATININE 1 01 02/22/2022    GLUF 106 (H) 02/22/2022    CALCIUM 9 8 02/22/2022    AST 88 (H) 02/22/2022    ALT 82 (H) 02/22/2022    ALKPHOS 96 02/22/2022    PROT 7 0 10/27/2017    BILITOT 0 6 10/27/2017    EGFR 57 02/22/2022       No results found for: INR, PROTIME      Radiology Results:   US abdomen complete    Result Date: 2/24/2022  Narrative: ABDOMEN ULTRASOUND, COMPLETE INDICATION:   R79 89: Other specified abnormal findings of blood chemistry K76 0: Fatty (change of) liver, not elsewhere classified  COMPARISON:  10/3/2019 TECHNIQUE:   Real-time ultrasound of the abdomen was performed with a curvilinear transducer with both volumetric sweeps and still imaging techniques  FINDINGS: PANCREAS:  Visualized portions of the pancreas are within normal limits  AORTA AND IVC:  Visualized portions are normal for patient age  LIVER: Size:  Enlarged  The liver measures 21 1 cm in the midclavicular line  Contour:  Surface contour is smooth  Parenchyma:  Increased echogenicity, posterior acoustic attenuation, decreased periportal echogenicity  No liver mass identified  Limited imaging of the main portal vein shows it to be patent and hepatopetal  BILIARY: Nonshadowing reported nonmobile 7 mm echogenic focus No intrahepatic biliary dilatation  CBD measures 4 0 mm  No choledocholithiasis  Negative Mena's sign KIDNEY: Right kidney measures 11 0 x 4 8 x 4 0  cm  Volume 110 0 mL Kidney within normal limits  Left kidney measures 11 7 x 5 5 x 5 0 cm  Volume 169 7 mL Kidney within normal limits  SPLEEN: Measures 13 1 x 13 5 x 4 8 cm  Volume 440 8 mL Enlarged  ASCITES:  None  Impression: Hepatosplenomegaly Hepatic moderate steatosis  7 mm gallbladder polyp  According to current literature guidelines (Darwin Armando 2013;10:953-956) for polyps this size yearly ultrasound follow-up recommended to ensure no interval growth  The study was marked in EPIC for significant notification  Workstation performed: [id number]       Portions of the record may have been created with voice recognition software  Occasional wrong word or "sound a like" substitutions may have occurred due to the inherent limitations of voice recognition software  Read the chart carefully and recognize, using context, where substitutions have occurred

## 2022-03-28 ENCOUNTER — HOSPITAL ENCOUNTER (OUTPATIENT)
Dept: MAMMOGRAPHY | Facility: HOSPITAL | Age: 67
Discharge: HOME | End: 2022-03-28
Payer: MEDICARE

## 2022-03-28 VITALS — HEIGHT: 69 IN | WEIGHT: 235.89 LBS | BODY MASS INDEX: 34.94 KG/M2

## 2022-03-28 DIAGNOSIS — Z12.31 VISIT FOR SCREENING MAMMOGRAM: ICD-10-CM

## 2022-03-28 PROCEDURE — 77063 BREAST TOMOSYNTHESIS BI: CPT

## 2022-03-28 PROCEDURE — 77067 SCR MAMMO BI INCL CAD: CPT

## 2022-04-07 ENCOUNTER — ANNUAL EXAM (OUTPATIENT)
Dept: OBGYN CLINIC | Facility: CLINIC | Age: 67
End: 2022-04-07
Payer: MEDICARE

## 2022-04-07 VITALS
WEIGHT: 234 LBS | HEIGHT: 69 IN | DIASTOLIC BLOOD PRESSURE: 82 MMHG | BODY MASS INDEX: 34.66 KG/M2 | SYSTOLIC BLOOD PRESSURE: 124 MMHG

## 2022-04-07 DIAGNOSIS — Z12.4 SCREENING FOR CERVICAL CANCER: ICD-10-CM

## 2022-04-07 DIAGNOSIS — Z01.419 ENCOUNTER FOR ANNUAL ROUTINE GYNECOLOGICAL EXAMINATION: Primary | ICD-10-CM

## 2022-04-07 DIAGNOSIS — Z12.31 ENCOUNTER FOR SCREENING MAMMOGRAM FOR BREAST CANCER: ICD-10-CM

## 2022-04-07 PROCEDURE — G0101 CA SCREEN;PELVIC/BREAST EXAM: HCPCS | Performed by: OBSTETRICS & GYNECOLOGY

## 2022-04-07 NOTE — PROGRESS NOTES
Assessment/Plan:  Pap smear deferred due to low risk status  Encouraged self-breast examination as well as calcium supplementation  Continue annual mammogram and follow-up with breast team   Reviewed colon cancer screening, up-to-date with colonoscopy  She will continue to follow-up with her primary care physician as scheduled  Return to office in 2 years per Medicare recommendations/protocol  No problem-specific Assessment & Plan notes found for this encounter  Diagnoses and all orders for this visit:    Encounter for annual routine gynecological examination    Encounter for screening mammogram for breast cancer  -     Mammo screening bilateral w 3d & cad; Future    Screening for cervical cancer  -     Ambulatory referral to Obstetrics / Gynecology          Subjective:      Patient ID: Yue Barone is a 75 y o  female  HPI     This is a pleasant 76-year-old female  ( x3,  x1, age 38, 42, 39) presents for gyn exam   She went through menopause at age 45  She has never been on hormone replacement therapy  She denies any vaginal bleeding or spotting  No changes in bowel or bladder function  She has been in a monogamous relationship for over 47 years  Pap smears have been normal   Last Pap   She was diagnosed with LCIS, right breast  and continues to follow-up with her breast surgeon  Colonoscopy 2020 revealing polyp with follow-up in 3 years, due 2023  The following portions of the patient's history were reviewed and updated as appropriate: allergies, current medications, past family history, past medical history, past social history, past surgical history and problem list     Review of Systems   Constitutional: Negative for fatigue, fever and unexpected weight change  Respiratory: Negative for cough, chest tightness, shortness of breath and wheezing  Cardiovascular: Negative  Negative for chest pain and palpitations  Gastrointestinal: Negative  Negative for abdominal distention, abdominal pain, blood in stool, constipation, diarrhea, nausea and vomiting  Genitourinary: Negative  Negative for difficulty urinating, dyspareunia, dysuria, flank pain, frequency, genital sores, hematuria, pelvic pain, urgency, vaginal bleeding, vaginal discharge and vaginal pain  Skin: Negative for rash  Objective:      /82   Ht 5' 9" (1 753 m)   Wt 106 kg (234 lb)   BMI 34 56 kg/m²          Physical Exam  Constitutional:       Appearance: She is well-developed  Cardiovascular:      Rate and Rhythm: Normal rate and regular rhythm  Pulmonary:      Effort: Pulmonary effort is normal       Breath sounds: Normal breath sounds  Chest:   Breasts:      Right: No inverted nipple, mass, nipple discharge, skin change or tenderness  Left: No inverted nipple, mass, nipple discharge, skin change or tenderness  Abdominal:      General: Bowel sounds are normal  There is no distension  Palpations: Abdomen is soft  Tenderness: There is no abdominal tenderness  There is no guarding or rebound  Genitourinary:     Labia:         Right: No lesion  Left: No lesion  Vagina: Normal  No vaginal discharge  Cervix: No discharge or friability  Adnexa:         Right: No mass, tenderness or fullness  Left: No mass, tenderness or fullness  Comments: External genitalia is within normal limits  The vagina is evident of estrogen deficiency  There is no pelvic floor prolapse  Rectovaginal exam is confirmatory

## 2022-04-08 ENCOUNTER — HOSPITAL ENCOUNTER (OUTPATIENT)
Dept: ULTRASOUND IMAGING | Facility: HOSPITAL | Age: 67
Discharge: HOME | End: 2022-04-08
Attending: INTERNAL MEDICINE
Payer: MEDICARE

## 2022-04-08 ENCOUNTER — APPOINTMENT (OUTPATIENT)
Dept: LAB | Facility: CLINIC | Age: 67
End: 2022-04-08
Payer: MEDICARE

## 2022-04-08 DIAGNOSIS — Z11.59 ENCOUNTER FOR SCREENING FOR OTHER VIRAL DISEASES: ICD-10-CM

## 2022-04-08 DIAGNOSIS — R16.2 HEPATOSPLENOMEGALY: ICD-10-CM

## 2022-04-08 DIAGNOSIS — R79.89 ELEVATED LFTS: ICD-10-CM

## 2022-04-08 LAB
FERRITIN SERPL-MCNC: 86 NG/ML (ref 8–388)
HBV CORE AB SER QL: NORMAL
HBV CORE IGM SER QL: NORMAL
HBV SURFACE AG SER QL: NORMAL
HCV AB SER QL: NORMAL
IGA SERPL-MCNC: 316 MG/DL (ref 70–400)
IGG SERPL-MCNC: 1010 MG/DL (ref 700–1600)
IGM SERPL-MCNC: 94 MG/DL (ref 40–230)
IRON SATN MFR SERPL: 31 % (ref 15–50)
IRON SERPL-MCNC: 120 UG/DL (ref 50–170)
TIBC SERPL-MCNC: 385 UG/DL (ref 250–450)

## 2022-04-08 PROCEDURE — 83540 ASSAY OF IRON: CPT

## 2022-04-08 PROCEDURE — 82728 ASSAY OF FERRITIN: CPT

## 2022-04-08 PROCEDURE — 86364 TISS TRNSGLTMNASE EA IG CLAS: CPT

## 2022-04-08 PROCEDURE — 86803 HEPATITIS C AB TEST: CPT

## 2022-04-08 PROCEDURE — 86705 HEP B CORE ANTIBODY IGM: CPT

## 2022-04-08 PROCEDURE — 82784 ASSAY IGA/IGD/IGG/IGM EACH: CPT

## 2022-04-08 PROCEDURE — 86258 DGP ANTIBODY EACH IG CLASS: CPT

## 2022-04-08 PROCEDURE — 86038 ANTINUCLEAR ANTIBODIES: CPT

## 2022-04-08 PROCEDURE — 36415 COLL VENOUS BLD VENIPUNCTURE: CPT

## 2022-04-08 PROCEDURE — 86231 EMA EACH IG CLASS: CPT

## 2022-04-08 PROCEDURE — 86015 ACTIN ANTIBODY EACH: CPT

## 2022-04-08 PROCEDURE — 83550 IRON BINDING TEST: CPT

## 2022-04-08 PROCEDURE — 87340 HEPATITIS B SURFACE AG IA: CPT

## 2022-04-08 PROCEDURE — 76981 USE PARENCHYMA: CPT

## 2022-04-08 PROCEDURE — 86704 HEP B CORE ANTIBODY TOTAL: CPT

## 2022-04-09 LAB
ACTIN IGG SERPL-ACNC: 11 UNITS (ref 0–19)
ENDOMYSIUM IGA SER QL: POSITIVE
GLIADIN PEPTIDE IGA SER-ACNC: >150 UNITS (ref 0–19)
GLIADIN PEPTIDE IGG SER-ACNC: 80 UNITS (ref 0–19)
IGA SERPL-MCNC: 329 MG/DL (ref 87–352)
TTG IGA SER-ACNC: >100 U/ML (ref 0–3)
TTG IGG SER-ACNC: 3 U/ML (ref 0–5)

## 2022-04-11 ENCOUNTER — TELEPHONE (OUTPATIENT)
Dept: GASTROENTEROLOGY | Facility: AMBULARY SURGERY CENTER | Age: 67
End: 2022-04-11

## 2022-04-11 LAB — RYE IGE QN: NEGATIVE

## 2022-04-11 NOTE — TELEPHONE ENCOUNTER
----- Message from Nicole Camacho sent at 4/11/2022 10:20 AM EDT -----    ----- Message -----  From: Saniya Brennan MD  Sent: 4/11/2022  10:18 AM EDT  To: Gastroenterology Surgery Coordinator    Please schedule for EGD

## 2022-04-11 NOTE — TELEPHONE ENCOUNTER
Patient is scheduled for EGD on April 25 , 2022 at [facility]  with Dionicio Mendez MD  Patient is aware of pre-procedure prep of NPO AFTER mn and they will be called the day prior between 2 and 6 pm for time to report for procedure  Pre-procedure prep has been given to the patient  via telephone on April 11 , 2022

## 2022-04-18 ENCOUNTER — ANESTHESIA (OUTPATIENT)
Dept: ANESTHESIOLOGY | Facility: HOSPITAL | Age: 67
End: 2022-04-18

## 2022-04-18 ENCOUNTER — ANESTHESIA EVENT (OUTPATIENT)
Dept: ANESTHESIOLOGY | Facility: HOSPITAL | Age: 67
End: 2022-04-18

## 2022-04-25 ENCOUNTER — ANESTHESIA EVENT (OUTPATIENT)
Dept: GASTROENTEROLOGY | Facility: AMBULARY SURGERY CENTER | Age: 67
End: 2022-04-25

## 2022-04-25 ENCOUNTER — ANESTHESIA (OUTPATIENT)
Dept: GASTROENTEROLOGY | Facility: AMBULARY SURGERY CENTER | Age: 67
End: 2022-04-25

## 2022-04-25 ENCOUNTER — HOSPITAL ENCOUNTER (OUTPATIENT)
Dept: GASTROENTEROLOGY | Facility: AMBULARY SURGERY CENTER | Age: 67
Setting detail: OUTPATIENT SURGERY
Discharge: HOME | End: 2022-04-25
Attending: INTERNAL MEDICINE
Payer: MEDICARE

## 2022-04-25 VITALS
HEIGHT: 69 IN | BODY MASS INDEX: 33.47 KG/M2 | WEIGHT: 226 LBS | TEMPERATURE: 98.3 F | DIASTOLIC BLOOD PRESSURE: 71 MMHG | OXYGEN SATURATION: 97 % | RESPIRATION RATE: 18 BRPM | SYSTOLIC BLOOD PRESSURE: 154 MMHG | HEART RATE: 62 BPM

## 2022-04-25 DIAGNOSIS — K21.00 GASTROESOPHAGEAL REFLUX DISEASE WITH ESOPHAGITIS WITHOUT HEMORRHAGE: Primary | ICD-10-CM

## 2022-04-25 DIAGNOSIS — K90.0 CELIAC DISEASE: ICD-10-CM

## 2022-04-25 PROCEDURE — 43239 EGD BIOPSY SINGLE/MULTIPLE: CPT | Performed by: INTERNAL MEDICINE

## 2022-04-25 PROCEDURE — 88305 TISSUE EXAM BY PATHOLOGIST: CPT | Performed by: PATHOLOGY

## 2022-04-25 RX ORDER — SODIUM CHLORIDE, SODIUM LACTATE, POTASSIUM CHLORIDE, CALCIUM CHLORIDE 600; 310; 30; 20 MG/100ML; MG/100ML; MG/100ML; MG/100ML
INJECTION, SOLUTION INTRAVENOUS CONTINUOUS PRN
Status: DISCONTINUED | OUTPATIENT
Start: 2022-04-25 | End: 2022-04-25

## 2022-04-25 RX ORDER — PROPOFOL 10 MG/ML
INJECTION, EMULSION INTRAVENOUS AS NEEDED
Status: DISCONTINUED | OUTPATIENT
Start: 2022-04-25 | End: 2022-04-25

## 2022-04-25 RX ORDER — OMEPRAZOLE 40 MG/1
40 CAPSULE, DELAYED RELEASE ORAL DAILY
Qty: 90 CAPSULE | Refills: 3 | Status: SHIPPED | OUTPATIENT
Start: 2022-04-25

## 2022-04-25 RX ORDER — LIDOCAINE HYDROCHLORIDE 10 MG/ML
INJECTION, SOLUTION EPIDURAL; INFILTRATION; INTRACAUDAL; PERINEURAL AS NEEDED
Status: DISCONTINUED | OUTPATIENT
Start: 2022-04-25 | End: 2022-04-25

## 2022-04-25 RX ADMIN — LIDOCAINE HYDROCHLORIDE 50 MG: 10 INJECTION, SOLUTION EPIDURAL; INFILTRATION; INTRACAUDAL at 09:23

## 2022-04-25 RX ADMIN — PROPOFOL 20 MG: 10 INJECTION, EMULSION INTRAVENOUS at 09:29

## 2022-04-25 RX ADMIN — PROPOFOL 50 MG: 10 INJECTION, EMULSION INTRAVENOUS at 09:26

## 2022-04-25 RX ADMIN — PROPOFOL 200 MG: 10 INJECTION, EMULSION INTRAVENOUS at 09:23

## 2022-04-25 RX ADMIN — PROPOFOL 20 MG: 10 INJECTION, EMULSION INTRAVENOUS at 09:27

## 2022-04-25 RX ADMIN — PROPOFOL 50 MG: 10 INJECTION, EMULSION INTRAVENOUS at 09:24

## 2022-04-25 RX ADMIN — PROPOFOL 20 MG: 10 INJECTION, EMULSION INTRAVENOUS at 09:28

## 2022-04-25 RX ADMIN — PROPOFOL 20 MG: 10 INJECTION, EMULSION INTRAVENOUS at 09:30

## 2022-04-25 RX ADMIN — SODIUM CHLORIDE, SODIUM LACTATE, POTASSIUM CHLORIDE, AND CALCIUM CHLORIDE: .6; .31; .03; .02 INJECTION, SOLUTION INTRAVENOUS at 09:20

## 2022-04-25 NOTE — ANESTHESIA POSTPROCEDURE EVALUATION
Post-Op Assessment Note    CV Status:  Stable  Pain Score: 0    Pain management: adequate     Mental Status:  Alert and awake   Hydration Status:  Euvolemic   PONV Controlled:  Controlled   Airway Patency:  Patent      Post Op Vitals Reviewed: Yes      Staff: CRNA         No complications documented      BP   105/62   Temp 97   Pulse 68   Resp 12   SpO2 94

## 2022-04-25 NOTE — ANESTHESIA PREPROCEDURE EVALUATION
Procedure:  EGD    Relevant Problems   ANESTHESIA (within normal limits)      GI/HEPATIC   (+) Esophageal reflux   (+) Fatty liver   (+) Hepatosplenomegaly      NEURO/PSYCH   (+) Anxiety associated with depression   (+) 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   (+) Recurrent major depressive disorder, in partial remission (HCC)      PULMONARY   (+) Mild intermittent asthma without complication      Other   (+) Dyslipidemia   (+) Hepatosplenomegaly   (+) Obesity (BMI 30 0-34 9)      12/2019  SUMMARY:  -  Clinical question: Detection of coronary artery disease  Abnormal EKG  -  Stress results: Duration of exercise was 3 min and 18 sec  Target heart rate was achieved  There was resting hypertension with an appropriate blood pressure response to stress  There was no chest pain during stress  -  ECG conclusions: The stress ECG was negative for ischemia  IMPRESSIONS: Normal study after maximal exercise without reproduction of symptoms  LEFT VENTRICLE: Size was normal  Systolic function was normal  Ejection fraction was estimated to be 60 %  There were no regional wall motion abnormalities  Wall thickness was at the upper limits of normal  No evidence of apical thrombus  DOPPLER: Left ventricular diastolic function parameters were normal      RIGHT VENTRICLE: The size was normal  Systolic function was normal  Wall thickness was normal      LEFT ATRIUM: The atrium was mildly dilated  RIGHT ATRIUM: Size was normal      MITRAL VALVE: Valve structure was normal  There was normal leaflet separation  DOPPLER: The transmitral velocity was within the normal range  There was no evidence for stenosis  There was mild regurgitation  AORTIC VALVE: The valve was trileaflet  Leaflets exhibited normal thickness and normal cuspal separation  DOPPLER: Transaortic velocity was within the normal range  There was no evidence for stenosis  There was no significant  regurgitation       TRICUSPID VALVE: The valve structure was normal  There was normal leaflet separation  DOPPLER: The transtricuspid velocity was within the normal range  There was no evidence for stenosis  There was trace regurgitation  Pulmonary artery  systolic pressure was within the normal range  PULMONIC VALVE: Leaflets exhibited normal thickness, no calcification, and normal cuspal separation  DOPPLER: The transpulmonic velocity was within the normal range  There was no significant regurgitation  PERICARDIUM: There was no pericardial effusion  The pericardium was normal in appearance  AORTA: The root exhibited mild dilatation  3 7 cm     SYSTEMIC VEINS: IVC: The inferior vena cava was normal in size  Physical Exam    Airway    Mallampati score: II  TM Distance: >3 FB  Neck ROM: full     Dental   Comment: No loose  Missing teeth,     Cardiovascular  Rhythm: regular, Rate: normal, Cardiovascular exam normal    Pulmonary  Pulmonary exam normal Breath sounds clear to auscultation,     Other Findings        Anesthesia Plan  ASA Score- 2     Anesthesia Type- IV sedation with anesthesia with ASA Monitors  Additional Monitors:   Airway Plan:           Plan Factors-Exercise tolerance (METS): >4 METS  Chart reviewed  EKG reviewed  Existing labs reviewed  Patient summary reviewed  Patient is not a current smoker  Induction-     Postoperative Plan-     Informed Consent- Anesthetic plan and risks discussed with patient  I personally reviewed this patient with the CRNA  Discussed and agreed on the Anesthesia Plan with the CRNA  J Carlos Valentin

## 2022-04-25 NOTE — H&P
History and Physical -  [facility]  [place] 75 y o  female MRN: [id number]                  HPI: [place] is a 75y o  year old female who presents for abnormal celiac serologies      REVIEW OF SYSTEMS: Per the HPI, and otherwise unremarkable  Historical Information   Past Medical History:   Diagnosis Date    Allergic rhinitis     last assessed-10/2/2017    Asthma     Breast cancer ([address] ) 2017    LCIS    Carotid bruit     R-last assessed-6/10/2014    Dysphagia     last assessed-2013    GERD (gastroesophageal reflux disease)      Past Surgical History:   Procedure Laterality Date    BREAST BIOPSY  11/15/2017    percutaneous needle core    BREAST LUMPECTOMY Right 2017    LCIS     SECTION  1984    COLONOSCOPY      complete-12/3/2012-internal/external hemorrhoids, perianal or excoriation, mild sigmoid diverticulosis    MAMMO NEEDLE LOCALIZATION RIGHT (ALL INC) Right 2017    MAMMO NEEDLE LOCALIZATION RIGHT (ALL INC) EACH ADD Right 2017    MAMMO STEREOTACTIC BREAST BIOPSY RIGHT (ALL INC) Right 11/15/2017    MAMMO STEREOTACTIC BREAST BIOPSY RIGHT (ALL INC) EACH ADD Right 11/15/2017    MRI BREAST BIOPSY LEFT (ALL INCLUSIVE) Left 2019    [place] PERQ DEVICE PLACEMT BREAST LOC 1ST LES W GUIDNCE Right 2017    Procedure: BREAST LUMPECTOMY; BREAST BRACKETED NEEDLE LOCALIZATION (BRACKETED NEEDLE LOC AT 0900);   Surgeon: Robyn Matamoros MD;  [facility];  Service: Surgical Oncology    VAGINAL DILATATION      d&e     Social History   Social History     Substance and Sexual Activity   Alcohol Use Yes    Alcohol/week: 1 0 standard drink    Types: 1 Glasses of wine per week    Comment: per allscripts, social drinker     Social History     Substance and Sexual Activity   Drug Use No     Social History     Tobacco Use   Smoking Status Former Smoker    Years: 2 00    Types: Cigarettes    Quit date: 1976    Years since quittin 0   Smokeless Tobacco Never Used     Family History   Problem Relation Age of Onset    Heart attack Mother         acute MI    Colon polyps Mother         polyps of the sigmoid colon    Colon cancer Mother     Breast cancer Sister 47    Uterine cancer Sister 59    Liver cancer Brother 48    Colon cancer Brother 48    Uterine cancer Maternal Grandmother         unknown age-maybe 63's    Uterine cancer Paternal Grandmother         unknown age , maybe 61's    Diabetes Paternal Grandfather     Stroke Maternal Uncle     Kidney cancer Paternal Aunt 54    Pancreatic cancer Paternal Uncle 79    No Known Problems Daughter     No Known Problems Daughter     No Known Problems Son     No Known Problems Brother     No Known Problems Brother     No Known Problems Brother     No Known Problems Brother     No Known Problems Brother     Breast cancer Cousin 62    Uterine cancer Cousin 54    Colon cancer Cousin 61       Meds/Allergies       Current Outpatient Medications:     albuterol (Ventolin HFA) 90 mcg/act inhaler    aspirin 81 MG tablet    calcium citrate (CALCITRATE) 950 MG tablet    Multiple Vitamin (MULTI-VITAMIN DAILY PO)    PARoxetine (PAXIL) 20 mg tablet    fluocinonide (LIDEX) 0 05 % cream    No Known Allergies    Objective     /76   Pulse 78   Temp (!) 97 1 °F (36 2 °C) (Temporal)   Resp 18   Ht 5' 9" (1 753 m)   Wt 103 kg (226 lb)   SpO2 93%   BMI 33 37 kg/m²       PHYSICAL EXAM    Gen: NAD  Head: NCAT  CV: RRR  CHEST: Clear  ABD: soft, NT/ND  EXT: no edema      ASSESSMENT/PLAN:  This is a 75y o  year old female here for EGD, and she is stable and optimized for her procedure

## 2022-04-27 ENCOUNTER — RA CDI HCC (OUTPATIENT)
Dept: OTHER | Facility: HOSPITAL | Age: 67
End: 2022-04-27

## 2022-05-03 DIAGNOSIS — F41.8 DEPRESSION WITH ANXIETY: ICD-10-CM

## 2022-05-03 RX ORDER — PAROXETINE HYDROCHLORIDE 20 MG/1
TABLET, FILM COATED ORAL
Qty: 90 TABLET | Refills: 3 | Status: SHIPPED | OUTPATIENT
Start: 2022-05-03

## 2022-05-04 ENCOUNTER — PROCEDURE VISIT (OUTPATIENT)
Dept: FAMILY MEDICINE CLINIC | Facility: CLINIC | Age: 67
End: 2022-05-04
Payer: MEDICARE

## 2022-05-04 VITALS
BODY MASS INDEX: 33.86 KG/M2 | DIASTOLIC BLOOD PRESSURE: 82 MMHG | HEART RATE: 69 BPM | SYSTOLIC BLOOD PRESSURE: 122 MMHG | TEMPERATURE: 99.3 F | WEIGHT: 228.6 LBS | RESPIRATION RATE: 16 BRPM | HEIGHT: 69 IN | OXYGEN SATURATION: 97 %

## 2022-05-04 DIAGNOSIS — L91.8 INFLAMED SKIN TAG: Primary | ICD-10-CM

## 2022-05-04 DIAGNOSIS — L91.8 SKIN TAG: ICD-10-CM

## 2022-05-04 PROCEDURE — 88304 TISSUE EXAM BY PATHOLOGIST: CPT | Performed by: PATHOLOGY

## 2022-05-04 PROCEDURE — 99213 OFFICE O/P EST LOW 20 MIN: CPT | Performed by: FAMILY MEDICINE

## 2022-05-04 NOTE — PROGRESS NOTES
Chief Complaint   Patient presents with    Skin Problem     skin tag removal      Health Maintenance   Topic Date Due    Cervical Cancer Screening  11/17/2020    Colorectal Cancer Screening  01/03/2023    Fall Risk  03/02/2023    Medicare Annual Wellness Visit (AWV)  03/02/2023    BMI: Followup Plan  03/02/2023    Depression Remission PHQ  04/07/2023    BMI: Adult  05/04/2023    DTaP,Tdap,and Td Vaccines (2 - Td or Tdap) 06/07/2023    Breast Cancer Screening: Mammogram  03/28/2024    Pneumococcal Vaccine: 65+ Years (2 of 2 - PPSV23) 02/19/2025    Hepatitis C Screening  Completed    Osteoporosis Screening  Completed    Influenza Vaccine  Completed    COVID-19 Vaccine  Completed    HIB Vaccine  Aged Out    Hepatitis B Vaccine  Aged Out    IPV Vaccine  Aged Out    Hepatitis A Vaccine  Aged Out    Meningococcal ACWY Vaccine  Aged Out    HPV Vaccine  Aged Out      Assessment/Plan:    No problem-specific Assessment & Plan notes found for this encounter  Diagnoses and all orders for this visit:    Inflamed skin tag  -     Skin tag removal  -     Tissue Exam; Future  -     Tissue Exam          Subjective:      Patient ID: Maico Farr is a 75 y o  female  HPI     Patient presents for irritated skin tags removal from right axillary area  Denies allergy to anesthesia drugs  No easy bruising or bleeding  The following portions of the patient's history were reviewed and updated as appropriate: allergies, current medications, past family history, past medical history, past social history and problem list     Review of Systems   Constitutional: Negative for activity change, chills, fatigue and fever  Respiratory: Negative for shortness of breath  Cardiovascular: Negative for chest pain  Musculoskeletal: Negative for arthralgias  Skin: Negative for rash  Irritated skin tags in right axilla   Neurological: Negative for dizziness  Hematological: Negative  Objective:      /82 (BP Location: Left arm, Patient Position: Sitting)   Pulse 69   Temp 99 3 °F (37 4 °C) (Tympanic)   Resp 16   Ht 5' 9" (1 753 m)   Wt 104 kg (228 lb 9 6 oz)   SpO2 97%   BMI 33 76 kg/m²          Physical Exam  Vitals and nursing note reviewed  Constitutional:       Appearance: Normal appearance  Skin:     General: Skin is warm and dry  Findings: No rash  Comments: Irritated 2 skin tags in right axilla ( one 2 x 3 mm, one 3 x 4 mm),  one brown skin lesion 3x 3 mm,  likely seborrheic keratosis  Neurological:      Mental Status: She is alert  Psychiatric:         Mood and Affect: Mood normal            Procedure note:   Obtained written consent for procedure  Under sterile conditions and local anesthesia with 2 % Lidocaine with Epi 2 inflamed skin tags 2 x  3 mm in size and 3 x 4 mm removed by shaving from right axilla  One brown skin lesion 3 x 3 mm removed by shaving also  Sites of removal cauterized with electrocautery  No bleeding  Patient tolerated procedure well  Applied sterile bandage  Skin care instructions provided to patient  Specimens sent for pathology

## 2022-05-10 ENCOUNTER — OFFICE VISIT (OUTPATIENT)
Dept: SURGICAL ONCOLOGY | Facility: CLINIC | Age: 67
End: 2022-05-10
Payer: MEDICARE

## 2022-05-10 VITALS
BODY MASS INDEX: 33.69 KG/M2 | DIASTOLIC BLOOD PRESSURE: 80 MMHG | OXYGEN SATURATION: 98 % | WEIGHT: 227.5 LBS | HEART RATE: 92 BPM | SYSTOLIC BLOOD PRESSURE: 124 MMHG | RESPIRATION RATE: 12 BRPM | HEIGHT: 69 IN

## 2022-05-10 DIAGNOSIS — Z91.89 INCREASED RISK OF BREAST CANCER: ICD-10-CM

## 2022-05-10 DIAGNOSIS — Z86.000 HISTORY OF LOBULAR CARCINOMA IN SITU (LCIS) OF BREAST: Primary | ICD-10-CM

## 2022-05-10 DIAGNOSIS — Z80.3 FAMILY HISTORY OF MALIGNANT NEOPLASM OF BREAST: ICD-10-CM

## 2022-05-10 PROCEDURE — 99213 OFFICE O/P EST LOW 20 MIN: CPT

## 2022-05-10 NOTE — PROGRESS NOTES
Surgical Oncology Follow Up       [address] [facility] [facility]  [facility]  [address] [facility] [address]    [place] DARRIAN Peoplesj carlos  1955  [id number]  [address] [facility] [facility]  [facility]  [address] [address]    Chief Complaint   Patient presents with    Follow-up     6 month        Assessment/Plan:  1  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  - 6 month follow up    2  Family history of malignant neoplasm of breast    3  Increased risk of breast cancer  - MRI breast bilateral w and wo contrast w cad; Future  - BUN; Future  - Creatinine, serum; Future         Discussion/Summary:  Patient is a 56-year-old female presenting today for six-month follow-up for history of LCIS and family history of breast cancer  She underwent genetic testing which was negative  Her sister was diagnosed with breast cancer  She underwent a lumpectomy with Dr Geovanny Hancock in December 2017  We have been seeing her every 6 months and screening with annual mammograms and breast MRIs  She had a bilateral screening mammogram on 03/28/2022 which was BI-RADS 2 category 2 density  There are no concerns on her clinical breast exam   I have ordered her breast MRI to be performed this summer  I will see the patient back in 6 months or sooner should the need arise  She was instructed to call with any questions or concerns prior to this time  All questions were answered today  History of Present Illness:     Oncology History    No history exists         -Interval History: Patient is a 56-year-old female presenting today for six-month follow-up for history of LCIS and family history of breast cancer  She had a bilateral screening mammogram on 03/28/2022 which was BI-RADS 2 category 2 density    Patient denies changes on her breast exam  She denies persistent headaches, bone pain, back pain, shortness of breath, or abdominal pain     Review of Systems:  Review of Systems   Constitutional: Negative for activity change, appetite change, fatigue and unexpected weight change  Respiratory: Negative for cough and shortness of breath  Cardiovascular: Negative for chest pain  Gastrointestinal: Negative for abdominal pain, diarrhea, nausea and vomiting  Endocrine: Negative for heat intolerance  Musculoskeletal: Negative for arthralgias, back pain and myalgias  Skin: Negative for rash  Neurological: Negative for weakness and headaches  Hematological: Negative for adenopathy  Patient Active Problem List   Diagnosis    Family history of malignant neoplasm of uterus    Family history of malignant neoplasm of gastrointestinal tract    Family history of malignant neoplasm of breast    [facility]    Dyslipidemia    Elevated LFTs    Esophageal reflux    Left thyroid nodule    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    Mild intermittent asthma without complication    Obesity (BMI 30 0-34  9)    Rosacea    Increased risk of breast cancer    Abnormal MRI, breast    Hand eczema    Chronic insomnia    Medicare annual wellness visit, subsequent    Fatty liver    Abnormal EKG    Anxiety associated with depression    Elevated hematocrit    Hepatosplenomegaly    Gallbladder polyp    Inflamed skin tag     Past Medical History:   Diagnosis Date    Allergic rhinitis     last assessed-10/2/2017    Asthma     [facility] 2017    LCIS    Carotid bruit     R-last assessed-6/10/2014    Dysphagia     last assessed-2013    GERD (gastroesophageal reflux disease)      Past Surgical History:   Procedure Laterality Date    BREAST BIOPSY  11/15/2017    percutaneous needle core    BREAST LUMPECTOMY Right 2017    LCIS     SECTION  1984    COLONOSCOPY      complete-12/3/2012-internal/external hemorrhoids, perianal or excoriation, mild sigmoid diverticulosis    MAMMO NEEDLE LOCALIZATION RIGHT (ALL INC) Right 12/19/2017    MAMMO NEEDLE LOCALIZATION RIGHT (ALL INC) EACH ADD Right 12/19/2017    MAMMO STEREOTACTIC BREAST BIOPSY RIGHT (ALL INC) Right 11/15/2017    MAMMO STEREOTACTIC BREAST BIOPSY RIGHT (ALL INC) EACH ADD Right 11/15/2017    MRI BREAST BIOPSY LEFT (ALL INCLUSIVE) Left 6/18/2019    OK PERQ DEVICE PLACEMT BREAST LOC 1ST LES W GUIDNCE Right 12/19/2017    Procedure: BREAST LUMPECTOMY; BREAST BRACKETED NEEDLE LOCALIZATION (BRACKETED NEEDLE LOC AT 0900);   Surgeon: Sigifredo Perez MD;  [facility];  Service: Surgical Oncology    VAGINAL DILATATION  1978    d&e     Family History   Problem Relation Age of Onset    Heart attack Mother         acute MI    Colon polyps Mother         polyps of the sigmoid colon    Colon cancer Mother     Breast cancer Sister 47    Uterine cancer Sister 59    Liver cancer Brother 48    Colon cancer Brother 48    Uterine cancer Maternal Grandmother         unknown age-maybe 63's    Uterine cancer Paternal Grandmother         unknown age , maybe 61's    Diabetes Paternal Grandfather     Stroke Maternal Uncle     Kidney cancer Paternal Aunt 54    Pancreatic cancer Paternal Uncle 79    No Known Problems Daughter     No Known Problems Daughter     No Known Problems Son     No Known Problems Brother     No Known Problems Brother     No Known Problems Brother     No Known Problems Brother     No Known Problems Brother     Breast cancer Cousin 62    Uterine cancer Cousin 54    Colon cancer Cousin 61     Social History     Socioeconomic History    Marital status: /Civil Union     Spouse name: Not on file    Number of children: Not on file    Years of education: Not on file    Highest education level: Not on file   Occupational History    Not on file   Tobacco Use    Smoking status: Former Smoker     Years: 2 00     Types: Cigarettes     Quit date: 4/21/1976 Years since quittin 0    Smokeless tobacco: Never Used   Vaping Use    Vaping Use: Never used   Substance and Sexual Activity    Alcohol use: Yes     Alcohol/week: 1 0 standard drink     Types: 1 Glasses of wine per week     Comment: per allscripts, social drinker    Drug use: No    Sexual activity: Not Currently     Partners: Male     Birth control/protection: Post-menopausal   Other Topics Concern    Not on file   Social History Narrative    Not on file     Social Determinants of Health     Financial Resource Strain: Not on file   Food Insecurity: Not on file   Transportation Needs: Not on file   Physical Activity: Not on file   Stress: Not on file   Social Connections: Not on file   Intimate Partner Violence: Not on file   Housing Stability: Not on file       Current Outpatient Medications:     albuterol (Ventolin HFA) 90 mcg/act inhaler, Inhale 2 puffs every 4 (four) hours as needed for wheezing, Disp: 18 g, Rfl: 5    aspirin 81 MG tablet, Take 81 mg by mouth daily, Disp: , Rfl:     calcium citrate (CALCITRATE) 950 MG tablet, Take 1 tablet by mouth daily, Disp: , Rfl:     fluocinonide (LIDEX) 0 05 % cream, Apply topically 2 (two) times a day (Patient taking differently: Apply topically if needed  ), Disp: 30 g, Rfl: 1    Multiple Vitamin (MULTI-VITAMIN DAILY PO), Take by mouth, Disp: , Rfl:     omeprazole (PriLOSEC) 40 MG capsule, Take 1 capsule (40 mg total) by mouth daily, Disp: 90 capsule, Rfl: 3    PARoxetine (PAXIL) 20 mg tablet, TAKE ONE TABLET BY MOUTH EVERY DAY, Disp: 90 tablet, Rfl: 3  No Known Allergies  Vitals:    05/10/22 1419   BP: 124/80   Pulse: 92   Resp: 12   SpO2: 98%       Physical Exam  Constitutional:       General: She is not in acute distress  Appearance: Normal appearance  Cardiovascular:      Rate and Rhythm: Normal rate and regular rhythm  Pulses: Normal pulses  Heart sounds: Normal heart sounds     Pulmonary:      Effort: Pulmonary effort is normal       Breath sounds: Normal breath sounds  Chest:      Chest wall: No mass  Breasts:      Right: No swelling, bleeding, inverted nipple, mass, nipple discharge, skin change, tenderness, axillary adenopathy or supraclavicular adenopathy  Left: No swelling, bleeding, inverted nipple, mass, nipple discharge, skin change, tenderness, axillary adenopathy or supraclavicular adenopathy  Comments: No masses, nodularity, skin changes, nipple changes or discharge, or adenopathy appreciated on physical exam      Abdominal:      General: Abdomen is flat  Palpations: Abdomen is soft  Lymphadenopathy:      Upper Body:      Right upper body: No supraclavicular, axillary or pectoral adenopathy  Left upper body: No supraclavicular, axillary or pectoral adenopathy  Skin:     General: Skin is warm  Neurological:      General: No focal deficit present  Mental Status: She is alert and oriented to person, place, and time  Psychiatric:         Mood and Affect: Mood normal          Behavior: Behavior normal            Results:    Imaging  EGD    Result Date: 4/25/2022  Narrative: [place] [phone] DATE OF SERVICE: 4/25/22 PHYSICIAN(S): Attending: Arlen Santiago MD Fellow: No Staff Documented INDICATION: Celiac disease POST-OP DIAGNOSIS: See the impression below  PREPROCEDURE: Informed consent was obtained for the procedure, including sedation  Risks of perforation, hemorrhage, adverse drug reaction and aspiration were discussed  The patient was placed in the left lateral decubitus position  Patient was explained about the risks and benefits of the procedure  Risks including but not limited to bleeding, infection, and perforation were explained in detail  Also explained about less than 100% sensitivity with the exam and other alternatives   DETAILS OF PROCEDURE: Patient was taken to the procedure room where a time out was performed to confirm correct patient and correct procedure  The patient underwent monitored anesthesia care, which was administered by an anesthesia professional  The patient's blood pressure, heart rate, level of consciousness, respirations and oxygen were monitored throughout the procedure  The scope was advanced to the second part of the duodenum  Retroflexion was performed in the fundus  The patient experienced no blood loss  The procedure was not difficult  The patient tolerated the procedure well  There were no apparent complications  ANESTHESIA INFORMATION: ASA: II Anesthesia Type: IV Sedation with Anesthesia MEDICATIONS: No administrations occurring from 0920 to 0932 on 04/25/22 FINDINGS: Localized erythematous and scarred mucosa in the GE junction (40 cm from the incisors); performed cold forceps biopsy Erythematous mucosa in the antrum; performed cold forceps biopsy to rule out H  pylori Polyp measuring smaller than 5 mm in the fundus of the stomach; performed cold forceps biopsy  Multiple polyps noted, randomly sampled   Generalized atrophic, edematous, erythematous and scalloped mucosa with erosion in the duodenal bulb and 2nd part of the duodenum, consistent with celiac disease; performed cold forceps biopsy SPECIMENS: ID Type Source Tests Collected by Time Destination 1 : Bx Duodenum Tissue Duodenum TISSUE EXAM Adonay Birch MD 4/25/2022  9:25 AM  2 : Bx Gastric Tissue Stomach TISSUE EXAM Adonay Birch MD 4/25/2022  9:25 AM  3 : Bx Gastric polyps Tissue Polyp, Stomach/Small Intestine TISSUE EXAM Adonay Birch MD 4/25/2022  9:29 AM  4 : Bx EG Junction Tissue Esophagogastric junction TISSUE EXAM Adonay Birch MD 4/25/2022  9:31 AM      Impression: Localized erythematous and scarred mucosa in the GE junction (40 cm from the incisors); performed cold forceps biopsy Erythematous mucosa in the antrum; performed cold forceps biopsy to rule out H  pylori Polyp measuring smaller than 5 mm in the fundus of the stomach; performed cold forceps biopsy  Multiple polyps noted, randomly sampled  Generalized atrophic, edematous, erythematous and scalloped mucosa with erosion in the duodenal bulb and 2nd part of the duodenum, consistent with celiac disease; performed cold forceps biopsy RECOMMENDATION: Await pathology results Recommend start strict gluten free diet Nutrition consult placed Start omeprazole 40 mg daily before breakfast Repeat EGD in 6 months Discharge home  Jax Barclay MD       I reviewed the above imaging data  Advance Care Planning/Advance Directives:  Discussed disease status, cancer treatment plans and/or cancer treatment goals with the patient

## 2022-05-11 DIAGNOSIS — K90.0 CELIAC DISEASE: Primary | ICD-10-CM

## 2022-05-11 PROBLEM — K76.0 FATTY LIVER: Status: RESOLVED | Noted: 2019-10-06 | Resolved: 2022-05-11

## 2022-05-11 NOTE — PATIENT INSTRUCTIONS
Apply xeroform to dressing daily    Call with fevers, chills, redness, drainage from wound Atorvastatin

## 2022-06-14 ENCOUNTER — TELEPHONE (OUTPATIENT)
Dept: SURGICAL ONCOLOGY | Facility: CLINIC | Age: 67
End: 2022-06-14

## 2022-06-15 ENCOUNTER — OFFICE VISIT (OUTPATIENT)
Dept: GASTROENTEROLOGY | Facility: AMBULARY SURGERY CENTER | Age: 67
End: 2022-06-15
Payer: MEDICARE

## 2022-06-15 VITALS
HEIGHT: 69 IN | RESPIRATION RATE: 18 BRPM | HEART RATE: 80 BPM | WEIGHT: 222 LBS | DIASTOLIC BLOOD PRESSURE: 78 MMHG | OXYGEN SATURATION: 100 % | SYSTOLIC BLOOD PRESSURE: 132 MMHG | BODY MASS INDEX: 32.88 KG/M2

## 2022-06-15 DIAGNOSIS — R16.2 HEPATOSPLENOMEGALY: ICD-10-CM

## 2022-06-15 DIAGNOSIS — R79.89 ELEVATED LFTS: ICD-10-CM

## 2022-06-15 DIAGNOSIS — K90.0 CELIAC DISEASE: Primary | ICD-10-CM

## 2022-06-15 PROCEDURE — 99214 OFFICE O/P EST MOD 30 MIN: CPT | Performed by: INTERNAL MEDICINE

## 2022-06-15 NOTE — PROGRESS NOTES
[facility] - Outpatient Consultation  Supriya Gilliam 75 y o  female MRN: [id number]  Encounter: [id number]      PCP: Jazlyn Sharp MD  Referring: Referral Self  [facility]  [address] [facility],  [address]      ASSESSMENT AND PLAN:      1  Celiac disease  2  Elevated LFTs  3  Hepatosplenomegaly  Patient is tolerating a gluten free diet  Will obtain labs to assess compliance, and monitor her liver enzymes  - CBC; Future  - Comprehensive metabolic panel; Future  - Iron Panel (Includes Ferritin, Iron Sat%, Iron, and TIBC); Future  - Celiac Disease Antibody Profile; Future  - Calcium; Future  - Vitamin D 25 hydroxy; Future  Repeat EGD to be scheduled in one year    ______________________________________________________________________    CC:  Chief Complaint   Patient presents with    Follow-up     No symptoms       HPI:      Patient is a 70-year-old female who was initially referred for elevated liver enzymes, hepatosplenomegaly, gallbladder polyp  EGD on 04/25 demonstrated patchy increased intraepithelial lymphocytes of the duodenum, with positive celiac antibody + TTG > 100  She has been on a strict gluten free diet since end of April  Esophageal biopsy showed reflux esophagitis, which has improved on Prilosec  She denies any GI symptoms at this time  She is having bowel movements daily without rectal bleeding  She is due for repeat colonoscopy in 2023  Her last DEXA scan was performed in December, with repeat recommended in two years  REVIEW OF SYSTEMS:    CONSTITUTIONAL: Denies any fever, chills, rigors, and weight loss  HEENT: No earache or tinnitus  Denies hearing loss or visual disturbances  CARDIOVASCULAR: No chest pain or palpitations  RESPIRATORY: Denies any cough, hemoptysis, shortness of breath or dyspnea on exertion  GASTROINTESTINAL: As noted in the History of Present Illness  GENITOURINARY: No problems with urination   Denies any hematuria or dysuria  NEUROLOGIC: No dizziness or vertigo, denies headaches  MUSCULOSKELETAL: Denies any muscle or joint pain  SKIN: Denies skin rashes or itching  ENDOCRINE: Denies excessive thirst  Denies intolerance to heat or cold  PSYCHOSOCIAL: Denies depression or anxiety  Denies any recent memory loss  Historical Information   Past Medical History:   Diagnosis Date    Allergic rhinitis     last assessed-10/2/2017    Asthma     Breast cancer ([address] ) 2017    LCIS    Carotid bruit     R-last assessed-6/10/2014    Dysphagia     last assessed-2013    GERD (gastroesophageal reflux disease)      Past Surgical History:   Procedure Laterality Date    BREAST BIOPSY  11/15/2017    percutaneous needle core    BREAST LUMPECTOMY Right 2017    LCIS     SECTION  1984    COLONOSCOPY      complete-12/3/2012-internal/external hemorrhoids, perianal or excoriation, mild sigmoid diverticulosis    MAMMO NEEDLE LOCALIZATION RIGHT (ALL INC) Right 2017    MAMMO NEEDLE LOCALIZATION RIGHT (ALL INC) EACH ADD Right 2017    MAMMO STEREOTACTIC BREAST BIOPSY RIGHT (ALL INC) Right 11/15/2017    MAMMO STEREOTACTIC BREAST BIOPSY RIGHT (ALL INC) EACH ADD Right 11/15/2017    MRI BREAST BIOPSY LEFT (ALL INCLUSIVE) Left 2019    ND PERQ DEVICE PLACEMT BREAST LOC 1ST LES W GUIDNCE Right 2017    Procedure: BREAST LUMPECTOMY; BREAST BRACKETED NEEDLE LOCALIZATION (BRACKETED NEEDLE LOC AT 0900);   Surgeon: Swapna Ferguson MD;  [facility];  Service: Surgical Oncology    UPPER GASTROINTESTINAL ENDOSCOPY      VAGINAL DILATATION      d&e     Social History   Social History     Substance and Sexual Activity   Alcohol Use Yes    Alcohol/week: 1 0 standard drink    Types: 1 Glasses of wine per week    Comment: per allscripts, social drinker     Social History     Substance and Sexual Activity   Drug Use No     Social History     Tobacco Use   Smoking Status Former Smoker    Years: 2 00  Types: Cigarettes    Quit date: 1976    Years since quittin 1   Smokeless Tobacco Never Used     Family History   Problem Relation Age of Onset    Heart attack Mother         acute MI    Colon polyps Mother         polyps of the sigmoid colon    Colon cancer Mother     Breast cancer Sister 47    Uterine cancer Sister 59    Liver cancer Brother 48    Colon cancer Brother 48    Uterine cancer Maternal Grandmother         unknown age-maybe 63's    Uterine cancer Paternal Grandmother         unknown age , maybe 61's    Diabetes Paternal Grandfather     Stroke Maternal Uncle     Kidney cancer Paternal Aunt 54    Pancreatic cancer Paternal Uncle 79    No Known Problems Daughter     No Known Problems Daughter     No Known Problems Son     No Known Problems Brother     No Known Problems Brother     No Known Problems Brother     No Known Problems Brother     No Known Problems Brother     Breast cancer Cousin 62    Uterine cancer Cousin 54    Colon cancer Cousin 60       Meds/Allergies       Current Outpatient Medications:     albuterol (Ventolin HFA) 90 mcg/act inhaler    aspirin 81 MG tablet    calcium citrate (CALCITRATE) 950 MG tablet    fluocinonide (LIDEX) 0 05 % cream    Multiple Vitamin (MULTI-VITAMIN DAILY PO)    omeprazole (PriLOSEC) 40 MG capsule    PARoxetine (PAXIL) 20 mg tablet    No Known Allergies        Objective     Blood pressure 132/78, pulse 80, resp  rate 18, height 5' 9" (1 753 m), weight 101 kg (222 lb), SpO2 100 %  Body mass index is 32 78 kg/m²  PHYSICAL EXAM:      General Appearance:   Alert, cooperative, no distress   HEENT:   Normocephalic, atraumatic, anicteric  Neck:  Supple, symmetrical, trachea midline   Lungs:   Clear to auscultation bilaterally; no rales, rhonchi or wheezing; respirations unlabored    Heart[de-identified]   Regular rate and rhythm; no murmur, rub, or gallop     Abdomen:   Soft, non-tender, non-distended; normal bowel sounds; no masses, no organomegaly    Genitalia:   Deferred    Rectal:   Deferred    Extremities:  No cyanosis, clubbing or edema    Pulses:  2+ and symmetric    Skin:  No jaundice, rashes, or lesions    Lymph nodes:  No palpable cervical lymphadenopathy        Lab Results:     Lab Results   Component Value Date    WBC 6 24 02/22/2022    HGB 15 0 02/22/2022    HCT 45 1 02/22/2022    MCV 87 02/22/2022     02/22/2022       Lab Results   Component Value Date     10/27/2017    K 4 1 02/22/2022     02/22/2022    CO2 27 02/22/2022    BUN 14 02/22/2022    CREATININE 1 01 02/22/2022    GLUF 106 (H) 02/22/2022    CALCIUM 9 8 02/22/2022    AST 88 (H) 02/22/2022    ALT 82 (H) 02/22/2022    ALKPHOS 96 02/22/2022    PROT 7 0 10/27/2017    BILITOT 0 6 10/27/2017    EGFR 57 02/22/2022       No results found for: INR, PROTIME      Radiology Results:   No results found  Portions of the record may have been created with voice recognition software  Occasional wrong word or "sound a like" substitutions may have occurred due to the inherent limitations of voice recognition software  Read the chart carefully and recognize, using context, where substitutions have occurred

## 2022-06-17 ENCOUNTER — APPOINTMENT (OUTPATIENT)
Dept: LAB | Facility: CLINIC | Age: 67
End: 2022-06-17
Payer: MEDICARE

## 2022-06-17 DIAGNOSIS — K90.0 CELIAC DISEASE: ICD-10-CM

## 2022-06-17 DIAGNOSIS — R16.2 HEPATOSPLENOMEGALY: ICD-10-CM

## 2022-06-17 DIAGNOSIS — R79.89 ELEVATED LFTS: ICD-10-CM

## 2022-06-17 DIAGNOSIS — Z91.89 INCREASED RISK OF BREAST CANCER: ICD-10-CM

## 2022-06-17 LAB
25(OH)D3 SERPL-MCNC: 62.6 NG/ML (ref 30–100)
ALBUMIN SERPL BCP-MCNC: 4.1 G/DL (ref 3.5–5)
ALP SERPL-CCNC: 84 U/L (ref 34–104)
ALT SERPL W P-5'-P-CCNC: 46 U/L (ref 7–52)
ANION GAP SERPL CALCULATED.3IONS-SCNC: 10 MMOL/L (ref 4–13)
AST SERPL W P-5'-P-CCNC: 52 U/L (ref 13–39)
BILIRUB SERPL-MCNC: 0.63 MG/DL (ref 0.2–1)
BUN SERPL-MCNC: 14 MG/DL (ref 5–25)
CALCIUM SERPL-MCNC: 9.7 MG/DL (ref 8.4–10.2)
CHLORIDE SERPL-SCNC: 106 MMOL/L (ref 96–108)
CO2 SERPL-SCNC: 26 MMOL/L (ref 21–32)
CREAT SERPL-MCNC: 0.93 MG/DL (ref 0.6–1.3)
ERYTHROCYTE [DISTWIDTH] IN BLOOD BY AUTOMATED COUNT: 13.9 % (ref 11.6–15.1)
FERRITIN SERPL-MCNC: 81 NG/ML (ref 8–388)
GFR SERPL CREATININE-BSD FRML MDRD: 63 ML/MIN/1.73SQ M
GLUCOSE P FAST SERPL-MCNC: 98 MG/DL (ref 65–99)
HCT VFR BLD AUTO: 43.9 % (ref 34.8–46.1)
HGB BLD-MCNC: 14.9 G/DL (ref 11.5–15.4)
IRON SATN MFR SERPL: 31 % (ref 15–50)
IRON SERPL-MCNC: 105 UG/DL (ref 50–170)
MCH RBC QN AUTO: 28.7 PG (ref 26.8–34.3)
MCHC RBC AUTO-ENTMCNC: 33.9 G/DL (ref 31.4–37.4)
MCV RBC AUTO: 84 FL (ref 82–98)
PLATELET # BLD AUTO: 235 THOUSANDS/UL (ref 149–390)
PMV BLD AUTO: 11.1 FL (ref 8.9–12.7)
POTASSIUM SERPL-SCNC: 3.8 MMOL/L (ref 3.5–5.3)
PROT SERPL-MCNC: 7.6 G/DL (ref 6.4–8.4)
RBC # BLD AUTO: 5.2 MILLION/UL (ref 3.81–5.12)
SODIUM SERPL-SCNC: 142 MMOL/L (ref 135–147)
TIBC SERPL-MCNC: 339 UG/DL (ref 250–450)
WBC # BLD AUTO: 6.81 THOUSAND/UL (ref 4.31–10.16)

## 2022-06-17 PROCEDURE — 85027 COMPLETE CBC AUTOMATED: CPT

## 2022-06-17 PROCEDURE — 82784 ASSAY IGA/IGD/IGG/IGM EACH: CPT

## 2022-06-17 PROCEDURE — 82728 ASSAY OF FERRITIN: CPT

## 2022-06-17 PROCEDURE — 86231 EMA EACH IG CLASS: CPT

## 2022-06-17 PROCEDURE — 83540 ASSAY OF IRON: CPT

## 2022-06-17 PROCEDURE — 80053 COMPREHEN METABOLIC PANEL: CPT

## 2022-06-17 PROCEDURE — 82306 VITAMIN D 25 HYDROXY: CPT

## 2022-06-17 PROCEDURE — 86364 TISS TRNSGLTMNASE EA IG CLAS: CPT

## 2022-06-17 PROCEDURE — 36415 COLL VENOUS BLD VENIPUNCTURE: CPT

## 2022-06-17 PROCEDURE — 86258 DGP ANTIBODY EACH IG CLASS: CPT

## 2022-06-17 PROCEDURE — 83550 IRON BINDING TEST: CPT

## 2022-06-20 ENCOUNTER — TELEPHONE (OUTPATIENT)
Dept: GASTROENTEROLOGY | Facility: AMBULARY SURGERY CENTER | Age: 67
End: 2022-06-20

## 2022-06-20 LAB
ENDOMYSIUM IGA SER QL: POSITIVE
GLIADIN PEPTIDE IGA SER-ACNC: 54 UNITS (ref 0–19)
GLIADIN PEPTIDE IGG SER-ACNC: 53 UNITS (ref 0–19)
IGA SERPL-MCNC: 294 MG/DL (ref 87–352)
TTG IGA SER-ACNC: 36 U/ML (ref 0–3)
TTG IGG SER-ACNC: <2 U/ML (ref 0–5)

## 2022-06-22 DIAGNOSIS — K90.0 CELIAC DISEASE: Primary | ICD-10-CM

## 2022-07-06 ENCOUNTER — HOSPITAL ENCOUNTER (OUTPATIENT)
Dept: RADIOLOGY | Facility: HOSPITAL | Age: 67
Discharge: HOME | End: 2022-07-06
Payer: MEDICARE

## 2022-07-06 DIAGNOSIS — Z91.89 INCREASED RISK OF BREAST CANCER: ICD-10-CM

## 2022-07-06 DIAGNOSIS — Z86.000 HISTORY OF LOBULAR CARCINOMA IN SITU (LCIS) OF BREAST: ICD-10-CM

## 2022-07-06 PROCEDURE — A9585 GADOBUTROL INJECTION: HCPCS

## 2022-07-06 PROCEDURE — C8908 MRI W/O FOL W/CONT, BREAST,: HCPCS

## 2022-07-06 PROCEDURE — C8937 CAD BREAST MRI: HCPCS

## 2022-07-06 PROCEDURE — G1004 CDSM NDSC: HCPCS

## 2022-07-06 RX ADMIN — GADOBUTROL 10 ML: 604.72 INJECTION INTRAVENOUS at 18:11

## 2022-08-31 ENCOUNTER — RA CDI HCC (OUTPATIENT)
Dept: OTHER | Facility: HOSPITAL | Age: 67
End: 2022-08-31

## 2022-08-31 NOTE — PROGRESS NOTES
[address]  coding opportunities       Chart reviewed, no opportunity found: CHART REVIEWED, NO OPPORTUNITY FOUND        Patients Insurance     Medicare Insurance: Medicare

## 2022-09-07 ENCOUNTER — OFFICE VISIT (OUTPATIENT)
Dept: FAMILY MEDICINE CLINIC | Facility: CLINIC | Age: 67
End: 2022-09-07
Payer: MEDICARE

## 2022-09-07 VITALS
HEIGHT: 69 IN | HEART RATE: 72 BPM | WEIGHT: 220 LBS | TEMPERATURE: 97.4 F | RESPIRATION RATE: 16 BRPM | BODY MASS INDEX: 32.58 KG/M2 | DIASTOLIC BLOOD PRESSURE: 82 MMHG | SYSTOLIC BLOOD PRESSURE: 132 MMHG | OXYGEN SATURATION: 98 %

## 2022-09-07 DIAGNOSIS — E78.5 DYSLIPIDEMIA: Primary | ICD-10-CM

## 2022-09-07 DIAGNOSIS — J45.20 MILD INTERMITTENT ASTHMA WITHOUT COMPLICATION: ICD-10-CM

## 2022-09-07 DIAGNOSIS — E66.9 OBESITY (BMI 30.0-34.9): ICD-10-CM

## 2022-09-07 DIAGNOSIS — F33.41 RECURRENT MAJOR DEPRESSIVE DISORDER, IN PARTIAL REMISSION (HCC): ICD-10-CM

## 2022-09-07 DIAGNOSIS — R71.8 ELEVATED HEMATOCRIT: ICD-10-CM

## 2022-09-07 DIAGNOSIS — F41.8 ANXIETY ASSOCIATED WITH DEPRESSION: ICD-10-CM

## 2022-09-07 DIAGNOSIS — K90.0 CELIAC DISEASE: ICD-10-CM

## 2022-09-07 DIAGNOSIS — K21.9 GASTROESOPHAGEAL REFLUX DISEASE, UNSPECIFIED WHETHER ESOPHAGITIS PRESENT: ICD-10-CM

## 2022-09-07 DIAGNOSIS — R79.89 ELEVATED LFTS: ICD-10-CM

## 2022-09-07 PROCEDURE — 99214 OFFICE O/P EST MOD 30 MIN: CPT | Performed by: FAMILY MEDICINE

## 2022-09-07 NOTE — ASSESSMENT & PLAN NOTE
Patient lost 15 lb since last visit in March 2022  She follows a gluten free diet  Encouraged regular exercise, continue to work on weight reduction

## 2022-09-07 NOTE — ASSESSMENT & PLAN NOTE
Reports improvement in symptoms  Continue omeprazole 40 mg daily as per gastroenterology Dr Trinna Moritz

## 2022-09-07 NOTE — ASSESSMENT & PLAN NOTE
Liver enzymes improved  Patient lost 15 lb since March 2022  Continue to follow a low-fat diet, regular exercise  Follow-up with gastroenterology Dr Osmany Alanis

## 2022-09-07 NOTE — ASSESSMENT & PLAN NOTE
Hematocrit 43 9 ( improved from 46 5 in August 2021)  Continue aspirin 81 mg daily  Stay well hydrated  Will continue to monitor labs

## 2022-09-07 NOTE — PROGRESS NOTES
Chief Complaint   Patient presents with    Follow-up     6 month      Health Maintenance   Topic Date Due    Cervical Cancer Screening  11/17/2020    Pneumococcal Vaccine: 65+ Years (3 - PPSV23 or PCV20) 02/19/2021    COVID-19 Vaccine (4 - Booster for Pfizer series) 02/23/2022    Influenza Vaccine (1) 09/01/2022    BMI: Followup Plan  03/02/2023    Colorectal Cancer Screening  01/03/2023    Fall Risk  03/02/2023    Urinary Incontinence Screening  03/02/2023    Medicare Annual Wellness Visit (AWV)  03/02/2023    Depression Remission PHQ  04/07/2023    BMI: Adult  09/07/2023    Breast Cancer Screening: Mammogram  03/28/2024    Hepatitis C Screening  Completed    Osteoporosis Screening  Completed    HIB Vaccine  Aged Out    Hepatitis B Vaccine  Aged Out    IPV Vaccine  Aged Out    Hepatitis A Vaccine  Aged Out    Meningococcal ACWY Vaccine  Aged Out    HPV Vaccine  Aged Out      Assessment/Plan:    Dyslipidemia  Recommended follow a low-cholesterol, low-fat diet regular exercise  Check CMP, lipid panel  Obesity (BMI 30 0-34  9)  Patient lost 15 lb since last visit in March 2022  She follows a gluten free diet  Encouraged regular exercise, continue to work on weight reduction  Celiac disease  Continue to follow a gluten free diet  Follow-up with gastroenterology Dr Danica Klein  Esophageal reflux  Reports improvement in symptoms  Continue omeprazole 40 mg daily as per gastroenterology Dr Danica Klein  Elevated LFTs  Liver enzymes improved  Patient lost 15 lb since March 2022  Continue to follow a low-fat diet, regular exercise  Follow-up with gastroenterology Dr Danica Klein  Recurrent major depressive disorder, in partial remission (HCC)  Symptoms improved  Continue Paxil 20 mg daily  Mild intermittent asthma without complication  Symptoms are stable  Use Ventolin HFA inhaler PRN  Anxiety associated with depression  Mood has been stable    Continue Paxil 20 mg daily       Elevated hematocrit  Hematocrit 43 9 ( improved from 46 5 in August 2021)  Continue aspirin 81 mg daily  Stay well hydrated  Will continue to monitor labs  HM: recommended annual Flu vaccination  Patient will be due for Covid booster this fall  Schedule follow-up office visit, Medicare AWV in 6 months  Diagnoses and all orders for this visit:    Dyslipidemia  -     Lipid panel; Future  -     Comprehensive metabolic panel; Future    Obesity (BMI 30 0-34 9)  -     Comprehensive metabolic panel; Future    Celiac disease    Gastroesophageal reflux disease, unspecified whether esophagitis present    Elevated LFTs  -     Comprehensive metabolic panel; Future    Recurrent major depressive disorder, in partial remission (HCC)    Mild intermittent asthma without complication    Anxiety associated with depression    Elevated hematocrit          Subjective:      Patient ID: Chepe Simmons is a 75 y o  female  HPI     Patient presents for 6 months follow-up office visit  PMHx: Dyslipidemia, Asthma, Obesity, elevated LFT's, fatty liver, GERD, celiac disease, Depression, Anxiety, Insomnia, H/o right breast CA  Reviewed current medications, blood test results from June 2022  Hemoglobin 14 9, Hematocrit 43 9( improved from 46 5 in August 2021)  Vit D 62 8  Fasting blood sugar 98  Creatinine 0 93  AST 52, ALT 46  Patient states that she feels well overall  Currently following a gluten free diet  Reports no abdominal pain, bloating, constipation, diarrhea  Lost 15 lb since lat visit in 3/2022  She has been followed by gastroenterology Dr Raymundo Friend,  was seen in June 2022  GERD - patient is doing well on Omeprazole 40 mg dailly  Colonoscopy in January 2022, 2 polyps were removed  Colorectal surgeon Dr Mary Granger recommended to repeat colonoscopy in 3 years  Mild intermittent asthma - symptoms are stable  Denies cough, shortness of breath, chest tightness  Right breast CA diagnosed in 2017,  status post right breast lumpectomy  Patient has been followed by [facility] surgical oncology group  Mammogram done March 2022  MRI breast done in July 2022 Showed no suspicious masses  The following portions of the patient's history were reviewed and updated as appropriate: allergies, current medications, past family history, past medical history, past surgical history and problem list     Review of Systems   Constitutional: Negative for activity change, appetite change, chills, fatigue and fever  HENT: Negative for congestion, ear pain, hearing loss, sore throat and trouble swallowing  Eyes: Negative  Respiratory: Negative for cough, chest tightness, shortness of breath and wheezing  Cardiovascular: Negative for chest pain, palpitations and leg swelling  Gastrointestinal: Negative for abdominal pain, blood in stool, constipation, diarrhea, nausea and vomiting  Genitourinary: Negative for difficulty urinating, dysuria, flank pain and hematuria  Musculoskeletal: Negative for arthralgias, back pain, gait problem and joint swelling  Skin: Negative for rash  Neurological: Negative for dizziness, syncope and headaches  Hematological: Negative  Psychiatric/Behavioral: Positive for sleep disturbance  Negative for suicidal ideas  Anxiety / Depression - mood has been stable  Objective:      /82 (BP Location: Left arm, Patient Position: Sitting, Cuff Size: Standard)   Pulse 72   Temp (!) 97 4 °F (36 3 °C) (Tympanic)   Resp 16   Ht 5' 9" (1 753 m)   Wt 99 8 kg (220 lb)   SpO2 98%   BMI 32 49 kg/m²          Physical Exam  Vitals and nursing note reviewed  Constitutional:       Appearance: Normal appearance  She is obese  HENT:      Head: Normocephalic and atraumatic  Eyes:      Conjunctiva/sclera: Conjunctivae normal       Pupils: Pupils are equal, round, and reactive to light     Neck:      Vascular: No carotid bruit  Cardiovascular:      Rate and Rhythm: Normal rate and regular rhythm  Heart sounds: No murmur heard  Pulmonary:      Effort: Pulmonary effort is normal       Breath sounds: Normal breath sounds  Abdominal:      General: Bowel sounds are normal  There is no distension  Palpations: Abdomen is soft  Tenderness: There is no abdominal tenderness  Musculoskeletal:         General: No swelling, tenderness or deformity  Normal range of motion  Cervical back: Normal range of motion and neck supple  Right lower leg: No edema  Left lower leg: No edema  Skin:     General: Skin is warm and dry  Findings: No rash  Neurological:      Mental Status: She is alert     Psychiatric:         Mood and Affect: Mood normal

## 2022-10-03 ENCOUNTER — CLINICAL SUPPORT (OUTPATIENT)
Dept: FAMILY MEDICINE CLINIC | Facility: CLINIC | Age: 67
End: 2022-10-03
Payer: MEDICARE

## 2022-10-03 DIAGNOSIS — Z23 ENCOUNTER FOR IMMUNIZATION: Primary | ICD-10-CM

## 2022-10-03 PROCEDURE — 90662 IIV NO PRSV INCREASED AG IM: CPT

## 2022-10-03 PROCEDURE — G0008 ADMIN INFLUENZA VIRUS VAC: HCPCS

## 2022-10-12 PROBLEM — Z00.00 MEDICARE ANNUAL WELLNESS VISIT, SUBSEQUENT: Status: RESOLVED | Noted: 2019-10-06 | Resolved: 2022-10-12

## 2022-10-31 ENCOUNTER — APPOINTMENT (OUTPATIENT)
Dept: LAB | Facility: CLINIC | Age: 67
End: 2022-10-31

## 2022-10-31 DIAGNOSIS — K90.0 CELIAC DISEASE: ICD-10-CM

## 2022-11-01 ENCOUNTER — OFFICE VISIT (OUTPATIENT)
Dept: GASTROENTEROLOGY | Facility: AMBULARY SURGERY CENTER | Age: 67
End: 2022-11-01

## 2022-11-01 ENCOUNTER — TELEPHONE (OUTPATIENT)
Dept: GASTROENTEROLOGY | Facility: AMBULARY SURGERY CENTER | Age: 67
End: 2022-11-01

## 2022-11-01 VITALS
HEIGHT: 69 IN | HEART RATE: 80 BPM | BODY MASS INDEX: 32.26 KG/M2 | DIASTOLIC BLOOD PRESSURE: 78 MMHG | SYSTOLIC BLOOD PRESSURE: 152 MMHG | OXYGEN SATURATION: 97 % | WEIGHT: 217.8 LBS

## 2022-11-01 DIAGNOSIS — D12.6 TUBULAR ADENOMA OF COLON: ICD-10-CM

## 2022-11-01 DIAGNOSIS — R79.89 ELEVATED LFTS: Primary | ICD-10-CM

## 2022-11-01 DIAGNOSIS — R16.2 HEPATOSPLENOMEGALY: ICD-10-CM

## 2022-11-01 DIAGNOSIS — K90.0 CELIAC DISEASE: ICD-10-CM

## 2022-11-01 DIAGNOSIS — Z80.0 FAMILY HISTORY OF COLON CANCER: ICD-10-CM

## 2022-11-01 NOTE — TELEPHONE ENCOUNTER
----- Message from Melva Velez MD sent at 11/1/2022  9:37 AM EDT -----  Can we please help this patient to coordinate colonoscopy with Dr Chris Dasilva and EGD with one of our GI Colleagues at [facility]? Alternatively, if can't happen- can have both with me or EGD with me per her preference  Approx time of procedures around March/April 2023, thanks!

## 2022-11-01 NOTE — PROGRESS NOTES
[facility] - Outpatient Consultation  Robert Maharaj 75 y o  female MRN: [id number]  Encounter: [id number]      PCP: Daron Kawasaki, MD  Referring: No referring provider defined for this encounter  ASSESSMENT AND PLAN:      1  Celiac disease  - due for EGD next year  - continue strict gluten free diet  - celiac labs pending  - Comprehensive metabolic panel; Future    2  Elevated LFTs  3  Hepatosplenomegaly  Liver enzymes significantly improved following a gluten free diet  She continues to attempt weight loss  - US right upper quadrant; Future  - Comprehensive metabolic panel; Future (added on to celiac panel that was drawn yesterday)    4  Tubular adenoma of colon  5  Family history of colon cancer  - due for colonoscopy in 2023, last performed by Dr Zackary Tenorio  - will attempt to coordinate EGD/colonoscopy  ______________________________________________________________________    CC:  Chief Complaint   Patient presents with   • Follow-up       HPI:      Patient is a 18-year-old female who is seen in follow-up for celiac disease-after presentation with elevated liver enzymes, and confirmation seen on EGD in April 2022 with patchy increased intraepithelial lymphocytes of the duodenum and positive celiac antibodies with tTG >100  Follow up celiac antibody panel did show significant improvement- and liver enzymes near normalized  She does admit to rare gluten intake-for example at a fair recently  She is due for her colonoscopy next year, as well as her endoscopy  She has regular bowel movements without rectal bleeding, she denies any chronic abdominal pain, bloating, unintentional weight loss  REVIEW OF SYSTEMS:    CONSTITUTIONAL: Denies any fever, chills, rigors, and weight loss  HEENT: No earache or tinnitus  Denies hearing loss or visual disturbances  CARDIOVASCULAR: No chest pain or palpitations     RESPIRATORY: Denies any cough, hemoptysis, shortness of breath or dyspnea on exertion  GASTROINTESTINAL: As noted in the History of Present Illness  GENITOURINARY: No problems with urination  Denies any hematuria or dysuria  NEUROLOGIC: No dizziness or vertigo, denies headaches  MUSCULOSKELETAL: Denies any muscle or joint pain  SKIN: Denies skin rashes or itching  ENDOCRINE: Denies excessive thirst  Denies intolerance to heat or cold  PSYCHOSOCIAL: Denies depression or anxiety  Denies any recent memory loss  Historical Information   Past Medical History:   Diagnosis Date   • Allergic rhinitis     last assessed-10/2/2017   • Asthma    • Breast cancer ([facility] [address] ) 2017    LCIS   • Carotid bruit     R-last assessed-6/10/2014   • Celiac disease    • Dysphagia     last assessed-2013   • GERD (gastroesophageal reflux disease)      Past Surgical History:   Procedure Laterality Date   • BREAST BIOPSY  11/15/2017    percutaneous needle core   • BREAST LUMPECTOMY Right 2017    LCIS   •  SECTION     • COLONOSCOPY      complete-12/3/2012-internal/external hemorrhoids, perianal or excoriation, mild sigmoid diverticulosis   • MAMMO NEEDLE LOCALIZATION RIGHT (ALL INC) Right 2017   • MAMMO NEEDLE LOCALIZATION RIGHT (ALL INC) EACH ADD Right 2017   • MAMMO STEREOTACTIC BREAST BIOPSY RIGHT (ALL INC) Right 11/15/2017   • MAMMO STEREOTACTIC BREAST BIOPSY RIGHT (ALL INC) EACH ADD Right 11/15/2017   • MRI BREAST BIOPSY LEFT (ALL INCLUSIVE) Left 2019   • MD PERQ DEVICE PLACEMT BREAST LOC 1ST LES W [place] Right 2017    Procedure: BREAST LUMPECTOMY; BREAST BRACKETED NEEDLE LOCALIZATION (BRACKETED NEEDLE LOC AT 0900);   Surgeon: Yasmin Holt MD;  [facility];  Service: Surgical Oncology   • UPPER GASTROINTESTINAL ENDOSCOPY     • VAGINAL DILATATION  1978    d&e     Social History   Social History     Substance and Sexual Activity   Alcohol Use Yes   • Alcohol/week: 1 0 standard drink   • Types: 1 Glasses of wine per week    Comment: per allscripts, social drinker     Social History     Substance and Sexual Activity   Drug Use No     Social History     Tobacco Use   Smoking Status Former Smoker   • Years: 2 00   • Types: Cigarettes   • Quit date: 1976   • Years since quittin 11   Smokeless Tobacco Never Used     Family History   Problem Relation Age of Onset   • Heart attack Mother         acute MI   • Colon polyps Mother         polyps of the sigmoid colon   • Colon cancer Mother    • Breast cancer Sister 47   • Uterine cancer Sister 59   • Liver cancer Brother 48   • Colon cancer Brother 48   • Lung cancer Brother    • No Known Problems Brother    • No Known Problems Brother    • No Known Problems Brother    • No Known Problems Brother    • Uterine cancer Maternal Grandmother         unknown age-maybe 63's   • Uterine cancer Paternal [de-identified]         unknown age , maybe 61's   • Diabetes Paternal Grandfather    • Stroke Maternal Uncle    • Kidney cancer Paternal Aunt 54   • Pancreatic cancer Paternal Uncle 79   • Breast cancer Cousin 62   • Uterine cancer Cousin 54   • Colon cancer Cousin 61   • No Known Problems Daughter    • No Known Problems Daughter    • No Known Problems Son        Meds/Allergies       Current Outpatient Medications:   •  albuterol (Ventolin HFA) 90 mcg/act inhaler  •  aspirin 81 MG tablet  •  calcium citrate (CALCITRATE) 950 MG tablet  •  fluocinonide (LIDEX) 0 05 % cream  •  Multiple Vitamin (MULTI-VITAMIN DAILY PO)  •  omeprazole (PriLOSEC) 40 MG capsule  •  PARoxetine (PAXIL) 20 mg tablet    No Known Allergies        Objective     Blood pressure 152/78, pulse 80, height 5' 9" (1 753 m), weight 98 8 kg (217 lb 12 8 oz), SpO2 97 %  Body mass index is 32 16 kg/m²  PHYSICAL EXAM:      General Appearance:   Alert, cooperative, no distress   HEENT:   Normocephalic, atraumatic, anicteric       Neck:  Supple, symmetrical, trachea midline   Lungs:   Clear to auscultation bilaterally; no rales, rhonchi or wheezing; respirations unlabored    Heart[de-identified]   Regular rate and rhythm; no murmur, rub, or gallop  Abdomen:   Soft, non-tender, non-distended; normal bowel sounds; no masses, no organomegaly    Genitalia:   Deferred    Rectal:   Deferred    Extremities:  No cyanosis, clubbing or edema    Pulses:  2+ and symmetric    Skin:  No jaundice, rashes, or lesions    Lymph nodes:  No palpable cervical lymphadenopathy        Lab Results:     Lab Results   Component Value Date    WBC 6 81 06/17/2022    HGB 14 9 06/17/2022    HCT 43 9 06/17/2022    MCV 84 06/17/2022     06/17/2022       Lab Results   Component Value Date     10/27/2017    K 3 8 06/17/2022     06/17/2022    CO2 26 06/17/2022    BUN 14 06/17/2022    CREATININE 0 93 06/17/2022    GLUF 98 06/17/2022    CALCIUM 9 7 06/17/2022    AST 52 (H) 06/17/2022    ALT 46 06/17/2022    ALKPHOS 84 06/17/2022    PROT 7 0 10/27/2017    BILITOT 0 6 10/27/2017    EGFR 63 06/17/2022       No results found for: INR, PROTIME      Radiology Results:   No results found  Portions of the record may have been created with voice recognition software  Occasional wrong word or "sound a like" substitutions may have occurred due to the inherent limitations of voice recognition software  Read the chart carefully and recognize, using context, where substitutions have occurred

## 2022-11-03 LAB
ENDOMYSIUM IGA SER QL: NEGATIVE
GLIADIN PEPTIDE IGA SER-ACNC: 22 UNITS (ref 0–19)
GLIADIN PEPTIDE IGG SER-ACNC: 16 UNITS (ref 0–19)
IGA SERPL-MCNC: 298 MG/DL (ref 87–352)
TTG IGA SER-ACNC: 6 U/ML (ref 0–3)
TTG IGG SER-ACNC: <2 U/ML (ref 0–5)

## 2022-11-09 ENCOUNTER — OFFICE VISIT (OUTPATIENT)
Dept: SURGICAL ONCOLOGY | Facility: CLINIC | Age: 67
End: 2022-11-09

## 2022-11-09 VITALS
RESPIRATION RATE: 20 BRPM | DIASTOLIC BLOOD PRESSURE: 80 MMHG | SYSTOLIC BLOOD PRESSURE: 132 MMHG | OXYGEN SATURATION: 99 % | WEIGHT: 220 LBS | HEIGHT: 69 IN | HEART RATE: 86 BPM | BODY MASS INDEX: 32.58 KG/M2 | TEMPERATURE: 97.8 F

## 2022-11-09 DIAGNOSIS — Z86.000 HISTORY OF LOBULAR CARCINOMA IN SITU (LCIS) OF BREAST: ICD-10-CM

## 2022-11-09 DIAGNOSIS — Z91.89 INCREASED RISK OF BREAST CANCER: Primary | ICD-10-CM

## 2022-11-09 NOTE — PROGRESS NOTES
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      Discussion/Summary: Patient is a 68-year-old female presenting today for six-month follow-up for history of LCIS and family history of breast cancer  She underwent genetic testing which was negative  Her sister was diagnosed with breast cancer  She underwent a lumpectomy with Dr Rafael Pierre in December 2017  We have been seeing her every 6 months and screening with annual mammograms and breast MRIs  She had a bilateral breast MRI on 7/6/2022 which was benign  There were no concens on for clinical breast exam   Patient and I discussed need for bilateral breast MRI in the future she has approximately 5 years out from her LCIS diagnosis  She is been doing great thus far and is getting bi-annual breast exams  We will have her resume with with annual mammograms and can reassess obtaining MRI's in the future  I will see the patient back in 6 months or sooner should the need arise  She was instructed to call with any questions or concerns prior to this time  All questions were answered today  History of Present Illness:     Oncology History    No history exists         -Interval History: Patient is a 68-year-old female presenting today for six-month follow-up for history of LCIS and family history of breast cancer  She had a bilateral breast MRI on 7/6/2022 which was benign  She denies changes on her breast exam and with her family history         Review of Systems:  Review of Systems   Constitutional: Negative for activity change, appetite change, fatigue and unexpected weight change  Respiratory: Negative for cough and shortness of breath  Cardiovascular: Negative for chest pain  Gastrointestinal: Negative for abdominal pain, diarrhea, nausea and vomiting  Endocrine: Negative for heat intolerance  Musculoskeletal: Negative for arthralgias, back pain and myalgias  Skin: Negative for rash  Neurological: Negative for weakness and headaches  Hematological: Negative for adenopathy  Patient Active Problem List   Diagnosis   • Family history of malignant neoplasm of uterus   • Family history of malignant neoplasm of gastrointestinal tract   • Family history of malignant neoplasm of breast   • Recurrent major depressive disorder, in partial remission ([facility] [address] )   • Dyslipidemia   • Elevated LFTs   • Esophageal reflux   • Left thyroid nodule   • 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   • Mild intermittent asthma without complication   • Obesity (BMI 30 0-34  9)   • Rosacea   • Increased risk of breast cancer   • Abnormal MRI, breast   • Hand eczema   • Chronic insomnia   • Abnormal EKG   • Anxiety associated with depression   • Elevated hematocrit   • Hepatosplenomegaly   • Gallbladder polyp   • Inflamed skin tag   • Celiac disease     Past Medical History:   Diagnosis Date   • Allergic rhinitis     last assessed-10/2/2017   • Asthma    • Breast cancer ([facility] [address] ) 2017    LCIS   • Carotid bruit     R-last assessed-6/10/2014   • Celiac disease    • Dysphagia     last assessed-2013   • GERD (gastroesophageal reflux disease)      Past Surgical History:   Procedure Laterality Date   • BREAST BIOPSY  11/15/2017    percutaneous needle core   • BREAST LUMPECTOMY Right 2017    LCIS   •  SECTION  1984   • COLONOSCOPY      complete-12/3/2012-internal/external hemorrhoids, perianal or excoriation, mild sigmoid diverticulosis   • MAMMO NEEDLE LOCALIZATION RIGHT (ALL INC) Right 2017   • MAMMO NEEDLE LOCALIZATION RIGHT (ALL INC) EACH ADD Right 2017   • MAMMO STEREOTACTIC BREAST BIOPSY RIGHT (ALL INC) Right 11/15/2017   • MAMMO STEREOTACTIC BREAST BIOPSY RIGHT (ALL INC) EACH ADD Right 11/15/2017   • MRI BREAST BIOPSY LEFT (ALL INCLUSIVE) Left 2019   • AK PERQ DEVICE PLACEMT BREAST LOC 1ST LES W [place] Right 2017    Procedure: BREAST LUMPECTOMY; BREAST BRACKETED NEEDLE LOCALIZATION (BRACKETED NEEDLE LOC AT 0900);   Surgeon: Fela Schroeder MD;  [facility];  Service: Surgical Oncology   • UPPER GASTROINTESTINAL ENDOSCOPY     • VAGINAL DILATATION      d&e     Family History   Problem Relation Age of Onset   • Heart attack Mother         acute MI   • Colon polyps Mother         polyps of the sigmoid colon   • Colon cancer Mother    • Breast cancer Sister 47   • Uterine cancer Sister 59   • Liver cancer Brother 48   • Colon cancer Brother 48   • Lung cancer Brother    • No Known Problems Brother    • No Known Problems Brother    • No Known Problems Brother    • No Known Problems Brother    • Uterine cancer Maternal Grandmother         unknown age-maybe 63's   • Uterine cancer Paternal Grandmother         unknown age , maybe 61's   • Diabetes Paternal Grandfather    • Stroke Maternal Uncle    • Kidney cancer Paternal Aunt 54   • Pancreatic cancer Paternal Uncle 79   • Breast cancer Cousin 62   • Uterine cancer Cousin 54   • Colon cancer Cousin 61   • No Known Problems Daughter    • No Known Problems Daughter    • No Known Problems Son      Social History     Socioeconomic History   • Marital status: /Civil Union     Spouse name: Not on file   • Number of children: Not on file   • Years of education: Not on file   • Highest education level: Not on file   Occupational History   • Not on file   Tobacco Use   • Smoking status: Former Smoker     Years: 2 00     Types: Cigarettes     Quit date: 1976     Years since quittin 5   • Smokeless tobacco: Never Used   Vaping Use   • Vaping Use: Never used   Substance and Sexual Activity   • Alcohol use: Yes     Alcohol/week: 1 0 standard drink     Types: 1 Glasses of wine per week     Comment: per allscripts, social drinker   • Drug use: No   • Sexual activity: Not Currently     Partners: Male     Birth control/protection: Post-menopausal   Other Topics Concern   • Not on file   Social History Narrative   • Not on file     Social Determinants of Health     Financial Resource Strain: Not on file   Food Insecurity: Not on file   Transportation Needs: Not on file   Physical Activity: Not on file   Stress: Not on file   Social Connections: Not on file   Intimate Partner Violence: Not on file   Housing Stability: Not on file       Current Outpatient Medications:   •  albuterol (Ventolin HFA) 90 mcg/act inhaler, Inhale 2 puffs every 4 (four) hours as needed for wheezing, Disp: 18 g, Rfl: 5  •  aspirin 81 MG tablet, Take 81 mg by mouth daily, Disp: , Rfl:   •  calcium citrate (CALCITRATE) 950 MG tablet, Take 1 tablet by mouth daily, Disp: , Rfl:   •  fluocinonide (LIDEX) 0 05 % cream, Apply topically 2 (two) times a day (Patient taking differently: Apply topically if needed), Disp: 30 g, Rfl: 1  •  Multiple Vitamin (MULTI-VITAMIN DAILY PO), Take by mouth, Disp: , Rfl:   •  omeprazole (PriLOSEC) 40 MG capsule, Take 1 capsule (40 mg total) by mouth daily, Disp: 90 capsule, Rfl: 3  •  PARoxetine (PAXIL) 20 mg tablet, TAKE ONE TABLET BY MOUTH EVERY DAY, Disp: 90 tablet, Rfl: 3  No Known Allergies  Vitals:    11/09/22 0819   BP: 132/80   Pulse: 86   Resp: 20   Temp: 97 8 °F (36 6 °C)   SpO2: 99%       Physical Exam  Constitutional:       General: She is not in acute distress  Appearance: Normal appearance  Cardiovascular:      Rate and Rhythm: Normal rate and regular rhythm  Pulses: Normal pulses  Heart sounds: Normal heart sounds  Pulmonary:      Effort: Pulmonary effort is normal       Breath sounds: Normal breath sounds  Chest:      Chest wall: No mass  Breasts:      Right: No swelling, bleeding, inverted nipple, mass, nipple discharge, skin change, tenderness, axillary adenopathy or supraclavicular adenopathy  Left: No swelling, bleeding, inverted nipple, mass, nipple discharge, skin change, tenderness, axillary adenopathy or supraclavicular adenopathy  Comments: No masses, nodularity, skin changes, nipple changes or discharge, or adenopathy appreciated on physical exam      Abdominal:      General: Abdomen is flat  Palpations: Abdomen is soft  Lymphadenopathy:      Upper Body:      Right upper body: No supraclavicular, axillary or pectoral adenopathy  Left upper body: No supraclavicular, axillary or pectoral adenopathy  Skin:     General: Skin is warm  Neurological:      General: No focal deficit present  Mental Status: She is alert and oriented to person, place, and time  Psychiatric:         Mood and Affect: Mood normal          Behavior: Behavior normal            Results:    Imaging  No results found  I reviewed the above imaging data  Advance Care Planning/Advance Directives:  Discussed disease status, cancer treatment plans and/or cancer treatment goals with the patient

## 2022-11-12 ENCOUNTER — HOSPITAL ENCOUNTER (OUTPATIENT)
Dept: ULTRASOUND IMAGING | Facility: HOSPITAL | Age: 67
Discharge: HOME | End: 2022-11-12
Attending: INTERNAL MEDICINE

## 2022-11-12 DIAGNOSIS — R16.2 HEPATOSPLENOMEGALY: ICD-10-CM

## 2022-11-12 DIAGNOSIS — R79.89 ELEVATED LFTS: ICD-10-CM

## 2022-11-16 DIAGNOSIS — R16.2 HEPATOSPLENOMEGALY: ICD-10-CM

## 2022-11-16 DIAGNOSIS — R79.89 ELEVATED LFTS: Primary | ICD-10-CM

## 2022-11-22 ENCOUNTER — APPOINTMENT (OUTPATIENT)
Dept: LAB | Facility: CLINIC | Age: 67
End: 2022-11-22

## 2022-11-22 DIAGNOSIS — E78.5 DYSLIPIDEMIA: ICD-10-CM

## 2022-11-22 DIAGNOSIS — E66.9 OBESITY (BMI 30.0-34.9): ICD-10-CM

## 2022-11-22 DIAGNOSIS — R16.2 HEPATOSPLENOMEGALY: ICD-10-CM

## 2022-11-22 DIAGNOSIS — R79.89 ELEVATED LFTS: ICD-10-CM

## 2022-11-22 LAB
ALBUMIN SERPL BCP-MCNC: 4.4 G/DL (ref 3.5–5)
ALP SERPL-CCNC: 79 U/L (ref 34–104)
ALT SERPL W P-5'-P-CCNC: 51 U/L (ref 7–52)
ANION GAP SERPL CALCULATED.3IONS-SCNC: 8 MMOL/L (ref 4–13)
AST SERPL W P-5'-P-CCNC: 52 U/L (ref 13–39)
BILIRUB SERPL-MCNC: 0.6 MG/DL (ref 0.2–1)
BUN SERPL-MCNC: 12 MG/DL (ref 5–25)
CALCIUM SERPL-MCNC: 9.6 MG/DL (ref 8.4–10.2)
CHLORIDE SERPL-SCNC: 105 MMOL/L (ref 96–108)
CHOLEST SERPL-MCNC: 184 MG/DL
CO2 SERPL-SCNC: 29 MMOL/L (ref 21–32)
CREAT SERPL-MCNC: 0.93 MG/DL (ref 0.6–1.3)
GFR SERPL CREATININE-BSD FRML MDRD: 63 ML/MIN/1.73SQ M
GLUCOSE P FAST SERPL-MCNC: 104 MG/DL (ref 65–99)
HDLC SERPL-MCNC: 40 MG/DL
LDLC SERPL CALC-MCNC: 119 MG/DL (ref 0–100)
NONHDLC SERPL-MCNC: 144 MG/DL
POTASSIUM SERPL-SCNC: 3.4 MMOL/L (ref 3.5–5.3)
PROT SERPL-MCNC: 7.9 G/DL (ref 6.4–8.4)
SODIUM SERPL-SCNC: 142 MMOL/L (ref 135–147)
TRIGL SERPL-MCNC: 124 MG/DL

## 2022-11-25 DIAGNOSIS — R79.89 ELEVATED LFTS: ICD-10-CM

## 2022-11-25 DIAGNOSIS — R71.8 ELEVATED HEMATOCRIT: Primary | ICD-10-CM

## 2023-01-04 ENCOUNTER — OFFICE VISIT (OUTPATIENT)
Dept: CARDIOLOGY CLINIC | Facility: CLINIC | Age: 68
End: 2023-01-04

## 2023-01-04 VITALS
HEART RATE: 75 BPM | HEIGHT: 69 IN | WEIGHT: 219 LBS | DIASTOLIC BLOOD PRESSURE: 78 MMHG | SYSTOLIC BLOOD PRESSURE: 132 MMHG | BODY MASS INDEX: 32.44 KG/M2

## 2023-01-04 DIAGNOSIS — E78.5 DYSLIPIDEMIA: Primary | ICD-10-CM

## 2023-01-04 DIAGNOSIS — R94.31 ABNORMAL EKG: ICD-10-CM

## 2023-01-04 NOTE — PROGRESS NOTES
Cardiology Follow Up    Landen Sanon  1955  [id number]  [address] [facility] [address] [phone]    1  Dyslipidemia  POCT ECG      2  Abnormal EKG  POCT ECG          Interval History: Cardiology follow-up  Patient was last seen on 12/20  Patient continues to do well from the cardiac point of view denies any significant chest pain or dyspnea  Patient states been compliant with low-cholesterol diet most recent lipid profile cholesterol 184, HDL 40,   Patient was recently diagnosed with celiac disease, she changed her diet  Denies any significant bleeding issues on chronic aspirin therapy  Patient Active Problem List   Diagnosis   • Family history of malignant neoplasm of uterus   • Family history of malignant neoplasm of gastrointestinal tract   • Family history of malignant neoplasm of breast   • Recurrent major depressive disorder, in partial remission ([facility] [address] )   • Dyslipidemia   • Elevated LFTs   • Esophageal reflux   • Left thyroid nodule   • 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   • Mild intermittent asthma without complication   • Obesity (BMI 30 0-34  9)   • Rosacea   • Increased risk of breast cancer   • Abnormal MRI, breast   • Hand eczema   • Chronic insomnia   • Abnormal EKG   • Anxiety associated with depression   • Elevated hematocrit   • Hepatosplenomegaly   • Gallbladder polyp   • Inflamed skin tag   • Celiac disease     Past Medical History:   Diagnosis Date   • Allergic rhinitis     last assessed-10/2/2017   • Asthma    • Breast cancer ([facility] [address] ) 11/11/2017    LCIS   • Carotid bruit     R-last assessed-6/10/2014   • Celiac disease    • Dysphagia     last assessed-6/6/2013   • GERD (gastroesophageal reflux disease)      Social History     Socioeconomic History   • Marital status: /Civil Union     Spouse name: Not on file   • Number of children: Not on file • Years of education: Not on file   • Highest education level: Not on file   Occupational History   • Not on file   Tobacco Use   • Smoking status: Former     Years: 2 00     Types: Cigarettes     Quit date: 1976     Years since quittin 7   • Smokeless tobacco: Never   Vaping Use   • Vaping Use: Never used   Substance and Sexual Activity   • Alcohol use:  Yes     Alcohol/week: 1 0 standard drink     Types: 1 Glasses of wine per week     Comment: per allscripts, social drinker   • Drug use: No   • Sexual activity: Not Currently     Partners: Male     Birth control/protection: Post-menopausal   Other Topics Concern   • Not on file   Social History Narrative   • Not on file     Social Determinants of Health     Financial Resource Strain: Not on file   Food Insecurity: Not on file   Transportation Needs: Not on file   Physical Activity: Not on file   Stress: Not on file   Social Connections: Not on file   Intimate Partner Violence: Not on file   Housing Stability: Not on file      Family History   Problem Relation Age of Onset   • Heart attack Mother         acute MI   • Colon polyps Mother         polyps of the sigmoid colon   • Colon cancer Mother    • Breast cancer Sister 47   • Uterine cancer Sister 59   • Liver cancer Brother 48   • Colon cancer Brother 48   • Lung cancer Brother    • No Known Problems Brother    • No Known Problems Brother    • No Known Problems Brother    • No Known Problems Brother    • Uterine cancer Maternal Grandmother         unknown age-maybe 63's   • Uterine cancer Paternal Grandmother         unknown age , maybe 61's   • Diabetes Paternal Grandfather    • Stroke Maternal Uncle    • Kidney cancer Paternal Aunt 54   • Pancreatic cancer Paternal Uncle 79   • Breast cancer Cousin 62   • Uterine cancer Cousin 54   • Colon cancer Cousin 61   • No Known Problems Daughter    • No Known Problems Daughter    • No Known Problems Son      Past Surgical History:   Procedure Laterality Date • BREAST BIOPSY  11/15/2017    percutaneous needle core   • BREAST LUMPECTOMY Right 2017    LCIS   •  SECTION  1984   • COLONOSCOPY      complete-12/3/2012-internal/external hemorrhoids, perianal or excoriation, mild sigmoid diverticulosis   • MAMMO NEEDLE LOCALIZATION RIGHT (ALL INC) Right 2017   • MAMMO NEEDLE LOCALIZATION RIGHT (ALL INC) EACH ADD Right 2017   • MAMMO STEREOTACTIC BREAST BIOPSY RIGHT (ALL INC) Right 11/15/2017   • MAMMO STEREOTACTIC BREAST BIOPSY RIGHT (ALL INC) EACH ADD Right 11/15/2017   • MRI BREAST BIOPSY LEFT (ALL INCLUSIVE) Left 2019   • NV PERQ DEVICE PLACEMENT BREAST LOC 1ST LES W/GDNCE Right 2017    Procedure: BREAST LUMPECTOMY; BREAST BRACKETED NEEDLE LOCALIZATION (BRACKETED NEEDLE LOC AT 0900);   Surgeon: Cruz Urbano MD;  Location: [facility];  Service: Surgical Oncology   • UPPER GASTROINTESTINAL ENDOSCOPY     • VAGINAL DILATATION  1978    d&e       Current Outpatient Medications:   •  albuterol (Ventolin HFA) 90 mcg/act inhaler, Inhale 2 puffs every 4 (four) hours as needed for wheezing, Disp: 18 g, Rfl: 5  •  aspirin 81 MG tablet, Take 81 mg by mouth daily, Disp: , Rfl:   •  calcium citrate (CALCITRATE) 950 MG tablet, Take 1 tablet by mouth daily, Disp: , Rfl:   •  fluocinonide (LIDEX) 0 05 % cream, Apply topically 2 (two) times a day (Patient taking differently: Apply topically if needed), Disp: 30 g, Rfl: 1  •  Multiple Vitamin (MULTI-VITAMIN DAILY PO), Take by mouth, Disp: , Rfl:   •  omeprazole (PriLOSEC) 40 MG capsule, Take 1 capsule (40 mg total) by mouth daily, Disp: 90 capsule, Rfl: 3  •  PARoxetine (PAXIL) 20 mg tablet, TAKE ONE TABLET BY MOUTH EVERY DAY, Disp: 90 tablet, Rfl: 3  No Known Allergies    Labs:  Appointment on 2022   Component Date Value   • Cholesterol 2022 184    • Triglycerides 2022 124    • HDL, Direct 2022 40 (L)    • LDL Calculated 2022 119 (H)    • Non-HDL-Chol (CHOL-HDL) 2022 144 • Sodium 11/22/2022 142    • Potassium 11/22/2022 3 4 (L)    • Chloride 11/22/2022 105    • CO2 11/22/2022 29    • ANION GAP 11/22/2022 8    • BUN 11/22/2022 12    • Creatinine 11/22/2022 0 93    • Glucose, Fasting 11/22/2022 104 (H)    • Calcium 11/22/2022 9 6    • AST 11/22/2022 52 (H)    • ALT 11/22/2022 51    • Alkaline Phosphatase 11/22/2022 79    • Total Protein 11/22/2022 7 9    • Albumin 11/22/2022 4 4    • Total Bilirubin 11/22/2022 0 60    • eGFR 11/22/2022 63      Appointment on 11/22/2022   Component Date Value   • Cholesterol 11/22/2022 184    • Triglycerides 11/22/2022 124    • HDL, Direct 11/22/2022 40 (L)    • LDL Calculated 11/22/2022 119 (H)    • Non-HDL-Chol (CHOL-HDL) 11/22/2022 144    • Sodium 11/22/2022 142    • Potassium 11/22/2022 3 4 (L)    • Chloride 11/22/2022 105    • CO2 11/22/2022 29    • ANION GAP 11/22/2022 8    • BUN 11/22/2022 12    • Creatinine 11/22/2022 0 93    • Glucose, Fasting 11/22/2022 104 (H)    • Calcium 11/22/2022 9 6    • AST 11/22/2022 52 (H)    • ALT 11/22/2022 51    • Alkaline Phosphatase 11/22/2022 79    • Total Protein 11/22/2022 7 9    • Albumin 11/22/2022 4 4    • Total Bilirubin 11/22/2022 0 60    • eGFR 11/22/2022 63    Appointment on 10/31/2022   Component Date Value   • IgA 10/31/2022 298    • Gliadin IgA 10/31/2022 22 (H)    • Gliadin IgG 10/31/2022 16    • Tissue Transglut Ab IGG 10/31/2022 <2    • TISSUE TRANSGLUTAMINASE * 10/31/2022 6 (H)    • Endomysial IgA 10/31/2022 Negative      Imaging: No results found  Review of Systems:  Review of Systems   Constitutional: Negative for activity change and fatigue  HENT: Negative for nosebleeds  Respiratory: Negative for apnea, shortness of breath, wheezing and stridor  Cardiovascular: Negative for chest pain, palpitations and leg swelling  Gastrointestinal: Negative for anal bleeding and blood in stool  Genitourinary: Negative for hematuria  Neurological: Negative for syncope     Hematological: Does not bruise/bleed easily  Physical Exam:  Physical Exam  Vitals reviewed  Constitutional:       General: She is not in acute distress  Appearance: Normal appearance  She is obese  She is not ill-appearing, toxic-appearing or diaphoretic  Eyes:      General: No scleral icterus  Neck:      Vascular: No carotid bruit  Cardiovascular:      Rate and Rhythm: Normal rate and regular rhythm  Pulses: Normal pulses  Heart sounds: Normal heart sounds  No murmur heard  No friction rub  No gallop  Pulmonary:      Effort: Pulmonary effort is normal  No respiratory distress  Breath sounds: Normal breath sounds  No stridor  No wheezing, rhonchi or rales  Musculoskeletal:      Right lower leg: No edema  Left lower leg: No edema  Skin:     General: Skin is warm and dry  Capillary Refill: Capillary refill takes less than 2 seconds  Coloration: Skin is not jaundiced or pale  Findings: No bruising or erythema  Neurological:      Mental Status: She is alert  Psychiatric:         Behavior: Behavior normal          Discussion/Summary:   Abnormal EKG questionable inferior infarct  Noninvasive evaluation 2019 an echocardiogram revealed normal left ventricular systolic function ejection fraction 55-60% mild left atrial enlargement mild mitral and tricuspid insufficiency with estimated normal pulmonary pressures suggested by Doppler criteria, the aortic root was described as mildly dilated measuring 37 mm, personally reviewed, I do believe this is upper limits of normal   Stress test she only did 3 minutes of Josue protocol achieving target heart rate, there were no symptoms or EKG changes  At the present time favor no interventions  Continue aspirin therapy, favor no statin therapy she does have history of liver steatosis and elevated LFTs  Recent ultrasound revealed hepatomegaly with liver steatosis  Regular exercise and weight management recommended    Consider pharmacological testing if symptoms worsen  She does have history of asthma which per her own account is mild, she does not use inhalers  This note was completed in part utilizing [employer] fluency direct voice recognition software  Grammatical errors, random word insertion, spelling mistakes, and incomplete sentences may be an occasional consequence of the system secondary to software limitations, ambient noise and hardware issues  At the time of dictation, efforts were made to edit, clarify and /or correct errors  Please read the chart carefully and recognize, using context, where substitutions have occurred  If you have any questions or concerns about the context, text or information contained within the body of this dictation, please contact myself, the provider, for further clarification

## 2023-01-11 ENCOUNTER — TELEPHONE (OUTPATIENT)
Age: 68
End: 2023-01-11

## 2023-01-11 NOTE — TELEPHONE ENCOUNTER
Patient calling to schedule recall colonoscopy with Dr Caridad Maciel  Verified information  Please call to schedule

## 2023-01-23 DIAGNOSIS — R16.2 HEPATOSPLENOMEGALY: ICD-10-CM

## 2023-01-23 DIAGNOSIS — K82.4 GALLBLADDER POLYP: Primary | ICD-10-CM

## 2023-01-23 DIAGNOSIS — K76.0 HEPATIC STEATOSIS: ICD-10-CM

## 2023-01-25 ENCOUNTER — TELEPHONE (OUTPATIENT)
Dept: GASTROENTEROLOGY | Facility: AMBULARY SURGERY CENTER | Age: 68
End: 2023-01-25

## 2023-01-25 NOTE — TELEPHONE ENCOUNTER
Spoke w/ pt   Patient is scheduled for colonoscopy and EGD on March 28 , 2023 at [facility]  with Deepa Adkins MD  Patient is aware of pre-procedure prep of Miralax/Dulcolax and they will be called the day prior between 2 and 6 pm for time to report for procedure  Pre-procedure prep has been given to the patient  via [address] mail on January 25 , 2023

## 2023-02-16 DIAGNOSIS — K21.00 GASTROESOPHAGEAL REFLUX DISEASE WITH ESOPHAGITIS WITHOUT HEMORRHAGE: ICD-10-CM

## 2023-02-16 RX ORDER — OMEPRAZOLE 40 MG/1
40 CAPSULE, DELAYED RELEASE ORAL DAILY
Qty: 90 CAPSULE | Refills: 3 | Status: SHIPPED | OUTPATIENT
Start: 2023-02-16

## 2023-03-07 ENCOUNTER — RA CDI HCC (OUTPATIENT)
Dept: OTHER | Facility: HOSPITAL | Age: 68
End: 2023-03-07

## 2023-03-14 ENCOUNTER — OFFICE VISIT (OUTPATIENT)
Dept: FAMILY MEDICINE CLINIC | Facility: CLINIC | Age: 68
End: 2023-03-14

## 2023-03-14 VITALS
HEART RATE: 77 BPM | DIASTOLIC BLOOD PRESSURE: 70 MMHG | TEMPERATURE: 98 F | OXYGEN SATURATION: 97 % | HEIGHT: 69 IN | RESPIRATION RATE: 16 BRPM | WEIGHT: 220.6 LBS | BODY MASS INDEX: 32.67 KG/M2 | SYSTOLIC BLOOD PRESSURE: 132 MMHG

## 2023-03-14 DIAGNOSIS — K90.0 CELIAC DISEASE: ICD-10-CM

## 2023-03-14 DIAGNOSIS — E66.9 OBESITY (BMI 30.0-34.9): ICD-10-CM

## 2023-03-14 DIAGNOSIS — K76.0 HEPATIC STEATOSIS: ICD-10-CM

## 2023-03-14 DIAGNOSIS — Z00.00 MEDICARE ANNUAL WELLNESS VISIT, SUBSEQUENT: ICD-10-CM

## 2023-03-14 DIAGNOSIS — J45.20 MILD INTERMITTENT ASTHMA WITHOUT COMPLICATION: ICD-10-CM

## 2023-03-14 DIAGNOSIS — K21.9 GASTROESOPHAGEAL REFLUX DISEASE, UNSPECIFIED WHETHER ESOPHAGITIS PRESENT: ICD-10-CM

## 2023-03-14 DIAGNOSIS — F41.8 ANXIETY ASSOCIATED WITH DEPRESSION: ICD-10-CM

## 2023-03-14 DIAGNOSIS — R79.89 ELEVATED LFTS: ICD-10-CM

## 2023-03-14 DIAGNOSIS — E78.5 DYSLIPIDEMIA: Primary | ICD-10-CM

## 2023-03-14 DIAGNOSIS — F33.41 RECURRENT MAJOR DEPRESSIVE DISORDER, IN PARTIAL REMISSION (HCC): ICD-10-CM

## 2023-03-14 NOTE — PATIENT INSTRUCTIONS
Medicare Preventive Visit Patient Instructions  Thank you for completing your Welcome to Medicare Visit or Medicare Annual Wellness Visit today  Your next wellness visit will be due in one year (3/14/2024)  The screening/preventive services that you may require over the next 5-10 years are detailed below  Some tests may not apply to you based off risk factors and/or age  Screening tests ordered at today's visit but not completed yet may show as past due  Also, please note that scanned in results may not display below  Preventive Screenings:  Service Recommendations Previous Testing/Comments   Colorectal Cancer Screening  * Colonoscopy    * Fecal Occult Blood Test (FOBT)/Fecal Immunochemical Test (FIT)  * Fecal DNA/Cologuard Test  * Flexible Sigmoidoscopy Age: 39-70 years old   Colonoscopy: every 10 years (may be performed more frequently if at higher risk)  OR  FOBT/FIT: every 1 year  OR  Cologuard: every 3 years  OR  Sigmoidoscopy: every 5 years  Screening may be recommended earlier than age 39 if at higher risk for colorectal cancer  Also, an individualized decision between you and your healthcare provider will decide whether screening between the ages of 74-80 would be appropriate  Colonoscopy: 01/03/2020  FOBT/FIT: Not on file  Cologuard: Not on file  Sigmoidoscopy: Not on file          Breast Cancer Screening Age: 36 years old  Frequency: every 1-2 years  Not required if history of left and right mastectomy Mammogram: 03/28/2022        Cervical Cancer Screening Between the ages of 21-29, pap smear recommended once every 3 years  Between the ages of 33-67, can perform pap smear with HPV co-testing every 5 years     Recommendations may differ for women with a history of total hysterectomy, cervical cancer, or abnormal pap smears in past  Pap Smear: 04/07/2022        Hepatitis C Screening Once for adults born between 1945 and 1965  More frequently in patients at high risk for Hepatitis C Hep C Antibody: 08/20/2019        Diabetes Screening 1-2 times per year if you're at risk for diabetes or have pre-diabetes Fasting glucose: 104 mg/dL (11/22/2022)  A1C: No results in last 5 years (No results in last 5 years)      Cholesterol Screening Once every 5 years if you don't have a lipid disorder  May order more often based on risk factors  Lipid panel: 11/22/2022          Other Preventive Screenings Covered by Medicare:  1  Abdominal Aortic Aneurysm (AAA) Screening: covered once if your at risk  You're considered to be at risk if you have a family history of AAA  2  Lung Cancer Screening: covers low dose CT scan once per year if you meet all of the following conditions: (1) Age 50-69; (2) No signs or symptoms of lung cancer; (3) Current smoker or have quit smoking within the last 15 years; (4) You have a tobacco smoking history of at least 20 pack years (packs per day multiplied by number of years you smoked); (5) You get a written order from a healthcare provider  3  Glaucoma Screening: covered annually if you're considered high risk: (1) You have diabetes OR (2) Family history of glaucoma OR (3)  aged 48 and older OR (3)  American aged 72 and older  3  Osteoporosis Screening: covered every 2 years if you meet one of the following conditions: (1) You're estrogen deficient and at risk for osteoporosis based off medical history and other findings; (2) Have a vertebral abnormality; (3) On glucocorticoid therapy for more than 3 months; (4) Have primary hyperparathyroidism; (5) On osteoporosis medications and need to assess response to drug therapy  · Last bone density test (DXA Scan): 12/21/2021   5  HIV Screening: covered annually if you're between the age of 15-65  Also covered annually if you are younger than 13 and older than 72 with risk factors for HIV infection  For pregnant patients, it is covered up to 3 times per pregnancy      Immunizations:  Immunization Recommendations   Influenza Vaccine Annual influenza vaccination during flu season is recommended for all persons aged >= 6 months who do not have contraindications   Pneumococcal Vaccine   * Pneumococcal conjugate vaccine = PCV13 (Prevnar 13), PCV15 (Vaxneuvance), PCV20 (Prevnar 20)  * Pneumococcal polysaccharide vaccine = PPSV23 (Pneumovax) Adults 25-60 years old: 1-3 doses may be recommended based on certain risk factors  Adults 72 years old: 1-2 doses may be recommended based off what pneumonia vaccine you previously received   Hepatitis B Vaccine 3 dose series if at intermediate or high risk (ex: diabetes, end stage renal disease, liver disease)   Tetanus (Td) Vaccine - COST NOT COVERED BY MEDICARE PART B Following completion of primary series, a booster dose should be given every 10 years to maintain immunity against tetanus  Td may also be given as tetanus wound prophylaxis  Tdap Vaccine - COST NOT COVERED BY MEDICARE PART B Recommended at least once for all adults  For pregnant patients, recommended with each pregnancy  Shingles Vaccine (Shingrix) - COST NOT COVERED BY MEDICARE PART B  2 shot series recommended in those aged 48 and above     Health Maintenance Due:      Topic Date Due   • Cervical Cancer Screening  11/17/2020   • Colorectal Cancer Screening  01/03/2023   • Breast Cancer Screening: Mammogram  03/28/2024   • Hepatitis C Screening  Completed     Immunizations Due:      Topic Date Due   • Pneumococcal Vaccine: 65+ Years (3 - PPSV23 if available, else PCV20) 02/19/2021   • COVID-19 Vaccine (4 - Booster for Sandoval Peter series) 12/18/2021     Advance Directives   What are advance directives? Advance directives are legal documents that state your wishes and plans for medical care  These plans are made ahead of time in case you lose your ability to make decisions for yourself  Advance directives can apply to any medical decision, such as the treatments you want, and if you want to donate organs     What are the types of advance directives? There are many types of advance directives, and each state has rules about how to use them  You may choose a combination of any of the following:  · Living will: This is a written record of the treatment you want  You can also choose which treatments you do not want, which to limit, and which to stop at a certain time  This includes surgery, medicine, IV fluid, and tube feedings  · Durable power of  for healthcare [facility]): This is a written record that states who you want to make healthcare choices for you when you are unable to make them for yourself  This person, called a proxy, is usually a family member or a friend  You may choose more than 1 proxy  · Do not resuscitate (DNR) order:  A DNR order is used in case your heart stops beating or you stop breathing  It is a request not to have certain forms of treatment, such as CPR  A DNR order may be included in other types of advance directives  · Medical directive: This covers the care that you want if you are in a coma, near death, or unable to make decisions for yourself  You can list the treatments you want for each condition  Treatment may include pain medicine, surgery, blood transfusions, dialysis, IV or tube feedings, and a ventilator (breathing machine)  · Values history: This document has questions about your views, beliefs, and how you feel and think about life  This information can help others choose the care that you would choose  Why are advance directives important? An advance directive helps you control your care  Although spoken wishes may be used, it is better to have your wishes written down  Spoken wishes can be misunderstood, or not followed  Treatments may be given even if you do not want them  An advance directive may make it easier for your family to make difficult choices about your care     Weight Management   Why it is important to manage your weight:  Being overweight increases your risk of health conditions such as heart disease, high blood pressure, type 2 diabetes, and certain types of cancer  It can also increase your risk for osteoarthritis, sleep apnea, and other respiratory problems  Aim for a slow, steady weight loss  Even a small amount of weight loss can lower your risk of health problems  How to lose weight safely:  A safe and healthy way to lose weight is to eat fewer calories and get regular exercise  You can lose up about 1 pound a week by decreasing the number of calories you eat by 500 calories each day  Healthy meal plan for weight management:  A healthy meal plan includes a variety of foods, contains fewer calories, and helps you stay healthy  A healthy meal plan includes the following:  · Eat whole-grain foods more often  A healthy meal plan should contain fiber  Fiber is the part of grains, fruits, and vegetables that is not broken down by your body  Whole-grain foods are healthy and provide extra fiber in your diet  Some examples of whole-grain foods are whole-wheat breads and pastas, oatmeal, brown rice, and bulgur  · Eat a variety of vegetables every day  Include dark, leafy greens such as spinach, kale, bryce greens, and mustard greens  Eat yellow and orange vegetables such as carrots, sweet potatoes, and winter squash  · Eat a variety of fruits every day  Choose fresh or canned fruit (canned in its own juice or light syrup) instead of juice  Fruit juice has very little or no fiber  · Eat low-fat dairy foods  Drink fat-free (skim) milk or 1% milk  Eat fat-free yogurt and low-fat cottage cheese  Try low-fat cheeses such as mozzarella and other reduced-fat cheeses  · Choose meat and other protein foods that are low in fat  Choose beans or other legumes such as split peas or lentils  Choose fish, skinless poultry (chicken or turkey), or lean cuts of red meat (beef or pork)  Before you cook meat or poultry, cut off any visible fat  · Use less fat and oil    Try baking foods instead of frying them  Add less fat, such as margarine, sour cream, regular salad dressing and mayonnaise to foods  Eat fewer high-fat foods  Some examples of high-fat foods include french fries, doughnuts, ice cream, and cakes  · Eat fewer sweets  Limit foods and drinks that are high in sugar  This includes candy, cookies, regular soda, and sweetened drinks  Exercise:  Exercise at least 30 minutes per day on most days of the week  Some examples of exercise include walking, biking, dancing, and swimming  You can also fit in more physical activity by taking the stairs instead of the elevator or parking farther away from stores  Ask your healthcare provider about the best exercise plan for you  © Copyright [address] 2018 Information is for End User's use only and may not be sold, redistributed or otherwise used for commercial purposes   All illustrations and images included in CareNotes® are the copyrighted property of [employer]  or [address] [place] [address]

## 2023-03-14 NOTE — PROGRESS NOTES
Chief Complaint   Patient presents with   • Medicare Wellness Visit     Subsequent visit      Health Maintenance   Topic Date Due   • Cervical Cancer Screening  11/17/2020   • Pneumococcal Vaccine: 65+ Years (3 - PPSV23 if available, else PCV20) 02/19/2021   • COVID-19 Vaccine (4 - Booster for Pfizer series) 12/18/2021   • Colorectal Cancer Screening  01/03/2023   • Medicare Annual Wellness Visit (AWV)  03/02/2023   • BMI: Followup Plan  03/02/2023   • Depression Remission PHQ  09/14/2023   • Fall Risk  03/14/2024   • Urinary Incontinence Screening  03/14/2024   • BMI: Adult  03/14/2024   • Breast Cancer Screening: Mammogram  03/28/2024   • Hepatitis C Screening  Completed   • Osteoporosis Screening  Completed   • Influenza Vaccine  Completed   • HIB Vaccine  Aged Out   • IPV Vaccine  Aged Out   • Hepatitis A Vaccine  Aged Out   • Meningococcal ACWY Vaccine  Aged Out   • HPV Vaccine  Aged Out        Assessment and Plan:     Problem List Items Addressed This Visit        Digestive    Esophageal reflux     Continue Omeprazole 40 mg daily as per gastroenterology  Hepatic steatosis     Follow a low-fat diet, lose weight  Follow-up with gastroenterology Dr Ryan Meyer  Celiac disease     Continue to follow a gluten free diet  Respiratory    Mild intermittent asthma without complication     Symptoms are stable  No recent asthma exacerbation  Use Ventolin HFA inhaler PRN  Other    Recurrent major depressive disorder, in partial remission (HCC)     Mood has been stable  Continue Paroxetine 20 mg daily  Dyslipidemia - Primary     Recommended to follow a low-cholesterol, low-fat diet, regular exercise  Check CMP, lipid panel as ordered in November 2022  Elevated LFTs     Patient has fatty liver  Encouraged to work on weight reduction  Follow-up with gastroenterology Dr Ryan Meyer  Obesity (BMI 30 0-34  9)     Weight has been stable since last visit in September 2022  Encouraged to work on dietary and lifestyle modifications, losing weight  Medicare annual wellness visit, subsequent    Anxiety associated with depression     Symptoms are stable  Continue Paroxetine 20 mg daily  BMI Counseling: Body mass index is 32 58 kg/m²  The BMI is above normal  Nutrition recommendations include decreasing portion sizes, encouraging healthy choices of fruits and vegetables, decreasing fast food intake, consuming healthier snacks, limiting drinks that contain sugar, moderation in carbohydrate intake, increasing intake of lean protein, reducing intake of saturated and trans fat and reducing intake of cholesterol  Exercise recommendations include exercising 3-5 times per week  Rationale for BMI follow-up plan is due to patient being overweight or obese  Preventive health issues were discussed with patient, and age appropriate screening tests were ordered as noted in patient's After Visit Summary  Personalized health advice and appropriate referrals for health education or preventive services given if needed, as noted in patient's After Visit Summary  Schedule follow-up visit in 6 months  History of Present Illness:     Patient presents for a Medicare Wellness Visit    HPI     Patient presents for 6 month follow-up visit, Medicare AWV  PMHx: Dyslipidemia, Asthma, Obesity, elevated LFT's, fatty liver, GERD, celiac disease, Depression, Anxiety, Insomnia, H/o right breast CA  Reviewed current medications, last blood test results from  November 2022  Patient feels well overall  Remains active, walks  Denies chest pain, SOB, dizziness  Patient was seen by gastroenterology Dr Chun Deras in November 2022  Currently following a gluten-free diet for celiac disease  Reports no abdominal pain, abdominal bloating  Colonoscopy done in January 2022, 2 polyps were removed    Family history is positive for colon CA in her brother  R breast CA diagnosed in 2017, status post right breast lumpectomy  Patient has been followed by [facility]ology [facility]  MRI breast done in July 2022 showed no suspicious masses  Mammogram scheduled this month  Patient Care Team:  Annemarie Mraavilla MD as PCP - General Caryle Leys, Ann Joseph MD as Surgeon (Surgical Oncology)  Laura Kelly as Nurse Practitioner (Surgical Oncology)     Review of Systems:     Review of Systems   Constitutional: Positive for fatigue (mild)  Negative for activity change, appetite change, chills and fever  HENT: Negative for congestion, ear pain, hearing loss, sore throat and trouble swallowing  Eyes: Negative  Respiratory: Negative for cough, chest tightness, shortness of breath and wheezing  Cardiovascular: Negative for chest pain, palpitations and leg swelling  Gastrointestinal: Negative for abdominal pain, blood in stool, constipation, diarrhea, nausea and vomiting  Genitourinary: Negative for difficulty urinating, dysuria and hematuria  Musculoskeletal: Negative for arthralgias, back pain, gait problem and joint swelling  Skin: Negative for rash  Neurological: Negative for dizziness and headaches  Hematological: Negative  Psychiatric/Behavioral: Positive for sleep disturbance  Anxiety / Depression - mood has been stable        Problem List:     Patient Active Problem List   Diagnosis   • Family history of malignant neoplasm of uterus   • Family history of malignant neoplasm of gastrointestinal tract   • Family history of malignant neoplasm of breast   • Recurrent major depressive disorder, in partial remission ([address] )   • Dyslipidemia   • Elevated LFTs   • Esophageal reflux   • Left thyroid nodule   • 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   • Mild intermittent asthma without complication   • Obesity (BMI 30 0-34  9)   • Rosacea   • Increased risk of breast cancer   • Abnormal MRI, breast   • Hand eczema   • Chronic insomnia   • Medicare annual wellness visit, subsequent   • Hepatic steatosis   • Abnormal EKG   • Anxiety associated with depression   • Elevated hematocrit   • Hepatosplenomegaly   • Gallbladder polyp   • Inflamed skin tag   • Celiac disease      Past Medical and Surgical History:     Past Medical History:   Diagnosis Date   • Allergic rhinitis     last assessed-10/2/2017   • Asthma    • Breast cancer ([address] ) 2017    LCIS   • Carotid bruit     R-last assessed-6/10/2014   • Celiac disease    • Dysphagia     last assessed-2013   • GERD (gastroesophageal reflux disease)      Past Surgical History:   Procedure Laterality Date   • BREAST BIOPSY  11/15/2017    percutaneous needle core   • BREAST LUMPECTOMY Right 2017    LCIS   •  SECTION     • COLONOSCOPY      complete-12/3/2012-internal/external hemorrhoids, perianal or excoriation, mild sigmoid diverticulosis   • MAMMO NEEDLE LOCALIZATION RIGHT (ALL INC) Right 2017   • MAMMO NEEDLE LOCALIZATION RIGHT (ALL INC) EACH ADD Right 2017   • MAMMO STEREOTACTIC BREAST BIOPSY RIGHT (ALL INC) Right 11/15/2017   • MAMMO STEREOTACTIC BREAST BIOPSY RIGHT (ALL INC) EACH ADD Right 11/15/2017   • MRI BREAST BIOPSY LEFT (ALL INCLUSIVE) Left 2019   • [place] PERQ DEVICE PLACEMENT BREAST LOC 1ST LES W/GDNCE Right 2017    Procedure: BREAST LUMPECTOMY; BREAST BRACKETED NEEDLE LOCALIZATION (BRACKETED NEEDLE LOC AT 0900);   Surgeon: Jodie Malik MD;  [facility];  Service: Surgical Oncology   • UPPER GASTROINTESTINAL ENDOSCOPY     • VAGINAL DILATATION  1978    d&e      Family History:     Family History   Problem Relation Age of Onset   • Heart attack Mother         acute MI   • Colon polyps Mother         polyps of the sigmoid colon   • Colon cancer Mother    • Breast cancer Sister 47   • Uterine cancer Sister 59   • Liver cancer Brother 48   • Colon cancer Brother 48   • Lung cancer Brother    • No Known Problems Brother    • No Known Problems Brother    • No Known Problems Brother    • No Known Problems Brother    • Uterine cancer Maternal Grandmother         unknown age-maybe 63's   • Uterine cancer Paternal Grandmother         unknown age , maybe 61's   • Diabetes Paternal Grandfather    • Stroke Maternal Uncle    • Kidney cancer Paternal Aunt 54   • Pancreatic cancer Paternal Uncle 79   • Breast cancer Cousin 62   • Uterine cancer Cousin 54   • Colon cancer Cousin 61   • No Known Problems Daughter    • No Known Problems Daughter    • No Known Problems Son       Social History:     Social History     Socioeconomic History   • Marital status: /Civil Union     Spouse name: None   • Number of children: None   • Years of education: None   • Highest education level: None   Occupational History   • None   Tobacco Use   • Smoking status: Former     Years: 2 00     Types: Cigarettes     Quit date: 1976     Years since quittin 9   • Smokeless tobacco: Never   Vaping Use   • Vaping Use: Never used   Substance and Sexual Activity   • Alcohol use: Yes     Alcohol/week: 1 0 standard drink     Types: 1 Glasses of wine per week     Comment: per allscripts, social drinker   • Drug use: No   • Sexual activity: Not Currently     Partners: Male     Birth control/protection: Post-menopausal   Other Topics Concern   • None   Social History Narrative   • None     Social Determinants of Health     Financial Resource Strain: Low Risk    • Difficulty of Paying Living Expenses: Not hard at all   Food Insecurity: Not on file   Transportation Needs: No Transportation Needs   • Lack of Transportation (Medical): No   • Lack of Transportation (Non-Medical):  No   Physical Activity: Not on file   Stress: Not on file   Social Connections: Not on file   Intimate Partner Violence: Not on file   Housing Stability: Not on file      Medications and Allergies:     Current Outpatient Medications   Medication Sig Dispense Refill • albuterol (Ventolin HFA) 90 mcg/act inhaler Inhale 2 puffs every 4 (four) hours as needed for wheezing 18 g 5   • aspirin 81 MG tablet Take 81 mg by mouth daily     • calcium citrate (CALCITRATE) 950 MG tablet Take 1 tablet by mouth daily     • fluocinonide (LIDEX) 0 05 % cream Apply topically 2 (two) times a day (Patient taking differently: Apply topically if needed) 30 g 1   • Multiple Vitamin (MULTI-VITAMIN DAILY PO) Take by mouth     • omeprazole (PriLOSEC) 40 MG capsule Take 1 capsule (40 mg total) by mouth daily 90 capsule 3   • PARoxetine (PAXIL) 20 mg tablet TAKE ONE TABLET BY MOUTH EVERY DAY 90 tablet 3     No current facility-administered medications for this visit  No Known Allergies   Immunizations:     Immunization History   Administered Date(s) Administered   • COVID-19 PFIZER VACCINE 0 3 ML IM 02/17/2021, 03/09/2021, 10/23/2021   • INFLUENZA 01/07/2013, 10/14/2013, 10/18/2016, 10/02/2017   • Influenza Quadrivalent, 6-35 Months IM 10/16/2015, 10/18/2016, 10/02/2017   • Influenza, high dose seasonal 0 7 mL 10/08/2019, 10/09/2020, 09/29/2021, 10/03/2022   • Influenza, recombinant, quadrivalent,injectable, preservative free 10/02/2018   • Influenza, seasonal, injectable 01/07/2013, 10/14/2013, 10/02/2014   • Pneumococcal Conjugate 13-Valent 02/19/2020   • Pneumococcal Polysaccharide PPV23 06/10/2014, 01/01/2016   • Tdap 06/07/2013   • Varicella 01/01/2016   • Zoster 07/08/2014      Health Maintenance:         Topic Date Due   • Cervical Cancer Screening  11/17/2020   • Colorectal Cancer Screening  01/03/2023   • Breast Cancer Screening: Mammogram  03/28/2024   • Hepatitis C Screening  Completed         Topic Date Due   • Pneumococcal Vaccine: 65+ Years (3 - PPSV23 if available, else PCV20) 02/19/2021   • COVID-19 Vaccine (4 - Booster for Sandoval Peter series) 12/18/2021      Medicare Screening Tests and Risk Assessments:     [place] is here for her Subsequent Wellness visit       Health Risk Assessment: Patient rates overall health as good  Patient feels that their physical health rating is same  Patient is satisfied with their life  Eyesight was rated as same  Hearing was rated as same  Patient feels that their emotional and mental health rating is same  Patients states they are never, rarely angry  Patient states they are sometimes unusually tired/fatigued  Pain experienced in the last 7 days has been none  Patient states that she has experienced no weight loss or gain in last 6 months  Fall Risk Screening: In the past year, patient has experienced: no history of falling in past year      Urinary Incontinence Screening:   Patient has leaked urine accidently in the last six months  Home Safety:  Patient does not have trouble with stairs inside or outside of their home  Patient has working smoke alarms and has no working carbon monoxide detector  Home safety hazards include: none  Nutrition:   Current diet is Limited junk food  Medications:   Patient is currently taking over-the-counter supplements  OTC medications include: daily multi vitamin and mineral  Patient is able to manage medications  Activities of Daily Living (ADLs)/Instrumental Activities of Daily Living (IADLs):   Walk and transfer into and out of bed and chair?: Yes  Dress and groom yourself?: Yes    Bathe or shower yourself?: Yes    Feed yourself?  Yes  Do your laundry/housekeeping?: Yes  Manage your money, pay your bills and track your expenses?: Yes  Make your own meals?: Yes    Do your own shopping?: Yes    Previous Hospitalizations:   Any hospitalizations or ED visits within the last 12 months?: No      Advance Care Planning:   Living will: No    Durable POA for healthcare: No    Advanced directive: No    Advanced directive counseling given: Yes      Cognitive Screening:   Provider or family/friend/caregiver concerned regarding cognition?: No    PREVENTIVE SCREENINGS      Cardiovascular Screening:    General: Screening Current      Diabetes Screening:     General: Screening Current      Colorectal Cancer Screening:     General: Screening Current      Breast Cancer Screening:     General: History Breast Cancer      Cervical Cancer Screening:    General: Screening Not Indicated      Abdominal Aortic Aneurysm (AAA) Screening:        General: Screening Not Indicated      Lung Cancer Screening:     General: Screening Not Indicated      Hepatitis C Screening:    General: Screening Current    Screening, Brief Intervention, and Referral to Treatment (SBIRT)    Screening  Typical number of drinks in a day: 0  Typical number of drinks in a week: 1  Interpretation: Low risk drinking behavior  AUDIT-C Screenin) How often did you have a drink containing alcohol in the past year? 2 to 4 times a month  2) How many drinks did you have on a typical day when you were drinking in the past year? 0  3) How often did you have 6 or more drinks on one occasion in the past year? never    AUDIT-C Score: 2  Interpretation: Score 0-2 (female): Negative screen for alcohol misuse    Single Item Drug Screening:  How often have you used an illegal drug (including marijuana) or a prescription medication for non-medical reasons in the past year? never    Single Item Drug Screen Score: 0  Interpretation: Negative screen for possible drug use disorder    Other Counseling Topics:   Car/seat belt/driving safety, skin self-exam and calcium and vitamin D intake and regular weightbearing exercise  No results found  Physical Exam:     /70 (BP Location: Left arm, Patient Position: Sitting, Cuff Size: Standard)   Pulse 77   Temp 98 °F (36 7 °C) (Tympanic)   Resp 16   Ht 5' 9" (1 753 m)   Wt 100 kg (220 lb 9 6 oz)   SpO2 97%   BMI 32 58 kg/m²     Physical Exam  Vitals and nursing note reviewed  Constitutional:       Appearance: Normal appearance  She is obese  HENT:      Head: Normocephalic and atraumatic     Eyes:      Conjunctiva/sclera: Conjunctivae normal       Pupils: Pupils are equal, round, and reactive to light  Neck:      Vascular: No carotid bruit  Cardiovascular:      Rate and Rhythm: Normal rate and regular rhythm  Heart sounds: No murmur heard  Pulmonary:      Effort: Pulmonary effort is normal       Breath sounds: Normal breath sounds  Abdominal:      General: Bowel sounds are normal  There is no distension  Tenderness: There is no abdominal tenderness  Musculoskeletal:         General: No swelling or tenderness  Normal range of motion  Cervical back: Normal range of motion and neck supple  Right lower leg: No edema  Left lower leg: No edema  Skin:     General: Skin is warm and dry  Findings: No rash  Neurological:      General: No focal deficit present  Mental Status: She is alert and oriented to person, place, and time  Motor: No weakness        Gait: Gait normal    Psychiatric:         Mood and Affect: Mood normal           Riley Garcia MD

## 2023-03-15 NOTE — ASSESSMENT & PLAN NOTE
Recommended to follow a low-cholesterol, low-fat diet, regular exercise  Check CMP, lipid panel as ordered in November 2022

## 2023-03-15 NOTE — ASSESSMENT & PLAN NOTE
Patient has fatty liver  Encouraged to work on weight reduction  Follow-up with gastroenterology Dr Manuel Polanco

## 2023-03-15 NOTE — ASSESSMENT & PLAN NOTE
Weight has been stable since last visit in September 2022  Encouraged to work on dietary and lifestyle modifications, losing weight

## 2023-03-18 ENCOUNTER — APPOINTMENT (OUTPATIENT)
Dept: LAB | Facility: CLINIC | Age: 68
End: 2023-03-18

## 2023-03-18 DIAGNOSIS — R71.8 ELEVATED HEMATOCRIT: ICD-10-CM

## 2023-03-18 DIAGNOSIS — R79.89 ELEVATED LFTS: ICD-10-CM

## 2023-03-18 LAB
ALBUMIN SERPL BCP-MCNC: 4.2 G/DL (ref 3.5–5)
ALP SERPL-CCNC: 77 U/L (ref 34–104)
ALT SERPL W P-5'-P-CCNC: 44 U/L (ref 7–52)
ANION GAP SERPL CALCULATED.3IONS-SCNC: 8 MMOL/L (ref 4–13)
AST SERPL W P-5'-P-CCNC: 49 U/L (ref 13–39)
BASOPHILS # BLD AUTO: 0.04 THOUSANDS/ÂΜL (ref 0–0.1)
BASOPHILS NFR BLD AUTO: 1 % (ref 0–1)
BILIRUB SERPL-MCNC: 0.53 MG/DL (ref 0.2–1)
BUN SERPL-MCNC: 14 MG/DL (ref 5–25)
CALCIUM SERPL-MCNC: 9.8 MG/DL (ref 8.4–10.2)
CHLORIDE SERPL-SCNC: 107 MMOL/L (ref 96–108)
CO2 SERPL-SCNC: 26 MMOL/L (ref 21–32)
CREAT SERPL-MCNC: 0.92 MG/DL (ref 0.6–1.3)
EOSINOPHIL # BLD AUTO: 0.44 THOUSAND/ÂΜL (ref 0–0.61)
EOSINOPHIL NFR BLD AUTO: 7 % (ref 0–6)
ERYTHROCYTE [DISTWIDTH] IN BLOOD BY AUTOMATED COUNT: 13.9 % (ref 11.6–15.1)
GFR SERPL CREATININE-BSD FRML MDRD: 64 ML/MIN/1.73SQ M
GLUCOSE P FAST SERPL-MCNC: 101 MG/DL (ref 65–99)
HCT VFR BLD AUTO: 43.5 % (ref 34.8–46.1)
HGB BLD-MCNC: 14.2 G/DL (ref 11.5–15.4)
IMM GRANULOCYTES # BLD AUTO: 0.02 THOUSAND/UL (ref 0–0.2)
IMM GRANULOCYTES NFR BLD AUTO: 0 % (ref 0–2)
LYMPHOCYTES # BLD AUTO: 1.8 THOUSANDS/ÂΜL (ref 0.6–4.47)
LYMPHOCYTES NFR BLD AUTO: 30 % (ref 14–44)
MCH RBC QN AUTO: 27.8 PG (ref 26.8–34.3)
MCHC RBC AUTO-ENTMCNC: 32.6 G/DL (ref 31.4–37.4)
MCV RBC AUTO: 85 FL (ref 82–98)
MONOCYTES # BLD AUTO: 0.34 THOUSAND/ÂΜL (ref 0.17–1.22)
MONOCYTES NFR BLD AUTO: 6 % (ref 4–12)
NEUTROPHILS # BLD AUTO: 3.37 THOUSANDS/ÂΜL (ref 1.85–7.62)
NEUTS SEG NFR BLD AUTO: 56 % (ref 43–75)
NRBC BLD AUTO-RTO: 0 /100 WBCS
PLATELET # BLD AUTO: 232 THOUSANDS/UL (ref 149–390)
PMV BLD AUTO: 11.2 FL (ref 8.9–12.7)
POTASSIUM SERPL-SCNC: 4.1 MMOL/L (ref 3.5–5.3)
PROT SERPL-MCNC: 7.3 G/DL (ref 6.4–8.4)
RBC # BLD AUTO: 5.1 MILLION/UL (ref 3.81–5.12)
SODIUM SERPL-SCNC: 141 MMOL/L (ref 135–147)
WBC # BLD AUTO: 6.01 THOUSAND/UL (ref 4.31–10.16)

## 2023-03-28 ENCOUNTER — HOSPITAL ENCOUNTER (OUTPATIENT)
Dept: GASTROENTEROLOGY | Facility: AMBULARY SURGERY CENTER | Age: 68
Setting detail: OUTPATIENT SURGERY
Discharge: HOME | End: 2023-03-28
Attending: INTERNAL MEDICINE

## 2023-03-28 ENCOUNTER — ANESTHESIA EVENT (OUTPATIENT)
Dept: GASTROENTEROLOGY | Facility: AMBULARY SURGERY CENTER | Age: 68
End: 2023-03-28

## 2023-03-28 ENCOUNTER — ANESTHESIA (OUTPATIENT)
Dept: GASTROENTEROLOGY | Facility: AMBULARY SURGERY CENTER | Age: 68
End: 2023-03-28

## 2023-03-28 VITALS
OXYGEN SATURATION: 100 % | HEART RATE: 65 BPM | DIASTOLIC BLOOD PRESSURE: 73 MMHG | RESPIRATION RATE: 18 BRPM | TEMPERATURE: 99.2 F | SYSTOLIC BLOOD PRESSURE: 143 MMHG

## 2023-03-28 DIAGNOSIS — Z80.0 FAMILY HISTORY OF COLON CANCER: ICD-10-CM

## 2023-03-28 DIAGNOSIS — K21.00 GASTROESOPHAGEAL REFLUX DISEASE WITH ESOPHAGITIS WITHOUT HEMORRHAGE: ICD-10-CM

## 2023-03-28 DIAGNOSIS — R16.2 HEPATOSPLENOMEGALY: ICD-10-CM

## 2023-03-28 DIAGNOSIS — D12.6 TUBULAR ADENOMA OF COLON: ICD-10-CM

## 2023-03-28 DIAGNOSIS — R79.89 ELEVATED LFTS: ICD-10-CM

## 2023-03-28 DIAGNOSIS — K82.4 GALLBLADDER POLYP: ICD-10-CM

## 2023-03-28 DIAGNOSIS — K90.0 CELIAC DISEASE: ICD-10-CM

## 2023-03-28 RX ORDER — SODIUM CHLORIDE, SODIUM LACTATE, POTASSIUM CHLORIDE, CALCIUM CHLORIDE 600; 310; 30; 20 MG/100ML; MG/100ML; MG/100ML; MG/100ML
INJECTION, SOLUTION INTRAVENOUS CONTINUOUS PRN
Status: DISCONTINUED | OUTPATIENT
Start: 2023-03-28 | End: 2023-03-28

## 2023-03-28 RX ORDER — PROPOFOL 10 MG/ML
INJECTION, EMULSION INTRAVENOUS AS NEEDED
Status: DISCONTINUED | OUTPATIENT
Start: 2023-03-28 | End: 2023-03-28

## 2023-03-28 RX ORDER — LIDOCAINE HYDROCHLORIDE 20 MG/ML
INJECTION, SOLUTION EPIDURAL; INFILTRATION; INTRACAUDAL; PERINEURAL AS NEEDED
Status: DISCONTINUED | OUTPATIENT
Start: 2023-03-28 | End: 2023-03-28

## 2023-03-28 RX ADMIN — PROPOFOL 50 MG: 10 INJECTION, EMULSION INTRAVENOUS at 12:29

## 2023-03-28 RX ADMIN — PROPOFOL 30 MG: 10 INJECTION, EMULSION INTRAVENOUS at 12:08

## 2023-03-28 RX ADMIN — PROPOFOL 30 MG: 10 INJECTION, EMULSION INTRAVENOUS at 12:14

## 2023-03-28 RX ADMIN — PROPOFOL 20 MG: 10 INJECTION, EMULSION INTRAVENOUS at 11:51

## 2023-03-28 RX ADMIN — PROPOFOL 40 MG: 10 INJECTION, EMULSION INTRAVENOUS at 11:53

## 2023-03-28 RX ADMIN — PROPOFOL 30 MG: 10 INJECTION, EMULSION INTRAVENOUS at 12:07

## 2023-03-28 RX ADMIN — PROPOFOL 30 MG: 10 INJECTION, EMULSION INTRAVENOUS at 12:03

## 2023-03-28 RX ADMIN — PROPOFOL 50 MG: 10 INJECTION, EMULSION INTRAVENOUS at 12:00

## 2023-03-28 RX ADMIN — LIDOCAINE HYDROCHLORIDE 100 MG: 20 INJECTION, SOLUTION EPIDURAL; INFILTRATION; INTRACAUDAL; PERINEURAL at 11:48

## 2023-03-28 RX ADMIN — PROPOFOL 30 MG: 10 INJECTION, EMULSION INTRAVENOUS at 12:10

## 2023-03-28 RX ADMIN — PROPOFOL 40 MG: 10 INJECTION, EMULSION INTRAVENOUS at 11:55

## 2023-03-28 RX ADMIN — PROPOFOL 30 MG: 10 INJECTION, EMULSION INTRAVENOUS at 12:05

## 2023-03-28 RX ADMIN — PROPOFOL 30 MG: 10 INJECTION, EMULSION INTRAVENOUS at 12:22

## 2023-03-28 RX ADMIN — SODIUM CHLORIDE, SODIUM LACTATE, POTASSIUM CHLORIDE, AND CALCIUM CHLORIDE: .6; .31; .03; .02 INJECTION, SOLUTION INTRAVENOUS at 11:44

## 2023-03-28 RX ADMIN — PROPOFOL 20 MG: 10 INJECTION, EMULSION INTRAVENOUS at 12:20

## 2023-03-28 RX ADMIN — PROPOFOL 140 MG: 10 INJECTION, EMULSION INTRAVENOUS at 11:48

## 2023-03-28 RX ADMIN — PROPOFOL 30 MG: 10 INJECTION, EMULSION INTRAVENOUS at 12:11

## 2023-03-28 NOTE — ANESTHESIA PREPROCEDURE EVALUATION
Procedure:  COLONOSCOPY  EGD    Relevant Problems   GI/HEPATIC   (+) Esophageal reflux   (+) Hepatic steatosis   (+) Hepatosplenomegaly      NEURO/PSYCH   (+) Anxiety associated with depression   (+) 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   (+) Recurrent major depressive disorder, in partial remission (HCC)      PULMONARY   (+) Mild intermittent asthma without complication      Other   (+) Hepatosplenomegaly        Physical Exam    Airway    Mallampati score: II  TM Distance: >3 FB  Neck ROM: limited     Dental   No notable dental hx     Cardiovascular  Cardiovascular exam normal    Pulmonary  Pulmonary exam normal     Other Findings        Anesthesia Plan  ASA Score- 2     Anesthesia Type- IV sedation with anesthesia with ASA Monitors  Additional Monitors:   Airway Plan:           Plan Factors-Exercise tolerance (METS): >4 METS  Chart reviewed  Existing labs reviewed  Patient summary reviewed  Patient is not a current smoker  Induction- intravenous  Postoperative Plan-     Informed Consent- Anesthetic plan and risks discussed with patient  I personally reviewed this patient with the CRNA  Discussed and agreed on the Anesthesia Plan with the CRNA  Sarina Mancia

## 2023-03-28 NOTE — H&P
History and Physical -  [facility]  [place] 75 y o  female MRN: [id number]                  HPI: [place] is a 75y o  year old female who presents for celiac and colon polyps  REVIEW OF SYSTEMS: Per the HPI, and otherwise unremarkable  Historical Information   Past Medical History:   Diagnosis Date   • Allergic rhinitis     last assessed-10/2/2017   • Asthma    • Breast cancer ([address] ) 2017    LCIS   • Carotid bruit     R-last assessed-6/10/2014   • Celiac disease    • Dysphagia     last assessed-2013   • GERD (gastroesophageal reflux disease)      Past Surgical History:   Procedure Laterality Date   • BREAST BIOPSY  11/15/2017    percutaneous needle core   • BREAST LUMPECTOMY Right 2017    LCIS   •  SECTION     • COLONOSCOPY      complete-12/3/2012-internal/external hemorrhoids, perianal or excoriation, mild sigmoid diverticulosis   • MAMMO NEEDLE LOCALIZATION RIGHT (ALL INC) Right 2017   • MAMMO NEEDLE LOCALIZATION RIGHT (ALL INC) EACH ADD Right 2017   • MAMMO STEREOTACTIC BREAST BIOPSY RIGHT (ALL INC) Right 11/15/2017   • MAMMO STEREOTACTIC BREAST BIOPSY RIGHT (ALL INC) EACH ADD Right 11/15/2017   • MRI BREAST BIOPSY LEFT (ALL INCLUSIVE) Left 2019   • [place] PERQ DEVICE PLACEMENT BREAST LOC 1ST LES W/GDNCE Right 2017    Procedure: BREAST LUMPECTOMY; BREAST BRACKETED NEEDLE LOCALIZATION (BRACKETED NEEDLE LOC AT 0900);   Surgeon: Stephanie Valladares MD;  [facility];  Service: Surgical Oncology   • UPPER GASTROINTESTINAL ENDOSCOPY     • VAGINAL DILATATION  1978    d&e     Social History   Social History     Substance and Sexual Activity   Alcohol Use Yes   • Alcohol/week: 1 0 standard drink   • Types: 1 Glasses of wine per week    Comment: per allscripts, social drinker     Social History     Substance and Sexual Activity   Drug Use No     Social History     Tobacco Use   Smoking Status Former   • Years: 2 00   • Types: Cigarettes   • Quit date: 1976   • Years since quittin 9   Smokeless Tobacco Never     Family History   Problem Relation Age of Onset   • Heart attack Mother         acute MI   • Colon polyps Mother         polyps of the sigmoid colon   • Colon cancer Mother    • Breast cancer Sister 47   • Uterine cancer Sister 59   • Liver cancer Brother 48   • Colon cancer Brother 48   • Lung cancer Brother    • No Known Problems Brother    • No Known Problems Brother    • No Known Problems Brother    • No Known Problems Brother    • Uterine cancer Maternal Grandmother         unknown age-maybe 63's   • Uterine cancer Paternal [de-identified]         unknown age , maybe 61's   • Diabetes Paternal Grandfather    • Stroke Maternal Uncle    • Kidney cancer Paternal Aunt 54   • Pancreatic cancer Paternal Uncle 79   • Breast cancer Cousin 62   • Uterine cancer Cousin 54   • Colon cancer Cousin 61   • No Known Problems Daughter    • No Known Problems Daughter    • No Known Problems Son        Meds/Allergies       Current Outpatient Medications:   •  aspirin 81 MG tablet  •  calcium citrate (CALCITRATE) 950 MG tablet  •  omeprazole (PriLOSEC) 40 MG capsule  •  PARoxetine (PAXIL) 20 mg tablet  •  albuterol (Ventolin HFA) 90 mcg/act inhaler  •  fluocinonide (LIDEX) 0 05 % cream  •  Multiple Vitamin (MULTI-VITAMIN DAILY PO)    No Known Allergies    Objective     /71   Pulse 69   Temp (!) 97 °F (36 1 °C)   Resp 17   SpO2 96%       PHYSICAL EXAM    Gen: NAD  Head: NCAT  CV: RRR  CHEST: Clear  ABD: soft, NT/ND  EXT: no edema      ASSESSMENT/PLAN:  This is a 75y o  year old female here for EGD & colonoscopy, and she is stable and optimized for her procedure

## 2023-03-28 NOTE — ANESTHESIA POSTPROCEDURE EVALUATION
Post-Op Assessment Note    CV Status:  Stable  Pain Score: 0    Pain management: adequate     Mental Status:  Alert and awake   Hydration Status:  Euvolemic   PONV Controlled:  Controlled   Airway Patency:  Patent      Post Op Vitals Reviewed: Yes      Staff: CRNA         There were no known notable events for this encounter      /57 (03/28/23 1236)    Temp 99 2 °F (37 3 °C) (03/28/23 1236)    Pulse 65 (03/28/23 1236)   Resp 15 (03/28/23 1236)    SpO2 94 % (03/28/23 1236)

## 2023-03-30 ENCOUNTER — HOSPITAL ENCOUNTER (OUTPATIENT)
Dept: MAMMOGRAPHY | Facility: HOSPITAL | Age: 68
Discharge: HOME | End: 2023-03-30

## 2023-03-30 VITALS — HEIGHT: 69 IN | WEIGHT: 220 LBS | BODY MASS INDEX: 32.58 KG/M2

## 2023-03-30 DIAGNOSIS — Z12.31 ENCOUNTER FOR SCREENING MAMMOGRAM FOR BREAST CANCER: ICD-10-CM

## 2023-04-03 ENCOUNTER — TELEPHONE (OUTPATIENT)
Dept: OBGYN CLINIC | Facility: CLINIC | Age: 68
End: 2023-04-03

## 2023-04-03 NOTE — TELEPHONE ENCOUNTER
----- Message from Micheal Power DO sent at 4/2/2023  9:17 PM EDT -----  Please call the patient inform needs addn views for further eval

## 2023-04-11 NOTE — PROGRESS NOTES
Met with patient and Dr Trend   regarding recommendation for;    ___x__ RIGHT ______LEFT      _____Ultrasound guided  ___x___Stereotactic breast biopsy  __X___Verbalized understanding        Blood thinners:  No: __x___ Yes: ______ What:                 Biopsy teaching sheet given:  Yes: ___X___ No: ________    Pt given contact information and adv to call with any questions/needs

## 2023-04-28 DIAGNOSIS — F41.8 DEPRESSION WITH ANXIETY: ICD-10-CM

## 2023-04-28 RX ORDER — PAROXETINE HYDROCHLORIDE 20 MG/1
TABLET, FILM COATED ORAL
Qty: 90 TABLET | Refills: 3 | Status: SHIPPED | OUTPATIENT
Start: 2023-04-28

## 2023-05-01 ENCOUNTER — HOSPITAL ENCOUNTER (OUTPATIENT)
Dept: RADIOLOGY | Facility: HOSPITAL | Age: 68
Discharge: HOME | End: 2023-05-01

## 2023-05-01 VITALS — DIASTOLIC BLOOD PRESSURE: 74 MMHG | SYSTOLIC BLOOD PRESSURE: 124 MMHG

## 2023-05-01 DIAGNOSIS — R92.8 ABNORMAL MAMMOGRAM: ICD-10-CM

## 2023-05-01 RX ORDER — LIDOCAINE HYDROCHLORIDE 10 MG/ML
5 INJECTION, SOLUTION EPIDURAL; INFILTRATION; INTRACAUDAL; PERINEURAL ONCE
Status: COMPLETED | OUTPATIENT
Start: 2023-05-01 | End: 2023-05-01

## 2023-05-01 RX ORDER — LIDOCAINE HYDROCHLORIDE AND EPINEPHRINE 10; 10 MG/ML; UG/ML
10 INJECTION, SOLUTION INFILTRATION; PERINEURAL ONCE
Status: COMPLETED | OUTPATIENT
Start: 2023-05-01 | End: 2023-05-01

## 2023-05-01 RX ADMIN — LIDOCAINE HYDROCHLORIDE 5 ML: 10 INJECTION, SOLUTION EPIDURAL; INFILTRATION; INTRACAUDAL; PERINEURAL at 10:25

## 2023-05-01 RX ADMIN — LIDOCAINE HYDROCHLORIDE AND EPINEPHRINE 10 ML: 10; 10 INJECTION, SOLUTION INFILTRATION; PERINEURAL at 10:27

## 2023-05-01 NOTE — DISCHARGE INSTR - OTHER ORDERS
POST LARGE CORE BREAST BIOPSY PATIENT INFORMATION      Place an ice pack inside your bra over the top of the dressing every hour for 20 minutes (20 minutes on, 60 minutes off)  Do this until bedtime  Do not shower or bathe until the following morning  You may bathe your breast carefully with the steri-strips in place  Be careful    Not to loosen them  The steri-strips will fall off in 3-5 days  You may have mild discomfort, and you may have some bruising where the   Needle entered the skin  This should clear within 5-7 days  If you need medicine for discomfort, take acetaminophen products such as   Tylenol  You may also take Advil or Motrin products  Do not participate in strenuous activities such as-tennis, aerobics, skiing,  Weight lifting, etc  for 24 hours  Refrain from swimming/soaking for 72 hours  Wearing a bra for sleeping may be more comfortable for the first 24-48 hours  Watch for continued bleeding, pain or fever over 101  If any of these symptoms occur, please contact our    breast nurse navigator at the location where your biopsy was performed  During normal business hours (7:30 am-4:00 pm) please call the nurse navigator at the site where your   procedure was performed:    [facility] [address]:  [phone], [phone] or [facility]:     [phone] or [address] [facility]:    [phone] or [phone]  [facility]:   [phone] or [phone]  [facility]:   [address] [place] [address]:     [phone]              After 4 PM - please call your physician or go to the nearest Emergency Department location  9          The final results of your biopsy are usually available within one week

## 2023-05-01 NOTE — PROGRESS NOTES
Ice pack given:    __x___ yes _____ no  *Ice pack provided to patient prior to discharge home, and patient educated on importance of use  Patient did have some hematoma development noted immediately post-biopsy, so she was instructed to strictly follow ice-pack use instructions (20 minutes on, 60 minutes off at regular intervals for remainder of the day until bedtime)  Patient was also educated to avoid any heavy lifting of > 8-10 lbs for first 24-48 hours post-biopsy, and to be mindful of overhead reaching & stretching so as to not loosen steri-strips bandages in place at Right breast biopsy incision site      Discharge instructions given to patient:    __x___ yes _____ no      Discharged via:    __x___ ambulatory    _____ wheelchair    _____ stretcher      Stable on discharge:    __x___ yes _____ no

## 2023-05-01 NOTE — PROGRESS NOTES
Procedure type:    _____ Ultrasound guided ___x__ Stereotactic    Breast:    _____ Left Breast biopsy  __x___ Right Breast biopsy    Time-out called @ 10:15am and procedure confirmed with patient Kayleneabdulaziz, myself Jack Cruz RN, Tavo Garcia MD, and Bishnu Radford RT(R)(M)(DS)     Location: Right Breast 9 o'clock calcifications    Needle: 8G mammotome    # of specimen chambers filled: 3 (chambers 1, 2, and 3)    Calcifications present in specimen chamber #: all 3 chambers (1, 2 and 3)    Clip: Triple Twist mammotome marker    Performed by: Tavo Garcia MD    Pressure held for 5 minutes by: Jack Cruz RN  *Of note, an extended period of manual compression greater than 5 minutes time was held post-procedure as patient was noted to have development of hematoma Right breast  Hemostasis was obtained with extended period of manual compression      Steri Strips:    __x__ yes _____ no    Band aid:    _____ yes __x___ no  *Gauze and tape in place    Tolerated procedure:    __x___ yes _____ no

## 2023-05-02 ENCOUNTER — TELEPHONE (OUTPATIENT)
Dept: RADIOLOGY | Facility: HOSPITAL | Age: 68
End: 2023-05-02

## 2023-05-02 NOTE — PROGRESS NOTES
Post-procedure call completed: Tuesday 05/02/2023    Bleeding: ___x__ yes ____ no  *[place] did have some bleeding immediately post-procedure yesterday 05/01/2023  Additional manual compression was held prior to discharge home to encourage hemostasis  [place] reports she did have some small amounts of bleeding still yesterday evening 05/01/2023 noted on gauze dressing, but no significant heavy bleeding  [place] reports bleeding  resolved near bed time yesterday evening, and no new bleeding noted today 05/02/2023  Pain: __x___yes ______ no    *Patient does report some breast pain & discomfort post-procedure  She reports using the ice pack Monday evening 05/01/2023 to help with pain and risk reduction of post-procedural bruising/bleeding/hematoma development  [place] has used Tylenol x3 doses yesterday afternoon/ evening for additional pain relief  She is aware she may continue to use OTC pain control medications as needed over the remainder of the week as her breast is continuing to heal post-procedure (tylenol, motrin, ibuprofen, advil all ok to use; avoid aspirin based products due to bleeding risk)  Redness/Swelling: ______ yes ___x___ no    Band aid removed: __x___ yes _____no    *Patient reports she did remove outer dressing/ gauze and tape earlier today prior to her shower  Steri-strips have remained in place  Notable bruising present in Right breast around and inferior to the nipple  She is aware to avoid soaking to the breast, swimming, or tub baths until biopsy incision is fully healed to reduce any risk for infection  Ice pack should continue to be used over the next few days to ensure cessation of bleeding at biopsy site  Starting next week Tuesday 05/09/2023, patient may begin warm compress and gentle massage to the Right breast to encourage reabsorption/ resolution of hematoma      Steri-Strips intact: ___x___ yes _____ no    *Patient instructed to leave steri-strips in place until Friday 05/05/2023  If steri-strips do not fall off on their own at that time, it would be appropriate to remove  Patient with no additional questions at this time  I relayed to her that either myself or Dr Ritiak Ramsay will call when her breast biopsy pathology results are available  Patient does have my name & office phone number if she has any questions or concerns in the interim of time until her pathology results are finalized

## 2023-05-03 ENCOUNTER — TELEPHONE (OUTPATIENT)
Dept: SURGICAL ONCOLOGY | Facility: CLINIC | Age: 68
End: 2023-05-03

## 2023-05-03 NOTE — TELEPHONE ENCOUNTER
Called patient and spoke to her about recent pathology findings that revealed LCIS  Patient did not have any questions  She is scheduled to see Dr Laura Smith tomorrow to discuss surgery

## 2023-05-04 ENCOUNTER — OFFICE VISIT (OUTPATIENT)
Dept: SURGICAL ONCOLOGY | Facility: CLINIC | Age: 68
End: 2023-05-04

## 2023-05-04 VITALS
HEIGHT: 69 IN | SYSTOLIC BLOOD PRESSURE: 128 MMHG | OXYGEN SATURATION: 97 % | BODY MASS INDEX: 33.03 KG/M2 | RESPIRATION RATE: 22 BRPM | DIASTOLIC BLOOD PRESSURE: 78 MMHG | WEIGHT: 223 LBS | HEART RATE: 82 BPM | TEMPERATURE: 96.7 F

## 2023-05-04 DIAGNOSIS — D05.01 LOBULAR CARCINOMA IN SITU (LCIS) OF RIGHT BREAST: Primary | ICD-10-CM

## 2023-05-04 DIAGNOSIS — Z01.818 PREOPERATIVE TESTING: ICD-10-CM

## 2023-05-04 DIAGNOSIS — Z80.3 FAMILY HISTORY OF MALIGNANT NEOPLASM OF BREAST: ICD-10-CM

## 2023-05-04 DIAGNOSIS — Z91.89 INCREASED RISK OF BREAST CANCER: ICD-10-CM

## 2023-05-04 PROBLEM — R94.31 ABNORMAL EKG: Status: RESOLVED | Noted: 2019-10-08 | Resolved: 2023-05-04

## 2023-05-04 PROBLEM — Z00.00 MEDICARE ANNUAL WELLNESS VISIT, SUBSEQUENT: Status: RESOLVED | Noted: 2019-10-06 | Resolved: 2023-05-04

## 2023-05-04 PROBLEM — R92.8 ABNORMAL MRI, BREAST: Status: RESOLVED | Noted: 2019-02-19 | Resolved: 2023-05-04

## 2023-05-04 NOTE — PROGRESS NOTES
Surgical Oncology Follow Up       [address]  [facility]  [address]   [place] [place] [address]    [place] DARRIAN Zuly  1955  [id number]  [address] [facility]  [facility] [place]  [address] [facility] [place] [address]    Chief Complaint   Patient presents with    Pre-op Exam     New Right LCIS          Assessment & Plan:   Patient presents with a new diagnosis of right breast lobular carcinoma in situ at the 9 o'clock position, clip is migrated somewhat secondary to hematoma  Left breast was cleared  The patient has a previous biopsy of LCIS in the right breast approximately 5 years ago  She presents now for opinion regarding further management  Cancer History:     Oncology History    No history exists  Interval History: This is a 57-year-old woman who was diagnosed approximately 5 years ago with 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   She has been fine until recently  She does have bilaterally busy breast   She has had a mammogram as well as an MRI  Dr Umanzor Stacks proceeded area of the right breast where her calcifications in the right breast are more concerning which is in the retroareolar region  The her calcifications extend over an area of approximately 7 x 5 cm  Patient presents now for an opinion regarding further management  She has some oozing from her biopsy site from a underlying hematoma  Review of Systems:   Review of Systems   All other systems reviewed and are negative        Past Medical History     Patient Active Problem List   Diagnosis    Family history of malignant neoplasm of uterus    Family history of malignant neoplasm of gastrointestinal tract    Family history of malignant neoplasm of breast    Recurrent major depressive disorder, in partial remission ([address] )    Dyslipidemia    Elevated LFTs    Esophageal reflux    Left thyroid nodule    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    Mild intermittent asthma without complication    Obesity (BMI 30 0-34  9)    Rosacea    Increased risk of breast cancer    Hand eczema    Chronic insomnia    Hepatic steatosis    Anxiety associated with depression    Elevated hematocrit    Hepatosplenomegaly    Gallbladder polyp    Inflamed skin tag    Celiac disease     Past Medical History:   Diagnosis Date    Allergic rhinitis     last assessed-10/2/2017    Asthma     Carotid bruit     R-last assessed-6/10/2014    Celiac disease     Dysphagia     last assessed-2013    GERD (gastroesophageal reflux disease)      Past Surgical History:   Procedure Laterality Date    BREAST BIOPSY Right 11/15/2017    percutaneous needle core    BREAST BIOPSY Right 2023    SLW Stereo 9:00    BREAST LUMPECTOMY Right 2017    LCIS     SECTION  1984    COLONOSCOPY      complete-12/3/2012-internal/external hemorrhoids, perianal or excoriation, mild sigmoid diverticulosis    MAMMO NEEDLE LOCALIZATION RIGHT (ALL INC) Right 2017    MAMMO NEEDLE LOCALIZATION RIGHT (ALL INC) EACH ADD Right 2017    MAMMO STEREOTACTIC BREAST BIOPSY RIGHT (ALL INC) Right 11/15/2017    MAMMO STEREOTACTIC BREAST BIOPSY RIGHT (ALL INC) Right 2023    MAMMO STEREOTACTIC BREAST BIOPSY RIGHT (ALL INC) EACH ADD Right 11/15/2017    MRI BREAST BIOPSY LEFT (ALL INCLUSIVE) Left 2019    RI PERQ DEVICE PLACEMENT BREAST LOC 1ST LES W/GDNCE Right 2017    Procedure: BREAST LUMPECTOMY; BREAST BRACKETED NEEDLE LOCALIZATION (BRACKETED NEEDLE LOC AT 0900);   Surgeon: Patrick Feliz MD;  Location: [facility];  Service: Surgical Oncology    UPPER GASTROINTESTINAL ENDOSCOPY      VAGINAL DILATATION      d&e     Family History   Problem Relation Age of Onset    Heart attack Mother         acute MI    Colon polyps Mother         polyps of the sigmoid colon    Colon cancer Mother     Breast cancer Sister 47    Uterine cancer Sister 59    No Known Problems Daughter     No Known Problems Daughter     Uterine cancer Maternal Grandmother         unknown age-maybe 63's    Uterine cancer Paternal Grandmother         unknown age , maybe 61's   [place] Diabetes Paternal Grandfather     Liver cancer Brother 48    Colon cancer Brother 48    Lung cancer Brother     No Known Problems Brother     No Known Problems Brother     No Known Problems Brother     No Known Problems Brother     No Known Problems Son     Stroke Maternal Uncle     Kidney cancer Paternal Aunt 54    Pancreatic cancer Paternal Uncle 79    Breast cancer Cousin 62    Uterine cancer Cousin 54    Colon cancer Cousin 61     Social History     Socioeconomic History    Marital status: /Civil Union     Spouse name: Not on file    Number of children: Not on file    Years of education: Not on file    Highest education level: Not on file   Occupational History    Not on file   Tobacco Use    Smoking status: Former     Years: 2 00     Types: Cigarettes     Quit date: 1976     Years since quittin 0    Smokeless tobacco: Never   Vaping Use    Vaping Use: Never used   Substance and Sexual Activity    Alcohol use: Yes     Alcohol/week: 1 0 standard drink     Types: 1 Glasses of wine per week     Comment: per allscripts, social drinker    Drug use: No    Sexual activity: Not Currently     Partners: Male     Birth control/protection: Post-menopausal   Other Topics Concern    Not on file   Social History Narrative    Not on file     Social Determinants of Health     Financial Resource Strain: Low Risk     Difficulty of Paying Living Expenses: Not hard at all   Food Insecurity: Not on file   Transportation Needs: No Transportation Needs    Lack of Transportation (Medical): No    Lack of Transportation (Non-Medical):  No   Physical Activity: Not on file   Stress: Not on file   Social Connections: Not on file   Intimate Partner Violence: Not on file Housing Stability: Not on file       Current Outpatient Medications:     albuterol (Ventolin HFA) 90 mcg/act inhaler, Inhale 2 puffs every 4 (four) hours as needed for wheezing, Disp: 18 g, Rfl: 5    aspirin 81 MG tablet, Take 81 mg by mouth daily, Disp: , Rfl:     calcium citrate (CALCITRATE) 950 MG tablet, Take 1 tablet by mouth daily, Disp: , Rfl:     fluocinonide (LIDEX) 0 05 % cream, Apply topically 2 (two) times a day (Patient taking differently: Apply topically if needed), Disp: 30 g, Rfl: 1    Multiple Vitamin (MULTI-VITAMIN DAILY PO), Take by mouth, Disp: , Rfl:     omeprazole (PriLOSEC) 40 MG capsule, Take 1 capsule (40 mg total) by mouth daily, Disp: 90 capsule, Rfl: 3    PARoxetine (PAXIL) 20 mg tablet, TAKE ONE TABLET BY MOUTH EVERY DAY, Disp: 90 tablet, Rfl: 3  No Known Allergies    Physical Exam:     Vitals:    05/04/23 1517   BP: 128/78   Pulse: 82   Resp: 22   Temp: (!) 96 7 °F (35 9 °C)   SpO2: 97%     Physical Exam  Vitals reviewed  Constitutional:       Appearance: She is well-developed  HENT:      Head: Normocephalic and atraumatic  Eyes:      Pupils: Pupils are equal, round, and reactive to light  Neck:      Thyroid: No thyromegaly  Vascular: No JVD  Trachea: No tracheal deviation  Cardiovascular:      Rate and Rhythm: Normal rate and regular rhythm  Heart sounds: Normal heart sounds  No murmur heard  No friction rub  No gallop  Pulmonary:      Effort: Pulmonary effort is normal  No respiratory distress  Breath sounds: Normal breath sounds  No wheezing or rales  Chest:          Comments: There is ecchymosis around the right nipple areolar complex with a small biopsy mateusz that is oozing  Patient takes a baby aspirin once a day  She was noted to have a hematoma following the biopsy and compression was held  The oozing started after she removed her Steri-Strips because they were essentially falling off and were bloodstained    There is no evidence of infection  There is no active bleeding  There is underlying hematoma otherwise there are no masses within the breast there are no worrisome skin findings no axillary adenopathy  The left breast was examined in the sitting and supine position  There are no worrisome skin changes, tenderness, inverted nipple, nipple discharge, swelling, bleeding or evidence of a mass in any quadrant  [place] survey demonstrated no evidence of any clinically suspicious axillary, pectoral or paraclavicular lymph nodes  Abdominal:      General: There is no distension  Palpations: Abdomen is soft  There is no hepatomegaly or mass  Tenderness: There is no abdominal tenderness  There is no guarding or rebound  Musculoskeletal:         General: No tenderness  Normal range of motion  Cervical back: Normal range of motion and neck supple  Lymphadenopathy:      Cervical: No cervical adenopathy  Skin:     General: Skin is warm and dry  Findings: No erythema or rash  Neurological:      Mental Status: She is alert and oriented to person, place, and time  Cranial Nerves: No cranial nerve deficit  Psychiatric:         Behavior: Behavior normal            Results & Discussion:   I reviewed her pathology as well as her MRI and mammogram films  I specifically reviewed them with Dr Arturo Cotton telephonically and we decided to place a Shalini clip in the region of the most concerning calcifications in the retroareolar region and perform a approximately 2 cm size excision of this area  Her current clip is then pushed anteriorly  I recommended a SHALINI directed lumpectomy  Patient is in agreement with this as is Dr Arturo Cotton  We went through the process of informed consent for this  See below  The patient and I underwent the process of consent for [facility] directed lumpectomy    The complications outlined on the consent including relatively minor problems (wound infection, wound healing problems, hematomas, scarring, chronic discomfort/pain), moderate problems (injury to nerves or blood vessels, allergic reactions) and major complications (extensive blood loss requiring transfusions or possible addtional surgeries, cardiac arrest, stroke and possible death)  We also reviewed specific complications as outlined on the consent form including possible need for additional treatment  All questions were answered to the patient's satisfaction  We will coordinate the surgery at our next mutual convience  Advance Care Planning/Advance Directives:  I discussed the disease status, treatment plans and follow-up with the patient

## 2023-05-10 ENCOUNTER — TELEPHONE (OUTPATIENT)
Dept: MAMMOGRAPHY | Facility: CLINIC | Age: 68
End: 2023-05-10

## 2023-05-11 NOTE — PROGRESS NOTES
Sp[larissa with patient after Dr Tidwell Pass made         recommendation for;      __x___ RIGHT ______LEFT      _____Ultrasound guided  ______Stereotactic  Breast biopsy  __x___ Mammo guided amie clip placement  __x___Verbalized understanding  Blood thinners:  _____yes __x___no    Date stopped: ____n/a_______    Biopsy teaching sheet given:  _______yes ___x___no    Pre procedure health information obtained  Pt has reflux & anxiety and has been recently diagnosed with lobular carcinoma in situ of right breast     Reviewed amie placement procedure & pt verbalized understanding

## 2023-05-22 ENCOUNTER — PATIENT MESSAGE (OUTPATIENT)
Dept: SURGICAL ONCOLOGY | Facility: CLINIC | Age: 68
End: 2023-05-22

## 2023-05-22 NOTE — TELEPHONE ENCOUNTER
"Received the following voicemail from the patient this morning: \"Pee Larson, This is [facility] [employer]  I wanted to tell you that I had a little tiny piece of metal come out of my biopsy wound yesterday and I think it might be the little marker clip  I saved it  I have it taped down to a piece of paper  So let me know what my next move is  Thank you, [phone]  \"    Called the patient and spoke to her , Alicia Mary  Instructed him to send a photo of the \"marker clip\" via [address]  Called the patient again to further discuss after receiving the photo  Patient reports that her biopsy site was \"constantly oozing\" from her biopsy on 5/1 up until 5/18  She states that on Saturday, 5/20, the clip \"came out\" of her biopsy site spontaneously  She denies any more discharge from the area  Explained that Dr Addie Brennan was currently out of the office but this would all be reviewed with him tomorrow and she would receive a call back to discuss his recommendations  Patient verbalized understanding    "

## 2023-05-24 ENCOUNTER — TELEPHONE (OUTPATIENT)
Dept: SURGICAL ONCOLOGY | Facility: CLINIC | Age: 68
End: 2023-05-24

## 2023-05-24 NOTE — TELEPHONE ENCOUNTER
Called patient to follow up regarding her breast clip but there was no answer  Left a detailed message stating that the situation was reviewed with Dr Tasha Grullon and one of the breast radiologists and they both feel that this will not impact her PUMA placement or surgery and that everything can proceed as scheduled  A direct call back number was provided if the patient had any additional questions

## 2023-06-07 ENCOUNTER — APPOINTMENT (OUTPATIENT)
Dept: LAB | Facility: CLINIC | Age: 68
End: 2023-06-07
Payer: MEDICARE

## 2023-06-07 ENCOUNTER — OFFICE VISIT (OUTPATIENT)
Dept: SURGICAL ONCOLOGY | Facility: CLINIC | Age: 68
End: 2023-06-07
Payer: MEDICARE

## 2023-06-07 ENCOUNTER — OFFICE VISIT (OUTPATIENT)
Dept: LAB | Facility: CLINIC | Age: 68
End: 2023-06-07
Payer: MEDICARE

## 2023-06-07 ENCOUNTER — HOSPITAL ENCOUNTER (OUTPATIENT)
Dept: RADIOLOGY | Facility: HOSPITAL | Age: 68
Discharge: HOME | End: 2023-06-07
Payer: MEDICARE

## 2023-06-07 VITALS
SYSTOLIC BLOOD PRESSURE: 128 MMHG | WEIGHT: 225 LBS | HEIGHT: 69 IN | RESPIRATION RATE: 22 BRPM | TEMPERATURE: 97.3 F | BODY MASS INDEX: 33.33 KG/M2 | OXYGEN SATURATION: 96 % | HEART RATE: 85 BPM | DIASTOLIC BLOOD PRESSURE: 76 MMHG

## 2023-06-07 DIAGNOSIS — Z01.818 PRE-OP EXAMINATION: ICD-10-CM

## 2023-06-07 DIAGNOSIS — Z01.818 PREOPERATIVE TESTING: ICD-10-CM

## 2023-06-07 DIAGNOSIS — D05.01 LOBULAR CARCINOMA IN SITU (LCIS) OF RIGHT BREAST: ICD-10-CM

## 2023-06-07 DIAGNOSIS — D05.01 LOBULAR CARCINOMA IN SITU (LCIS) OF RIGHT BREAST: Primary | ICD-10-CM

## 2023-06-07 LAB
ALBUMIN SERPL BCP-MCNC: 4.3 G/DL (ref 3.5–5)
ALP SERPL-CCNC: 77 U/L (ref 34–104)
ALT SERPL W P-5'-P-CCNC: 42 U/L (ref 7–52)
ANION GAP SERPL CALCULATED.3IONS-SCNC: 7 MMOL/L (ref 4–13)
AST SERPL W P-5'-P-CCNC: 49 U/L (ref 13–39)
BASOPHILS # BLD AUTO: 0.04 THOUSANDS/ÂΜL (ref 0–0.1)
BASOPHILS NFR BLD AUTO: 1 % (ref 0–1)
BILIRUB SERPL-MCNC: 0.51 MG/DL (ref 0.2–1)
BUN SERPL-MCNC: 18 MG/DL (ref 5–25)
CALCIUM SERPL-MCNC: 9.9 MG/DL (ref 8.4–10.2)
CHLORIDE SERPL-SCNC: 107 MMOL/L (ref 96–108)
CO2 SERPL-SCNC: 27 MMOL/L (ref 21–32)
CREAT SERPL-MCNC: 1 MG/DL (ref 0.6–1.3)
EOSINOPHIL # BLD AUTO: 0.23 THOUSAND/ÂΜL (ref 0–0.61)
EOSINOPHIL NFR BLD AUTO: 4 % (ref 0–6)
ERYTHROCYTE [DISTWIDTH] IN BLOOD BY AUTOMATED COUNT: 13.7 % (ref 11.6–15.1)
GFR SERPL CREATININE-BSD FRML MDRD: 57 ML/MIN/1.73SQ M
GLUCOSE SERPL-MCNC: 108 MG/DL (ref 65–140)
HCT VFR BLD AUTO: 44 % (ref 34.8–46.1)
HGB BLD-MCNC: 14.3 G/DL (ref 11.5–15.4)
IMM GRANULOCYTES # BLD AUTO: 0.01 THOUSAND/UL (ref 0–0.2)
IMM GRANULOCYTES NFR BLD AUTO: 0 % (ref 0–2)
LYMPHOCYTES # BLD AUTO: 1.52 THOUSANDS/ÂΜL (ref 0.6–4.47)
LYMPHOCYTES NFR BLD AUTO: 28 % (ref 14–44)
MCH RBC QN AUTO: 28 PG (ref 26.8–34.3)
MCHC RBC AUTO-ENTMCNC: 32.5 G/DL (ref 31.4–37.4)
MCV RBC AUTO: 86 FL (ref 82–98)
MONOCYTES # BLD AUTO: 0.32 THOUSAND/ÂΜL (ref 0.17–1.22)
MONOCYTES NFR BLD AUTO: 6 % (ref 4–12)
NEUTROPHILS # BLD AUTO: 3.3 THOUSANDS/ÂΜL (ref 1.85–7.62)
NEUTS SEG NFR BLD AUTO: 61 % (ref 43–75)
NRBC BLD AUTO-RTO: 0 /100 WBCS
PLATELET # BLD AUTO: 233 THOUSANDS/UL (ref 149–390)
PMV BLD AUTO: 11.3 FL (ref 8.9–12.7)
POTASSIUM SERPL-SCNC: 4.3 MMOL/L (ref 3.5–5.3)
PROT SERPL-MCNC: 7.4 G/DL (ref 6.4–8.4)
RBC # BLD AUTO: 5.11 MILLION/UL (ref 3.81–5.12)
SODIUM SERPL-SCNC: 141 MMOL/L (ref 135–147)
WBC # BLD AUTO: 5.42 THOUSAND/UL (ref 4.31–10.16)

## 2023-06-07 PROCEDURE — 71046 X-RAY EXAM CHEST 2 VIEWS: CPT

## 2023-06-07 PROCEDURE — 36415 COLL VENOUS BLD VENIPUNCTURE: CPT

## 2023-06-07 PROCEDURE — 99213 OFFICE O/P EST LOW 20 MIN: CPT

## 2023-06-07 PROCEDURE — 93005 ELECTROCARDIOGRAM TRACING: CPT

## 2023-06-07 PROCEDURE — 80053 COMPREHEN METABOLIC PANEL: CPT

## 2023-06-07 PROCEDURE — 85025 COMPLETE CBC W/AUTO DIFF WBC: CPT

## 2023-06-07 RX ORDER — OXYCODONE HYDROCHLORIDE AND ACETAMINOPHEN 5; 325 MG/1; MG/1
1 TABLET ORAL EVERY 4 HOURS PRN
Qty: 6 TABLET | Refills: 0 | Status: SHIPPED | OUTPATIENT
Start: 2023-06-07

## 2023-06-07 NOTE — H&P (VIEW-ONLY)
Surgical Oncology Follow Up       [address]  [facility] [place]  [address] [place] [address]    [place] DARRIAN [place]  1955  [id number]  [address] [facility]  [facility] [place]  [address] [facility] [address]  [place] [address]    Diagnoses and all orders for this visit:    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  -     oxyCODONE-acetaminophen (Percocet) 5-325 mg per tablet; Take 1 tablet by mouth every 4 (four) hours as needed for moderate pain Max Daily Amount: 6 tablets    Pre-op examination        Chief Complaint   Patient presents with   • Follow-up       No follow-ups on file  History of Present Illness: The patient returns to the office today for pre-operative history and physical exam   She is scheduled for PUMA directed right breast lumpectomy on June 27, 2023 with Dr West Comes  She denies any changes since her last visit  She denies any SOB, cough, chest pain or pressure, palpitations, fever or chills  Pre-admission testing has not been completed  Review of Systems   Constitutional: Negative for activity change, appetite change, chills and fever  HENT: Negative  Respiratory: Negative  Negative for cough, chest tightness and shortness of breath  Cardiovascular: Negative  Negative for chest pain and palpitations  Gastrointestinal: Negative  Musculoskeletal: Negative  Skin: Negative  Neurological: Negative  Hematological: Negative  Negative for adenopathy  Psychiatric/Behavioral: Negative                Patient Active Problem List   Diagnosis   • Family history of malignant neoplasm of uterus   • Family history of malignant neoplasm of gastrointestinal tract   • Family history of malignant neoplasm of breast   • Recurrent major depressive disorder, in partial remission ([facility] [address] )   • Dyslipidemia   • Elevated LFTs   • Esophageal reflux   • Left thyroid nodule   • 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   • Mild intermittent asthma without complication   • Obesity (BMI 30 0-34  9)   • Rosacea   • Increased risk of breast cancer   • Hand eczema   • Chronic insomnia   • Hepatic steatosis   • Anxiety associated with depression   • Elevated hematocrit   • Hepatosplenomegaly   • Gallbladder polyp   • Inflamed skin tag   • Celiac disease     Past Medical History:   Diagnosis Date   • Allergic rhinitis     last assessed-10/2/2017   • Asthma    • Carotid bruit     R-last assessed-6/10/2014   • Celiac disease    • Dysphagia     last assessed-2013   • GERD (gastroesophageal reflux disease)      Past Surgical History:   Procedure Laterality Date   • BREAST BIOPSY Right 11/15/2017    percutaneous needle core   • BREAST BIOPSY Right 2023    SLW Stereo 9:00   • BREAST LUMPECTOMY Right 2017    LCIS   •  SECTION     • COLONOSCOPY      complete-12/3/2012-internal/external hemorrhoids, perianal or excoriation, mild sigmoid diverticulosis   • MAMMO NEEDLE LOCALIZATION RIGHT (ALL INC) Right 2017   • MAMMO NEEDLE LOCALIZATION RIGHT (ALL INC) EACH ADD Right 2017   • MAMMO STEREOTACTIC BREAST BIOPSY RIGHT (ALL INC) Right 11/15/2017   • MAMMO STEREOTACTIC BREAST BIOPSY RIGHT (ALL INC) Right 2023   • MAMMO STEREOTACTIC BREAST BIOPSY RIGHT (ALL INC) EACH ADD Right 11/15/2017   • MRI BREAST BIOPSY LEFT (ALL INCLUSIVE) Left 2019   • [place] PERQ DEVICE PLACEMENT BREAST LOC 1ST LES W/GDNCE Right 2017    Procedure: BREAST LUMPECTOMY; BREAST BRACKETED NEEDLE LOCALIZATION (BRACKETED NEEDLE LOC AT 0900);   Surgeon: Carolin Gates MD;  [facility];  Service: Surgical Oncology   • UPPER GASTROINTESTINAL ENDOSCOPY     • VAGINAL DILATATION      d&e     Family History   Problem Relation Age of Onset   • Heart attack Mother         acute MI   • Colon polyps Mother         polyps of the sigmoid colon   • Colon cancer Mother    • Breast cancer Sister 47   • Uterine cancer Sister 59   • No Known Problems Daughter    • No Known Problems Daughter    • Uterine cancer Maternal Grandmother         unknown age-maybe 63's   • Uterine cancer Paternal Grandmother         unknown age , maybe 61's   • Diabetes Paternal Grandfather    • Liver cancer Brother 48   • Colon cancer Brother 48   • Lung cancer Brother    • No Known Problems Brother    • No Known Problems Brother    • No Known Problems Brother    • No Known Problems Brother    • No Known Problems Son    • Stroke Maternal Uncle    • Kidney cancer Paternal Aunt 54   • Pancreatic cancer Paternal Uncle 79   • Breast cancer Cousin 62   • Uterine cancer Cousin 54   • Colon cancer Cousin 61     Social History     Socioeconomic History   • Marital status: /Civil Union     Spouse name: Not on file   • Number of children: Not on file   • Years of education: Not on file   • Highest education level: Not on file   Occupational History   • Not on file   Tobacco Use   • Smoking status: Former     Years: 2 00     Types: Cigarettes     Quit date: 1976     Years since quittin 1   • Smokeless tobacco: Never   Vaping Use   • Vaping Use: Never used   Substance and Sexual Activity   • Alcohol use: Yes     Alcohol/week: 1 0 standard drink of alcohol     Types: 1 Glasses of wine per week     Comment: per allscripts, social drinker   • Drug use: No   • Sexual activity: Not Currently     Partners: Male     Birth control/protection: Post-menopausal   Other Topics Concern   • Not on file   Social History Narrative   • Not on file     Social Determinants of Health     Financial Resource Strain: Low Risk  (3/13/2023)    Overall Financial Resource Strain (CARDIA)    • Difficulty of Paying Living Expenses: Not hard at all   Food Insecurity: Not on file   Transportation Needs: No Transportation Needs (3/13/2023)    PRAPARE - Transportation    • Lack of Transportation (Medical): No    • Lack of Transportation (Non-Medical):  No   Physical Activity: Not on file   Stress: Not on file   Social Connections: Not on file   Intimate Partner Violence: Not on file   Housing Stability: Not on file       Current Outpatient Medications:   •  albuterol (Ventolin HFA) 90 mcg/act inhaler, Inhale 2 puffs every 4 (four) hours as needed for wheezing, Disp: 18 g, Rfl: 5  •  aspirin 81 MG tablet, Take 81 mg by mouth daily, Disp: , Rfl:   •  calcium citrate (CALCITRATE) 950 MG tablet, Take 1 tablet by mouth daily, Disp: , Rfl:   •  fluocinonide (LIDEX) 0 05 % cream, Apply topically 2 (two) times a day (Patient taking differently: Apply topically if needed), Disp: 30 g, Rfl: 1  •  Multiple Vitamin (MULTI-VITAMIN DAILY PO), Take by mouth, Disp: , Rfl:   •  omeprazole (PriLOSEC) 40 MG capsule, Take 1 capsule (40 mg total) by mouth daily, Disp: 90 capsule, Rfl: 3  •  oxyCODONE-acetaminophen (Percocet) 5-325 mg per tablet, Take 1 tablet by mouth every 4 (four) hours as needed for moderate pain Max Daily Amount: 6 tablets, Disp: 6 tablet, Rfl: 0  •  PARoxetine (PAXIL) 20 mg tablet, TAKE ONE TABLET BY MOUTH EVERY DAY, Disp: 90 tablet, Rfl: 3  No Known Allergies  Vitals:    06/07/23 0820   BP: 128/76   Pulse: 85   Resp: 22   Temp: (!) 97 3 °F (36 3 °C)   SpO2: 96%       Physical Exam  Vitals reviewed  Constitutional:       General: She is not in acute distress  Appearance: Normal appearance  She is normal weight  She is not ill-appearing or toxic-appearing  HENT:      Head: Normocephalic and atraumatic  Nose: Nose normal       Mouth/Throat:      Mouth: Mucous membranes are moist    Eyes:      General: No scleral icterus  Extraocular Movements: Extraocular movements intact  Conjunctiva/sclera: Conjunctivae normal       Pupils: Pupils are equal, round, and reactive to light  Neck:      Vascular: No carotid bruit  Cardiovascular:      Rate and Rhythm: Normal rate and regular rhythm  Pulses: Normal pulses  Heart sounds: Normal heart sounds  Pulmonary:      Effort: Pulmonary effort is normal       Breath sounds: Normal breath sounds and air entry  No decreased breath sounds or wheezing  Musculoskeletal:         General: Normal range of motion  Cervical back: Normal range of motion and neck supple  Lymphadenopathy:      Upper Body:      Right upper body: No axillary or pectoral adenopathy  Skin:     General: Skin is warm and dry  Neurological:      General: No focal deficit present  Mental Status: She is alert and oriented to person, place, and time  Psychiatric:         Mood and Affect: Mood normal          Behavior: Behavior normal          Thought Content: Thought content normal          Judgment: Judgment normal          Discussion/Summary: This is a 71 y/o female who presents today for pre-op H&P  There are no new or worrisome findings on exam today  She will proceed with surgery as planned, all questions were answered today

## 2023-06-07 NOTE — PROGRESS NOTES
Surgical Oncology Follow Up       [address]  [facility] [place]  [address] [place] [address]    [place] DARRI[place]  1955  [id number]  [address] [facility]  [facility] [place]  [address] [facility] [address] [address]    Diagnoses and all orders for this visit:    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  -     oxyCODONE-acetaminophen (Percocet) 5-325 mg per tablet; Take 1 tablet by mouth every 4 (four) hours as needed for moderate pain Max Daily Amount: 6 tablets    Pre-op examination        Chief Complaint   Patient presents with   • Follow-up       No follow-ups on file  History of Present Illness: The patient returns to the office today for pre-operative history and physical exam   She is scheduled for PUMA directed right breast lumpectomy on June 27, 2023 with Dr Melissa Benjamin  She denies any changes since her last visit  She denies any SOB, cough, chest pain or pressure, palpitations, fever or chills  Pre-admission testing has not been completed  Review of Systems   Constitutional: Negative for activity change, appetite change, chills and fever  HENT: Negative  Respiratory: Negative  Negative for cough, chest tightness and shortness of breath  Cardiovascular: Negative  Negative for chest pain and palpitations  Gastrointestinal: Negative  Musculoskeletal: Negative  Skin: Negative  Neurological: Negative  Hematological: Negative  Negative for adenopathy  Psychiatric/Behavioral: Negative                Patient Active Problem List   Diagnosis   • Family history of malignant neoplasm of uterus   • Family history of malignant neoplasm of gastrointestinal tract   • Family history of malignant neoplasm of breast   • Recurrent major depressive disorder, in partial remission ([facility] [address] )   • Dyslipidemia   • Elevated LFTs   • Esophageal reflux   • Left thyroid nodule   • 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   • Mild intermittent asthma without complication   • Obesity (BMI 30 0-34  9)   • Rosacea   • Increased risk of breast cancer   • Hand eczema   • Chronic insomnia   • Hepatic steatosis   • Anxiety associated with depression   • Elevated hematocrit   • Hepatosplenomegaly   • Gallbladder polyp   • Inflamed skin tag   • Celiac disease     Past Medical History:   Diagnosis Date   • Allergic rhinitis     last assessed-10/2/2017   • Asthma    • Carotid bruit     R-last assessed-6/10/2014   • Celiac disease    • Dysphagia     last assessed-2013   • GERD (gastroesophageal reflux disease)      Past Surgical History:   Procedure Laterality Date   • BREAST BIOPSY Right 11/15/2017    percutaneous needle core   • BREAST BIOPSY Right 2023    SLW Stereo 9:00   • BREAST LUMPECTOMY Right 2017    LCIS   •  SECTION     • COLONOSCOPY      complete-12/3/2012-internal/external hemorrhoids, perianal or excoriation, mild sigmoid diverticulosis   • MAMMO NEEDLE LOCALIZATION RIGHT (ALL INC) Right 2017   • MAMMO NEEDLE LOCALIZATION RIGHT (ALL INC) EACH ADD Right 2017   • MAMMO STEREOTACTIC BREAST BIOPSY RIGHT (ALL INC) Right 11/15/2017   • MAMMO STEREOTACTIC BREAST BIOPSY RIGHT (ALL INC) Right 2023   • MAMMO STEREOTACTIC BREAST BIOPSY RIGHT (ALL INC) EACH ADD Right 11/15/2017   • MRI BREAST BIOPSY LEFT (ALL INCLUSIVE) Left 2019   • [place] PERQ DEVICE PLACEMENT BREAST LOC 1ST LES W/GDNCE Right 2017    Procedure: BREAST LUMPECTOMY; BREAST BRACKETED NEEDLE LOCALIZATION (BRACKETED NEEDLE LOC AT 0900);   Surgeon: Nandini Dunlap MD;  [facility];  Service: Surgical Oncology   • UPPER GASTROINTESTINAL ENDOSCOPY     • VAGINAL DILATATION      d&e     Family History   Problem Relation Age of Onset   • Heart attack Mother         acute MI   • Colon polyps Mother         polyps of the sigmoid colon   • Colon cancer Mother    • Breast cancer Sister 47   • Uterine cancer Sister 59   • No Known Problems Daughter    • No Known Problems Daughter    • Uterine cancer Maternal Grandmother         unknown age-maybe 63's   • Uterine cancer Paternal Grandmother         unknown age , maybe 61's   • Diabetes Paternal Grandfather    • Liver cancer Brother 48   • Colon cancer Brother 48   • Lung cancer Brother    • No Known Problems Brother    • No Known Problems Brother    • No Known Problems Brother    • No Known Problems Brother    • No Known Problems Son    • Stroke Maternal Uncle    • Kidney cancer Paternal Aunt 54   • Pancreatic cancer Paternal Uncle 79   • Breast cancer Cousin 62   • Uterine cancer Cousin 54   • Colon cancer Cousin 61     Social History     Socioeconomic History   • Marital status: /Civil Union     Spouse name: Not on file   • Number of children: Not on file   • Years of education: Not on file   • Highest education level: Not on file   Occupational History   • Not on file   Tobacco Use   • Smoking status: Former     Years: 2 00     Types: Cigarettes     Quit date: 1976     Years since quittin 1   • Smokeless tobacco: Never   Vaping Use   • Vaping Use: Never used   Substance and Sexual Activity   • Alcohol use: Yes     Alcohol/week: 1 0 standard drink of alcohol     Types: 1 Glasses of wine per week     Comment: per allscripts, social drinker   • Drug use: No   • Sexual activity: Not Currently     Partners: Male     Birth control/protection: Post-menopausal   Other Topics Concern   • Not on file   Social History Narrative   • Not on file     Social Determinants of Health     Financial Resource Strain: Low Risk  (3/13/2023)    Overall Financial Resource Strain (CARDIA)    • Difficulty of Paying Living Expenses: Not hard at all   Food Insecurity: Not on file   Transportation Needs: No Transportation Needs (3/13/2023)    PRAPARE - Transportation    • Lack of Transportation (Medical): No    • Lack of Transportation (Non-Medical):  No   Physical Activity: Not on file   Stress: Not on file   Social Connections: Not on file   Intimate Partner Violence: Not on file   Housing Stability: Not on file       Current Outpatient Medications:   •  albuterol (Ventolin HFA) 90 mcg/act inhaler, Inhale 2 puffs every 4 (four) hours as needed for wheezing, Disp: 18 g, Rfl: 5  •  aspirin 81 MG tablet, Take 81 mg by mouth daily, Disp: , Rfl:   •  calcium citrate (CALCITRATE) 950 MG tablet, Take 1 tablet by mouth daily, Disp: , Rfl:   •  fluocinonide (LIDEX) 0 05 % cream, Apply topically 2 (two) times a day (Patient taking differently: Apply topically if needed), Disp: 30 g, Rfl: 1  •  Multiple Vitamin (MULTI-VITAMIN DAILY PO), Take by mouth, Disp: , Rfl:   •  omeprazole (PriLOSEC) 40 MG capsule, Take 1 capsule (40 mg total) by mouth daily, Disp: 90 capsule, Rfl: 3  •  oxyCODONE-acetaminophen (Percocet) 5-325 mg per tablet, Take 1 tablet by mouth every 4 (four) hours as needed for moderate pain Max Daily Amount: 6 tablets, Disp: 6 tablet, Rfl: 0  •  PARoxetine (PAXIL) 20 mg tablet, TAKE ONE TABLET BY MOUTH EVERY DAY, Disp: 90 tablet, Rfl: 3  No Known Allergies  Vitals:    06/07/23 0820   BP: 128/76   Pulse: 85   Resp: 22   Temp: (!) 97 3 °F (36 3 °C)   SpO2: 96%       Physical Exam  Vitals reviewed  Constitutional:       General: She is not in acute distress  Appearance: Normal appearance  She is normal weight  She is not ill-appearing or toxic-appearing  HENT:      Head: Normocephalic and atraumatic  Nose: Nose normal       Mouth/Throat:      Mouth: Mucous membranes are moist    Eyes:      General: No scleral icterus  Extraocular Movements: Extraocular movements intact  Conjunctiva/sclera: Conjunctivae normal       Pupils: Pupils are equal, round, and reactive to light  Neck:      Vascular: No carotid bruit  Cardiovascular:      Rate and Rhythm: Normal rate and regular rhythm  Pulses: Normal pulses  Heart sounds: Normal heart sounds  Pulmonary:      Effort: Pulmonary effort is normal       Breath sounds: Normal breath sounds and air entry  No decreased breath sounds or wheezing  Musculoskeletal:         General: Normal range of motion  Cervical back: Normal range of motion and neck supple  Lymphadenopathy:      Upper Body:      Right upper body: No axillary or pectoral adenopathy  Skin:     General: Skin is warm and dry  Neurological:      General: No focal deficit present  Mental Status: She is alert and oriented to person, place, and time  Psychiatric:         Mood and Affect: Mood normal          Behavior: Behavior normal          Thought Content: Thought content normal          Judgment: Judgment normal          Discussion/Summary: This is a 71 y/o female who presents today for pre-op H&P  There are no new or worrisome findings on exam today  She will proceed with surgery as planned, all questions were answered today

## 2023-06-09 LAB
ATRIAL RATE: 74 BPM
P AXIS: 48 DEGREES
PR INTERVAL: 142 MS
QRS AXIS: 3 DEGREES
QRSD INTERVAL: 86 MS
QT INTERVAL: 416 MS
QTC INTERVAL: 461 MS
T WAVE AXIS: 43 DEGREES
VENTRICULAR RATE: 74 BPM

## 2023-06-09 PROCEDURE — 93010 ELECTROCARDIOGRAM REPORT: CPT | Performed by: INTERNAL MEDICINE

## 2023-06-16 ENCOUNTER — HOSPITAL ENCOUNTER (OUTPATIENT)
Dept: MAMMOGRAPHY | Facility: CLINIC | Age: 68
Discharge: HOME | End: 2023-06-16
Payer: MEDICARE

## 2023-06-16 VITALS — DIASTOLIC BLOOD PRESSURE: 82 MMHG | HEART RATE: 78 BPM | SYSTOLIC BLOOD PRESSURE: 130 MMHG

## 2023-06-16 DIAGNOSIS — D05.01 LOBULAR CARCINOMA IN SITU (LCIS) OF RIGHT BREAST: ICD-10-CM

## 2023-06-16 PROCEDURE — 19281 PERQ DEVICE BREAST 1ST IMAG: CPT

## 2023-06-16 RX ORDER — LIDOCAINE HYDROCHLORIDE 10 MG/ML
5 INJECTION, SOLUTION EPIDURAL; INFILTRATION; INTRACAUDAL; PERINEURAL ONCE
Status: COMPLETED | OUTPATIENT
Start: 2023-06-16 | End: 2023-06-16

## 2023-06-16 RX ADMIN — LIDOCAINE HYDROCHLORIDE 5 ML: 10 INJECTION, SOLUTION EPIDURAL; INFILTRATION; INTRACAUDAL; PERINEURAL at 13:28

## 2023-06-16 NOTE — PROGRESS NOTES
Ice pack given:    ___X__yes _____no    Discharge instructions signed by patient:    _____yes ___X__no    Discharge instructions given to patient:    ___X__yes _____no    Discharged via:    ___X__ambulatory    _____wheelchair    _____stretcher    Stable on discharge:    ___X__yes ____no

## 2023-06-16 NOTE — PROGRESS NOTES
Ice pack given:    __X___yes _____no    Discharge instructions signed by patient:    _____yes ___X__no    Discharge instructions given to patient:    _X__yes _____no    Discharged via:    ___X__ambulatory    _____wheelchair    _____stretcher    Stable on discharge:    ____X_yes ____no

## 2023-06-16 NOTE — PROGRESS NOTES
Procedure type:    __X___ultrasound guided _____stereotactic    Breast:    _____Left ___X__Right    Location: [facility]     Needle: 7 5 cm; 16 gauge     # of passes:1    Clip: Shalini     Performed by: Dr Keren Massey held for 5 minutes by: Nila PECK    Stergeena Strips:    _____yes ___X__no    Band aid:    ___X__yes_____no    Tape and guaze:    _____yes ___X__no    Tolerated procedure:    __X___yes _____no

## 2023-06-19 NOTE — PROGRESS NOTES
Post procedure call completed    Bleeding: _____yes __x___no    Pain: _____yes ___x___no    Redness/Swelling: ______yes ___x___no    Band aid removed: __x___yes _____no

## 2023-06-21 NOTE — PRE-PROCEDURE INSTRUCTIONS
Pre-Surgery Instructions:   Medication Instructions   • albuterol (Ventolin HFA) 90 mcg/act inhaler Uses PRN- OK to take day of surgery   • aspirin 81 MG tablet last dose 6-19-23 per MD   • calcium citrate (CALCITRATE) 950 MG tablet Stop taking 7 days prior to surgery  • fluocinonide (LIDEX) 0 05 % cream Uses PRN- DO NOT take day of surgery   • Multiple Vitamin (MULTI-VITAMIN DAILY PO) Stop taking 7 days prior to surgery  • omeprazole (PriLOSEC) 40 MG capsule Take day of surgery  • PARoxetine (PAXIL) 20 mg tablet Take night before surgery   Medication instructions for day surgery reviewed  Please use only a sip of water to take your instructed medications  Avoid all over the counter vitamins, supplements and NSAIDS for one week prior to surgery per anesthesia guidelines  Tylenol is ok to take as needed  You will receive a call one business day prior to surgery with an arrival time and hospital directions  If your surgery is scheduled on a Monday, the hospital will be calling you on the Friday prior to your surgery  If you have not heard from anyone by 8pm, please call the hospital supervisor through the hospital  at [phone]  Do not eat or drink anything after midnight the night before your surgery, including candy, mints, lifesavers, or chewing gum  Do not drink alcohol 24hrs before your surgery  Try not to smoke at least 24hrs before your surgery  Follow the pre surgery showering instructions as listed in the [facility] Surgical Experience Booklet” or otherwise provided by your surgeon's office  Do not shave the surgical area 24 hours before surgery  Do not apply any lotions, creams, including makeup, cologne, deodorant, or perfumes after showering on the day of your surgery  No contact lenses, eye make-up, or artificial eyelashes  Remove nail polish, including gel polish, and any artificial, gel, or acrylic nails if possible   Remove all jewelry including rings and body piercing jewelry  Wear causal clothing that is easy to take on and off  Consider your type of surgery  Keep any valuables, jewelry, piercings at home  Please bring any specially ordered equipment (sling, braces) if indicated  Arrange for a responsible person to drive you to and from the hospital on the day of your surgery  Visitor Guidelines discussed  Call the surgeon's office with any new illnesses, exposures, or additional questions prior to surgery  Please reference your [facility] Surgical Experience Booklet” for additional information to prepare for your upcoming surgery

## 2023-06-27 ENCOUNTER — APPOINTMENT (OUTPATIENT)
Dept: RADIOLOGY | Facility: HOSPITAL | Age: 68
End: 2023-06-27
Payer: MEDICARE

## 2023-06-27 ENCOUNTER — ANESTHESIA (OUTPATIENT)
Dept: PERIOP | Facility: HOSPITAL | Age: 68
End: 2023-06-27
Payer: MEDICARE

## 2023-06-27 ENCOUNTER — ANESTHESIA EVENT (OUTPATIENT)
Dept: PERIOP | Facility: HOSPITAL | Age: 68
End: 2023-06-27
Payer: MEDICARE

## 2023-06-27 ENCOUNTER — HOSPITAL ENCOUNTER (OUTPATIENT)
Facility: HOSPITAL | Age: 68
Setting detail: OUTPATIENT SURGERY
Discharge: HOME | End: 2023-06-27
Attending: SURGERY | Admitting: SURGERY
Payer: MEDICARE

## 2023-06-27 VITALS
HEIGHT: 69 IN | TEMPERATURE: 97.3 F | BODY MASS INDEX: 33.33 KG/M2 | DIASTOLIC BLOOD PRESSURE: 69 MMHG | HEART RATE: 86 BPM | SYSTOLIC BLOOD PRESSURE: 148 MMHG | RESPIRATION RATE: 17 BRPM | OXYGEN SATURATION: 96 % | WEIGHT: 225 LBS

## 2023-06-27 DIAGNOSIS — D05.01 LOBULAR CARCINOMA IN SITU (LCIS) OF RIGHT BREAST: ICD-10-CM

## 2023-06-27 PROCEDURE — 88342 IMHCHEM/IMCYTCHM 1ST ANTB: CPT | Performed by: PATHOLOGY

## 2023-06-27 PROCEDURE — 88341 IMHCHEM/IMCYTCHM EA ADD ANTB: CPT | Performed by: PATHOLOGY

## 2023-06-27 PROCEDURE — 88307 TISSUE EXAM BY PATHOLOGIST: CPT | Performed by: PATHOLOGY

## 2023-06-27 PROCEDURE — 19301 PARTIAL MASTECTOMY: CPT | Performed by: SURGERY

## 2023-06-27 RX ORDER — BUPIVACAINE HYDROCHLORIDE AND EPINEPHRINE 2.5; 5 MG/ML; UG/ML
INJECTION, SOLUTION INFILTRATION; PERINEURAL AS NEEDED
Status: DISCONTINUED | OUTPATIENT
Start: 2023-06-27 | End: 2023-06-27 | Stop reason: HOSPADM

## 2023-06-27 RX ORDER — SODIUM CHLORIDE, SODIUM LACTATE, POTASSIUM CHLORIDE, CALCIUM CHLORIDE 600; 310; 30; 20 MG/100ML; MG/100ML; MG/100ML; MG/100ML
INJECTION, SOLUTION INTRAVENOUS CONTINUOUS PRN
Status: DISCONTINUED | OUTPATIENT
Start: 2023-06-27 | End: 2023-06-27

## 2023-06-27 RX ORDER — MIDAZOLAM HYDROCHLORIDE 2 MG/2ML
INJECTION, SOLUTION INTRAMUSCULAR; INTRAVENOUS AS NEEDED
Status: DISCONTINUED | OUTPATIENT
Start: 2023-06-27 | End: 2023-06-27

## 2023-06-27 RX ORDER — FENTANYL CITRATE/PF 50 MCG/ML
25 SYRINGE (ML) INJECTION
Status: DISCONTINUED | OUTPATIENT
Start: 2023-06-27 | End: 2023-06-27 | Stop reason: HOSPADM

## 2023-06-27 RX ORDER — CEFAZOLIN SODIUM 2 G/50ML
2000 SOLUTION INTRAVENOUS ONCE
Status: COMPLETED | OUTPATIENT
Start: 2023-06-27 | End: 2023-06-27

## 2023-06-27 RX ORDER — DEXAMETHASONE SODIUM PHOSPHATE 10 MG/ML
INJECTION, SOLUTION INTRAMUSCULAR; INTRAVENOUS AS NEEDED
Status: DISCONTINUED | OUTPATIENT
Start: 2023-06-27 | End: 2023-06-27

## 2023-06-27 RX ORDER — LIDOCAINE HYDROCHLORIDE 10 MG/ML
INJECTION, SOLUTION EPIDURAL; INFILTRATION; INTRACAUDAL; PERINEURAL AS NEEDED
Status: DISCONTINUED | OUTPATIENT
Start: 2023-06-27 | End: 2023-06-27

## 2023-06-27 RX ORDER — PROPOFOL 10 MG/ML
INJECTION, EMULSION INTRAVENOUS AS NEEDED
Status: DISCONTINUED | OUTPATIENT
Start: 2023-06-27 | End: 2023-06-27

## 2023-06-27 RX ORDER — SODIUM CHLORIDE, SODIUM LACTATE, POTASSIUM CHLORIDE, CALCIUM CHLORIDE 600; 310; 30; 20 MG/100ML; MG/100ML; MG/100ML; MG/100ML
20 INJECTION, SOLUTION INTRAVENOUS CONTINUOUS
Status: CANCELLED | OUTPATIENT
Start: 2023-06-27

## 2023-06-27 RX ORDER — ONDANSETRON 2 MG/ML
4 INJECTION INTRAMUSCULAR; INTRAVENOUS ONCE AS NEEDED
Status: DISCONTINUED | OUTPATIENT
Start: 2023-06-27 | End: 2023-06-27 | Stop reason: HOSPADM

## 2023-06-27 RX ORDER — OXYCODONE HYDROCHLORIDE AND ACETAMINOPHEN 5; 325 MG/1; MG/1
1 TABLET ORAL EVERY 4 HOURS PRN
Status: CANCELLED | OUTPATIENT
Start: 2023-06-27

## 2023-06-27 RX ORDER — ONDANSETRON 2 MG/ML
INJECTION INTRAMUSCULAR; INTRAVENOUS AS NEEDED
Status: DISCONTINUED | OUTPATIENT
Start: 2023-06-27 | End: 2023-06-27

## 2023-06-27 RX ORDER — FENTANYL CITRATE 50 UG/ML
INJECTION, SOLUTION INTRAMUSCULAR; INTRAVENOUS AS NEEDED
Status: DISCONTINUED | OUTPATIENT
Start: 2023-06-27 | End: 2023-06-27

## 2023-06-27 RX ORDER — METOCLOPRAMIDE HYDROCHLORIDE 5 MG/ML
5 INJECTION INTRAMUSCULAR; INTRAVENOUS ONCE AS NEEDED
Status: DISCONTINUED | OUTPATIENT
Start: 2023-06-27 | End: 2023-06-27 | Stop reason: HOSPADM

## 2023-06-27 RX ORDER — HYDROMORPHONE HCL IN WATER/PF 6 MG/30 ML
0.2 PATIENT CONTROLLED ANALGESIA SYRINGE INTRAVENOUS
Status: DISCONTINUED | OUTPATIENT
Start: 2023-06-27 | End: 2023-06-27 | Stop reason: HOSPADM

## 2023-06-27 RX ADMIN — ONDANSETRON 4 MG: 2 INJECTION INTRAMUSCULAR; INTRAVENOUS at 11:06

## 2023-06-27 RX ADMIN — PROPOFOL 150 MG: 10 INJECTION, EMULSION INTRAVENOUS at 10:23

## 2023-06-27 RX ADMIN — FENTANYL CITRATE 25 MCG: 50 INJECTION, SOLUTION INTRAMUSCULAR; INTRAVENOUS at 11:12

## 2023-06-27 RX ADMIN — FENTANYL CITRATE 50 MCG: 50 INJECTION, SOLUTION INTRAMUSCULAR; INTRAVENOUS at 10:31

## 2023-06-27 RX ADMIN — SODIUM CHLORIDE, SODIUM LACTATE, POTASSIUM CHLORIDE, AND CALCIUM CHLORIDE: .6; .31; .03; .02 INJECTION, SOLUTION INTRAVENOUS at 10:07

## 2023-06-27 RX ADMIN — MIDAZOLAM HYDROCHLORIDE 2 MG: 1 INJECTION, SOLUTION INTRAMUSCULAR; INTRAVENOUS at 10:17

## 2023-06-27 RX ADMIN — CEFAZOLIN SODIUM 2000 MG: 2 SOLUTION INTRAVENOUS at 10:20

## 2023-06-27 RX ADMIN — DEXAMETHASONE SODIUM PHOSPHATE 10 MG: 10 INJECTION, SOLUTION INTRAMUSCULAR; INTRAVENOUS at 10:23

## 2023-06-27 RX ADMIN — LIDOCAINE HYDROCHLORIDE 50 MG: 10 INJECTION, SOLUTION EPIDURAL; INFILTRATION; INTRACAUDAL; PERINEURAL at 10:23

## 2023-06-27 RX ADMIN — FENTANYL CITRATE 25 MCG: 50 INJECTION, SOLUTION INTRAMUSCULAR; INTRAVENOUS at 10:38

## 2023-06-27 NOTE — DISCHARGE INSTR - AVS FIRST PAGE
POST-OPERATIVE BREAST CARE INSTRUCTIONS    Wound Closure/Dressing: Your wound is closed with:   surgical glue, it will come off after 2-3 weeks  Dissolvable sutures-underneath the skin    Wound care: If you have gauze over your wound you may remove it the day after surgery  Leave the Steri-strips on, they will fall off on their own in 1-2 weeks  You may shower using soap and water to clean your wound  Gently pat dry  Drain Care: If you do not have a drain, disregard this section  Visiting Nursing should have been arranged upon discharge from hospital   A nurse will call your home to schedule an initial visit  Please call the number below if you have not received a call within 48 hours of hospital discharge  You may shower with the FORTINO drains  Wash area around the drains with soap and water and pat dry  Record drain outputs on chart given to you at pre op visit and bring that chart to office at post op appt  FORTINO drains can be removed in the office by a nurse either at post op visit OR when drain output is 30 cc’s or less in a 24 hour period for three days in a row  If you had plastic surgery or reconstructive surgery, the plastic surgeon will manage the drains  Other:      You can begin wearing your own bra when it feels comfortable  You may resume using deodorant the day after surgery                 Post-op visit:  your post-op visit is scheduled for:  July 19 2023 @ 8:00 AM    Call our office at [phone] if you have any  of the following:    Redness, swelling, heat, unusual drainage or heavy bleeding from wound  Fever greater than 101 °  Pain not relieved by medication

## 2023-06-27 NOTE — ANESTHESIA PREPROCEDURE EVALUATION
Procedure:  BREAST PUMA  DIRECTED LUMPECTOMY (Right: Breast)    Relevant Problems   GI/HEPATIC   (+) Esophageal reflux   (+) Hepatic steatosis   (+) Hepatosplenomegaly      NEURO/PSYCH   (+) Anxiety associated with depression   (+) Recurrent major depressive disorder, in partial remission (HCC)      PULMONARY   (+) Mild intermittent asthma without complication      Other   (+) Hepatosplenomegaly        Physical Exam    Airway    Mallampati score: III  TM Distance: >3 FB  Neck ROM: full     Dental   No notable dental hx     Cardiovascular  Cardiovascular exam normal    Pulmonary  Pulmonary exam normal     Other Findings    Cough from allergies  Denies fever/chills, sore throat, dyspnea  Anesthesia Plan  ASA Score- 2     Anesthesia Type- general with ASA Monitors  Additional Monitors:   Airway Plan: LMA  Plan Factors-Exercise tolerance (METS): >4 METS  Chart reviewed  EKG reviewed  Existing labs reviewed  Patient summary reviewed  Patient is not a current smoker  Induction- intravenous  Postoperative Plan- Plan for postoperative opioid use  Informed Consent- Anesthetic plan and risks discussed with patient  I personally reviewed this patient with the CRNA  Discussed and agreed on the Anesthesia Plan with the CRNA  Artist Antonio

## 2023-06-27 NOTE — OP NOTE
OPERATIVE REPORT  PATIENT NAME: [place]    :  1955  MRN: [id number]  Pt Location: [facility] 03    SURGERY DATE: 2023    Surgeon(s) and Role:     * Shakila Anaya MD - Primary     * Virginia Barnhart MD - Assisting    Preop Diagnosis:  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 [D05 01]    Post-Op Diagnosis Codes:     * 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 [D05 01]    Procedure(s):  Right - BREAST PUMA  DIRECTED LUMPECTOMY    Specimen(s):  ID Type Source Tests Collected by Time Destination   1 : right breast lumpectomy marked per protocol  Tissue Breast, Right TISSUE uNno Pollard MD 2023 1053    2 : right breast new inferior margin- marked per protocol  Tissue Breast, Right TISSUE Nuno Pollard MD 2023 105        Estimated Blood Loss:   Minimal    Drains:  * No LDAs found *    Anesthesia Type:   General    Operative Indications:  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 [D05 01]      Operative Findings:  Good specimen radiograph, additional inferior margin taken    Complications:   None    Procedure and Technique:  I previously reviewed the post biopsy images  Lumpectomy  The patient was brought to the operation room and placed under general anesthesia  Attention was paid to ensure appropriate padding and correct positioning  The PUMA  detector was then used to locate the position of the PUMA deflector and the skin was marked in this area  The right breast was prepped and draped in a sterile fashion  I initiated a time-out, identifying the patient, the correct side and the above procedure  All parties agreed with the time out  The planned incision was then injected with 0 25% Marcaine for local anesthesia  The incision was sharply incised  The PUMA dectector was used to guide the dissection    Superior, inferior, medial and lateral planes were developed around the PUMA deflector and the specimen truncated with electrocautery beyond the deflector  The specimen was then inked for orientation purposes  A specimen radiograph was taken in two dimensions which showed the amie clip close inferiorly so a new inferior margin was taken as well  All specimens were sent to pathology for permanent analysis  Superficial bleeding controlled with electrocautery and the wound was extensively irrigated  The wound was closed with two layers, an inner layer of 3-0 vicryl and a subcuticular layer of 4-0 monocryl  Exofin was applied  The counts were correct x 2  I was present for the entire procedure      Patient Disposition:  PACU     This procedure was not performed to treat breast cancer through sentinel node biopsy      SIGNATURE: Mayco Martinez MD  DATE: June 27, 2023  TIME: 10:56 AM

## 2023-06-27 NOTE — INTERVAL H&P NOTE
H&P reviewed  After examining the patient I find no changes in the patients condition since the H&P had been written    Shalini functional   Vitals:    06/27/23 0927   BP: 139/72   Pulse: 81   Resp: 18   Temp: (!) 96 °F (35 6 °C)   SpO2: 98%

## 2023-06-27 NOTE — ANESTHESIA POSTPROCEDURE EVALUATION
Post-Op Assessment Note    CV Status:  Stable  Pain Score: 0    Pain management: adequate     Mental Status:  Sleepy and awake   Hydration Status:  Stable   PONV Controlled:  None   Airway Patency:  Patent      Post Op Vitals Reviewed: Yes      Staff: CRNA         There were no known notable events for this encounter      BP  157/73    Temp 97   Pulse 77   Resp 15   SpO2 94 3L NC

## 2023-06-30 ENCOUNTER — TELEPHONE (OUTPATIENT)
Dept: SURGICAL ONCOLOGY | Facility: CLINIC | Age: 68
End: 2023-06-30

## 2023-06-30 NOTE — TELEPHONE ENCOUNTER
"Called the patient to follow up from her recent breast surgery with Dr Dionicio Reddy  Patient denies any redness, swelling, bruising, or discharge from her incision  She states that she is doing \"very well\" and has not needed to take any pain medication  The patient's post-op appointment with Dr Dionicio Reddy was verified for 7/19  Patient denies any questions or concerns at this time and was instructed to call the office if any problems arise prior to her appointment    "

## 2023-07-10 PROCEDURE — 88342 IMHCHEM/IMCYTCHM 1ST ANTB: CPT | Performed by: PATHOLOGY

## 2023-07-10 PROCEDURE — 88307 TISSUE EXAM BY PATHOLOGIST: CPT | Performed by: PATHOLOGY

## 2023-07-10 PROCEDURE — 88341 IMHCHEM/IMCYTCHM EA ADD ANTB: CPT | Performed by: PATHOLOGY

## 2023-07-19 ENCOUNTER — OFFICE VISIT (OUTPATIENT)
Dept: SURGICAL ONCOLOGY | Facility: CLINIC | Age: 68
End: 2023-07-19

## 2023-07-19 VITALS
HEART RATE: 80 BPM | SYSTOLIC BLOOD PRESSURE: 128 MMHG | WEIGHT: 228 LBS | DIASTOLIC BLOOD PRESSURE: 74 MMHG | HEIGHT: 69 IN | RESPIRATION RATE: 16 BRPM | BODY MASS INDEX: 33.77 KG/M2 | TEMPERATURE: 97.3 F | OXYGEN SATURATION: 97 %

## 2023-07-19 DIAGNOSIS — D05.01 LOBULAR CARCINOMA IN SITU (LCIS) OF RIGHT BREAST: ICD-10-CM

## 2023-07-19 DIAGNOSIS — Z91.89 INCREASED RISK OF BREAST CANCER: ICD-10-CM

## 2023-07-19 DIAGNOSIS — Z98.890 STATUS POST RIGHT BREAST LUMPECTOMY: Primary | ICD-10-CM

## 2023-07-19 PROCEDURE — 99024 POSTOP FOLLOW-UP VISIT: CPT | Performed by: SURGERY

## 2023-07-19 NOTE — PROGRESS NOTES
Surgical Oncology Breast Post-Op       [address] [facility] [place]  [address] [address]    Zelda Caruso  1955  [id number]  [address] [facility] [address]    Chief Complaint:   [place] is seen for a post-operative visit of her recent breast surgery. Oncology History:     Oncology History    No history exists. Assessment & Plan:   Assessment/Plan    Patient presents with relatively extensive 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.  There is no invasive cancer and no DCIS or atypical ductal hyperplasia. Did have 2 margins that were positive for LCIS however as I explained to her in the past we do not try to excise LCIS to negative margins unless it is pleomorphic which this is not. The patient I talked about possibly revisiting Dr. Lucita Bergman for repeat discussion regarding chemoprevention however she does not want to pursue this course. She prefers to continue with her imaging and is happy to follow with our nurse practitioner [address]. First getting her MRI and mammogram at the same time. Wound is healing well. History of Present Illness:   See Oncology History    Interval History:   See Assessment & Plan    Review of Systems:   Review of Systems   All other systems reviewed and are negative. Past Medical History:     Patient Active Problem List   Diagnosis   • Family history of malignant neoplasm of uterus   • Family history of malignant neoplasm of gastrointestinal tract   • Family history of malignant neoplasm of breast   • Recurrent major depressive disorder, in partial remission ([address])   • Dyslipidemia   • Elevated LFTs   • Esophageal reflux   • Left thyroid nodule   • 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   • Mild intermittent asthma without complication   • Obesity (BMI 30.0-34. 9)   • Rosacea   • Increased risk of breast cancer   • Hand eczema   • Chronic insomnia   • Hepatic steatosis   • Anxiety associated with depression   • Elevated hematocrit   • Hepatosplenomegaly   • Gallbladder polyp   • Inflamed skin tag   • Celiac disease     Past Medical History:   Diagnosis Date   • Allergic rhinitis     last assessed-10/2/2017   • Asthma    • Carotid bruit     R-last assessed-6/10/2014   • Celiac disease    • Dysphagia     last assessed-2013   • GERD (gastroesophageal reflux disease)      Past Surgical History:   Procedure Laterality Date   • BREAST BIOPSY Right 11/15/2017    percutaneous needle core   • BREAST BIOPSY Right 2023    SLW Stereo 9:00   • BREAST LUMPECTOMY Right 2017    LCIS   • BREAST LUMPECTOMY Right 2023    Procedure: BREAST PUMA  DIRECTED LUMPECTOMY;  Surgeon: Sheldon Ann MD;  [facility];  Service: Surgical Oncology   •  SECTION     • COLONOSCOPY      complete-12/3/2012-internal/external hemorrhoids, perianal or excoriation, mild sigmoid diverticulosis   • MAMMO NEEDLE LOCALIZATION LEFT (ALL INC) Right 2023   • MAMMO NEEDLE LOCALIZATION RIGHT (ALL INC) Right 2017   • MAMMO NEEDLE LOCALIZATION RIGHT (ALL INC) EACH ADD Right 2017   • MAMMO STEREOTACTIC BREAST BIOPSY RIGHT (ALL INC) Right 11/15/2017   • MAMMO STEREOTACTIC BREAST BIOPSY RIGHT (ALL INC) Right 2023   • MAMMO STEREOTACTIC BREAST BIOPSY RIGHT (ALL INC) EACH ADD Right 11/15/2017   • MRI BREAST BIOPSY LEFT (ALL INCLUSIVE) Left 2019   • WA PERQ DEVICE PLACEMENT BREAST LOC 1ST LES W/GDNCE Right 2017    Procedure: BREAST LUMPECTOMY; BREAST BRACKETED NEEDLE LOCALIZATION (BRACKETED NEEDLE LOC AT 0900);   Surgeon: Sheldon Ann MD;  [facility];  Service: Surgical Oncology   • UPPER GASTROINTESTINAL ENDOSCOPY     • VAGINAL DILATATION      d&e     Family History   Problem Relation Age of Onset   • Heart attack Mother         acute MI   • Colon polyps Mother         polyps of the sigmoid colon   • Colon cancer Mother    • Breast cancer Sister 47   • Uterine cancer Sister 59   • No Known Problems Daughter    • No Known Problems Daughter    • Uterine cancer Maternal Grandmother         unknown age-maybe 63's   • Uterine cancer Paternal Grandmother         unknown age , maybe 61's   • Diabetes Paternal Grandfather    • Liver cancer Brother 48   • Colon cancer Brother 48   • Lung cancer Brother    • No Known Problems Brother    • No Known Problems Brother    • No Known Problems Brother    • No Known Problems Brother    • No Known Problems Son    • Stroke Maternal Uncle    • Kidney cancer Paternal Aunt 54   • Pancreatic cancer Paternal Uncle 79   • Breast cancer Cousin 62   • Uterine cancer Cousin 54   • Colon cancer Cousin 61     Social History     Socioeconomic History   • Marital status: /Civil Union     Spouse name: Not on file   • Number of children: Not on file   • Years of education: Not on file   • Highest education level: Not on file   Occupational History   • Not on file   Tobacco Use   • Smoking status: Former     Years: 2.00     Types: Cigarettes     Quit date: 1976     Years since quittin.2   • Smokeless tobacco: Never   Vaping Use   • Vaping Use: Never used   Substance and Sexual Activity   • Alcohol use: Yes     Alcohol/week: 1.0 standard drink of alcohol     Types: 1 Glasses of wine per week     Comment: once q 2 weeks   • Drug use: No   • Sexual activity: Not Currently     Partners: Male     Birth control/protection: Post-menopausal   Other Topics Concern   • Not on file   Social History Narrative   • Not on file     Social Determinants of Health     Financial Resource Strain: Low Risk  (3/13/2023)    Overall Financial Resource Strain (CARDIA)    • Difficulty of Paying Living Expenses: Not hard at all   Food Insecurity: Not on file   Transportation Needs: No Transportation Needs (3/13/2023)    PRAPARE - Transportation    • Lack of Transportation (Medical):  No • Lack of Transportation (Non-Medical): No   Physical Activity: Not on file   Stress: Not on file   Social Connections: Not on file   Intimate Partner Violence: Not on file   Housing Stability: Not on file       Current Outpatient Medications:   •  albuterol (Ventolin HFA) 90 mcg/act inhaler, Inhale 2 puffs every 4 (four) hours as needed for wheezing, Disp: 18 g, Rfl: 5  •  aspirin 81 MG tablet, Take 81 mg by mouth daily, Disp: , Rfl:   •  calcium citrate (CALCITRATE) 950 MG tablet, Take 1 tablet by mouth daily, Disp: , Rfl:   •  fluocinonide (LIDEX) 0.05 % cream, Apply topically 2 (two) times a day (Patient taking differently: Apply topically if needed), Disp: 30 g, Rfl: 1  •  Multiple Vitamin (MULTI-VITAMIN DAILY PO), Take by mouth, Disp: , Rfl:   •  omeprazole (PriLOSEC) 40 MG capsule, Take 1 capsule (40 mg total) by mouth daily (Patient taking differently: Take 40 mg by mouth every morning), Disp: 90 capsule, Rfl: 3  •  PARoxetine (PAXIL) 20 mg tablet, TAKE ONE TABLET BY MOUTH EVERY DAY (Patient taking differently: Take 20 mg by mouth daily at bedtime), Disp: 90 tablet, Rfl: 3  •  oxyCODONE-acetaminophen (Percocet) 5-325 mg per tablet, Take 1 tablet by mouth every 4 (four) hours as needed for moderate pain Max Daily Amount: 6 tablets (Patient not taking: Reported on 7/19/2023), Disp: 6 tablet, Rfl: 0  No Known Allergies    Physical Exams:     Vitals:    07/19/23 0803   BP: 128/74   Pulse: 80   Resp: 16   Temp: (!) 97.3 °F (36.3 °C)   SpO2: 97%     Physical Exam  Chest:          Comments: Examination of the right breast wound demonstrates no evidence of infection hematoma or seroma        Results & Discussion:   As reviewed above the patient was aware of her pathology results. Her risk is now approximately 30% as opposed to 50% in the past based on her new TC calculation. Patient did not want to go through a repeat discussion regarding chemoprevention with medical oncology. She prefers not to have that. She is agreeable to continue with her current screening program as outlined above. We will see her back in 6 months. All questions were answered the patient's satisfaction.

## 2023-09-13 ENCOUNTER — RA CDI HCC (OUTPATIENT)
Dept: OTHER | Facility: HOSPITAL | Age: 68
End: 2023-09-13

## 2023-09-20 ENCOUNTER — OFFICE VISIT (OUTPATIENT)
Dept: FAMILY MEDICINE CLINIC | Facility: CLINIC | Age: 68
End: 2023-09-20
Payer: MEDICARE

## 2023-09-20 VITALS
HEIGHT: 69 IN | OXYGEN SATURATION: 95 % | BODY MASS INDEX: 33.62 KG/M2 | WEIGHT: 227 LBS | SYSTOLIC BLOOD PRESSURE: 124 MMHG | HEART RATE: 81 BPM | TEMPERATURE: 97.3 F | RESPIRATION RATE: 16 BRPM | DIASTOLIC BLOOD PRESSURE: 78 MMHG

## 2023-09-20 DIAGNOSIS — E66.9 OBESITY (BMI 30.0-34.9): ICD-10-CM

## 2023-09-20 DIAGNOSIS — E78.5 DYSLIPIDEMIA: Primary | ICD-10-CM

## 2023-09-20 DIAGNOSIS — K21.9 GASTROESOPHAGEAL REFLUX DISEASE, UNSPECIFIED WHETHER ESOPHAGITIS PRESENT: ICD-10-CM

## 2023-09-20 DIAGNOSIS — Z13.820 SCREENING FOR OSTEOPOROSIS: ICD-10-CM

## 2023-09-20 DIAGNOSIS — J45.20 MILD INTERMITTENT ASTHMA WITHOUT COMPLICATION: ICD-10-CM

## 2023-09-20 DIAGNOSIS — N18.31 STAGE 3A CHRONIC KIDNEY DISEASE (HCC): ICD-10-CM

## 2023-09-20 DIAGNOSIS — K90.0 CELIAC DISEASE: ICD-10-CM

## 2023-09-20 DIAGNOSIS — F41.8 ANXIETY ASSOCIATED WITH DEPRESSION: ICD-10-CM

## 2023-09-20 DIAGNOSIS — K76.0 HEPATIC STEATOSIS: ICD-10-CM

## 2023-09-20 DIAGNOSIS — F33.41 RECURRENT MAJOR DEPRESSIVE DISORDER, IN PARTIAL REMISSION (HCC): ICD-10-CM

## 2023-09-20 DIAGNOSIS — Z78.0 MENOPAUSE: ICD-10-CM

## 2023-09-20 PROCEDURE — 99214 OFFICE O/P EST MOD 30 MIN: CPT | Performed by: FAMILY MEDICINE

## 2023-09-20 NOTE — ASSESSMENT & PLAN NOTE
Recommended to follow a low fat diet, work on weight reduction. Continue to monitor LFT's. Follow-up with gastroenterology Dr. Emmy Villanueva.

## 2023-09-20 NOTE — ASSESSMENT & PLAN NOTE
Lab Results   Component Value Date    EGFR 57 06/07/2023    EGFR 64 03/18/2023    EGFR 63 11/22/2022    CREATININE 1.00 06/07/2023    CREATININE 0.92 03/18/2023    CREATININE 0.93 11/22/2022     Recommended to stay well hydrated. Avoid NSAID's. Continue to monitor labs.

## 2023-09-20 NOTE — PROGRESS NOTES
Name: Giselle Lemus      : 1955      MRN: [id number]  Encounter Provider: Kelli Blevins MD  Encounter Date: 2023   Encounter department: [address] [place] [address],[address]     1. Dyslipidemia  Assessment & Plan:  Follow a low-cholesterol, low-fat diet, regular exercise. Check CMP, lipid panel. Orders:  -     Comprehensive metabolic panel; Future  -     Lipid panel; Future    2. Mild intermittent asthma without complication  Assessment & Plan:  No recent athma exacerbation. Symptoms are stable. Use Ventolin HFA inhaler PRN. 3. Gastroesophageal reflux disease, unspecified whether esophagitis present  Assessment & Plan:  Symptoms controlled on Omeprazole 40 mg daily. Follow-up with gastroenterology Dr. Ean Mckeon. 4. Hepatic steatosis  Assessment & Plan:  Recommended to follow a low fat diet, work on weight reduction. Continue to monitor LFT's. Follow-up with gastroenterology Dr. Ean Mckeon. Orders:  -     Comprehensive metabolic panel; Future    5. Obesity (BMI 30.0-34. 9)  Assessment & Plan:  Recommended to work on dietary and lifestyle modifications, losing weight. 6. Celiac disease  Assessment & Plan:  Follow a gluten free diet. 7. Recurrent major depressive disorder, in partial remission (HCC)  Assessment & Plan:  PHQ-9 score 3. Mood has been stable. Continue Paroxetine 20 mg daily. 8. Anxiety associated with depression  Assessment & Plan:  Symptoms are stable. Continue Paroxetine 20 mg daily. 9. Stage 3a chronic kidney disease [facility])  Assessment & Plan:  Lab Results   Component Value Date    EGFR 57 2023    EGFR 64 2023    EGFR 63 2022    CREATININE 1.00 2023    CREATININE 0.92 2023    CREATININE 0.93 2022     Recommended to stay well hydrated. Avoid NSAID's. Continue to monitor labs. 10. Menopause  -     DXA bone density spine hip and pelvis;  Future; Expected date: 09/20/2023    11. Screening for osteoporosis  -     DXA bone density spine hip and pelvis; Future; Expected date: 09/20/2023          HM: recommended annual Flu shot, schedule updated Covid vaccination at the local pharmacy. Schedule follow-up office visit, Medicare AWV in 6 months. Subjective     HPI     Presents for 6 months follow-up visit. PMHx: Dyslipidemia, Asthma, Obesity, elevated LFT's, fatty liver, GERD, celiac disease, Obesity, Depression, Anxiety, Insomnia, CKD stage 3, rght breast CA.     Reviewed current medications, last blood test results from June 2023. Glucose 138, creatinine 1.00 - stable. GFR 57. AST 49, ALT 42. Patient states that she feels well overall. Reports no chest pain, shortness of breath, dizziness. Hepatic steatosis/GERD/ Celiac disease - c/o occasional abdominal cramping. Denies heartburn. Currently taking Omeprazole 40 mg daily before breakfast.  Followed by gastroenterology Dr. Ean Mckeon. Anxiety / Depression - mood has been stable. Patient takes Paxil 20 mg daily. Family history is positive for colon CA in her brother at age 48. Colonoscopy performed by gastroenterology Dr. Ean Mckeon in March 2023, 3 polyps were removed. Patient was recommended to repeat colonoscopy in 3 years. Right breast CA - followed by [facility]'s surgical oncology group, was seen by Dr. Latosha Trinh in July 2023. S/P lumpectomy in June 2023. Declined chemoprevention therapy. Review of Systems   Constitutional: Positive for fatigue (mild). Negative for activity change, appetite change, chills and fever. HENT: Negative for congestion, ear pain, hearing loss, sore throat and trouble swallowing. Eyes: Negative for pain, discharge, redness, itching and visual disturbance. Respiratory: Negative for cough, chest tightness, shortness of breath and wheezing. Cardiovascular: Negative for chest pain, palpitations and leg swelling.    Gastrointestinal: Positive for diarrhea (ocasional). Negative for abdominal pain, blood in stool, constipation, nausea and vomiting. Denies heartburn. C/o occasional abdominal cramps   Genitourinary: Negative for difficulty urinating, dysuria and hematuria. Musculoskeletal: Positive for arthralgias. Negative for back pain, gait problem and joint swelling. Skin: Negative for rash. Neurological: Negative for dizziness, syncope and headaches. Hematological: Negative. Psychiatric/Behavioral: Positive for sleep disturbance.         Anxiety / Depression - mood has been stable       Past Medical History:   Diagnosis Date   • Allergic rhinitis     last assessed-10/2/2017   • Asthma    • Carotid bruit     R-last assessed-6/10/2014   • Celiac disease    • Dysphagia     last assessed-2013   • GERD (gastroesophageal reflux disease)      Past Surgical History:   Procedure Laterality Date   • BREAST BIOPSY Right 11/15/2017    percutaneous needle core   • BREAST BIOPSY Right 2023    SLW Stereo 9:00   • BREAST LUMPECTOMY Right 2017    LCIS   • BREAST LUMPECTOMY Right 2023    Procedure: BREAST PUMA  DIRECTED LUMPECTOMY;  Surgeon: Valdemar Jade MD;  [facility];  Service: Surgical Oncology   •  SECTION     • COLONOSCOPY      complete-12/3/2012-internal/external hemorrhoids, perianal or excoriation, mild sigmoid diverticulosis   • MAMMO NEEDLE LOCALIZATION LEFT (ALL INC) Right 2023   • MAMMO NEEDLE LOCALIZATION RIGHT (ALL INC) Right 2017   • MAMMO NEEDLE LOCALIZATION RIGHT (ALL INC) EACH ADD Right 2017   • MAMMO STEREOTACTIC BREAST BIOPSY RIGHT (ALL INC) Right 11/15/2017   • MAMMO STEREOTACTIC BREAST BIOPSY RIGHT (ALL INC) Right 2023   • MAMMO STEREOTACTIC BREAST BIOPSY RIGHT (ALL INC) EACH ADD Right 11/15/2017   • MRI BREAST BIOPSY LEFT (ALL INCLUSIVE) Left 2019   • [place] PERQ DEVICE PLACEMENT BREAST LOC 1ST LES W/GDNCE Right 2017    Procedure: BREAST LUMPECTOMY; BREAST BRACKETED NEEDLE LOCALIZATION (BRACKETED NEEDLE LOC AT 0900); Surgeon: aVldemar Jade MD;  [facility];  Service: Surgical Oncology   • UPPER GASTROINTESTINAL ENDOSCOPY     • VAGINAL DILATATION      d&e     Family History   Problem Relation Age of Onset   • Heart attack Mother         acute MI   • Colon polyps Mother         polyps of the sigmoid colon   • Colon cancer Mother    • Breast cancer Sister 47   • Uterine cancer Sister 59   • No Known Problems Daughter    • No Known Problems Daughter    • Uterine cancer Maternal Grandmother         unknown age-maybe 63's   • Uterine cancer Paternal Grandmother         unknown age , maybe 61's   • Diabetes Paternal Grandfather    • Liver cancer Brother 48   • Colon cancer Brother 48   • Lung cancer Brother    • No Known Problems Brother    • No Known Problems Brother    • No Known Problems Brother    • No Known Problems Brother    • No Known Problems Son    • Stroke Maternal Uncle    • Kidney cancer Paternal Aunt 54   • Pancreatic cancer Paternal Uncle 79   • Breast cancer Cousin 62   • Uterine cancer Cousin 54   • Colon cancer Cousin 61     Social History     Socioeconomic History   • Marital status: /Civil Union     Spouse name: None   • Number of children: None   • Years of education: None   • Highest education level: None   Occupational History   • None   Tobacco Use   • Smoking status: Former     Years: 2.00     Types: Cigarettes     Quit date: 1976     Years since quittin.4   • Smokeless tobacco: Never   Vaping Use   • Vaping Use: Never used   Substance and Sexual Activity   • Alcohol use:  Yes     Alcohol/week: 1.0 standard drink of alcohol     Types: 1 Glasses of wine per week     Comment: once q 2 weeks   • Drug use: No   • Sexual activity: Not Currently     Partners: Male     Birth control/protection: Post-menopausal   Other Topics Concern   • None   Social History Narrative   • None     Social Determinants of Health     Financial Resource Strain: Low Risk  (3/13/2023)    Overall Financial Resource Strain (CARDIA)    • Difficulty of Paying Living Expenses: Not hard at all   Food Insecurity: Not on file   Transportation Needs: No Transportation Needs (3/13/2023)    PRAPARE - Transportation    • Lack of Transportation (Medical): No    • Lack of Transportation (Non-Medical):  No   Physical Activity: Not on file   Stress: Not on file   Social Connections: Not on file   Intimate Partner Violence: Not on file   Housing Stability: Not on file     Current Outpatient Medications on File Prior to Visit   Medication Sig   • albuterol (Ventolin HFA) 90 mcg/act inhaler Inhale 2 puffs every 4 (four) hours as needed for wheezing   • aspirin 81 MG tablet Take 81 mg by mouth daily   • calcium citrate (CALCITRATE) 950 MG tablet Take 1 tablet by mouth daily   • fluocinonide (LIDEX) 0.05 % cream Apply topically 2 (two) times a day (Patient taking differently: Apply topically if needed)   • Multiple Vitamin (MULTI-VITAMIN DAILY PO) Take by mouth   • omeprazole (PriLOSEC) 40 MG capsule Take 1 capsule (40 mg total) by mouth daily (Patient taking differently: Take 40 mg by mouth every morning)   • PARoxetine (PAXIL) 20 mg tablet TAKE ONE TABLET BY MOUTH EVERY DAY (Patient taking differently: Take 20 mg by mouth daily at bedtime)   • [DISCONTINUED] oxyCODONE-acetaminophen (Percocet) 5-325 mg per tablet Take 1 tablet by mouth every 4 (four) hours as needed for moderate pain Max Daily Amount: 6 tablets (Patient not taking: Reported on 7/19/2023)     No Known Allergies  Immunization History   Administered Date(s) Administered   • COVID-19 PFIZER VACCINE 0.3 ML IM 02/17/2021, 03/09/2021, 10/23/2021   • INFLUENZA 01/07/2013, 10/14/2013, 10/18/2016, 10/02/2017   • Influenza Quadrivalent, 6-35 Months IM 10/16/2015, 10/18/2016, 10/02/2017   • Influenza, high dose seasonal 0.7 mL 10/08/2019, 10/09/2020, 09/29/2021, 10/03/2022   • Influenza, recombinant, quadrivalent,injectable, preservative free 10/02/2018   • Influenza, seasonal, injectable 01/07/2013, 10/14/2013, 10/02/2014   • Pneumococcal Conjugate 13-Valent 02/19/2020   • Pneumococcal Polysaccharide PPV23 06/10/2014, 01/01/2016   • Tdap 06/07/2013   • Varicella 01/01/2016   • Zoster 07/08/2014       Objective     /78   Pulse 81   Temp (!) 97.3 °F (36.3 °C) (Temporal)   Resp 16   Ht 5' 9" (1.753 m)   Wt 103 kg (227 lb)   SpO2 95%   BMI 33.52 kg/m²     Physical Exam  Vitals and nursing note reviewed. Constitutional:       Appearance: Normal appearance. She is obese. HENT:      Head: Normocephalic and atraumatic. Eyes:      Conjunctiva/sclera: Conjunctivae normal.      Pupils: Pupils are equal, round, and reactive to light. Neck:      Vascular: No carotid bruit. Cardiovascular:      Rate and Rhythm: Normal rate and regular rhythm. Heart sounds: No murmur heard. Pulmonary:      Effort: Pulmonary effort is normal.      Breath sounds: Normal breath sounds. Abdominal:      General: Bowel sounds are normal. There is no distension. Palpations: Abdomen is soft. Tenderness: There is no abdominal tenderness. Musculoskeletal:         General: No swelling. Normal range of motion. Cervical back: Normal range of motion and neck supple. Right lower leg: No edema. Left lower leg: No edema. Skin:     General: Skin is warm and dry. Findings: No rash. Neurological:      Mental Status: She is alert.    Psychiatric:         Mood and Affect: Mood normal.       Gino Max MD

## 2024-01-22 ENCOUNTER — OFFICE VISIT (OUTPATIENT)
Dept: SURGICAL ONCOLOGY | Facility: CLINIC | Age: 69
End: 2024-01-22
Payer: MEDICARE

## 2024-01-22 VITALS
DIASTOLIC BLOOD PRESSURE: 76 MMHG | TEMPERATURE: 97.1 F | WEIGHT: 147 LBS | BODY MASS INDEX: 21.77 KG/M2 | OXYGEN SATURATION: 98 % | HEIGHT: 69 IN | RESPIRATION RATE: 16 BRPM | SYSTOLIC BLOOD PRESSURE: 143 MMHG | HEART RATE: 68 BPM

## 2024-01-22 DIAGNOSIS — Z12.31 VISIT FOR SCREENING MAMMOGRAM: ICD-10-CM

## 2024-01-22 DIAGNOSIS — D05.01 LOBULAR CARCINOMA IN SITU (LCIS) OF RIGHT BREAST: Primary | ICD-10-CM

## 2024-01-22 DIAGNOSIS — Z80.3 FAMILY HISTORY OF MALIGNANT NEOPLASM OF BREAST: ICD-10-CM

## 2024-01-22 DIAGNOSIS — Z91.89 INCREASED RISK OF BREAST CANCER: ICD-10-CM

## 2024-01-22 PROCEDURE — 99213 OFFICE O/P EST LOW 20 MIN: CPT

## 2024-01-22 NOTE — PROGRESS NOTES
Surgical Oncology Follow Up       [facility] [place]  [address]  [place] [address]    Angelia COLMENARES Zuly  1955  [id number]  [facility] [place]  [address]  [place] [address]    Chief Complaint   Patient presents with    office visit       Assessment/Plan:  1. 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 Increased risk of breast cancer    3. Family history of malignant neoplasm of breast    4. Visit for screening mammogram  - Mammo screening bilateral w 3d & cad; Future       Discussion/Summary: Patient is a 68-year-old female presenting today for six-month follow-up for history of LCIS and family history of breast cancer. She underwent genetic testing which was negative. Her sister was diagnosed with breast cancer and notes recently her brother was diagnosed with lung cancer. She underwent a right breast lumpectomy with Dr. Rivera in December 2017. We were seeing her every 6 months and screening with annual mammograms and breast MRIs. She had a bilateral screening mammogram on 3/30/2023 which was incomplete requiring diagnostic imaging for the right breast. She had a diagnostic mammogram of the right breast on 4/11/2023 which was BI-RADS 4 with round calcifications seen in the upper outer quadrant of the right breast. Biopsy report recommended surgical consultation for lobular carcinoma in situ. She had a lumpectomy of the right breast with Dr. Rivera on 6/27/2023. She will be due for bilateral mammogram in March of this year. We will get this scheduled. We discussed annual MRI however we had decided to hold off as she had 5 years of breast MRI's with benign findings and most recent LCIS was found with mammogram. There were no concerns on her clinical exam. I will see the patient back in 6 months or sooner should the need arise. She was instructed to  call with any questions or concerns prior to this time. All questions were answered today.        History of Present Illness:     Oncology History    No history exists.        -Interval History:Patient is a 68-year-old female presenting today for six-month follow-up for history of LCIS and family history of breast cancer. She had a lumpectomy of the right breast with Dr. Rivera on 6/27/2023 for a recurrence of LCIS of the right breast. She denies breast changes.        Review of Systems:  Review of Systems   Constitutional:  Negative for activity change, appetite change, fatigue and unexpected weight change.   Respiratory:  Negative for cough and shortness of breath.    Cardiovascular:  Negative for chest pain.   Gastrointestinal:  Negative for abdominal pain, diarrhea, nausea and vomiting.   Endocrine: Negative for heat intolerance.   Musculoskeletal:  Negative for arthralgias, back pain and myalgias.   Skin:  Negative for rash.   Neurological:  Negative for weakness and headaches.   Hematological:  Negative for adenopathy.       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    Mild intermittent asthma without complication    Obesity (BMI 30.0-34.9)    Rosacea    Increased risk of breast cancer    Hand eczema    Chronic insomnia    Hepatic steatosis    Anxiety associated with depression    Elevated hematocrit    Hepatosplenomegaly    Gallbladder polyp    Inflamed skin tag    Celiac disease    Stage 3a chronic kidney disease (HCC)     Past Medical History:   Diagnosis Date    Allergic rhinitis     last assessed-10/2/2017    Asthma     Carotid bruit     R-last  assessed-6/10/2014    Celiac disease     Dysphagia     last assessed-2013    GERD (gastroesophageal reflux disease)      Past Surgical History:   Procedure Laterality Date    BREAST BIOPSY Right 11/15/2017    percutaneous needle core    BREAST BIOPSY Right 2023    SLW Stereo 9:00    BREAST LUMPECTOMY Right 2017    LCIS    BREAST LUMPECTOMY Right 2023    Procedure: BREAST PUMA  DIRECTED LUMPECTOMY;  Surgeon: Marco Rivera MD;  [facility];  Service: Surgical Oncology     SECTION  1984    COLONOSCOPY      complete-12/3/2012-internal/external hemorrhoids, perianal or excoriation, mild sigmoid diverticulosis    MAMMO NEEDLE LOCALIZATION LEFT (ALL INC) Right 2023    MAMMO NEEDLE LOCALIZATION RIGHT (ALL INC) Right 2017    MAMMO NEEDLE LOCALIZATION RIGHT (ALL INC) EACH ADD Right 2017    MAMMO STEREOTACTIC BREAST BIOPSY RIGHT (ALL INC) Right 11/15/2017    MAMMO STEREOTACTIC BREAST BIOPSY RIGHT (ALL INC) Right 2023    MAMMO STEREOTACTIC BREAST BIOPSY RIGHT (ALL INC) EACH ADD Right 11/15/2017    MRI BREAST BIOPSY LEFT (ALL INCLUSIVE) Left 2019    [place] PERQ DEVICE PLACEMENT BREAST LOC 1ST LES W/GDNCE Right 2017    Procedure: BREAST LUMPECTOMY; BREAST BRACKETED NEEDLE LOCALIZATION (BRACKETED NEEDLE LOC AT 0900);  Surgeon: Marco Rivera MD;  [facility];  Service: Surgical Oncology    UPPER GASTROINTESTINAL ENDOSCOPY      VAGINAL DILATATION      d&e     Family History   Problem Relation Age of Onset    Heart attack Mother         acute MI    Colon polyps Mother         polyps of the sigmoid colon    Colon cancer Mother     Breast cancer Sister 54    Uterine cancer Sister 64    No Known Problems Daughter     No Known Problems Daughter     Uterine cancer Maternal Grandmother         unknown age-maybe 60's    Uterine cancer Paternal Grandmother         unknown age , maybe 60's    Diabetes Paternal Grandfather     Liver cancer Brother 50    Colon cancer  Brother 50    Lung cancer Brother     No Known Problems Brother     No Known Problems Brother     No Known Problems Brother     No Known Problems Brother     No Known Problems Son     Stroke Maternal Uncle     Kidney cancer Paternal Aunt 55    Pancreatic cancer Paternal Uncle 67    Breast cancer Cousin 57    Uterine cancer Cousin 55    Colon cancer Cousin 60     Social History     Socioeconomic History    Marital status: /Civil Union     Spouse name: Not on file    Number of children: Not on file    Years of education: Not on file    Highest education level: Not on file   Occupational History    Not on file   Tobacco Use    Smoking status: Former     Current packs/day: 0.00     Types: Cigarettes     Start date: 1974     Quit date: 1976     Years since quittin.7    Smokeless tobacco: Never   Vaping Use    Vaping status: Never Used   Substance and Sexual Activity    Alcohol use: Yes     Alcohol/week: 1.0 standard drink of alcohol     Types: 1 Glasses of wine per week     Comment: once q 2 weeks    Drug use: No    Sexual activity: Not Currently     Partners: Male     Birth control/protection: Post-menopausal   Other Topics Concern    Not on file   Social History Narrative    Not on file     Social Determinants of Health     Financial Resource Strain: Low Risk  (3/13/2023)    Overall Financial Resource Strain (CARDIA)     Difficulty of Paying Living Expenses: Not hard at all   Food Insecurity: Not on file   Transportation Needs: No Transportation Needs (3/13/2023)    PRAPARE - Transportation     Lack of Transportation (Medical): No     Lack of Transportation (Non-Medical): No   Physical Activity: Not on file   Stress: Not on file   Social Connections: Not on file   Intimate Partner Violence: Not on file   Housing Stability: Not on file       Current Outpatient Medications:     albuterol (Ventolin HFA) 90 mcg/act inhaler, Inhale 2 puffs every 4 (four) hours as needed for wheezing, Disp: 18 g, Rfl:  5    aspirin 81 MG tablet, Take 81 mg by mouth daily, Disp: , Rfl:     calcium citrate (CALCITRATE) 950 MG tablet, Take 1 tablet by mouth daily, Disp: , Rfl:     fluocinonide (LIDEX) 0.05 % cream, Apply topically 2 (two) times a day (Patient taking differently: Apply topically if needed), Disp: 30 g, Rfl: 1    Multiple Vitamin (MULTI-VITAMIN DAILY PO), Take by mouth, Disp: , Rfl:     omeprazole (PriLOSEC) 40 MG capsule, Take 1 capsule (40 mg total) by mouth daily (Patient taking differently: Take 40 mg by mouth every morning), Disp: 90 capsule, Rfl: 3    PARoxetine (PAXIL) 20 mg tablet, TAKE ONE TABLET BY MOUTH EVERY DAY (Patient taking differently: Take 20 mg by mouth daily at bedtime), Disp: 90 tablet, Rfl: 3  No Known Allergies  Vitals:    01/22/24 0853   BP: 143/76   Pulse: 68   Resp: 16   Temp: (!) 97.1 °F (36.2 °C)   SpO2: 98%       Physical Exam  Constitutional:       General: She is not in acute distress.     Appearance: Normal appearance.   Cardiovascular:      Rate and Rhythm: Normal rate and regular rhythm.      Pulses: Normal pulses.      Heart sounds: Normal heart sounds.   Pulmonary:      Effort: Pulmonary effort is normal.      Breath sounds: Normal breath sounds.   Chest:      Chest wall: No mass.   Breasts:     Right: No swelling, bleeding, inverted nipple, mass, nipple discharge, skin change or tenderness.      Left: No swelling, bleeding, inverted nipple, mass, nipple discharge, skin change or tenderness.       Abdominal:      General: Abdomen is flat.      Palpations: Abdomen is soft.   Lymphadenopathy:      Upper Body:      Right upper body: No supraclavicular, axillary or pectoral adenopathy.      Left upper body: No supraclavicular, axillary or pectoral adenopathy.   Skin:     General: Skin is warm.   Neurological:      General: No focal deficit present.      Mental Status: She is alert and oriented to person, place, and time.   Psychiatric:         Mood and Affect: Mood normal.          Behavior: Behavior normal.           Results:    Imaging  No results found.    I reviewed the above imaging data.      Advance Care Planning/Advance Directives:  Discussed disease status, cancer treatment plans and/or cancer treatment goals with the patient.

## 2024-02-21 ENCOUNTER — HOSPITAL ENCOUNTER (OUTPATIENT)
Dept: RADIOLOGY | Age: 69
Discharge: HOME | End: 2024-02-21
Payer: MEDICARE

## 2024-02-21 DIAGNOSIS — Z13.820 SCREENING FOR OSTEOPOROSIS: ICD-10-CM

## 2024-02-21 DIAGNOSIS — Z78.0 MENOPAUSE: ICD-10-CM

## 2024-02-21 PROCEDURE — 77080 DXA BONE DENSITY AXIAL: CPT

## 2024-03-24 PROBLEM — M85.89 OSTEOPENIA OF MULTIPLE SITES: Status: ACTIVE | Noted: 2024-03-24

## 2024-03-25 ENCOUNTER — APPOINTMENT (OUTPATIENT)
Dept: LAB | Facility: CLINIC | Age: 69
End: 2024-03-25
Payer: MEDICARE

## 2024-03-25 DIAGNOSIS — E78.5 DYSLIPIDEMIA: ICD-10-CM

## 2024-03-25 DIAGNOSIS — K76.0 HEPATIC STEATOSIS: ICD-10-CM

## 2024-03-25 LAB
ALBUMIN SERPL BCP-MCNC: 4.4 G/DL (ref 3.5–5)
ALP SERPL-CCNC: 83 U/L (ref 34–104)
ALT SERPL W P-5'-P-CCNC: 50 U/L (ref 7–52)
ANION GAP SERPL CALCULATED.3IONS-SCNC: 8 MMOL/L (ref 4–13)
AST SERPL W P-5'-P-CCNC: 56 U/L (ref 13–39)
BILIRUB SERPL-MCNC: 0.52 MG/DL (ref 0.2–1)
BUN SERPL-MCNC: 15 MG/DL (ref 5–25)
CALCIUM SERPL-MCNC: 10 MG/DL (ref 8.4–10.2)
CHLORIDE SERPL-SCNC: 107 MMOL/L (ref 96–108)
CHOLEST SERPL-MCNC: 177 MG/DL
CO2 SERPL-SCNC: 26 MMOL/L (ref 21–32)
CREAT SERPL-MCNC: 0.95 MG/DL (ref 0.6–1.3)
GFR SERPL CREATININE-BSD FRML MDRD: 61 ML/MIN/1.73SQ M
GLUCOSE P FAST SERPL-MCNC: 100 MG/DL (ref 65–99)
HDLC SERPL-MCNC: 42 MG/DL
LDLC SERPL CALC-MCNC: 110 MG/DL (ref 0–100)
NONHDLC SERPL-MCNC: 135 MG/DL
POTASSIUM SERPL-SCNC: 4.4 MMOL/L (ref 3.5–5.3)
PROT SERPL-MCNC: 7.5 G/DL (ref 6.4–8.4)
SODIUM SERPL-SCNC: 141 MMOL/L (ref 135–147)
TRIGL SERPL-MCNC: 125 MG/DL

## 2024-03-25 PROCEDURE — 36415 COLL VENOUS BLD VENIPUNCTURE: CPT

## 2024-03-25 PROCEDURE — 80053 COMPREHEN METABOLIC PANEL: CPT

## 2024-03-25 PROCEDURE — 80061 LIPID PANEL: CPT

## 2024-03-27 ENCOUNTER — OFFICE VISIT (OUTPATIENT)
Dept: FAMILY MEDICINE CLINIC | Facility: CLINIC | Age: 69
End: 2024-03-27
Payer: MEDICARE

## 2024-03-27 VITALS
TEMPERATURE: 98 F | BODY MASS INDEX: 33.86 KG/M2 | DIASTOLIC BLOOD PRESSURE: 82 MMHG | WEIGHT: 228.6 LBS | SYSTOLIC BLOOD PRESSURE: 128 MMHG | HEART RATE: 74 BPM | RESPIRATION RATE: 18 BRPM | OXYGEN SATURATION: 98 % | HEIGHT: 69 IN

## 2024-03-27 DIAGNOSIS — M85.89 OSTEOPENIA OF MULTIPLE SITES: ICD-10-CM

## 2024-03-27 DIAGNOSIS — K21.9 GASTROESOPHAGEAL REFLUX DISEASE, UNSPECIFIED WHETHER ESOPHAGITIS PRESENT: ICD-10-CM

## 2024-03-27 DIAGNOSIS — K76.0 HEPATIC STEATOSIS: ICD-10-CM

## 2024-03-27 DIAGNOSIS — N18.31 STAGE 3A CHRONIC KIDNEY DISEASE (HCC): ICD-10-CM

## 2024-03-27 DIAGNOSIS — J45.20 MILD INTERMITTENT ASTHMA WITHOUT COMPLICATION: ICD-10-CM

## 2024-03-27 DIAGNOSIS — D05.01 LOBULAR CARCINOMA IN SITU (LCIS) OF RIGHT BREAST: ICD-10-CM

## 2024-03-27 DIAGNOSIS — Z00.00 MEDICARE ANNUAL WELLNESS VISIT, SUBSEQUENT: ICD-10-CM

## 2024-03-27 DIAGNOSIS — F33.41 RECURRENT MAJOR DEPRESSIVE DISORDER, IN PARTIAL REMISSION (HCC): ICD-10-CM

## 2024-03-27 DIAGNOSIS — K90.0 CELIAC DISEASE: ICD-10-CM

## 2024-03-27 DIAGNOSIS — F41.8 ANXIETY ASSOCIATED WITH DEPRESSION: ICD-10-CM

## 2024-03-27 DIAGNOSIS — Z23 ENCOUNTER FOR IMMUNIZATION: ICD-10-CM

## 2024-03-27 DIAGNOSIS — E78.5 DYSLIPIDEMIA: Primary | ICD-10-CM

## 2024-03-27 PROCEDURE — G0009 ADMIN PNEUMOCOCCAL VACCINE: HCPCS

## 2024-03-27 PROCEDURE — 90677 PCV20 VACCINE IM: CPT

## 2024-03-27 PROCEDURE — G0439 PPPS, SUBSEQ VISIT: HCPCS | Performed by: FAMILY MEDICINE

## 2024-03-27 PROCEDURE — 99214 OFFICE O/P EST MOD 30 MIN: CPT | Performed by: FAMILY MEDICINE

## 2024-03-27 NOTE — ASSESSMENT & PLAN NOTE
Lab Results   Component Value Date    EGFR 61 03/25/2024    EGFR 57 06/07/2023    EGFR 64 03/18/2023    CREATININE 0.95 03/25/2024    CREATININE 1.00 06/07/2023    CREATININE 0.92 03/18/2023     Recommended to stay well-hydrated.  Avoid NSAIDs, nephrotoxins.

## 2024-03-27 NOTE — PATIENT INSTRUCTIONS
Medicare Preventive Visit Patient Instructions  Thank you for completing your Welcome to Medicare Visit or Medicare Annual Wellness Visit today. Your next wellness visit will be due in one year (3/28/2025).  The screening/preventive services that you may require over the next 5-10 years are detailed below. Some tests may not apply to you based off risk factors and/or age. Screening tests ordered at today's visit but not completed yet may show as past due. Also, please note that scanned in results may not display below.  Preventive Screenings:  Service Recommendations Previous Testing/Comments   Colorectal Cancer Screening  * Colonoscopy    * Fecal Occult Blood Test (FOBT)/Fecal Immunochemical Test (FIT)  * Fecal DNA/Cologuard Test  * Flexible Sigmoidoscopy Age: 45-75 years old   Colonoscopy: every 10 years (may be performed more frequently if at higher risk)  OR  FOBT/FIT: every 1 year  OR  Cologuard: every 3 years  OR  Sigmoidoscopy: every 5 years  Screening may be recommended earlier than age 45 if at higher risk for colorectal cancer. Also, an individualized decision between you and your healthcare provider will decide whether screening between the ages of 76-85 would be appropriate. Colonoscopy: 03/28/2023  FOBT/FIT: Not on file  Cologuard: Not on file  Sigmoidoscopy: Not on file          Breast Cancer Screening Age: 40+ years old  Frequency: every 1-2 years  Not required if history of left and right mastectomy Mammogram: 03/30/2023        Cervical Cancer Screening Between the ages of 21-29, pap smear recommended once every 3 years.   Between the ages of 30-65, can perform pap smear with HPV co-testing every 5 years.   Recommendations may differ for women with a history of total hysterectomy, cervical cancer, or abnormal pap smears in past. Pap Smear: 04/07/2022        Hepatitis C Screening Once for adults born between 1945 and 1965  More frequently in patients at high risk for Hepatitis C Hep C Antibody:  08/20/2019        Diabetes Screening 1-2 times per year if you're at risk for diabetes or have pre-diabetes Fasting glucose: 100 mg/dL (3/25/2024)  A1C: No results in last 5 years (No results in last 5 years)      Cholesterol Screening Once every 5 years if you don't have a lipid disorder. May order more often based on risk factors. Lipid panel: 03/25/2024          Other Preventive Screenings Covered by Medicare:  Abdominal Aortic Aneurysm (AAA) Screening: covered once if your at risk. You're considered to be at risk if you have a family history of AAA.  Lung Cancer Screening: covers low dose CT scan once per year if you meet all of the following conditions: (1) Age 55-77; (2) No signs or symptoms of lung cancer; (3) Current smoker or have quit smoking within the last 15 years; (4) You have a tobacco smoking history of at least 20 pack years (packs per day multiplied by number of years you smoked); (5) You get a written order from a healthcare provider.  Glaucoma Screening: covered annually if you're considered high risk: (1) You have diabetes OR (2) Family history of glaucoma OR (3)  aged 50 and older OR (4)  American aged 65 and older  Osteoporosis Screening: covered every 2 years if you meet one of the following conditions: (1) You're estrogen deficient and at risk for osteoporosis based off medical history and other findings; (2) Have a vertebral abnormality; (3) On glucocorticoid therapy for more than 3 months; (4) Have primary hyperparathyroidism; (5) On osteoporosis medications and need to assess response to drug therapy.   Last bone density test (DXA Scan): 02/21/2024.  HIV Screening: covered annually if you're between the age of 15-65. Also covered annually if you are younger than 15 and older than 65 with risk factors for HIV infection. For pregnant patients, it is covered up to 3 times per pregnancy.    Immunizations:  Immunization Recommendations   Influenza Vaccine Annual  influenza vaccination during flu season is recommended for all persons aged >= 6 months who do not have contraindications   Pneumococcal Vaccine   * Pneumococcal conjugate vaccine = PCV13 (Prevnar 13), PCV15 (Vaxneuvance), PCV20 (Prevnar 20)  * Pneumococcal polysaccharide vaccine = PPSV23 (Pneumovax) Adults 19-65 yo with certain risk factors or if 65+ yo  If never received any pneumonia vaccine: recommend Prevnar 20 (PCV20)  Give PCV20 if previously received 1 dose of PCV13 or PPSV23   Hepatitis B Vaccine 3 dose series if at intermediate or high risk (ex: diabetes, end stage renal disease, liver disease)   Respiratory syncytial virus (RSV) Vaccine - COVERED BY MEDICARE PART D  * RSVPreF3 (Arexvy) CDC recommends that adults 60 years of age and older may receive a single dose of RSV vaccine using shared clinical decision-making (SCDM)   Tetanus (Td) Vaccine - COST NOT COVERED BY MEDICARE PART B Following completion of primary series, a booster dose should be given every 10 years to maintain immunity against tetanus. Td may also be given as tetanus wound prophylaxis.   Tdap Vaccine - COST NOT COVERED BY MEDICARE PART B Recommended at least once for all adults. For pregnant patients, recommended with each pregnancy.   Shingles Vaccine (Shingrix) - COST NOT COVERED BY MEDICARE PART B  2 shot series recommended in those 19 years and older who have or will have weakened immune systems or those 50 years and older     Health Maintenance Due:      Topic Date Due   • Cervical Cancer Screening  11/17/2020   • Breast Cancer Screening: Mammogram  03/30/2025   • Colorectal Cancer Screening  03/27/2026   • Hepatitis C Screening  Completed     Immunizations Due:      Topic Date Due   • Pneumococcal Vaccine: 65+ Years (3 of 3 - PPSV23 or PCV20) 02/19/2021   • COVID-19 Vaccine (6 - 2023-24 season) 12/25/2023     Advance Directives   What are advance directives?  Advance directives are legal documents that state your wishes and plans  for medical care. These plans are made ahead of time in case you lose your ability to make decisions for yourself. Advance directives can apply to any medical decision, such as the treatments you want, and if you want to donate organs.   What are the types of advance directives?  There are many types of advance directives, and each state has rules about how to use them. You may choose a combination of any of the following:  Living will:  This is a written record of the treatment you want. You can also choose which treatments you do not want, which to limit, and which to stop at a certain time. This includes surgery, medicine, IV fluid, and tube feedings.   Durable power of  for healthcare (DPAHC):  This is a written record that states who you want to make healthcare choices for you when you are unable to make them for yourself. This person, called a proxy, is usually a family member or a friend. You may choose more than 1 proxy.  Do not resuscitate (DNR) order:  A DNR order is used in case your heart stops beating or you stop breathing. It is a request not to have certain forms of treatment, such as CPR. A DNR order may be included in other types of advance directives.  Medical directive:  This covers the care that you want if you are in a coma, near death, or unable to make decisions for yourself. You can list the treatments you want for each condition. Treatment may include pain medicine, surgery, blood transfusions, dialysis, IV or tube feedings, and a ventilator (breathing machine).  Values history:  This document has questions about your views, beliefs, and how you feel and think about life. This information can help others choose the care that you would choose.  Why are advance directives important?  An advance directive helps you control your care. Although spoken wishes may be used, it is better to have your wishes written down. Spoken wishes can be misunderstood, or not followed. Treatments may be  given even if you do not want them. An advance directive may make it easier for your family to make difficult choices about your care.   Weight Management   Why it is important to manage your weight:  Being overweight increases your risk of health conditions such as heart disease, high blood pressure, type 2 diabetes, and certain types of cancer. It can also increase your risk for osteoarthritis, sleep apnea, and other respiratory problems. Aim for a slow, steady weight loss. Even a small amount of weight loss can lower your risk of health problems.  How to lose weight safely:  A safe and healthy way to lose weight is to eat fewer calories and get regular exercise. You can lose up about 1 pound a week by decreasing the number of calories you eat by 500 calories each day.   Healthy meal plan for weight management:  A healthy meal plan includes a variety of foods, contains fewer calories, and helps you stay healthy. A healthy meal plan includes the following:  Eat whole-grain foods more often.  A healthy meal plan should contain fiber. Fiber is the part of grains, fruits, and vegetables that is not broken down by your body. Whole-grain foods are healthy and provide extra fiber in your diet. Some examples of whole-grain foods are whole-wheat breads and pastas, oatmeal, brown rice, and bulgur.  Eat a variety of vegetables every day.  Include dark, leafy greens such as spinach, kale, bryce greens, and mustard greens. Eat yellow and orange vegetables such as carrots, sweet potatoes, and winter squash.   Eat a variety of fruits every day.  Choose fresh or canned fruit (canned in its own juice or light syrup) instead of juice. Fruit juice has very little or no fiber.  Eat low-fat dairy foods.  Drink fat-free (skim) milk or 1% milk. Eat fat-free yogurt and low-fat cottage cheese. Try low-fat cheeses such as mozzarella and other reduced-fat cheeses.  Choose meat and other protein foods that are low in fat.  Choose beans or  other legumes such as split peas or lentils. Choose fish, skinless poultry (chicken or turkey), or lean cuts of red meat (beef or pork). Before you cook meat or poultry, cut off any visible fat.   Use less fat and oil.  Try baking foods instead of frying them. Add less fat, such as margarine, sour cream, regular salad dressing and mayonnaise to foods. Eat fewer high-fat foods. Some examples of high-fat foods include french fries, doughnuts, ice cream, and cakes.  Eat fewer sweets.  Limit foods and drinks that are high in sugar. This includes candy, cookies, regular soda, and sweetened drinks.  Exercise:  Exercise at least 30 minutes per day on most days of the week. Some examples of exercise include walking, biking, dancing, and swimming. You can also fit in more physical activity by taking the stairs instead of the elevator or parking farther away from stores. Ask your healthcare provider about the best exercise plan for you.      © Copyright [employer] 2018 Information is for End User's use only and may not be sold, redistributed or otherwise used for commercial purposes. All illustrations and images included in CareNotes® are the copyrighted property of [employer] or [employer]

## 2024-03-27 NOTE — ASSESSMENT & PLAN NOTE
Continues on Omeprazole 40 mg daily.  Denies heartburn, abdominal pain.  Recommended to avoid fried, fatty foods, caffeine.

## 2024-03-27 NOTE — ASSESSMENT & PLAN NOTE
Patient is S/P right breast lumpectomy in June 2023.    Mammogram is scheduled in May.    Follow-up with surgical oncology Dr. Rivera.

## 2024-03-27 NOTE — ASSESSMENT & PLAN NOTE
AST is mildly elevated.  ALT - within normal range.    Recommended to follow a low fat diet, lose weight.    Follow-up with gastroenterology Dr. Hurt.

## 2024-03-27 NOTE — ASSESSMENT & PLAN NOTE
DEXA scan done in February 2024 showed osteopenia.    Recommended weightbearing exercise, calcium with vitamin D supplementation.  Recheck DEXA scan in 2 years.

## 2024-04-23 DIAGNOSIS — F41.8 DEPRESSION WITH ANXIETY: ICD-10-CM

## 2024-04-24 RX ORDER — PAROXETINE HYDROCHLORIDE 20 MG/1
TABLET, FILM COATED ORAL
Qty: 90 TABLET | Refills: 1 | Status: SHIPPED | OUTPATIENT
Start: 2024-04-24

## 2024-05-01 PROBLEM — Z00.00 MEDICARE ANNUAL WELLNESS VISIT, SUBSEQUENT: Status: RESOLVED | Noted: 2019-10-06 | Resolved: 2024-05-01

## 2024-05-20 NOTE — PROGRESS NOTES
Called pt and advised her to drop off paperwork as soon as she could. Did inform her at the latest it could possibly be Friday by time the paperwork gets signed due to Fridays being admin days. Pt verbalized understanding. Pt inquired about getting a work excuse since she was out of work from April- May due to her arm injury. Please advise.   Chief Complaint   Patient presents with    Medicare Wellness Visit     Subsequent visit       Follow-up     6 month      Health Maintenance   Topic Date Due    Zoster Vaccine (2 of 3) 09/02/2014    Cervical Cancer Screening  11/17/2020    Pneumococcal Vaccine: 65+ Years (3 of 3 - PPSV23 or PCV20) 02/19/2021    COVID-19 Vaccine (6 - 2023-24 season) 12/25/2023    Fall Risk  03/14/2024    Urinary Incontinence Screening  03/14/2024    Medicare Annual Wellness Visit (AWV)  03/14/2024    Depression Screening  09/20/2024    Breast Cancer Screening: Mammogram  03/30/2025    Colorectal Cancer Screening  03/27/2026    Hepatitis C Screening  Completed    Osteoporosis Screening  Completed    Influenza Vaccine  Completed    HIB Vaccine  Aged Out    IPV Vaccine  Aged Out    Hepatitis A Vaccine  Aged Out    Meningococcal ACWY Vaccine  Aged Out    HPV Vaccine  Aged Out        Assessment and Plan:     Problem List Items Addressed This Visit          Respiratory    Mild intermittent asthma without complication     Symptoms are stable.  Use Ventolin HFA inhaler as needed.            Digestive    Esophageal reflux     Continues on Omeprazole 40 mg daily.  Denies heartburn, abdominal pain.  Recommended to avoid fried, fatty foods, caffeine.         Relevant Orders    CBC and differential    Hepatic steatosis     AST is mildly elevated.  ALT - within normal range.    Recommended to follow a low fat diet, lose weight.    Follow-up with gastroenterology Dr. Hurt.         Relevant Orders    Comprehensive metabolic panel    Celiac disease     Follow a gluten free diet.              Musculoskeletal and Integument    Osteopenia of multiple sites     DEXA scan done in February 2024 showed osteopenia.    Recommended weightbearing exercise, calcium with vitamin D supplementation.  Recheck DEXA scan in 2 years.            Genitourinary    Stage 3a chronic kidney disease (HCC)     Lab Results   Component Value Date    EGFR 61 03/25/2024    EGFR  57 06/07/2023    EGFR 64 03/18/2023    CREATININE 0.95 03/25/2024    CREATININE 1.00 06/07/2023    CREATININE 0.92 03/18/2023     Recommended to stay well-hydrated.  Avoid NSAIDs, nephrotoxins.           Relevant Orders    Comprehensive metabolic panel    CBC and differential       Behavioral Health    Recurrent major depressive disorder, in partial remission (HCC)     PHQ 9 score 3.  Continue Paroxetine 20 mg daily.           Anxiety associated with depression     Mood has been stable.    Continue Paroxetine 20 mg daily.            Oncology    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     Patient is S/P right breast lumpectomy in June 2023.    Mammogram is scheduled in May.    Follow-up with surgical oncology Dr. Rivera.              Other    Dyslipidemia - Primary     Lipid panel improved.    Recommended to follow a low-cholesterol diet, regular exercise.         Medicare annual wellness visit, subsequent     Other Visit Diagnoses       Encounter for immunization        Relevant Orders    Pneumococcal Conjugate Vaccine 20-valent (Pcv20)            Depression Screening and Follow-up Plan: Patient was screened for depression during today's encounter. They screened negative with a PHQ-9 score of 3.    Urinary Incontinence Plan of Care: counseling topics discussed: practice Kegel (pelvic floor strengthening) exercises, use restroom every 2 hours, limit alcohol, caffeine, spicy foods, and acidic foods, weight loss and preventing constipation.       Preventive health issues were discussed with patient, and age appropriate screening tests were ordered as noted in patient's After Visit Summary.  Personalized health advice and appropriate referrals for health education or preventive services given if needed, as noted in patient's After Visit Summary.    Schedule follow-up visit in 6 months.  Check labs prior to next visit.       History of Present Illness:     Patient presents for a Medicare Wellness Visit    HPI      Patient presents for 6 month follow-up visit, Medicare AWV.    PMHx:  Dyslipidemia, Asthma, Obesity, elevated LFT's, fatty liver, GERD, celiac disease, Obesity, Depression, Anxiety, Insomnia, CKD stage 3, rght breast CA.      Reviewed current medications, blood work results from 3/25/24.    Cholesterol 177, triglycerides 125, HDL 42, .  Fasting glucose 100, creatinine 0.95, GFR 61, potassium 4.4, AST 56, ALT 50.    Patient states that she feels well overall.    Denies chest pain, shortness of breath, dizziness.      GERD - symptoms are stable.  Currently taking Omeprazole 40 mg daily.    EGD done in March 2023 showed small hiatal hernia.      Patient had colonoscopy performed by gastroenterology Dr. Hurt in March 2023, 3 polyps were removed.    Dr. Hurt recommended to repeat colonoscopy in 3 years.      Family history is positive for breast cancer in her sister, lung cancer, colon cancer in her brother.  Patient had negative genetic testing.      Right breast CA, S/P lumpectomy in June 2023.    Declined chemoprevention therapy.  Followed by surgical oncology Dr. Rivera, was seen by MURRAY in January 2024.    Mammogram scheduled in May.      DEXA scan done in February 2024 showed osteopenia.    Patient reports no falls or fractures.    Take calcium with vitamin D supplements, plans to start walking regularly.    Received flu shot, updated COVID booster at [facility] pharmacy  In 10/2023.      Patient Care Team:  Nancie Villasenor MD as PCP - General  MURRAY Woo MD as Surgeon (Surgical Oncology)  MURRAY Estevez as Nurse Practitioner (Surgical Oncology)     Review of Systems:     Review of Systems   Constitutional:  Positive for fatigue (mild). Negative for activity change, appetite change, chills and fever.   HENT:  Negative for congestion, ear pain, hearing loss, sore throat and trouble swallowing.    Eyes:  Negative for pain, discharge, redness, itching and visual  disturbance.   Respiratory:  Negative for cough, chest tightness, shortness of breath and wheezing.    Cardiovascular:  Negative for chest pain, palpitations and leg swelling.   Gastrointestinal:  Positive for diarrhea (occasional). Negative for abdominal pain, constipation, nausea and vomiting.        Denies heartburn   Genitourinary:  Negative for difficulty urinating, dysuria and hematuria.        Mild urinary incontinence   Musculoskeletal:  Positive for arthralgias and joint swelling. Negative for back pain and gait problem.   Skin:  Positive for rash (on face).   Neurological:  Negative for dizziness, syncope and headaches.   Hematological: Negative.    Psychiatric/Behavioral:  Positive for sleep disturbance.         Anxiety / Depression - mood has been stable        Problem List:     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    Mild intermittent asthma without complication    Obesity (BMI 30.0-34.9)    Rosacea    Increased risk of breast cancer    Hand eczema    Chronic insomnia    Medicare annual wellness visit, subsequent    Hepatic steatosis    Anxiety associated with depression    Elevated hematocrit    Hepatosplenomegaly    Gallbladder polyp    Inflamed skin tag    Celiac disease    Stage 3a chronic kidney disease (HCC)    Osteopenia of multiple sites      Past Medical and Surgical History:     Past Medical History:   Diagnosis Date    Allergic rhinitis     last assessed-10/2/2017    Asthma     Carotid bruit     R-last assessed-6/10/2014    Celiac disease     Dysphagia     last assessed-6/6/2013    GERD (gastroesophageal reflux disease)      Past  Surgical History:   Procedure Laterality Date    BREAST BIOPSY Right 11/15/2017    percutaneous needle core    BREAST BIOPSY Right 2023    SLW Stereo 9:00    BREAST LUMPECTOMY Right 2017    LCIS    BREAST LUMPECTOMY Right 2023    Procedure: BREAST PUMA  DIRECTED LUMPECTOMY;  Surgeon: Marco Rivera MD;  Location: [facility];  Service: Surgical Oncology     SECTION  1984    COLONOSCOPY      complete-12/3/2012-internal/external hemorrhoids, perianal or excoriation, mild sigmoid diverticulosis    MAMMO NEEDLE LOCALIZATION LEFT (ALL INC) Right 2023    MAMMO NEEDLE LOCALIZATION RIGHT (ALL INC) Right 2017    MAMMO NEEDLE LOCALIZATION RIGHT (ALL INC) EACH ADD Right 2017    MAMMO STEREOTACTIC BREAST BIOPSY RIGHT (ALL INC) Right 11/15/2017    MAMMO STEREOTACTIC BREAST BIOPSY RIGHT (ALL INC) Right 2023    MAMMO STEREOTACTIC BREAST BIOPSY RIGHT (ALL INC) EACH ADD Right 11/15/2017    MRI BREAST BIOPSY LEFT (ALL INCLUSIVE) Left 2019    SC PERQ DEVICE PLACEMENT BREAST LOC 1ST LES W/GDNCE Right 2017    Procedure: BREAST LUMPECTOMY; BREAST BRACKETED NEEDLE LOCALIZATION (BRACKETED NEEDLE LOC AT 0900);  Surgeon: Marco Rivera MD;  Location[facility];  Service: Surgical Oncology    UPPER GASTROINTESTINAL ENDOSCOPY      VAGINAL DILATATION      d&e      Family History:     Family History   Problem Relation Age of Onset    Heart attack Mother         acute MI    Colon polyps Mother         polyps of the sigmoid colon    Colon cancer Mother     Breast cancer Sister 54    Uterine cancer Sister 64    No Known Problems Daughter     No Known Problems Daughter     Uterine cancer Maternal Grandmother         unknown age-maybe 60's    Uterine cancer Paternal Grandmother         unknown age , maybe 60's    Diabetes Paternal Grandfather     Liver cancer Brother 50    Colon cancer Brother 50    Lung cancer Brother     No Known Problems Brother     No Known Problems Brother     No Known  Problems Brother     No Known Problems Brother     No Known Problems Son     Stroke Maternal Uncle     Kidney cancer Paternal Aunt 55    Pancreatic cancer Paternal Uncle 67    Breast cancer Cousin 57    Uterine cancer Cousin 55    Colon cancer Cousin 60      Social History:     Social History     Socioeconomic History    Marital status: /Civil Union     Spouse name: None    Number of children: None    Years of education: None    Highest education level: None   Occupational History    None   Tobacco Use    Smoking status: Former     Current packs/day: 0.00     Types: Cigarettes     Start date: 1974     Quit date: 1976     Years since quittin.9     Passive exposure: Never    Smokeless tobacco: Never   Vaping Use    Vaping status: Never Used   Substance and Sexual Activity    Alcohol use: Yes     Alcohol/week: 1.0 standard drink of alcohol     Types: 1 Glasses of wine per week     Comment: once q 2 weeks    Drug use: No    Sexual activity: Not Currently     Partners: Male     Birth control/protection: Post-menopausal   Other Topics Concern    None   Social History Narrative    None     Social Determinants of Health     Financial Resource Strain: Low Risk  (3/13/2023)    Overall Financial Resource Strain (CARDIA)     Difficulty of Paying Living Expenses: Not hard at all   Food Insecurity: No Food Insecurity (3/27/2024)    Hunger Vital Sign     Worried About Running Out of Food in the Last Year: Never true     Ran Out of Food in the Last Year: Never true   Transportation Needs: No Transportation Needs (3/27/2024)    PRAPARE - Transportation     Lack of Transportation (Medical): No     Lack of Transportation (Non-Medical): No   Physical Activity: Not on file   Stress: Not on file   Social Connections: Not on file   Intimate Partner Violence: Not on file   Housing Stability: Low Risk  (3/27/2024)    Housing Stability Vital Sign     Unable to Pay for Housing in the Last Year: No     Number of Places  Lived in the Last Year: 1     Unstable Housing in the Last Year: No      Medications and Allergies:     Current Outpatient Medications   Medication Sig Dispense Refill    albuterol (Ventolin HFA) 90 mcg/act inhaler Inhale 2 puffs every 4 (four) hours as needed for wheezing 18 g 5    aspirin 81 MG tablet Take 81 mg by mouth daily      calcium citrate (CALCITRATE) 950 MG tablet Take 1 tablet by mouth daily      fluocinonide (LIDEX) 0.05 % cream Apply topically 2 (two) times a day (Patient taking differently: Apply topically if needed) 30 g 1    Multiple Vitamin (MULTI-VITAMIN DAILY PO) Take by mouth      omeprazole (PriLOSEC) 40 MG capsule Take 1 capsule (40 mg total) by mouth daily (Patient taking differently: Take 40 mg by mouth every morning) 90 capsule 3    PARoxetine (PAXIL) 20 mg tablet TAKE ONE TABLET BY MOUTH EVERY DAY (Patient taking differently: Take 20 mg by mouth daily at bedtime) 90 tablet 3     No current facility-administered medications for this visit.     No Known Allergies   Immunizations:     Immunization History   Administered Date(s) Administered    COVID-19 PFIZER VACCINE 0.3 ML IM 02/17/2021, 03/09/2021, 10/23/2021    COVID-19 Pfizer Vac BIVALENT Sandeep-sucrose 12 Yr+ IM 11/08/2022    COVID-19 Pfizer mRNA vacc PF sandeep-sucrose 12 yr and older (Comirnaty) 10/30/2023    INFLUENZA 01/07/2013, 10/14/2013, 10/18/2016, 10/02/2017    Influenza Quadrivalent, 6-35 Months IM 10/16/2015, 10/18/2016, 10/02/2017    Influenza, high dose seasonal 0.7 mL 10/08/2019, 10/09/2020, 09/29/2021, 10/03/2022    Influenza, recombinant, quadrivalent,injectable, preservative free 10/02/2018    Influenza, seasonal, injectable 01/07/2013, 10/14/2013, 10/02/2014    Pneumococcal Conjugate 13-Valent 02/19/2020    Pneumococcal Polysaccharide PPV23 06/10/2014, 01/01/2016    Tdap 06/07/2013    Varicella 01/01/2016    Zoster 07/08/2014      Health Maintenance:         Topic Date Due    Cervical Cancer Screening  11/17/2020    Breast  Cancer Screening: Mammogram  03/30/2025    Colorectal Cancer Screening  03/27/2026    Hepatitis C Screening  Completed         Topic Date Due    Pneumococcal Vaccine: 65+ Years (3 of 3 - PPSV23 or PCV20) 02/19/2021    COVID-19 Vaccine (6 - 2023-24 season) 12/25/2023      Medicare Screening Tests and Risk Assessments:     [place] is here for her Subsequent Wellness visit.     Health Risk Assessment:   Patient rates overall health as good. Patient feels that their physical health rating is same. Patient is satisfied with their life. Eyesight was rated as same. Hearing was rated as slightly worse. Patient feels that their emotional and mental health rating is same. Patients states they are never, rarely angry. Patient states they are sometimes unusually tired/fatigued. Pain experienced in the last 7 days has been none. Patient states that she has experienced no weight loss or gain in last 6 months.     Depression Screening:   PHQ-9 Score: 3      Fall Risk Screening:   In the past year, patient has experienced: history of falling in past year      Urinary Incontinence Screening:   Patient has leaked urine accidently in the last six months.     Home Safety:  Patient does not have trouble with stairs inside or outside of their home. Patient has working smoke alarms and has no working carbon monoxide detector. Home safety hazards include: none.     Nutrition:   Current diet is Other (please comment). gluten free    Medications:   Patient is not currently taking any over-the-counter supplements. Patient is able to manage medications.     Activities of Daily Living (ADLs)/Instrumental Activities of Daily Living (IADLs):   Walk and transfer into and out of bed and chair?: Yes  Dress and groom yourself?: Yes    Bathe or shower yourself?: Yes    Feed yourself? Yes  Do your laundry/housekeeping?: Yes  Manage your money, pay your bills and track your expenses?: Yes  Make your own meals?: Yes    Do your own shopping?:  Yes    Previous Hospitalizations:   Any hospitalizations or ED visits within the last 12 months?: Yes    How many hospitalizations have you had in the last year?: 1-2    Hospitalization Comments: lumpectomy, 2023    Advance Care Planning:   Living will: No    Durable POA for healthcare: No    Advanced directive: No    Advanced directive counseling given: Yes    ACP document given: Yes    End of Life Decisions reviewed with patient: Yes      Cognitive Screening:   Provider or family/friend/caregiver concerned regarding cognition?: No    PREVENTIVE SCREENINGS      Cardiovascular Screening:    General: Screening Current      Diabetes Screening:     General: Screening Current      Colorectal Cancer Screening:     General: Screening Current      Breast Cancer Screening:     General: Screening Current      Cervical Cancer Screening:    General: Screening Not Indicated      Abdominal Aortic Aneurysm (AAA) Screening:        General: Screening Not Indicated      Lung Cancer Screening:     General: Screening Not Indicated      Hepatitis C Screening:    General: Screening Current    Screening, Brief Intervention, and Referral to Treatment (SBIRT)    Screening  Typical number of drinks in a day: 0  Typical number of drinks in a week: 1  Interpretation: Low risk drinking behavior.    AUDIT-C Screenin) How often did you have a drink containing alcohol in the past year? monthly or less  2) How many drinks did you have on a typical day when you were drinking in the past year? 1 to 2  3) How often did you have 6 or more drinks on one occasion in the past year? never    AUDIT-C Score: 1  Interpretation: Score 0-2 (female): Negative screen for alcohol misuse    Single Item Drug Screening:  How often have you used an illegal drug (including marijuana) or a prescription medication for non-medical reasons in the past year? never    Single Item Drug Screen Score: 0  Interpretation: Negative screen for possible drug use  "disorder    Other Counseling Topics:   Car/seat belt/driving safety, skin self-exam and calcium and vitamin D intake and regular weightbearing exercise.     No results found.     Physical Exam:     /82   Pulse 74   Temp 98 °F (36.7 °C) (Tympanic)   Resp 18   Ht 5' 9\" (1.753 m)   Wt 104 kg (228 lb 9.6 oz)   SpO2 98%   BMI 33.76 kg/m²     Physical Exam  Vitals and nursing note reviewed.   Constitutional:       Appearance: Normal appearance. She is obese.   HENT:      Head: Normocephalic and atraumatic.      Right Ear: Tympanic membrane normal.      Left Ear: Tympanic membrane normal.   Eyes:      Conjunctiva/sclera: Conjunctivae normal.      Pupils: Pupils are equal, round, and reactive to light.   Neck:      Vascular: No carotid bruit.   Cardiovascular:      Rate and Rhythm: Normal rate and regular rhythm.      Heart sounds: No murmur heard.  Pulmonary:      Effort: Pulmonary effort is normal.      Breath sounds: Normal breath sounds.   Abdominal:      General: Bowel sounds are normal. There is no distension.      Palpations: Abdomen is soft.      Tenderness: There is no abdominal tenderness.   Musculoskeletal:         General: No swelling. Normal range of motion.      Cervical back: Normal range of motion and neck supple.      Right lower leg: No edema.      Left lower leg: No edema.   Skin:     General: Skin is warm and dry.      Findings: Rash (rosacea on face) present.   Neurological:      General: No focal deficit present.      Mental Status: She is alert and oriented to person, place, and time.      Motor: No weakness.      Gait: Gait normal.   Psychiatric:         Mood and Affect: Mood normal.          Nancie Villasenor MD  "

## 2024-06-05 DIAGNOSIS — K21.00 GASTROESOPHAGEAL REFLUX DISEASE WITH ESOPHAGITIS WITHOUT HEMORRHAGE: ICD-10-CM

## 2024-06-05 RX ORDER — OMEPRAZOLE 40 MG/1
40 CAPSULE, DELAYED RELEASE ORAL DAILY
Qty: 90 CAPSULE | Refills: 3 | Status: SHIPPED | OUTPATIENT
Start: 2024-06-05

## 2024-06-05 NOTE — TELEPHONE ENCOUNTER
Received refill request for Omeprazole 40mg 90 day supply. Please send to:    [id number] [facility] [address] [place] [address]  Phone: [phone]  Fax: [phone]

## 2024-07-22 ENCOUNTER — OFFICE VISIT (OUTPATIENT)
Dept: SURGICAL ONCOLOGY | Facility: CLINIC | Age: 69
End: 2024-07-22
Payer: MEDICARE

## 2024-07-22 VITALS
SYSTOLIC BLOOD PRESSURE: 128 MMHG | RESPIRATION RATE: 15 BRPM | BODY MASS INDEX: 33.18 KG/M2 | HEIGHT: 69 IN | DIASTOLIC BLOOD PRESSURE: 72 MMHG | TEMPERATURE: 97.2 F | WEIGHT: 224 LBS | HEART RATE: 84 BPM | OXYGEN SATURATION: 98 %

## 2024-07-22 DIAGNOSIS — D05.01 LOBULAR CARCINOMA IN SITU (LCIS) OF RIGHT BREAST: ICD-10-CM

## 2024-07-22 DIAGNOSIS — Z80.3 FAMILY HISTORY OF MALIGNANT NEOPLASM OF BREAST: ICD-10-CM

## 2024-07-22 DIAGNOSIS — Z91.89 INCREASED RISK OF BREAST CANCER: Primary | ICD-10-CM

## 2024-07-22 PROCEDURE — 99213 OFFICE O/P EST LOW 20 MIN: CPT

## 2024-07-22 NOTE — PROGRESS NOTES
Surgical Oncology Follow Up       [facility] SURGICAL ONCOLOGY [place]  [address]  [place] [address]    Angelia COLMENARES Zuly  1955  [id number]  [facility] SURGICAL ONCOLOGY [place]  [address]  [place] [address]    Chief Complaint   Patient presents with   • Follow-up       Assessment/Plan:  1. Increased risk of breast cancer  - 6 mo f/u    2. 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    3. Family history of malignant neoplasm of breast       Discussion/Summary: Patient is a 69-year-old female presenting today for six-month follow-up for history of LCIS and family history of breast cancer. She underwent genetic testing which was negative. Her sister was diagnosed with breast cancer. She underwent a right breast lumpectomy with Dr. Rivera in December 2017.  She had another lumpectomy of the right breast for LCIS with Dr. Rivera on 6/27/2023. We discussed annual MRI however we had decided to hold off as she had 5 years of breast MRI's with benign findings and most recent LCIS was found with mammogram. Her mammo is due for August. There were no concerns on her clinical exam. I will see the patient back in 6 months or sooner should the need arise. She was instructed to call with any questions or concerns prior to this time. All questions were answered today.       History of Present Illness:     Oncology History    No history exists.        -Interval History: Patient is a 69-year-old female presenting today for six-month follow-up for history of LCIS and family history of breast cancer. She is on obs. Her mammo is due for August. She denies breast changes.    Review of Systems:  Review of Systems   Constitutional:  Negative for activity change, appetite change, fatigue and unexpected weight change.   Respiratory:  Negative for cough and shortness of breath.    Cardiovascular:  Negative for chest pain.    Gastrointestinal:  Negative for abdominal pain, diarrhea, nausea and vomiting.   Endocrine: Negative for heat intolerance.   Musculoskeletal:  Negative for arthralgias, back pain and myalgias.   Skin:  Negative for rash.   Neurological:  Negative for weakness and headaches.   Hematological:  Negative for adenopathy.       Patient Active Problem List   Diagnosis   • Family history of malignant neoplasm of uterus   • Family history of malignant neoplasm of gastrointestinal tract   • Family history of malignant neoplasm of breast   • Recurrent major depressive disorder, in partial remission (HCC)   • Dyslipidemia   • Elevated LFTs   • Esophageal reflux   • Left thyroid nodule   • 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   • Mild intermittent asthma without complication   • Obesity (BMI 30.0-34.9)   • Rosacea   • Increased risk of breast cancer   • Hand eczema   • Chronic insomnia   • Hepatic steatosis   • Anxiety associated with depression   • Elevated hematocrit   • Hepatosplenomegaly   • Gallbladder polyp   • Inflamed skin tag   • Celiac disease   • Stage 3a chronic kidney disease (HCC)   • Osteopenia of multiple sites     Past Medical History:   Diagnosis Date   • Allergic rhinitis     last assessed-10/2/2017   • Asthma    • Carotid bruit     R-last assessed-6/10/2014   • Celiac disease    • Dysphagia     last assessed-2013   • GERD (gastroesophageal reflux disease)      Past Surgical History:   Procedure Laterality Date   • BREAST BIOPSY Right 11/15/2017    percutaneous needle core   • BREAST BIOPSY Right 2023    SLW Stereo 9:00   • BREAST LUMPECTOMY Right 2017    LCIS   • BREAST LUMPECTOMY Right 2023    Procedure: BREAST PUMA  DIRECTED LUMPECTOMY;  Surgeon: Marco Rivera MD;  Location: [facility];  Service: Surgical Oncology   •  SECTION     • COLONOSCOPY      complete-12/3/2012-internal/external hemorrhoids, perianal or excoriation, mild sigmoid diverticulosis   •  MAMMO NEEDLE LOCALIZATION LEFT (ALL INC) Right 6/16/2023   • MAMMO NEEDLE LOCALIZATION RIGHT (ALL INC) Right 12/19/2017   • MAMMO NEEDLE LOCALIZATION RIGHT (ALL INC) EACH ADD Right 12/19/2017   • MAMMO STEREOTACTIC BREAST BIOPSY RIGHT (ALL INC) Right 11/15/2017   • MAMMO STEREOTACTIC BREAST BIOPSY RIGHT (ALL INC) Right 5/1/2023   • MAMMO STEREOTACTIC BREAST BIOPSY RIGHT (ALL INC) EACH ADD Right 11/15/2017   • MRI BREAST BIOPSY LEFT (ALL INCLUSIVE) Left 06/18/2019   • LA PERQ DEVICE PLACEMENT BREAST LOC 1ST LES W/GDNCE Right 12/19/2017    Procedure: BREAST LUMPECTOMY; BREAST BRACKETED NEEDLE LOCALIZATION (BRACKETED NEEDLE LOC AT 0900);  Surgeon: Marco Rivera MD;  Location[facility];  Service: Surgical Oncology   • UPPER GASTROINTESTINAL ENDOSCOPY     • VAGINAL DILATATION  1978    d&e     Family History   Problem Relation Age of Onset   • Heart attack Mother         acute MI   • Colon polyps Mother         polyps of the sigmoid colon   • Colon cancer Mother    • Breast cancer Sister 54   • Uterine cancer Sister 64   • No Known Problems Daughter    • No Known Problems Daughter    • Uterine cancer Maternal Grandmother         unknown age-maybe 60's   • Uterine cancer Paternal Grandmother         unknown age , maybe 60's   • Diabetes Paternal Grandfather    • Liver cancer Brother 50   • Colon cancer Brother 50   • Lung cancer Brother    • No Known Problems Brother    • No Known Problems Brother    • No Known Problems Brother    • No Known Problems Brother    • No Known Problems Son    • Stroke Maternal Uncle    • Kidney cancer Paternal Aunt 55   • Pancreatic cancer Paternal Uncle 67   • Breast cancer Cousin 57   • Uterine cancer Cousin 55   • Colon cancer Cousin 60     Social History     Socioeconomic History   • Marital status: /Civil Union     Spouse name: Not on file   • Number of children: Not on file   • Years of education: Not on file   • Highest education level: Not on file   Occupational History   • Not on  file   Tobacco Use   • Smoking status: Former     Current packs/day: 0.00     Types: Cigarettes     Start date: 1974     Quit date: 1976     Years since quittin.2     Passive exposure: Never   • Smokeless tobacco: Never   Vaping Use   • Vaping status: Never Used   Substance and Sexual Activity   • Alcohol use: Yes     Alcohol/week: 1.0 standard drink of alcohol     Types: 1 Glasses of wine per week     Comment: once q 2 weeks   • Drug use: No   • Sexual activity: Not Currently     Partners: Male     Birth control/protection: Post-menopausal   Other Topics Concern   • Not on file   Social History Narrative   • Not on file     Social Determinants of Health     Financial Resource Strain: Low Risk  (3/13/2023)    Overall Financial Resource Strain (CARDIA)    • Difficulty of Paying Living Expenses: Not hard at all   Food Insecurity: No Food Insecurity (3/27/2024)    Hunger Vital Sign    • Worried About Running Out of Food in the Last Year: Never true    • Ran Out of Food in the Last Year: Never true   Transportation Needs: No Transportation Needs (3/27/2024)    PRAPARE - Transportation    • Lack of Transportation (Medical): No    • Lack of Transportation (Non-Medical): No   Physical Activity: Not on file   Stress: Not on file   Social Connections: Not on file   Intimate Partner Violence: Not on file   Housing Stability: Low Risk  (3/27/2024)    Housing Stability Vital Sign    • Unable to Pay for Housing in the Last Year: No    • Number of Times Moved in the Last Year: 1    • Homeless in the Last Year: No       Current Outpatient Medications:   •  albuterol (Ventolin HFA) 90 mcg/act inhaler, Inhale 2 puffs every 4 (four) hours as needed for wheezing, Disp: 18 g, Rfl: 5  •  aspirin 81 MG tablet, Take 81 mg by mouth daily, Disp: , Rfl:   •  calcium citrate (CALCITRATE) 950 MG tablet, Take 1 tablet by mouth daily, Disp: , Rfl:   •  fluocinonide (LIDEX) 0.05 % cream, Apply topically 2 (two) times a day (Patient  taking differently: Apply topically if needed), Disp: 30 g, Rfl: 1  •  Multiple Vitamin (MULTI-VITAMIN DAILY PO), Take by mouth, Disp: , Rfl:   •  omeprazole (PriLOSEC) 40 MG capsule, Take 1 capsule (40 mg total) by mouth daily, Disp: 90 capsule, Rfl: 3  •  PARoxetine (PAXIL) 20 mg tablet, TAKE ONE TABLET BY MOUTH EVERY DAY, Disp: 90 tablet, Rfl: 1  No Known Allergies  Vitals:    07/22/24 0919   BP: 128/72   Pulse: 84   Resp: 15   Temp: (!) 97.2 °F (36.2 °C)   SpO2: 98%       Physical Exam  Constitutional:       General: She is not in acute distress.     Appearance: Normal appearance.   Cardiovascular:      Rate and Rhythm: Normal rate and regular rhythm.      Pulses: Normal pulses.      Heart sounds: Normal heart sounds.   Pulmonary:      Effort: Pulmonary effort is normal.      Breath sounds: Normal breath sounds.   Chest:      Chest wall: No mass.   Breasts:     Right: No swelling, bleeding, inverted nipple, mass, nipple discharge, skin change or tenderness.      Left: No swelling, bleeding, inverted nipple, mass, nipple discharge, skin change or tenderness.       Abdominal:      General: Abdomen is flat.      Palpations: Abdomen is soft.   Lymphadenopathy:      Upper Body:      Right upper body: No supraclavicular, axillary or pectoral adenopathy.      Left upper body: No supraclavicular, axillary or pectoral adenopathy.   Skin:     General: Skin is warm.   Neurological:      General: No focal deficit present.      Mental Status: She is alert and oriented to person, place, and time.   Psychiatric:         Mood and Affect: Mood normal.         Behavior: Behavior normal.           Results:    Imaging  No results found.    I reviewed the above imaging data.      Advance Care Planning/Advance Directives:  Discussed disease status, cancer treatment plans and/or cancer treatment goals with the patient.

## 2024-08-14 ENCOUNTER — ANNUAL EXAM (OUTPATIENT)
Dept: OBGYN CLINIC | Facility: CLINIC | Age: 69
End: 2024-08-14
Payer: MEDICARE

## 2024-08-14 VITALS
HEIGHT: 69 IN | DIASTOLIC BLOOD PRESSURE: 76 MMHG | SYSTOLIC BLOOD PRESSURE: 138 MMHG | WEIGHT: 230 LBS | BODY MASS INDEX: 34.07 KG/M2

## 2024-08-14 DIAGNOSIS — M85.89 OSTEOPENIA OF MULTIPLE SITES: ICD-10-CM

## 2024-08-14 DIAGNOSIS — D05.01 LOBULAR CARCINOMA IN SITU (LCIS) OF RIGHT BREAST: ICD-10-CM

## 2024-08-14 DIAGNOSIS — Z12.39 ENCOUNTER FOR SCREENING FOR MALIGNANT NEOPLASM OF BREAST, UNSPECIFIED SCREENING MODALITY: ICD-10-CM

## 2024-08-14 DIAGNOSIS — Z01.419 ENCOUNTER FOR ANNUAL ROUTINE GYNECOLOGICAL EXAMINATION: Primary | ICD-10-CM

## 2024-08-14 PROCEDURE — G0101 CA SCREEN;PELVIC/BREAST EXAM: HCPCS | Performed by: OBSTETRICS & GYNECOLOGY

## 2024-08-14 NOTE — PROGRESS NOTES
Ambulatory Visit  Name: Angelia Caruso      : 1955      MRN: [id number]  Encounter Provider: Darlyn Stephen DO  Encounter Date: 2024   Encounter department: [facility]    Assessment & Plan   1. Encounter for annual routine gynecological examination  2. Osteopenia of multiple sites  3. 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  4. Encounter for screening for malignant neoplasm of breast, unspecified screening modality    Pap smear deferred due to low risk status.  Encouraged self breast examination as well as calcium supplementation.  Continue follow-up with breast team, serial mammogram.  Reviewed colon cancer screening, up-to-date.  Reviewed DEXA scan, osteopenia with follow-up DEXA scan 2 years.  She will continue to follow-up with primary care as scheduled.  Return to office in  2 years per Medicare recommendations/protocol    History of Present Illness     Angelia Caruso is a 69 y.o. female who presents     This is a pleasant 69-year-old female P3 ( x 3) presents for GYN exam.  She went through menopause at age 51.  She has never been on hormone replacement therapy.  She denies any vaginal bleeding or spotting.  No changes in bowel or bladder function.  She has been in a monogamous relationship with her  for over 49 years.  They are not sexually active.  Pap smears have always been normal.  Last Pap age 64.    Her past medical history significant for LCIS r lumpectomy 2018 followed byight second episode 2023 with another lumpectomy.  Her genetic screening was negative.  She follows up with her breast team every 6 months.  She also follows up with gastroenterology, primary care on a regular basis.    2023 LCIS, rt lumpectomy, neg genetic test, f/u q 6 mo    2018 LCIS rt breast    Colon/EGD 3/2023 f/u 3 yr    Dexa  osteopenia    Review of Systems   Constitutional:  Negative for fatigue, fever and unexpected weight change.   Respiratory:  Negative for cough,  "chest tightness, shortness of breath and wheezing.    Cardiovascular: Negative.  Negative for chest pain and palpitations.   Gastrointestinal: Negative.  Negative for abdominal distention, abdominal pain, blood in stool, constipation, diarrhea, nausea and vomiting.   Genitourinary: Negative.  Negative for difficulty urinating, dyspareunia, dysuria, flank pain, frequency, genital sores, hematuria, pelvic pain, urgency, vaginal bleeding, vaginal discharge and vaginal pain.   Skin:  Negative for rash.     Current Outpatient Medications on File Prior to Visit   Medication Sig Dispense Refill    albuterol (Ventolin HFA) 90 mcg/act inhaler Inhale 2 puffs every 4 (four) hours as needed for wheezing 18 g 5    aspirin 81 MG tablet Take 81 mg by mouth daily      calcium citrate (CALCITRATE) 950 MG tablet Take 1 tablet by mouth daily      Multiple Vitamin (MULTI-VITAMIN DAILY PO) Take by mouth      omeprazole (PriLOSEC) 40 MG capsule Take 1 capsule (40 mg total) by mouth daily 90 capsule 3    PARoxetine (PAXIL) 20 mg tablet TAKE ONE TABLET BY MOUTH EVERY DAY 90 tablet 1    fluocinonide (LIDEX) 0.05 % cream Apply topically 2 (two) times a day (Patient not taking: Reported on 8/14/2024) 30 g 1     No current facility-administered medications on file prior to visit.      Objective     /76   Ht 5' 9\" (1.753 m)   Wt 104 kg (230 lb)   BMI 33.97 kg/m²     Physical Exam  Constitutional:       Appearance: Normal appearance. She is well-developed.   HENT:      Head: Normocephalic and atraumatic.   Cardiovascular:      Rate and Rhythm: Normal rate and regular rhythm.   Pulmonary:      Effort: Pulmonary effort is normal.      Breath sounds: Normal breath sounds.   Chest:   Breasts:     Right: No inverted nipple, mass, nipple discharge, skin change or tenderness.      Left: No inverted nipple, mass, nipple discharge, skin change or tenderness.   Abdominal:      General: Bowel sounds are normal. There is no distension.      " Palpations: Abdomen is soft.      Tenderness: There is no abdominal tenderness. There is no guarding or rebound.   Genitourinary:     Labia:         Right: No rash, tenderness or lesion.         Left: No rash, tenderness or lesion.       Vagina: Normal. No signs of injury. No vaginal discharge or tenderness.      Cervix: No cervical motion tenderness, discharge, friability, lesion or cervical bleeding.      Uterus: Not enlarged and not tender.       Adnexa:         Right: No mass, tenderness or fullness.          Left: No mass, tenderness or fullness.     Neurological:      Mental Status: She is alert.   Psychiatric:         Behavior: Behavior normal.     External genitalia is within normal limits.  The vagina is evident of estrogen deficiency.  Cervix is small.  No pelvic floor prolapse.      Administrative Statements   I have spent a total time of 25 minutes in caring for this patient on the day of the visit/encounter including Impressions, Counseling / Coordination of care, Documenting in the medical record, Reviewing / ordering tests, medicine, procedures  , and Obtaining or reviewing history  .

## 2024-08-20 ENCOUNTER — HOSPITAL ENCOUNTER (OUTPATIENT)
Dept: RADIOLOGY | Age: 69
Discharge: HOME | End: 2024-08-20
Payer: MEDICARE

## 2024-08-20 VITALS — HEIGHT: 69 IN | BODY MASS INDEX: 34.07 KG/M2 | WEIGHT: 230 LBS

## 2024-08-20 DIAGNOSIS — Z12.31 VISIT FOR SCREENING MAMMOGRAM: ICD-10-CM

## 2024-08-20 PROCEDURE — 77067 SCR MAMMO BI INCL CAD: CPT

## 2024-08-20 PROCEDURE — 77063 BREAST TOMOSYNTHESIS BI: CPT

## 2024-08-30 ENCOUNTER — HOSPITAL ENCOUNTER (OUTPATIENT)
Dept: ULTRASOUND IMAGING | Facility: CLINIC | Age: 69
Discharge: HOME | End: 2024-08-30
Payer: MEDICARE

## 2024-08-30 ENCOUNTER — HOSPITAL ENCOUNTER (OUTPATIENT)
Dept: MAMMOGRAPHY | Facility: CLINIC | Age: 69
Discharge: HOME | End: 2024-08-30
Payer: MEDICARE

## 2024-08-30 VITALS — WEIGHT: 230 LBS | BODY MASS INDEX: 34.07 KG/M2 | HEIGHT: 69 IN

## 2024-08-30 VITALS — HEART RATE: 69 BPM | DIASTOLIC BLOOD PRESSURE: 83 MMHG | SYSTOLIC BLOOD PRESSURE: 158 MMHG

## 2024-08-30 DIAGNOSIS — R92.8 ABNORMAL SCREENING MAMMOGRAM: ICD-10-CM

## 2024-08-30 DIAGNOSIS — R92.8 ABNORMAL FINDINGS ON DIAGNOSTIC IMAGING OF BREAST: ICD-10-CM

## 2024-08-30 PROCEDURE — G0279 TOMOSYNTHESIS, MAMMO: HCPCS

## 2024-08-30 PROCEDURE — 88305 TISSUE EXAM BY PATHOLOGIST: CPT | Performed by: PATHOLOGY

## 2024-08-30 PROCEDURE — 88341 IMHCHEM/IMCYTCHM EA ADD ANTB: CPT | Performed by: PATHOLOGY

## 2024-08-30 PROCEDURE — 76642 ULTRASOUND BREAST LIMITED: CPT

## 2024-08-30 PROCEDURE — 77065 DX MAMMO INCL CAD UNI: CPT

## 2024-08-30 PROCEDURE — 88342 IMHCHEM/IMCYTCHM 1ST ANTB: CPT | Performed by: PATHOLOGY

## 2024-08-30 PROCEDURE — 19083 BX BREAST 1ST LESION US IMAG: CPT

## 2024-08-30 PROCEDURE — A4648 IMPLANTABLE TISSUE MARKER: HCPCS

## 2024-08-30 RX ORDER — LIDOCAINE HYDROCHLORIDE 10 MG/ML
5 INJECTION, SOLUTION EPIDURAL; INFILTRATION; INTRACAUDAL; PERINEURAL ONCE
Status: COMPLETED | OUTPATIENT
Start: 2024-08-30 | End: 2024-08-30

## 2024-08-30 RX ADMIN — LIDOCAINE HYDROCHLORIDE 5 ML: 10 INJECTION, SOLUTION EPIDURAL; INFILTRATION; INTRACAUDAL; PERINEURAL at 13:42

## 2024-08-30 NOTE — PROGRESS NOTES
Patient arrived via:    __x___ambulatory    _____wheelchair    _____stretcher      Domestic violence screen    ___x___negative______positive    Breast Implants:    ___x____yes ________no    *same day add-on*

## 2024-08-30 NOTE — PRE-PROCEDURE INSTRUCTIONS
Signed               Patient met with Dr. Josue___ at the [facility] [place] regarding recommendation for;  _x__ Ultrasound guided biopsy of the _right___  breast (x _1__ site)  ___ Stereotactic biopsy of the ____ breast (x ___ sites)     Questions and concerns were addressed at this time.  Patient verbalized understanding.  Allergies & medications reviewed with pt & verified in Epic.  Biopsy scheduled, consent reviewed & pre-procedure instructions given.     Blood thinners:   ___ NO  ___ YES/medication: _ASA__ (no need to stop per RBC protocol)          -INR ____ (if pt on Coumadin/Warfarin)    *same day add-on*

## 2024-08-30 NOTE — PROGRESS NOTES
Procedure type:    ___x__ultrasound guided _____stereotactic    Breast:    _____Left __x___Right    Location: 10 o'clock 7 cmfn    Needle: 12g    # of passes: 4    Clip: Butterfly     Performed by: Dr. Galaviz    Pressure held for 5 minutes by: Donita Wilkerson Strips:    ___X__yes _____no    Band aid:    __X___yes_____no    Tolerated procedure:    __X___yes _____no

## 2024-09-05 ENCOUNTER — TELEPHONE (OUTPATIENT)
Dept: SURGICAL ONCOLOGY | Facility: CLINIC | Age: 69
End: 2024-09-05

## 2024-09-05 PROCEDURE — 88341 IMHCHEM/IMCYTCHM EA ADD ANTB: CPT | Performed by: PATHOLOGY

## 2024-09-05 PROCEDURE — 88305 TISSUE EXAM BY PATHOLOGIST: CPT | Performed by: PATHOLOGY

## 2024-09-05 PROCEDURE — 88342 IMHCHEM/IMCYTCHM 1ST ANTB: CPT | Performed by: PATHOLOGY

## 2024-09-05 NOTE — TELEPHONE ENCOUNTER
Called patient to review right breast bx showing 3rd recurrence of LCIS and ADH. Surgical consult recommended. We will get her scheduled with Dr. Cardarelli. She was appreciative of the call.

## 2024-09-23 ENCOUNTER — RA CDI HCC (OUTPATIENT)
Dept: OTHER | Facility: HOSPITAL | Age: 69
End: 2024-09-23

## 2024-10-02 ENCOUNTER — APPOINTMENT (OUTPATIENT)
Dept: LAB | Facility: CLINIC | Age: 69
End: 2024-10-02
Payer: MEDICARE

## 2024-10-02 ENCOUNTER — OFFICE VISIT (OUTPATIENT)
Dept: FAMILY MEDICINE CLINIC | Facility: CLINIC | Age: 69
End: 2024-10-02
Payer: MEDICARE

## 2024-10-02 VITALS
TEMPERATURE: 97.5 F | DIASTOLIC BLOOD PRESSURE: 72 MMHG | RESPIRATION RATE: 18 BRPM | BODY MASS INDEX: 33.98 KG/M2 | HEIGHT: 69 IN | OXYGEN SATURATION: 96 % | WEIGHT: 229.4 LBS | SYSTOLIC BLOOD PRESSURE: 118 MMHG | HEART RATE: 78 BPM

## 2024-10-02 DIAGNOSIS — Z23 ENCOUNTER FOR IMMUNIZATION: ICD-10-CM

## 2024-10-02 DIAGNOSIS — N18.31 STAGE 3A CHRONIC KIDNEY DISEASE (HCC): ICD-10-CM

## 2024-10-02 DIAGNOSIS — F41.8 DEPRESSION WITH ANXIETY: ICD-10-CM

## 2024-10-02 DIAGNOSIS — E78.5 DYSLIPIDEMIA: ICD-10-CM

## 2024-10-02 DIAGNOSIS — K76.0 HEPATIC STEATOSIS: ICD-10-CM

## 2024-10-02 DIAGNOSIS — M79.671 PAIN IN BOTH FEET: ICD-10-CM

## 2024-10-02 DIAGNOSIS — M79.672 PAIN IN BOTH FEET: ICD-10-CM

## 2024-10-02 DIAGNOSIS — K21.9 GASTROESOPHAGEAL REFLUX DISEASE, UNSPECIFIED WHETHER ESOPHAGITIS PRESENT: Primary | ICD-10-CM

## 2024-10-02 DIAGNOSIS — M85.89 OSTEOPENIA OF MULTIPLE SITES: ICD-10-CM

## 2024-10-02 DIAGNOSIS — K21.9 GASTROESOPHAGEAL REFLUX DISEASE, UNSPECIFIED WHETHER ESOPHAGITIS PRESENT: ICD-10-CM

## 2024-10-02 DIAGNOSIS — K90.0 CELIAC DISEASE: ICD-10-CM

## 2024-10-02 DIAGNOSIS — E66.811 OBESITY (BMI 30.0-34.9): ICD-10-CM

## 2024-10-02 DIAGNOSIS — F33.41 RECURRENT MAJOR DEPRESSIVE DISORDER, IN PARTIAL REMISSION (HCC): ICD-10-CM

## 2024-10-02 DIAGNOSIS — D05.01 LOBULAR CARCINOMA IN SITU (LCIS) OF RIGHT BREAST: ICD-10-CM

## 2024-10-02 DIAGNOSIS — J45.20 MILD INTERMITTENT ASTHMA WITHOUT COMPLICATION: ICD-10-CM

## 2024-10-02 DIAGNOSIS — F41.8 ANXIETY ASSOCIATED WITH DEPRESSION: ICD-10-CM

## 2024-10-02 LAB
ALBUMIN SERPL BCG-MCNC: 4.4 G/DL (ref 3.5–5)
ALP SERPL-CCNC: 88 U/L (ref 34–104)
ALT SERPL W P-5'-P-CCNC: 62 U/L (ref 7–52)
ANION GAP SERPL CALCULATED.3IONS-SCNC: 7 MMOL/L (ref 4–13)
AST SERPL W P-5'-P-CCNC: 73 U/L (ref 13–39)
BASOPHILS # BLD AUTO: 0.04 THOUSANDS/ΜL (ref 0–0.1)
BASOPHILS NFR BLD AUTO: 1 % (ref 0–1)
BILIRUB SERPL-MCNC: 0.65 MG/DL (ref 0.2–1)
BUN SERPL-MCNC: 14 MG/DL (ref 5–25)
CALCIUM SERPL-MCNC: 9.9 MG/DL (ref 8.4–10.2)
CHLORIDE SERPL-SCNC: 106 MMOL/L (ref 96–108)
CO2 SERPL-SCNC: 27 MMOL/L (ref 21–32)
CREAT SERPL-MCNC: 0.95 MG/DL (ref 0.6–1.3)
EOSINOPHIL # BLD AUTO: 0.17 THOUSAND/ΜL (ref 0–0.61)
EOSINOPHIL NFR BLD AUTO: 3 % (ref 0–6)
ERYTHROCYTE [DISTWIDTH] IN BLOOD BY AUTOMATED COUNT: 14.3 % (ref 11.6–15.1)
GFR SERPL CREATININE-BSD FRML MDRD: 60 ML/MIN/1.73SQ M
GLUCOSE P FAST SERPL-MCNC: 105 MG/DL (ref 65–99)
HCT VFR BLD AUTO: 44.1 % (ref 34.8–46.1)
HGB BLD-MCNC: 14.4 G/DL (ref 11.5–15.4)
IMM GRANULOCYTES # BLD AUTO: 0.02 THOUSAND/UL (ref 0–0.2)
IMM GRANULOCYTES NFR BLD AUTO: 0 % (ref 0–2)
LYMPHOCYTES # BLD AUTO: 1.94 THOUSANDS/ΜL (ref 0.6–4.47)
LYMPHOCYTES NFR BLD AUTO: 34 % (ref 14–44)
MCH RBC QN AUTO: 27.8 PG (ref 26.8–34.3)
MCHC RBC AUTO-ENTMCNC: 32.7 G/DL (ref 31.4–37.4)
MCV RBC AUTO: 85 FL (ref 82–98)
MONOCYTES # BLD AUTO: 0.32 THOUSAND/ΜL (ref 0.17–1.22)
MONOCYTES NFR BLD AUTO: 6 % (ref 4–12)
NEUTROPHILS # BLD AUTO: 3.15 THOUSANDS/ΜL (ref 1.85–7.62)
NEUTS SEG NFR BLD AUTO: 56 % (ref 43–75)
NRBC BLD AUTO-RTO: 0 /100 WBCS
PLATELET # BLD AUTO: 243 THOUSANDS/UL (ref 149–390)
PMV BLD AUTO: 11 FL (ref 8.9–12.7)
POTASSIUM SERPL-SCNC: 3.8 MMOL/L (ref 3.5–5.3)
PROT SERPL-MCNC: 7.5 G/DL (ref 6.4–8.4)
RBC # BLD AUTO: 5.18 MILLION/UL (ref 3.81–5.12)
SODIUM SERPL-SCNC: 140 MMOL/L (ref 135–147)
WBC # BLD AUTO: 5.64 THOUSAND/UL (ref 4.31–10.16)

## 2024-10-02 PROCEDURE — 90662 IIV NO PRSV INCREASED AG IM: CPT

## 2024-10-02 PROCEDURE — 36415 COLL VENOUS BLD VENIPUNCTURE: CPT

## 2024-10-02 PROCEDURE — 85025 COMPLETE CBC W/AUTO DIFF WBC: CPT

## 2024-10-02 PROCEDURE — 99214 OFFICE O/P EST MOD 30 MIN: CPT | Performed by: FAMILY MEDICINE

## 2024-10-02 PROCEDURE — 80053 COMPREHEN METABOLIC PANEL: CPT

## 2024-10-02 PROCEDURE — G0008 ADMIN INFLUENZA VIRUS VAC: HCPCS

## 2024-10-02 RX ORDER — PAROXETINE 20 MG/1
TABLET, FILM COATED ORAL
Start: 2024-10-02

## 2024-10-02 NOTE — H&P (VIEW-ONLY)
Breast Consultation-Surgical Oncology     [address] [facility]  [facility]  [address] [facility] [place] [address]    Name:  Angelia Caruso  YOB: 1955  MRN:  [id number]    Assessment & Plan  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  Patient has an extensive history of LCIS of the breast.  This includes two previous partial mastectomies.  Her previous areas of LCIS were located in the right breast.  Prior pathology reports were reviewed.  Pathology was consistent with LCIS with a classic type.  She has not had any history of invasive or microinvasion.  Following her previous excisions, she had been offered endocrine therapy and declined.  Discussion with patient today explained that based on the information we have from her core needle biopsy that was performed in August, her right breast again shows LCIS of predominantly the classic type.  I explained that LCIS in and of itself is not a malignancy.  LCIS of the classic variant traditionally has a low upgrade rate of less than 3%.  LCIS of the classic variant can be managed with serial imaging and observation along with consideration for endocrine therapy as this diagnosis puts women at a approximately 7-11 fold higher risk than women who do not have LCIS.  While the available information based on her recent biopsy demonstrates no evidence of pleomorphic or florid LCIS, there is a evidence of surrounding calcifications which are more commonly associated with DCIS. Given this information, patient was offered the option for excisional biopsy which would be localized via PUMA . Alternately, patient has additional areas in the right breast for which she will require short interval imaging and the area of concern with LCIS can be further evaluated for changes.     She has elected for PUMA localized excisional biopsy of the right breast.  A signed witness consent was obtained for a right breast PUMA  localized excisional biopsy.  Explained to patient the risks to include but are not limited to: Bleeding, infection, seroma formation, need for further surgery, unsightly scar, contour defect as well as the general risks of anesthesia to include stroke, heart attack blood clot and death.  All questions and concerns were addressed at the time of the visit.  Patient has contact information should she have any further questions.  Abnormal mammogram of right breast  Diagnostic Mammogram in 6 months for the right breast as requested per radiology  Orders:    Mammo diagnostic right w 3d and cad; Future    Increased risk of breast cancer  Patient has previously declined chemoprevention with endocrine therapy.  We discussed this again today.  Patient continues to decline despite understanding that her overall risk of breast cancer is increased.    Family history of malignant neoplasm of breast    Genetic testing has previously been performed.   BRCA gene mutation negative  Patient has previously undergone genetic testing in February 2018.  Results were negative.  Obesity (BMI 30.0-34.9)  Modifiable risk factors for breast cancer were reviewed with patient including elevated BMI. Counseling regarding portion control as well as increased physical activity was had.   Screening for colorectal cancer  Patient is up-to-date on her colorectal screening.  She is due in 2026.  Patient has follow-up with gastroenterology.  She is currently asymptomatic and denies blood in stool or changes in bowel habits.        HPI: This is the initial clinical evaluation for current issue. Angelia Caruso is a 69 y.o. year old female who presents with a diagnosis of right breast LCIS predominantly classic type with no invasive component identified.  This was designated as concordant by radiology.  Pathology did note within the specimen that there were microcalcifications associated with the LCIS.  Patient reports no associated symptoms prior to her  screening mammogram.  She denies unintentional weight loss, new onset headaches, new abdominal pain, night sweats or bone pain.  Her most recent lumpectomy prior to this was approximately 1 year ago in June 2023 in which she underwent a right breast excisional biopsy for predominantly classic type LCIS with a focal area of florid type pattern.  On final pathology there was extensive LCIS focally florid and ALH with pagetoid ductal spread.  There is also a small intraductal papilloma identified.  It is notable that patient has previously been referred to medical oncology to discuss chemoprevention with endocrine therapy.  She declined at that time.  Regarding her overall health, she is generally doing well and has started a carb control diet. She is mobile and able to completed her daily tasks without assistance as well as climb stairs without issue.     Surgical treatment to date consisted of multiple lumpectomies of the right breast for LCIS.  Pathology was reviewed.    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 & B. Medial  Lobular carcinoma in situ    C. & D. Lateral  Lobular carcinoma in situ with focal necrosis     12/19/2017 Surgery    Right breast bracketed needle localized lumpectomy (Dr. Rivera)  Lobular carcinoma in situ present in 24/40 tissue sections  LCIS present in 7/7 inferior margins tissue sections     2/1/2018 Genetic Testing    NEGATIVE: No clinically significant variants detected  [facility] - A total of 34 genes were evaluated: APC, OSBALDO, BARD1, BMPR1A, BRCA1, BRCA2, BRIP1, CDH1, CDK4, CDKN2A, CHEK2, DICER1, HOXB13, MLH1, MRE11A, MSH2, MSH6, MUTYH, NBN, NF1, PALB2, PMS2, POLD1, POLE, PTEN, RAD50, RAD51C, RAD51D, SMAD4, SMARCA4, STK11, TP53, (sequencing and deletion/duplication); EPCAM and GREM1 (deletion/duplication only).     5/1/2023 Biopsy    Right breast stereotactic biopsy  9 o'clock  Lobular  carcinoma in situ, predominantly classical type focally approaching florid-type pattern     2023 Surgery    Right breast PUMA  directed lumpectomy (Dr. Rivera)  Benign fibrocystic changes with atypia consisting of extensive LCIS, focally florid and ALH with pagetoid ductal spread  Small intraductal papilloma  Changes consistent with prior biopsy/surgical site  New inferior margin specimen involved by same     2024 Biopsy    Right breast ultrasound-guided biopsy  10 o'clock, 7 cm from nipple (butterfly)  Lobular carcinoma in situ, predominately classical type  No invasive carcinoma identified    Concordant. Pathologist note indicates microcalcifications associated with the LCIS; the patient does have a relatively large area of microcalcifications in the right breast. Surgical consultation is recommended to discuss possible excision. A 6 MO F/U mammo was also recommended on 2024.       OB History          3    Para   3    Term   3            AB        Living             SAB        IAB        Ectopic        Multiple        Live Births   3           Obstetric Comments   Menarche age 12  Age at first live birth 21  Menopause age 51             Problem List:   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    Mild intermittent asthma without complication    Obesity (BMI 30.0-34.9)    Rosacea    Increased risk of breast cancer    Hand eczema    Chronic insomnia    Hepatic steatosis    Anxiety associated with depression    Elevated hematocrit    Hepatosplenomegaly    Gallbladder polyp    Inflamed skin tag    Celiac disease    Stage 3a chronic  kidney disease (HCC)    Osteopenia of multiple sites     Past Medical History:   Diagnosis Date    Allergic rhinitis     last assessed-10/2/2017    Asthma     Carotid bruit     R-last assessed-6/10/2014    Celiac disease     Dysphagia     last assessed-2013    GERD (gastroesophageal reflux disease)      Past Surgical History:   Procedure Laterality Date    BREAST BIOPSY Right 11/15/2017    percutaneous needle core    BREAST BIOPSY Right 2023    SLW Stereo 9:00    BREAST BIOPSY Right 2024    BREAST LUMPECTOMY Right 2017    LCIS    BREAST LUMPECTOMY Right 2023    Procedure: BREAST PUMA  DIRECTED LUMPECTOMY;  Surgeon: Marco Rivera MD;  Location: [facility];  Service: Surgical Oncology     SECTION  1984    COLONOSCOPY      complete-12/3/2012-internal/external hemorrhoids, perianal or excoriation, mild sigmoid diverticulosis    MAMMO NEEDLE LOCALIZATION LEFT (ALL INC) Right 2023    MAMMO NEEDLE LOCALIZATION RIGHT (ALL INC) Right 2017    MAMMO NEEDLE LOCALIZATION RIGHT (ALL INC) EACH ADD Right 2017    MAMMO STEREOTACTIC BREAST BIOPSY RIGHT (ALL INC) Right 11/15/2017    MAMMO STEREOTACTIC BREAST BIOPSY RIGHT (ALL INC) Right 2023    MAMMO STEREOTACTIC BREAST BIOPSY RIGHT (ALL INC) EACH ADD Right 11/15/2017    MRI BREAST BIOPSY LEFT (ALL INCLUSIVE) Left 2019    KS PERQ DEVICE PLACEMENT BREAST LOC 1ST LES W/GDNCE Right 2017    Procedure: BREAST LUMPECTOMY; BREAST BRACKETED NEEDLE LOCALIZATION (BRACKETED NEEDLE LOC AT 0900);  Surgeon: Marco Rivera MD;  Location: [facility];  Service: Surgical Oncology    UPPER GASTROINTESTINAL ENDOSCOPY      US GUIDED BREAST BIOPSY RIGHT COMPLETE Right 2024    VAGINAL DILATATION      d&e     Family History   Problem Relation Age of Onset    Heart attack Mother         acute MI    Colon polyps Mother         polyps of the sigmoid colon    Colon cancer Mother     Breast cancer Sister 54    Uterine cancer Sister 64     No Known Problems Daughter     No Known Problems Daughter     Uterine cancer Maternal Grandmother         unknown age-maybe 60's    Uterine cancer Paternal Grandmother         unknown age , maybe 60's    Diabetes Paternal Grandfather     Liver cancer Brother 50    Colon cancer Brother 50    Lung cancer Brother     No Known Problems Brother     No Known Problems Brother     No Known Problems Brother     No Known Problems Brother     No Known Problems Son     Stroke Maternal Uncle     Kidney cancer Paternal Aunt 55    Pancreatic cancer Paternal Uncle 67    Breast cancer Cousin 57        maternal    Uterine cancer Cousin 55    Colon cancer Cousin 60     Social History     Socioeconomic History    Marital status: /Civil Union     Spouse name: Not on file    Number of children: Not on file    Years of education: Not on file    Highest education level: Not on file   Occupational History    Not on file   Tobacco Use    Smoking status: Former     Current packs/day: 0.00     Types: Cigarettes     Start date: 1974     Quit date: 1976     Years since quittin.4     Passive exposure: Never    Smokeless tobacco: Never   Vaping Use    Vaping status: Never Used   Substance and Sexual Activity    Alcohol use: Yes     Alcohol/week: 1.0 standard drink of alcohol     Types: 1 Glasses of wine per week     Comment: once q 2 weeks    Drug use: No    Sexual activity: Not Currently     Partners: Male     Birth control/protection: Post-menopausal   Other Topics Concern    Not on file   Social History Narrative    Not on file     Social Determinants of Health     Financial Resource Strain: Low Risk  (3/13/2023)    Overall Financial Resource Strain (CARDIA)     Difficulty of Paying Living Expenses: Not hard at all   Food Insecurity: No Food Insecurity (3/27/2024)    Hunger Vital Sign     Worried About Running Out of Food in the Last Year: Never true     Ran Out of Food in the Last Year: Never true   Transportation  Needs: No Transportation Needs (3/27/2024)    PRAPARE - Transportation     Lack of Transportation (Medical): No     Lack of Transportation (Non-Medical): No   Physical Activity: Not on file   Stress: Not on file   Social Connections: Not on file   Intimate Partner Violence: Not on file   Housing Stability: Low Risk  (3/27/2024)    Housing Stability Vital Sign     Unable to Pay for Housing in the Last Year: No     Number of Times Moved in the Last Year: 1     Homeless in the Last Year: No     Current Outpatient Medications   Medication Sig Dispense Refill    albuterol (Ventolin HFA) 90 mcg/act inhaler Inhale 2 puffs every 4 (four) hours as needed for wheezing 18 g 5    aspirin 81 MG tablet Take 81 mg by mouth daily      calcium citrate (CALCITRATE) 950 MG tablet Take 1 tablet by mouth daily      fluocinonide (LIDEX) 0.05 % cream Apply topically 2 (two) times a day (Patient not taking: Reported on 8/14/2024) 30 g 1    Multiple Vitamin (MULTI-VITAMIN DAILY PO) Take by mouth      omeprazole (PriLOSEC) 40 MG capsule Take 1 capsule (40 mg total) by mouth daily 90 capsule 3    PARoxetine (PAXIL) 20 mg tablet TAKE ONE TABLET BY MOUTH EVERY DAY 90 tablet 1     No current facility-administered medications for this visit.     No Known Allergies      The following portions of the patient's history were reviewed and updated as appropriate: allergies, current medications, past family history, past medical history, past social history, past surgical history, and problem list.    Review of Systems:  Review of Systems   Constitutional:  Negative for chills and fever.   HENT:  Negative for ear pain and sore throat.    Eyes:  Negative for pain and visual disturbance.   Respiratory:  Negative for cough and shortness of breath.    Cardiovascular:  Negative for chest pain and palpitations.   Gastrointestinal:  Negative for abdominal pain and vomiting.   Genitourinary:  Negative for dysuria and hematuria.   Musculoskeletal:  Negative for  "arthralgias and back pain.   Skin:  Negative for color change and rash.   Neurological:  Negative for seizures and syncope.   All other systems reviewed and are negative.      Physical Exam: /70 (BP Location: Left arm, Patient Position: Sitting, Cuff Size: Large)   Pulse 96   Temp 97.5 °F (36.4 °C) (Tympanic)   Resp 18   Ht 5' 9\" (1.753 m)   Wt 104 kg (230 lb)   SpO2 97%   BMI 33.97 kg/m²     Physical Exam  Vitals reviewed. Exam conducted with a chaperone present.   Constitutional:       Appearance: Normal appearance.   HENT:      Head: Normocephalic.      Nose: Nose normal.   Cardiovascular:      Rate and Rhythm: Normal rate and regular rhythm.   Pulmonary:      Effort: Pulmonary effort is normal.      Breath sounds: Normal breath sounds.   Chest:      Chest wall: No mass or deformity.   Breasts:     Right: Normal.      Left: Normal.          Comments: Well-healed lumpectomy scars in the periareolar position of the right outer breast x 2.    Breast exam was otherwise normal.  Abdominal:      Palpations: Abdomen is soft.   Musculoskeletal:         General: Normal range of motion.      Cervical back: Neck supple.      Right lower leg: Normal. No edema.      Left lower leg: No edema.   Lymphadenopathy:      Upper Body:      Right upper body: No supraclavicular, axillary or pectoral adenopathy.      Left upper body: No supraclavicular, axillary or pectoral adenopathy.   Skin:     General: Skin is warm and dry.   Neurological:      Mental Status: She is alert.                       "

## 2024-10-02 NOTE — ASSESSMENT & PLAN NOTE
Depression Screening Follow-up Plan: Patient's depression screening was positive with a PHQ-9 score of 6.   Increased anxiety due to abnormal right breast biopsy.    Will increase dose of Paroxetine from 20 to 30 mg daily.  Recommended to call office if symptoms persist or worsen.

## 2024-10-02 NOTE — ASSESSMENT & PLAN NOTE
Modifiable risk factors for breast cancer were reviewed with patient including elevated BMI. Counseling regarding portion control as well as increased physical activity was had.

## 2024-10-02 NOTE — ASSESSMENT & PLAN NOTE
Liver enzymes are elevated.    Recommended to follow a low fat diet, work on weight reduction.  Follow-up with gastroenterology Dr. Hurt.    Orders:    Comprehensive metabolic panel; Future

## 2024-10-02 NOTE — PROGRESS NOTES
Breast Consultation-Surgical Oncology     [address] [facility]  [facility]  [address] [facility] [place] [address]    Name:  Angelia Caruso  YOB: 1955  MRN:  [id number]    Assessment & Plan  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  Patient has an extensive history of LCIS of the breast.  This includes two previous partial mastectomies.  Her previous areas of LCIS were located in the right breast.  Prior pathology reports were reviewed.  Pathology was consistent with LCIS with a classic type.  She has not had any history of invasive or microinvasion.  Following her previous excisions, she had been offered endocrine therapy and declined.  Discussion with patient today explained that based on the information we have from her core needle biopsy that was performed in August, her right breast again shows LCIS of predominantly the classic type.  I explained that LCIS in and of itself is not a malignancy.  LCIS of the classic variant traditionally has a low upgrade rate of less than 3%.  LCIS of the classic variant can be managed with serial imaging and observation along with consideration for endocrine therapy as this diagnosis puts women at a approximately 7-11 fold higher risk than women who do not have LCIS.  While the available information based on her recent biopsy demonstrates no evidence of pleomorphic or florid LCIS, there is a evidence of surrounding calcifications which are more commonly associated with DCIS. Given this information, patient was offered the option for excisional biopsy which would be localized via PUMA . Alternately, patient has additional areas in the right breast for which she will require short interval imaging and the area of concern with LCIS can be further evaluated for changes.     She has elected for PUMA localized excisional biopsy of the right breast.  A signed witness consent was obtained for a right breast PUMA  localized excisional biopsy.  Explained to patient the risks to include but are not limited to: Bleeding, infection, seroma formation, need for further surgery, unsightly scar, contour defect as well as the general risks of anesthesia to include stroke, heart attack blood clot and death.  All questions and concerns were addressed at the time of the visit.  Patient has contact information should she have any further questions.  Abnormal mammogram of right breast  Diagnostic Mammogram in 6 months for the right breast as requested per radiology  Orders:    Mammo diagnostic right w 3d and cad; Future    Increased risk of breast cancer  Patient has previously declined chemoprevention with endocrine therapy.  We discussed this again today.  Patient continues to decline despite understanding that her overall risk of breast cancer is increased.    Family history of malignant neoplasm of breast    Genetic testing has previously been performed.   BRCA gene mutation negative  Patient has previously undergone genetic testing in February 2018.  Results were negative.  Obesity (BMI 30.0-34.9)  Modifiable risk factors for breast cancer were reviewed with patient including elevated BMI. Counseling regarding portion control as well as increased physical activity was had.   Screening for colorectal cancer  Patient is up-to-date on her colorectal screening.  She is due in 2026.  Patient has follow-up with gastroenterology.  She is currently asymptomatic and denies blood in stool or changes in bowel habits.        HPI: This is the initial clinical evaluation for current issue. Angelia Caruso is a 69 y.o. year old female who presents with a diagnosis of right breast LCIS predominantly classic type with no invasive component identified.  This was designated as concordant by radiology.  Pathology did note within the specimen that there were microcalcifications associated with the LCIS.  Patient reports no associated symptoms prior to her  screening mammogram.  She denies unintentional weight loss, new onset headaches, new abdominal pain, night sweats or bone pain.  Her most recent lumpectomy prior to this was approximately 1 year ago in June 2023 in which she underwent a right breast excisional biopsy for predominantly classic type LCIS with a focal area of florid type pattern.  On final pathology there was extensive LCIS focally florid and ALH with pagetoid ductal spread.  There is also a small intraductal papilloma identified.  It is notable that patient has previously been referred to medical oncology to discuss chemoprevention with endocrine therapy.  She declined at that time.  Regarding her overall health, she is generally doing well and has started a carb control diet. She is mobile and able to completed her daily tasks without assistance as well as climb stairs without issue.     Surgical treatment to date consisted of multiple lumpectomies of the right breast for LCIS.  Pathology was reviewed.    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 & B. Medial  Lobular carcinoma in situ    C. & D. Lateral  Lobular carcinoma in situ with focal necrosis     12/19/2017 Surgery    Right breast bracketed needle localized lumpectomy (Dr. Rivera)  Lobular carcinoma in situ present in 24/40 tissue sections  LCIS present in 7/7 inferior margins tissue sections     2/1/2018 Genetic Testing    NEGATIVE: No clinically significant variants detected  [facility] - A total of 34 genes were evaluated: APC, OSBALDO, BARD1, BMPR1A, BRCA1, BRCA2, BRIP1, CDH1, CDK4, CDKN2A, CHEK2, DICER1, HOXB13, MLH1, MRE11A, MSH2, MSH6, MUTYH, NBN, NF1, PALB2, PMS2, POLD1, POLE, PTEN, RAD50, RAD51C, RAD51D, SMAD4, SMARCA4, STK11, TP53, (sequencing and deletion/duplication); EPCAM and GREM1 (deletion/duplication only).     5/1/2023 Biopsy    Right breast stereotactic biopsy  9 o'clock  Lobular  carcinoma in situ, predominantly classical type focally approaching florid-type pattern     2023 Surgery    Right breast PUMA  directed lumpectomy (Dr. Rivera)  Benign fibrocystic changes with atypia consisting of extensive LCIS, focally florid and ALH with pagetoid ductal spread  Small intraductal papilloma  Changes consistent with prior biopsy/surgical site  New inferior margin specimen involved by same     2024 Biopsy    Right breast ultrasound-guided biopsy  10 o'clock, 7 cm from nipple (butterfly)  Lobular carcinoma in situ, predominately classical type  No invasive carcinoma identified    Concordant. Pathologist note indicates microcalcifications associated with the LCIS; the patient does have a relatively large area of microcalcifications in the right breast. Surgical consultation is recommended to discuss possible excision. A 6 MO F/U mammo was also recommended on 2024.       OB History          3    Para   3    Term   3            AB        Living             SAB        IAB        Ectopic        Multiple        Live Births   3           Obstetric Comments   Menarche age 12  Age at first live birth 21  Menopause age 51             Problem List:   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    Mild intermittent asthma without complication    Obesity (BMI 30.0-34.9)    Rosacea    Increased risk of breast cancer    Hand eczema    Chronic insomnia    Hepatic steatosis    Anxiety associated with depression    Elevated hematocrit    Hepatosplenomegaly    Gallbladder polyp    Inflamed skin tag    Celiac disease    Stage 3a chronic  kidney disease (HCC)    Osteopenia of multiple sites     Past Medical History:   Diagnosis Date    Allergic rhinitis     last assessed-10/2/2017    Asthma     Carotid bruit     R-last assessed-6/10/2014    Celiac disease     Dysphagia     last assessed-2013    GERD (gastroesophageal reflux disease)      Past Surgical History:   Procedure Laterality Date    BREAST BIOPSY Right 11/15/2017    percutaneous needle core    BREAST BIOPSY Right 2023    SLW Stereo 9:00    BREAST BIOPSY Right 2024    BREAST LUMPECTOMY Right 2017    LCIS    BREAST LUMPECTOMY Right 2023    Procedure: BREAST PUMA  DIRECTED LUMPECTOMY;  Surgeon: Marco Rivera MD;  Location: [facility];  Service: Surgical Oncology     SECTION  1984    COLONOSCOPY      complete-12/3/2012-internal/external hemorrhoids, perianal or excoriation, mild sigmoid diverticulosis    MAMMO NEEDLE LOCALIZATION LEFT (ALL INC) Right 2023    MAMMO NEEDLE LOCALIZATION RIGHT (ALL INC) Right 2017    MAMMO NEEDLE LOCALIZATION RIGHT (ALL INC) EACH ADD Right 2017    MAMMO STEREOTACTIC BREAST BIOPSY RIGHT (ALL INC) Right 11/15/2017    MAMMO STEREOTACTIC BREAST BIOPSY RIGHT (ALL INC) Right 2023    MAMMO STEREOTACTIC BREAST BIOPSY RIGHT (ALL INC) EACH ADD Right 11/15/2017    MRI BREAST BIOPSY LEFT (ALL INCLUSIVE) Left 2019    [place] PERQ DEVICE PLACEMENT BREAST LOC 1ST LES W/GDNCE Right 2017    Procedure: BREAST LUMPECTOMY; BREAST BRACKETED NEEDLE LOCALIZATION (BRACKETED NEEDLE LOC AT 0900);  Surgeon: Marco Rivera MD;  Location: [facility];  Service: Surgical Oncology    UPPER GASTROINTESTINAL ENDOSCOPY      US GUIDED BREAST BIOPSY RIGHT COMPLETE Right 2024    VAGINAL DILATATION      d&e     Family History   Problem Relation Age of Onset    Heart attack Mother         acute MI    Colon polyps Mother         polyps of the sigmoid colon    Colon cancer Mother     Breast cancer Sister 54    Uterine cancer Sister 64     No Known Problems Daughter     No Known Problems Daughter     Uterine cancer Maternal Grandmother         unknown age-maybe 60's    Uterine cancer Paternal Grandmother         unknown age , maybe 60's    Diabetes Paternal Grandfather     Liver cancer Brother 50    Colon cancer Brother 50    Lung cancer Brother     No Known Problems Brother     No Known Problems Brother     No Known Problems Brother     No Known Problems Brother     No Known Problems Son     Stroke Maternal Uncle     Kidney cancer Paternal Aunt 55    Pancreatic cancer Paternal Uncle 67    Breast cancer Cousin 57        maternal    Uterine cancer Cousin 55    Colon cancer Cousin 60     Social History     Socioeconomic History    Marital status: /Civil Union     Spouse name: Not on file    Number of children: Not on file    Years of education: Not on file    Highest education level: Not on file   Occupational History    Not on file   Tobacco Use    Smoking status: Former     Current packs/day: 0.00     Types: Cigarettes     Start date: 1974     Quit date: 1976     Years since quittin.4     Passive exposure: Never    Smokeless tobacco: Never   Vaping Use    Vaping status: Never Used   Substance and Sexual Activity    Alcohol use: Yes     Alcohol/week: 1.0 standard drink of alcohol     Types: 1 Glasses of wine per week     Comment: once q 2 weeks    Drug use: No    Sexual activity: Not Currently     Partners: Male     Birth control/protection: Post-menopausal   Other Topics Concern    Not on file   Social History Narrative    Not on file     Social Determinants of Health     Financial Resource Strain: Low Risk  (3/13/2023)    Overall Financial Resource Strain (CARDIA)     Difficulty of Paying Living Expenses: Not hard at all   Food Insecurity: No Food Insecurity (3/27/2024)    Hunger Vital Sign     Worried About Running Out of Food in the Last Year: Never true     Ran Out of Food in the Last Year: Never true   Transportation  Needs: No Transportation Needs (3/27/2024)    PRAPARE - Transportation     Lack of Transportation (Medical): No     Lack of Transportation (Non-Medical): No   Physical Activity: Not on file   Stress: Not on file   Social Connections: Not on file   Intimate Partner Violence: Not on file   Housing Stability: Low Risk  (3/27/2024)    Housing Stability Vital Sign     Unable to Pay for Housing in the Last Year: No     Number of Times Moved in the Last Year: 1     Homeless in the Last Year: No     Current Outpatient Medications   Medication Sig Dispense Refill    albuterol (Ventolin HFA) 90 mcg/act inhaler Inhale 2 puffs every 4 (four) hours as needed for wheezing 18 g 5    aspirin 81 MG tablet Take 81 mg by mouth daily      calcium citrate (CALCITRATE) 950 MG tablet Take 1 tablet by mouth daily      fluocinonide (LIDEX) 0.05 % cream Apply topically 2 (two) times a day (Patient not taking: Reported on 8/14/2024) 30 g 1    Multiple Vitamin (MULTI-VITAMIN DAILY PO) Take by mouth      omeprazole (PriLOSEC) 40 MG capsule Take 1 capsule (40 mg total) by mouth daily 90 capsule 3    PARoxetine (PAXIL) 20 mg tablet TAKE ONE TABLET BY MOUTH EVERY DAY 90 tablet 1     No current facility-administered medications for this visit.     No Known Allergies      The following portions of the patient's history were reviewed and updated as appropriate: allergies, current medications, past family history, past medical history, past social history, past surgical history, and problem list.    Review of Systems:  Review of Systems   Constitutional:  Negative for chills and fever.   HENT:  Negative for ear pain and sore throat.    Eyes:  Negative for pain and visual disturbance.   Respiratory:  Negative for cough and shortness of breath.    Cardiovascular:  Negative for chest pain and palpitations.   Gastrointestinal:  Negative for abdominal pain and vomiting.   Genitourinary:  Negative for dysuria and hematuria.   Musculoskeletal:  Negative for  "arthralgias and back pain.   Skin:  Negative for color change and rash.   Neurological:  Negative for seizures and syncope.   All other systems reviewed and are negative.      Physical Exam: /70 (BP Location: Left arm, Patient Position: Sitting, Cuff Size: Large)   Pulse 96   Temp 97.5 °F (36.4 °C) (Tympanic)   Resp 18   Ht 5' 9\" (1.753 m)   Wt 104 kg (230 lb)   SpO2 97%   BMI 33.97 kg/m²     Physical Exam  Vitals reviewed. Exam conducted with a chaperone present.   Constitutional:       Appearance: Normal appearance.   HENT:      Head: Normocephalic.      Nose: Nose normal.   Cardiovascular:      Rate and Rhythm: Normal rate and regular rhythm.   Pulmonary:      Effort: Pulmonary effort is normal.      Breath sounds: Normal breath sounds.   Chest:      Chest wall: No mass or deformity.   Breasts:     Right: Normal.      Left: Normal.          Comments: Well-healed lumpectomy scars in the periareolar position of the right outer breast x 2.    Breast exam was otherwise normal.  Abdominal:      Palpations: Abdomen is soft.   Musculoskeletal:         General: Normal range of motion.      Cervical back: Neck supple.      Right lower leg: Normal. No edema.      Left lower leg: No edema.   Lymphadenopathy:      Upper Body:      Right upper body: No supraclavicular, axillary or pectoral adenopathy.      Left upper body: No supraclavicular, axillary or pectoral adenopathy.   Skin:     General: Skin is warm and dry.   Neurological:      Mental Status: She is alert.                       "

## 2024-10-02 NOTE — ASSESSMENT & PLAN NOTE
Patient has an extensive history of LCIS of the breast.  This includes two previous partial mastectomies.  Her previous areas of LCIS were located in the right breast.  Prior pathology reports were reviewed.  Pathology was consistent with LCIS with a classic type.  She has not had any history of invasive or microinvasion.  Following her previous excisions, she had been offered endocrine therapy and declined.  Discussion with patient today explained that based on the information we have from her core needle biopsy that was performed in August, her right breast again shows LCIS of predominantly the classic type.  I explained that LCIS in and of itself is not a malignancy.  LCIS of the classic variant traditionally has a low upgrade rate of less than 3%.  LCIS of the classic variant can be managed with serial imaging and observation along with consideration for endocrine therapy as this diagnosis puts women at a approximately 7-11 fold higher risk than women who do not have LCIS.  While the available information based on her recent biopsy demonstrates no evidence of pleomorphic or florid LCIS, there is a evidence of surrounding calcifications which are more commonly associated with DCIS. Given this information, patient was offered the option for excisional biopsy which would be localized via PUMA . Alternately, patient has additional areas in the right breast for which she will require short interval imaging and the area of concern with LCIS can be further evaluated for changes.     She has elected for PUMA localized excisional biopsy of the right breast.  A signed witness consent was obtained for a right breast PUMA localized excisional biopsy.  Explained to patient the risks to include but are not limited to: Bleeding, infection, seroma formation, need for further surgery, unsightly scar, contour defect as well as the general risks of anesthesia to include stroke, heart attack blood clot and death.  All  questions and concerns were addressed at the time of the visit.  Patient has contact information should she have any further questions.

## 2024-10-02 NOTE — PROGRESS NOTES
Ambulatory Visit  Name: Angelia Caruso      : 1955      MRN: [id number]  Encounter Provider: Nancie Villasenor MD  Encounter Date: 10/2/2024   Encounter department: [facility]    Assessment & Plan  Gastroesophageal reflux disease, unspecified whether esophagitis present  Symptoms are stable.  Continue omeprazole 40 mg daily.    Follow-up with gastroenterology Dr. Hurt.         Hepatic steatosis  Liver enzymes are elevated.    Recommended to follow a low fat diet, work on weight reduction.  Follow-up with gastroenterology Dr. Hurt.    Orders:    Comprehensive metabolic panel; Future    Celiac disease  Follow a gluten-free diet.  Orders:    CBC and differential; Future    Mild intermittent asthma without complication  Symptoms are stable.  Use Ventolin HFA inhaler as needed.       Osteopenia of multiple sites         Stage 3a chronic kidney disease (HCC)  Lab Results   Component Value Date    EGFR 60 10/02/2024    EGFR 61 2024    EGFR 57 2023    CREATININE 0.95 10/02/2024    CREATININE 0.95 2024    CREATININE 1.00 2023     Kidney function remains stable.  Avoid NSAIDs.    Stay well-hydrated.  Continue to monitor labs.    Orders:    Comprehensive metabolic panel; Future    CBC and differential; Future    Recurrent major depressive disorder, in partial remission (HCC)  Depression Screening Follow-up Plan: Patient's depression screening was positive with a PHQ-9 score of 6.     Will increase dose of Paroxetine 20 mg to 1.5 tablet daily.  Recommended to call with update on symptoms in 2 - 3 weeks.       Anxiety associated with depression  Depression Screening Follow-up Plan: Patient's depression screening was positive with a PHQ-9 score of 6.   Increased anxiety due to abnormal right breast biopsy.    Will increase dose of Paroxetine from 20 to 30 mg daily.  Recommended to call office if symptoms persist or worsen.         Lobular carcinoma in situ (LCIS) of  right breast  Follow-up with surgical oncology Dr.Cardarelli tomorrow.         Dyslipidemia  Follow a low-cholesterol, low-fat diet, regular exercise.  Recheck lipid panel  in 6 months.    Orders:    Lipid panel; Future    Obesity (BMI 30.0-34.9)  Weight has been stable.   Recommended to work on weight reduction.       Pain in both feet  Recommended evaluation by podiatry, discuss ordering orthotics.  Orders:    Ambulatory Referral to Podiatry; Future    Encounter for immunization    Orders:    influenza vaccine, high-dose, PF 0.5 mL (Fluzone High Dose)    Depression with anxiety  Depression Screening Follow-up Plan: Patient's depression screening was positive with a PHQ-9 score of 6. Patient with underlying depression and was advised to continue current medications as prescribed.    Orders:    PARoxetine (PAXIL) 20 mg tablet; Take 1.5 tab. daily      Depression Screening and Follow-up Plan: Patient's depression screening was positive with a PHQ-9 score of 6. Patient with underlying depression and was advised to continue current medications as prescribed.       Schedule follow-up office visit, Medicare AWV in 6 months.  Check labs prior to next visit.      History of Present Illness     HPI    Patient presents for 6 month follow-up visit.    PMHx:  Dyslipidemia, Asthma, Obesity, elevated LFT's, fatty liver, GERD, celiac disease, Depression, Anxiety, Insomnia, CKD stage 3, rght breast CA.      Reviewed current medications, blood test results from October 2, 2024.      Hemoglobin 14.4, Kos 105, potassium 3.8, creatinine 0.95, AST 73, ALT 62.    Patient reports no chest pain, shortness of breath, dizziness.      GERD - symptoms are stable on Omeprazole 40 mg daily.    EGD done in March 2023 showed small hiatal hernia.    Followed by gastroenterology Dr. Hurt.    Colonoscopy done in March 2023, 3 polyps were removed.    Dr. Hurt recommended to repeat colonoscopy in 3 years.    Right breast CA, status post lumpectomy  in June 2023 - patient declined chemoprevention therapy.    Followed by surgical oncology.  Right breast biopsy done in August 2024.  Pathology showed high risk finding with lobular carcinoma in situ and atypical lobular hyperplasia.   Appointment scheduled with surgical oncology Dr. Cardarelli tomorrow.    DEXA scan done in February 2024 showed osteopenia.    Patient denies history of falls, fractures.  Takes calcium with vitamin D supplements, walks.    Depression/ Anxiety - worsening anxiety due to abnormal right breast biopsy.    Currently taking Paxil 20 mg daily.    Patient c/o pain in her feet, noticed that feet arches are getting flat.  C/o some pain in hips with walking.    Review of Systems   Constitutional:  Positive for fatigue. Negative for activity change, appetite change, chills and fever.   HENT:  Negative for congestion, ear pain, hearing loss and sore throat.    Eyes: Negative.    Respiratory:  Negative for cough and shortness of breath.    Cardiovascular:  Negative for chest pain, palpitations and leg swelling.   Gastrointestinal:  Positive for diarrhea (occasional). Negative for abdominal pain, blood in stool, constipation, nausea and vomiting.   Genitourinary:  Negative for difficulty urinating, dysuria and hematuria.   Musculoskeletal:  Positive for arthralgias (pain in feet, hips). Negative for back pain, gait problem and joint swelling.   Skin:  Positive for rash. Pallor: rosacea on face.  Neurological:  Negative for dizziness, syncope and headaches.   Hematological: Negative.    Psychiatric/Behavioral:  Positive for sleep disturbance. Negative for suicidal ideas.         Anxiety/ Depression - feels more anxious     Past Medical History:   Diagnosis Date    Allergic rhinitis     last assessed-10/2/2017    Asthma     Carotid bruit     R-last assessed-6/10/2014    Celiac disease     Dysphagia     last assessed-6/6/2013    GERD (gastroesophageal reflux disease)      Past Surgical History:    Procedure Laterality Date    BREAST BIOPSY Right 11/15/2017    percutaneous needle core    BREAST BIOPSY Right 2023    SLW Stereo 9:00    BREAST BIOPSY Right 2024    BREAST LUMPECTOMY Right 2017    LCIS    BREAST LUMPECTOMY Right 2023    Procedure: BREAST PUMA  DIRECTED LUMPECTOMY;  Surgeon: Marco Rivera MD;  Location: [facility];  Service: Surgical Oncology     SECTION  1984    COLONOSCOPY      complete-12/3/2012-internal/external hemorrhoids, perianal or excoriation, mild sigmoid diverticulosis    MAMMO NEEDLE LOCALIZATION LEFT (ALL INC) Right 2023    MAMMO NEEDLE LOCALIZATION RIGHT (ALL INC) Right 2017    MAMMO NEEDLE LOCALIZATION RIGHT (ALL INC) EACH ADD Right 2017    MAMMO STEREOTACTIC BREAST BIOPSY RIGHT (ALL INC) Right 11/15/2017    MAMMO STEREOTACTIC BREAST BIOPSY RIGHT (ALL INC) Right 2023    MAMMO STEREOTACTIC BREAST BIOPSY RIGHT (ALL INC) EACH ADD Right 11/15/2017    MRI BREAST BIOPSY LEFT (ALL INCLUSIVE) Left 2019    OH PERQ DEVICE PLACEMENT BREAST LOC 1ST LES W/GDNCE Right 2017    Procedure: BREAST LUMPECTOMY; BREAST BRACKETED NEEDLE LOCALIZATION (BRACKETED NEEDLE LOC AT 0900);  Surgeon: Marco Rivera MD;  Location[facility];  Service: Surgical Oncology    UPPER GASTROINTESTINAL ENDOSCOPY      US GUIDED BREAST BIOPSY RIGHT COMPLETE Right 2024    VAGINAL DILATATION      d&e     Family History   Problem Relation Age of Onset    Heart attack Mother         acute MI    Colon polyps Mother         polyps of the sigmoid colon    Colon cancer Mother     Breast cancer Sister 54    Uterine cancer Sister 64    No Known Problems Daughter     No Known Problems Daughter     Uterine cancer Maternal Grandmother         unknown age-maybe 60's    Uterine cancer Paternal Grandmother         unknown age , maybe 60's    Diabetes Paternal Grandfather     Liver cancer Brother 50    Colon cancer Brother 50    Lung cancer Brother     No Known  Problems Brother     No Known Problems Brother     No Known Problems Brother     No Known Problems Brother     No Known Problems Son     Stroke Maternal Uncle     Kidney cancer Paternal Aunt 55    Pancreatic cancer Paternal Uncle 67    Breast cancer Cousin 57        maternal    Uterine cancer Cousin 55    Colon cancer Cousin 60     Social History     Tobacco Use    Smoking status: Former     Current packs/day: 0.00     Types: Cigarettes     Start date: 1974     Quit date: 1976     Years since quittin.4     Passive exposure: Never    Smokeless tobacco: Never   Vaping Use    Vaping status: Never Used   Substance and Sexual Activity    Alcohol use: Yes     Alcohol/week: 1.0 standard drink of alcohol     Types: 1 Glasses of wine per week     Comment: once q 2 weeks    Drug use: No    Sexual activity: Not Currently     Partners: Male     Birth control/protection: Post-menopausal     Current Outpatient Medications on File Prior to Visit   Medication Sig    albuterol (Ventolin HFA) 90 mcg/act inhaler Inhale 2 puffs every 4 (four) hours as needed for wheezing    aspirin 81 MG tablet Take 81 mg by mouth daily    calcium citrate (CALCITRATE) 950 MG tablet Take 1 tablet by mouth daily    Multiple Vitamin (MULTI-VITAMIN DAILY PO) Take by mouth    omeprazole (PriLOSEC) 40 MG capsule Take 1 capsule (40 mg total) by mouth daily    fluocinonide (LIDEX) 0.05 % cream Apply topically 2 (two) times a day (Patient not taking: Reported on 2024)     No Known Allergies  Immunization History   Administered Date(s) Administered    COVID-19 PFIZER VACCINE 0.3 ML IM 2021, 2021, 10/23/2021    COVID-19 Pfizer Vac BIVALENT Sandeep-sucrose 12 Yr+ IM 2022    COVID-19 Pfizer mRNA vacc PF sandeep-sucrose 12 yr and older (Comirnaty) 10/30/2023, 2024    INFLUENZA 2013, 10/14/2013, 10/18/2016, 10/02/2017    Influenza Quadrivalent, 6-35 Months IM 10/16/2015, 10/18/2016, 10/02/2017    Influenza Split High Dose  "Preservative Free IM 10/02/2024    Influenza, high dose seasonal 0.7 mL 10/08/2019, 10/09/2020, 09/29/2021, 10/03/2022    Influenza, recombinant, quadrivalent,injectable, preservative free 10/02/2018    Influenza, seasonal, injectable 01/07/2013, 10/14/2013, 10/02/2014    Pneumococcal Conjugate 13-Valent 02/19/2020    Pneumococcal Conjugate Vaccine 20-valent (Pcv20), Polysace 03/27/2024    Pneumococcal Polysaccharide PPV23 06/10/2014, 01/01/2016    Tdap 06/07/2013    Varicella 01/01/2016    Zoster 07/08/2014     Objective     /72   Pulse 78   Temp 97.5 °F (36.4 °C) (Temporal)   Resp 18   Ht 5' 9\" (1.753 m)   Wt 104 kg (229 lb 6.4 oz)   SpO2 96%   BMI 33.88 kg/m²     Physical Exam  Vitals and nursing note reviewed.   Constitutional:       Appearance: Normal appearance. She is obese.   HENT:      Head: Normocephalic and atraumatic.   Eyes:      Conjunctiva/sclera: Conjunctivae normal.      Pupils: Pupils are equal, round, and reactive to light.   Neck:      Vascular: No carotid bruit.   Cardiovascular:      Rate and Rhythm: Normal rate and regular rhythm.      Heart sounds: No murmur heard.  Pulmonary:      Effort: Pulmonary effort is normal.      Breath sounds: Normal breath sounds.   Abdominal:      General: There is no distension.      Palpations: Abdomen is soft.      Tenderness: There is no abdominal tenderness.   Musculoskeletal:         General: No swelling or tenderness. Normal range of motion.      Cervical back: Normal range of motion and neck supple.      Right lower leg: No edema.      Left lower leg: No edema.   Skin:     General: Skin is warm and dry.      Findings: Rash present. Erythema: rosacea on face.  Neurological:      Mental Status: She is alert.   Psychiatric:         Thought Content: Thought content normal.         Judgment: Judgment normal.      Comments: Feels anxious         "

## 2024-10-02 NOTE — ASSESSMENT & PLAN NOTE
Patient has previously declined chemoprevention with endocrine therapy.  We discussed this again today.  Patient continues to decline despite understanding that her overall risk of breast cancer is increased.

## 2024-10-02 NOTE — ASSESSMENT & PLAN NOTE
Follow a low-cholesterol, low-fat diet, regular exercise.  Recheck lipid panel  in 6 months.    Orders:    Lipid panel; Future

## 2024-10-02 NOTE — ASSESSMENT & PLAN NOTE
Lab Results   Component Value Date    EGFR 60 10/02/2024    EGFR 61 03/25/2024    EGFR 57 06/07/2023    CREATININE 0.95 10/02/2024    CREATININE 0.95 03/25/2024    CREATININE 1.00 06/07/2023     Kidney function remains stable.  Avoid NSAIDs.    Stay well-hydrated.  Continue to monitor labs.    Orders:    Comprehensive metabolic panel; Future    CBC and differential; Future

## 2024-10-02 NOTE — ASSESSMENT & PLAN NOTE
Depression Screening Follow-up Plan: Patient's depression screening was positive with a PHQ-9 score of 6.     Will increase dose of Paroxetine 20 mg to 1.5 tablet daily.  Recommended to call with update on symptoms in 2 - 3 weeks.

## 2024-10-02 NOTE — ASSESSMENT & PLAN NOTE
Symptoms are stable.  Continue omeprazole 40 mg daily.    Follow-up with gastroenterology Dr. Hurt.

## 2024-10-02 NOTE — ASSESSMENT & PLAN NOTE
Recommended evaluation by podiatry, discuss ordering orthotics.  Orders:    Ambulatory Referral to Podiatry; Future

## 2024-10-03 ENCOUNTER — OFFICE VISIT (OUTPATIENT)
Dept: SURGICAL ONCOLOGY | Facility: CLINIC | Age: 69
End: 2024-10-03
Payer: MEDICARE

## 2024-10-03 ENCOUNTER — APPOINTMENT (OUTPATIENT)
Dept: LAB | Facility: CLINIC | Age: 69
End: 2024-10-03
Payer: MEDICARE

## 2024-10-03 ENCOUNTER — PREP FOR PROCEDURE (OUTPATIENT)
Dept: SURGICAL ONCOLOGY | Facility: CLINIC | Age: 69
End: 2024-10-03

## 2024-10-03 VITALS
HEART RATE: 96 BPM | WEIGHT: 230 LBS | OXYGEN SATURATION: 97 % | HEIGHT: 69 IN | RESPIRATION RATE: 18 BRPM | SYSTOLIC BLOOD PRESSURE: 122 MMHG | DIASTOLIC BLOOD PRESSURE: 70 MMHG | TEMPERATURE: 97.5 F | BODY MASS INDEX: 34.07 KG/M2

## 2024-10-03 DIAGNOSIS — Z12.11 SCREENING FOR COLORECTAL CANCER: ICD-10-CM

## 2024-10-03 DIAGNOSIS — Z13.71 BRCA GENE MUTATION NEGATIVE: ICD-10-CM

## 2024-10-03 DIAGNOSIS — E66.811 OBESITY (BMI 30.0-34.9): ICD-10-CM

## 2024-10-03 DIAGNOSIS — D05.01 LOBULAR CARCINOMA IN SITU (LCIS) OF RIGHT BREAST: Primary | ICD-10-CM

## 2024-10-03 DIAGNOSIS — Z12.12 SCREENING FOR COLORECTAL CANCER: ICD-10-CM

## 2024-10-03 DIAGNOSIS — D05.01 LOBULAR CARCINOMA IN SITU (LCIS) OF RIGHT BREAST: ICD-10-CM

## 2024-10-03 DIAGNOSIS — Z91.89 INCREASED RISK OF BREAST CANCER: ICD-10-CM

## 2024-10-03 DIAGNOSIS — Z80.3 FAMILY HISTORY OF MALIGNANT NEOPLASM OF BREAST: ICD-10-CM

## 2024-10-03 DIAGNOSIS — R92.8 ABNORMAL MAMMOGRAM OF RIGHT BREAST: ICD-10-CM

## 2024-10-03 PROCEDURE — 99215 OFFICE O/P EST HI 40 MIN: CPT | Performed by: STUDENT IN AN ORGANIZED HEALTH CARE EDUCATION/TRAINING PROGRAM

## 2024-10-04 LAB
ATRIAL RATE: 73 BPM
P AXIS: 54 DEGREES
PR INTERVAL: 148 MS
QRS AXIS: 0 DEGREES
QRSD INTERVAL: 84 MS
QT INTERVAL: 402 MS
QTC INTERVAL: 442 MS
T WAVE AXIS: 39 DEGREES
VENTRICULAR RATE: 73 BPM

## 2024-10-04 PROCEDURE — 93010 ELECTROCARDIOGRAM REPORT: CPT | Performed by: INTERNAL MEDICINE

## 2024-10-10 ENCOUNTER — ANESTHESIA EVENT (OUTPATIENT)
Dept: PERIOP | Facility: HOSPITAL | Age: 69
End: 2024-10-10
Payer: MEDICARE

## 2024-10-10 NOTE — PRE-PROCEDURE INSTRUCTIONS
Pre-Surgery Instructions:   Medication Instructions    albuterol (Ventolin HFA) 90 mcg/act inhaler Uses PRN- OK to take day of surgery    aspirin 81 MG tablet Instructions provided by MD-pt's lasat dose 10/8    calcium citrate (CALCITRATE) 950 MG tablet Pt started holding as of 10/9    Multiple Vitamin (MULTI-VITAMIN DAILY PO) calcium citrate (CALCITRATE) 950 MG tablet Pt started holding as of 10/9       omeprazole (PriLOSEC) 40 MG capsule Take day of surgery.    PARoxetine (PAXIL) 20 mg tablet Take night before surgery      Medication instructions for day surgery reviewed. Please use only a sip of water to take your instructed medications. Avoid all over the counter vitamins, supplements and NSAIDS for one week prior to surgery per anesthesia guidelines. Tylenol is ok to take as needed.     You will receive a call one business day prior to surgery with an arrival time and hospital directions. If your surgery is scheduled on a Monday, the hospital will be calling you on the Friday prior to your surgery. If you have not heard from anyone by 8pm, please call the hospital supervisor through the hospital  at [phone]. ([place] [phone] or [place] [phone]).    Do not eat or drink anything after midnight the night before your surgery, including candy, mints, lifesavers, or chewing gum. Do not drink alcohol 24hrs before your surgery. Try not to smoke at least 24hrs before your surgery.       Follow the pre surgery showering instructions as listed in the “My Surgical Experience Booklet” or otherwise provided by your surgeon's office. Do not use a blade to shave the surgical area 1 week before surgery. It is okay to use a clean electric clippers up to 24 hours before surgery. Do not apply any lotions, creams, including makeup, cologne, deodorant, or perfumes after showering on the day of your surgery. Do not use dry shampoo, hair spray, hair gel, or any type of hair products.     No contact lenses, eye  make-up, or artificial eyelashes. Remove nail polish, including gel polish, and any artificial, gel, or acrylic nails if possible. Remove all jewelry including rings and body piercing jewelry.     Wear causal clothing that is easy to take on and off. Consider your type of surgery.    Keep any valuables, jewelry, piercings at home. Please bring any specially ordered equipment (sling, braces) if indicated.    Arrange for a responsible person to drive you to and from the hospital on the day of your surgery. Please confirm the visitor policy for the day of your procedure when you receive your phone call with an arrival time.     Call the surgeon's office with any new illnesses, exposures, or additional questions prior to surgery.    Please reference your “My Surgical Experience Booklet” for additional information to prepare for your upcoming surgery.

## 2024-10-11 ENCOUNTER — HOSPITAL ENCOUNTER (OUTPATIENT)
Dept: ULTRASOUND IMAGING | Facility: CLINIC | Age: 69
Discharge: HOME | End: 2024-10-11
Payer: MEDICARE

## 2024-10-11 ENCOUNTER — HOSPITAL ENCOUNTER (OUTPATIENT)
Dept: MAMMOGRAPHY | Facility: CLINIC | Age: 69
Discharge: HOME | End: 2024-10-11
Payer: MEDICARE

## 2024-10-11 VITALS — DIASTOLIC BLOOD PRESSURE: 81 MMHG | HEART RATE: 72 BPM | SYSTOLIC BLOOD PRESSURE: 138 MMHG

## 2024-10-11 DIAGNOSIS — R92.8 ABNORMAL FINDING ON BREAST IMAGING: ICD-10-CM

## 2024-10-11 DIAGNOSIS — D05.01 LOBULAR CARCINOMA IN SITU (LCIS) OF RIGHT BREAST: ICD-10-CM

## 2024-10-11 PROCEDURE — 19285 PERQ DEV BREAST 1ST US IMAG: CPT

## 2024-10-11 RX ORDER — LIDOCAINE HYDROCHLORIDE 10 MG/ML
5 INJECTION, SOLUTION EPIDURAL; INFILTRATION; INTRACAUDAL; PERINEURAL ONCE
Status: COMPLETED | OUTPATIENT
Start: 2024-10-11 | End: 2024-10-11

## 2024-10-11 RX ADMIN — LIDOCAINE HYDROCHLORIDE 5 ML: 10 INJECTION, SOLUTION EPIDURAL; INFILTRATION; INTRACAUDAL; PERINEURAL at 11:34

## 2024-10-11 NOTE — PROGRESS NOTES
Procedure type: Shalini      ___x__ultrasound guided _____stereotactic    Breast:    _____Left __x___Right    Location: 10 o'clock 7 cmfn     Needle: 16g    # of passes: 1    Clip: 10cm Shalini    Performed by: Dr. Iniguez     Pressure held for 5 minutes by: Donita Wilkerson Strips:    _____yes __x___no    Band aid:    __X___yes_____no    Tolerated procedure:    __X___yes _____no

## 2024-10-14 ENCOUNTER — TELEPHONE (OUTPATIENT)
Dept: SURGICAL ONCOLOGY | Facility: CLINIC | Age: 69
End: 2024-10-14

## 2024-10-14 NOTE — PROGRESS NOTES
Post procedure call completed 10/14/2024 at 10:50    Bleeding: _____yes __X___no    Pain: _____yes ___X___no    Redness/Swelling: ______yes ___X___no    Band aid removed: _____yes ___X__no (discussed removing when she showers)      Pt with no questions at this time, adv to call with any questions or concerns, has name/# for contact

## 2024-10-14 NOTE — TELEPHONE ENCOUNTER
Patient is scheduled to have surgery tomorrow morning. Patient called in stating she's sick with cold and will need to reschedule surgery.     Advised patient I will send message to surgery scheduler and they will return her call to reschedule surgery date.     Please call patient.     Thank you!

## 2024-10-14 NOTE — TELEPHONE ENCOUNTER
Called the patient to further discuss. She stated that yesterday, she started to frequently sneeze and her nose started running. She denied any body aches, fevers, or chills. The patient was instructed to perform a home COVID test and to call the office back with her results. Her surgery with Dr. Cardarelli was rescheduled to 10/31/2024 with the understanding that it may need to be delayed longer if her symptoms worsen or do not resolve by 10/17. The patient verbalized understanding of everything discussed and was appreciative of the call. She denied any questions at this time.

## 2024-10-15 ENCOUNTER — ANESTHESIA (OUTPATIENT)
Dept: PERIOP | Facility: HOSPITAL | Age: 69
End: 2024-10-15
Payer: MEDICARE

## 2024-10-16 ENCOUNTER — TELEPHONE (OUTPATIENT)
Dept: SURGICAL ONCOLOGY | Facility: CLINIC | Age: 69
End: 2024-10-16

## 2024-10-16 NOTE — TELEPHONE ENCOUNTER
Left a message for [place] to reschedule her Post Op appointment with Dr. Cardarelli. Surgery 10/15 was moved out to 10/31. Her Post Op appointment on 10/28 has been canceled, and a new appointment was made for 11/14. I left my direct number to confirm or reschedule this appointment.

## 2024-10-22 DIAGNOSIS — F41.8 DEPRESSION WITH ANXIETY: ICD-10-CM

## 2024-10-23 ENCOUNTER — TELEPHONE (OUTPATIENT)
Age: 69
End: 2024-10-23

## 2024-10-23 RX ORDER — PAROXETINE 20 MG/1
TABLET, FILM COATED ORAL
Qty: 90 TABLET | Refills: 1 | Status: SHIPPED | OUTPATIENT
Start: 2024-10-23

## 2024-10-23 NOTE — TELEPHONE ENCOUNTER
Pt called to update PCP following increased dose of Paxil to 30 mg. Pt has been taking for the past few weeks and is doing well. Pt asks that the increased dose be sent to her pharmacy, [facility] on [address].

## 2024-10-28 ENCOUNTER — TELEPHONE (OUTPATIENT)
Dept: SURGICAL ONCOLOGY | Facility: CLINIC | Age: 69
End: 2024-10-28

## 2024-10-28 NOTE — TELEPHONE ENCOUNTER
FYI:  Pt called back stating she is feeling better,only symptom remaining is nasal stuffiness,no fever. Did a home covid test today resulted NEGATIVE. No other issues noted.

## 2024-10-28 NOTE — TELEPHONE ENCOUNTER
Called the patient to follow up from her recent illness and to confirm that she had resolution of her symptoms prior to her surgery with Dr. Cardarelli on 10/31. There was no answer so a message was left requesting a call back. The hope line number was provided.

## 2024-10-29 DIAGNOSIS — F41.8 DEPRESSION WITH ANXIETY: ICD-10-CM

## 2024-10-29 RX ORDER — PAROXETINE 30 MG/1
TABLET, FILM COATED ORAL
Qty: 100 TABLET | Refills: 1 | Status: SHIPPED | OUTPATIENT
Start: 2024-10-29

## 2024-10-31 ENCOUNTER — APPOINTMENT (OUTPATIENT)
Dept: RADIOLOGY | Facility: HOSPITAL | Age: 69
End: 2024-10-31
Payer: MEDICARE

## 2024-10-31 ENCOUNTER — HOSPITAL ENCOUNTER (OUTPATIENT)
Facility: HOSPITAL | Age: 69
Setting detail: OUTPATIENT SURGERY
Discharge: HOME | End: 2024-10-31
Attending: STUDENT IN AN ORGANIZED HEALTH CARE EDUCATION/TRAINING PROGRAM | Admitting: STUDENT IN AN ORGANIZED HEALTH CARE EDUCATION/TRAINING PROGRAM
Payer: MEDICARE

## 2024-10-31 VITALS
DIASTOLIC BLOOD PRESSURE: 69 MMHG | OXYGEN SATURATION: 94 % | WEIGHT: 230 LBS | TEMPERATURE: 98.7 F | HEIGHT: 69 IN | SYSTOLIC BLOOD PRESSURE: 139 MMHG | RESPIRATION RATE: 95 BRPM | HEART RATE: 82 BPM | BODY MASS INDEX: 34.07 KG/M2

## 2024-10-31 DIAGNOSIS — D05.01 LOBULAR CARCINOMA IN SITU (LCIS) OF RIGHT BREAST: ICD-10-CM

## 2024-10-31 PROCEDURE — 19125 EXCISION BREAST LESION: CPT | Performed by: STUDENT IN AN ORGANIZED HEALTH CARE EDUCATION/TRAINING PROGRAM

## 2024-10-31 PROCEDURE — 88342 IMHCHEM/IMCYTCHM 1ST ANTB: CPT | Performed by: PATHOLOGY

## 2024-10-31 PROCEDURE — 88307 TISSUE EXAM BY PATHOLOGIST: CPT | Performed by: PATHOLOGY

## 2024-10-31 PROCEDURE — 88341 IMHCHEM/IMCYTCHM EA ADD ANTB: CPT | Performed by: PATHOLOGY

## 2024-10-31 RX ORDER — CEFAZOLIN SODIUM 2 G/50ML
2000 SOLUTION INTRAVENOUS ONCE
Status: COMPLETED | OUTPATIENT
Start: 2024-10-31 | End: 2024-10-31

## 2024-10-31 RX ORDER — LIDOCAINE HYDROCHLORIDE 20 MG/ML
INJECTION, SOLUTION EPIDURAL; INFILTRATION; INTRACAUDAL; PERINEURAL AS NEEDED
Status: DISCONTINUED | OUTPATIENT
Start: 2024-10-31 | End: 2024-10-31

## 2024-10-31 RX ORDER — PHENYLEPHRINE HCL IN 0.9% NACL 1 MG/10 ML
SYRINGE (ML) INTRAVENOUS AS NEEDED
Status: DISCONTINUED | OUTPATIENT
Start: 2024-10-31 | End: 2024-10-31

## 2024-10-31 RX ORDER — ONDANSETRON 2 MG/ML
INJECTION INTRAMUSCULAR; INTRAVENOUS AS NEEDED
Status: DISCONTINUED | OUTPATIENT
Start: 2024-10-31 | End: 2024-10-31

## 2024-10-31 RX ORDER — PROPOFOL 10 MG/ML
INJECTION, EMULSION INTRAVENOUS AS NEEDED
Status: DISCONTINUED | OUTPATIENT
Start: 2024-10-31 | End: 2024-10-31

## 2024-10-31 RX ORDER — METOCLOPRAMIDE HYDROCHLORIDE 5 MG/ML
10 INJECTION INTRAMUSCULAR; INTRAVENOUS ONCE AS NEEDED
Status: DISCONTINUED | OUTPATIENT
Start: 2024-10-31 | End: 2024-10-31 | Stop reason: HOSPADM

## 2024-10-31 RX ORDER — SODIUM CHLORIDE, SODIUM LACTATE, POTASSIUM CHLORIDE, CALCIUM CHLORIDE 600; 310; 30; 20 MG/100ML; MG/100ML; MG/100ML; MG/100ML
INJECTION, SOLUTION INTRAVENOUS CONTINUOUS PRN
Status: DISCONTINUED | OUTPATIENT
Start: 2024-10-31 | End: 2024-10-31

## 2024-10-31 RX ORDER — FENTANYL CITRATE/PF 50 MCG/ML
25 SYRINGE (ML) INJECTION
Status: DISCONTINUED | OUTPATIENT
Start: 2024-10-31 | End: 2024-10-31 | Stop reason: HOSPADM

## 2024-10-31 RX ORDER — MIDAZOLAM HYDROCHLORIDE 2 MG/2ML
INJECTION, SOLUTION INTRAMUSCULAR; INTRAVENOUS AS NEEDED
Status: DISCONTINUED | OUTPATIENT
Start: 2024-10-31 | End: 2024-10-31

## 2024-10-31 RX ORDER — HYDROMORPHONE HCL/PF 1 MG/ML
0.5 SYRINGE (ML) INJECTION
Status: DISCONTINUED | OUTPATIENT
Start: 2024-10-31 | End: 2024-10-31 | Stop reason: HOSPADM

## 2024-10-31 RX ORDER — DEXAMETHASONE SODIUM PHOSPHATE 10 MG/ML
INJECTION, SOLUTION INTRAMUSCULAR; INTRAVENOUS AS NEEDED
Status: DISCONTINUED | OUTPATIENT
Start: 2024-10-31 | End: 2024-10-31

## 2024-10-31 RX ORDER — ALBUTEROL SULFATE 0.83 MG/ML
2.5 SOLUTION RESPIRATORY (INHALATION) ONCE AS NEEDED
Status: DISCONTINUED | OUTPATIENT
Start: 2024-10-31 | End: 2024-10-31 | Stop reason: HOSPADM

## 2024-10-31 RX ORDER — DIPHENHYDRAMINE HYDROCHLORIDE 50 MG/ML
12.5 INJECTION INTRAMUSCULAR; INTRAVENOUS ONCE AS NEEDED
Status: DISCONTINUED | OUTPATIENT
Start: 2024-10-31 | End: 2024-10-31 | Stop reason: HOSPADM

## 2024-10-31 RX ORDER — BUPIVACAINE HYDROCHLORIDE AND EPINEPHRINE 2.5; 5 MG/ML; UG/ML
INJECTION, SOLUTION INFILTRATION; PERINEURAL AS NEEDED
Status: DISCONTINUED | OUTPATIENT
Start: 2024-10-31 | End: 2024-10-31 | Stop reason: HOSPADM

## 2024-10-31 RX ORDER — FENTANYL CITRATE 50 UG/ML
INJECTION, SOLUTION INTRAMUSCULAR; INTRAVENOUS AS NEEDED
Status: DISCONTINUED | OUTPATIENT
Start: 2024-10-31 | End: 2024-10-31

## 2024-10-31 RX ADMIN — ONDANSETRON 4 MG: 2 INJECTION INTRAMUSCULAR; INTRAVENOUS at 16:58

## 2024-10-31 RX ADMIN — Medication 100 MCG: at 16:47

## 2024-10-31 RX ADMIN — CEFAZOLIN SODIUM 2000 MG: 2 SOLUTION INTRAVENOUS at 16:08

## 2024-10-31 RX ADMIN — Medication 100 MCG: at 16:50

## 2024-10-31 RX ADMIN — MIDAZOLAM HYDROCHLORIDE 1 MG: 1 INJECTION, SOLUTION INTRAMUSCULAR; INTRAVENOUS at 16:00

## 2024-10-31 RX ADMIN — FENTANYL CITRATE 25 MCG: 50 INJECTION INTRAMUSCULAR; INTRAVENOUS at 16:42

## 2024-10-31 RX ADMIN — PROPOFOL 200 MG: 10 INJECTION, EMULSION INTRAVENOUS at 16:08

## 2024-10-31 RX ADMIN — LIDOCAINE HYDROCHLORIDE 50 MG: 20 INJECTION, SOLUTION EPIDURAL; INFILTRATION; INTRACAUDAL at 16:08

## 2024-10-31 RX ADMIN — Medication 100 MCG: at 16:29

## 2024-10-31 RX ADMIN — Medication 100 MCG: at 16:38

## 2024-10-31 RX ADMIN — SODIUM CHLORIDE, SODIUM LACTATE, POTASSIUM CHLORIDE, AND CALCIUM CHLORIDE: .6; .31; .03; .02 INJECTION, SOLUTION INTRAVENOUS at 15:59

## 2024-10-31 RX ADMIN — FENTANYL CITRATE 25 MCG: 50 INJECTION INTRAMUSCULAR; INTRAVENOUS at 16:21

## 2024-10-31 RX ADMIN — FENTANYL CITRATE 50 MCG: 50 INJECTION INTRAMUSCULAR; INTRAVENOUS at 16:19

## 2024-10-31 RX ADMIN — PROPOFOL 100 MCG/KG/MIN: 10 INJECTION, EMULSION INTRAVENOUS at 16:09

## 2024-10-31 RX ADMIN — MIDAZOLAM HYDROCHLORIDE 1 MG: 1 INJECTION, SOLUTION INTRAMUSCULAR; INTRAVENOUS at 16:06

## 2024-10-31 RX ADMIN — DEXAMETHASONE SODIUM PHOSPHATE 10 MG: 10 INJECTION INTRAMUSCULAR; INTRAVENOUS at 16:20

## 2024-10-31 NOTE — ANESTHESIA POSTPROCEDURE EVALUATION
Post-Op Assessment Note    CV Status:  Stable  Pain Score: 0    Pain management: adequate       Mental Status:  Alert and awake   Hydration Status:  Euvolemic and stable   PONV Controlled:  Controlled   Airway Patency:  Patent and adequate     Post Op Vitals Reviewed: Yes    No anethesia notable event occurred.    Staff: CRNA           Last Filed PACU Vitals:  Vitals Value Taken Time   Temp     Pulse 88 10/31/24 1713   /59 10/31/24 1710   Resp 17 10/31/24 1713   SpO2 95% 2L NC 10/31/24 1713   Vitals shown include unfiled device data.    Modified Lorraine:  No data recorded

## 2024-10-31 NOTE — OP NOTE
OPERATIVE REPORT  PATIENT NAME: Angelia Caruso    :  1955  MRN: [id number]  Pt Location: [facility]    SURGERY DATE: 10/31/2024    Surgeons and Role:     * Cassandra Lynn Cardarelli, MD - Primary     * Marco Rivera MD - Assisting     * Raquel Lynch MD - Assisting    Preop Diagnosis:  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 [D05.01]    Post-Op Diagnosis Codes:     * 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 [D05.01]    Procedure(s):  Right - RIGHT BREAST PUMA LOCALIZED EXCISIONAL BIOPSY    Specimen(s):  ID Type Source Tests Collected by Time Destination   1 : Right breast lumpectomy- see comments Tissue Breast, Right TISSUE EXAM Cassandra Lynn Cardarelli, MD 10/31/2024 1631        Estimated Blood Loss:   5 mL    Drains:  * No LDAs found *    Anesthesia Type:   General    Operative Indications:  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 [D05.01]      Operative Findings:  Normal-appearing breast tissue.  2 Puma's within specimen.      Complications:   None    Procedure and Technique:    Angelia Caruso was greeted in the preanesthesia area.  Puma localizer was tested.  Surgical site was marked with the assistance of the patient. I previously reviewed the post biopsy images.        The patient was brought to the operation room and placed under general anesthesia.   Attention was paid to ensure appropriate padding and correct positioning.  The PUMA  detector was then used to locate the position of the PUMA deflector and the skin was marked in this area.  The right breast was prepped and draped in a sterile fashion.  I initiated a time-out, identifying the patient, the correct side and the above procedure.  All parties agreed with the time out.     The planned incision was then injected with 0.25% Marcaine for local anesthesia.  The incision was sharply incised.  The PUMA dectector was used to guide the dissection.  Superior, inferior, medial and lateral planes were developed around  the SHALINI deflector and the specimen truncated with electrocautery beyond the deflector.  The specimen was then marked with a silk suture (long lateral, short superior) for orientation purposes.  A specimen radiograph was taken in two dimensions.  The 2 Shalini reflector with appropriate margins was identified.  No additional margins were taken as this was an excisional biopsy.  All specimens were sent to pathology for permanent analysis.      Superficial bleeding controlled with electrocautery and the wound was extensively irrigated.  Surgical clips were placed in the tumor bed.  The wound was closed in layers, an inner layer of 2-0 Vicryl was utilized to close down the area of dead space, a deep dermal layer utilizing 3-0 Vicryl and a subcuticular layer of 4-0 Monocryl.  Exofin was applied.  A Telfa island was applied once glue was dried.  The counts were correct x 2.  A surgical bra was placed.    I was present for the entire procedure.    Patient Disposition:  PACU     This procedure was not performed to treat breast cancer through sentinel node biopsy           SIGNATURE: Cassandra Lynn Cardarelli, MD  DATE: October 31, 2024  TIME: 5:02 PM

## 2024-10-31 NOTE — ANESTHESIA POSTPROCEDURE EVALUATION
Post-Op Assessment Note    CV Status:  Stable  Pain Score: 0    Pain management: adequate       Mental Status:  Alert and awake   Hydration Status:  Euvolemic   PONV Controlled:  Controlled   Airway Patency:  Patent     Post Op Vitals Reviewed: Yes    No anethesia notable event occurred.    Staff: Anesthesiologist, CRNA           Last Filed PACU Vitals:  Vitals Value Taken Time   Temp 98 °F (36.7 °C) 10/31/24 1730   Pulse 84 10/31/24 1730   /65 10/31/24 1730   Resp 18 10/31/24 1730   SpO2 96 % 10/31/24 1730       Modified Lorraine:  Activity: 2 (10/31/2024  5:45 PM)  Respiration: 2 (10/31/2024  5:45 PM)  Circulation: 2 (10/31/2024  5:45 PM)  Consciousness: 2 (10/31/2024  5:45 PM)  Oxygen Saturation: 2 (10/31/2024  5:45 PM)  Modified Lorraine Score: 10 (10/31/2024  5:45 PM)

## 2024-10-31 NOTE — ANESTHESIA PREPROCEDURE EVALUATION
Procedure:  RIGHT BREAST PUMA LOCALIZED EXCISIONAL BIOPSY (Right: Breast)    Relevant Problems   ANESTHESIA (within normal limits)      GI/HEPATIC   (+) Esophageal reflux   (+) Hepatic steatosis   (+) Hepatosplenomegaly      /RENAL   (+) Stage 3a chronic kidney disease (HCC)      NEURO/PSYCH   (+) Anxiety associated with depression   (+) Recurrent major depressive disorder, in partial remission (HCC)      PULMONARY   (+) Mild intermittent asthma without complication      Other   (+) Hepatosplenomegaly        Physical Exam    Airway    Mallampati score: II  TM Distance: <3 FB  Neck ROM: full     Dental        Cardiovascular      Pulmonary      Other Findings  post-pubertal.      Anesthesia Plan  ASA Score- 2     Anesthesia Type- general with ASA Monitors.         Additional Monitors:     Airway Plan: LMA.           Plan Factors-Exercise tolerance (METS): >4 METS.    Chart reviewed. EKG reviewed.  Existing labs reviewed. Patient summary reviewed.    Patient is not a current smoker.              Induction- intravenous.    Postoperative Plan- Plan for postoperative opioid use.         Informed Consent- Anesthetic plan and risks discussed with patient.  I personally reviewed this patient with the CRNA. Discussed and agreed on the Anesthesia Plan with the CRNA..

## 2024-10-31 NOTE — INTERVAL H&P NOTE
H&P reviewed. After examining the patient I find no changes in the patients condition since the H&P had been written.  Patient initially had sinus infection not requiring antibiotics. Patient reports feeling anxious today. Her HR was elevated to 118 on my exam. She denies any chest pain or SOB.     Vitals:    10/31/24 1411   BP: 154/98   Pulse: (!) 120   Resp: 18   Temp: (!) 97.2 °F (36.2 °C)   SpO2: 94%

## 2024-10-31 NOTE — DISCHARGE INSTR - AVS FIRST PAGE
"Follow-up    [facility] 11/14/2024 10:30 AM Arrive by 10:15 AM POST OP PG 30 min [facility] [place] Cassandra Lynn Cardarelli, MD    Patient Instructions:   Please arrive 15 minutes before appointment time.   Location Instructions:   [facility] [place], [address] [place] [address], [id number] [phone]   Appointment Notes:       Wound care  - You may have a surgical bra on when you wake up from surgery. Please wear your surgical bra at all times for the next week until your follow up.  It can be worn during sleeping for comfort, but should not be worn in the shower.    - Over your incision may be a a gauze dressing and/or surgical glue.  You may remove the gauze two days after surgery. The surgical glue will begin to flake off in about one week.  You can leave your incision(s) uncovered unless you have drainage. Some blood staining of the dressing can be normal, as is bruising at the site.   - You may develop a harmless pocket of fluid (called a seroma) at the surgical site(s) which may cause a \"sloshing\" sound or sensation. This is normal and will eventually disappear. If you develop a seroma that is large or uncomfortable, call the office to discuss.  - You may have a surgical drain to collect extra fluid from the surgical site. Please refer to additional instructions on how to care for the drain.  - If you had a sentinel node biopsy, where blue dye was injected, your skin may be blue. This is normal and will eventually resolve after several weeks. Your urine may be green for a few days and this will resolve also.    Showering  - You may shower as you normally do starting the day after surgery. The dressings are waterproof.  Let soapy water run over the incision and avoid vigorous scrubbing of the surgical site.   - No soaking in baths, hot tubs or public areas such as lakes or pools until cleared by your surgeon.    Eating and drinking  - You may " return to eating your regular diet.  Because you received anesthesia, we recommend you start with drinking clear liquids and a light meal to avoid getting an upset stomach.   - Drink plenty of liquids to avoid constipation. If necessary you may take an over-the-counter medication (such as MiraLax or Colace). Call the office if you have not had a bowel movement in more than 2 days.    Activity  - You may resume normal activity the day after surgery, but avoid any vigorous exercise until your follow up.    - Please do not drive for at least 24 hours as you received anesthesia.  If you are taking prescriptions pain medicine that makes you drowsy, please do not attempt to drive.     Pain  - You may experience tenderness or soreness at the surgical site(s) which is to be expected. You may also experience a sore throat or a headache which is temporary.  - You may apply ice packs to the surgical site(s) for comfort.  - Please take medications as prescribed.      Please call [phone] for any concerns, including:  - a fever 100.4 or higher  - unrelieved or increasing pain  - significant redness of your incision(s) or surrounding area  - concerning drainage from the incision(s)

## 2024-11-01 ENCOUNTER — ANCILLARY PROCEDURE (OUTPATIENT)
Dept: LAB | Facility: HOSPITAL | Age: 69
End: 2024-11-01
Attending: STUDENT IN AN ORGANIZED HEALTH CARE EDUCATION/TRAINING PROGRAM

## 2024-11-05 ENCOUNTER — TELEPHONE (OUTPATIENT)
Age: 69
End: 2024-11-05

## 2024-11-05 NOTE — TELEPHONE ENCOUNTER
How does the incision look? WNL    Do you have fever or chills? No    Are you having any pain? No    Do you have a drain(s)? No    Verify post-op appointment date and time  [x]    Do you have any other questions or concerns? Not at this time    **NOTE TO TRIAGER: If patient requires further triage, based upon the answers above, move to appropriate triage protocol.

## 2024-11-06 PROCEDURE — 88342 IMHCHEM/IMCYTCHM 1ST ANTB: CPT | Performed by: PATHOLOGY

## 2024-11-06 PROCEDURE — 88341 IMHCHEM/IMCYTCHM EA ADD ANTB: CPT | Performed by: PATHOLOGY

## 2024-11-06 PROCEDURE — 88307 TISSUE EXAM BY PATHOLOGIST: CPT | Performed by: PATHOLOGY

## 2024-11-08 ENCOUNTER — TELEPHONE (OUTPATIENT)
Dept: SURGICAL ONCOLOGY | Facility: CLINIC | Age: 69
End: 2024-11-08

## 2024-11-08 NOTE — TELEPHONE ENCOUNTER
Patient returned call and stated she would like to keep her appointment as scheduled for 11/14/24 @ 1030AM. She states she is not available on Monday 11/11/24.

## 2024-11-08 NOTE — TELEPHONE ENCOUNTER
Called the patient on both her house and mobile phone to offer her a sooner post-op appointment with Dr. Cardarelli on Monday, 11/11 due to availability in her schedule. There was no answer at either number so a message was left on each with the hope line number provided.

## 2024-11-13 NOTE — PROGRESS NOTES
Breast Consultation-Surgical Oncology     [facility]  [facility] [place]  [address]  [place] [address]    Name:  Angelia Caruso  YOB: 1955  MRN:  [id number]    Assessment & Plan  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  I reviewed pathology report with patient.  A copy of the report was provided to patient at the visit.  We reviewed LCIS of the classic variant.  There was no evidence of invasion.  There was no evidence of DCIS.  I explained to patient that LCIS was diagnosed on her excisional biopsy and with classic LCIS present at the margin reexcision is not indicated.  Patient will return to her annual screening mammograms.  Her next annual screening mammogram is due in August 2025.  I order this at this visit.  Patient will follow-up with me following this imaging.           At increased risk of breast cancer  I reviewed with patient again that the incidence of LCIS within the breast is not cancer itself however it does have a relative risk of developing an invasive cancer that is approximately 7-11 fold higher than for women who do not have LCIS.  Patient would like to discuss risk reducing endocrine therapy with medical oncology.  A consult was placed with the [facility].         CHIEF COMPLAINT: This is a postoperative visit following left breast excisional biopsy for LCIS    HPI:  Angelia Caruso is a 69 y.o. year old female who presents with following left breast excisional biopsy for LCIS on 10/31/2024.  She is overall doing well.  She reports no systemic illness.  She is no longer using pain medications.        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 & B. Medial  Lobular carcinoma in situ    C. & D. Lateral  Lobular carcinoma in situ with focal necrosis     12/19/2017 Surgery    Right breast bracketed needle localized lumpectomy  (Dr. Rivera)  Lobular carcinoma in situ present in 24/40 tissue sections  LCIS present in 7/7 inferior margins tissue sections     2/1/2018 Genetic Testing    NEGATIVE: No clinically significant variants detected  [place] - A total of 34 genes were evaluated: APC, OSBALDO, BARD1, BMPR1A, BRCA1, BRCA2, BRIP1, CDH1, CDK4, CDKN2A, CHEK2, DICER1, HOXB13, MLH1, MRE11A, MSH2, MSH6, MUTYH, NBN, NF1, PALB2, PMS2, POLD1, POLE, PTEN, RAD50, RAD51C, RAD51D, SMAD4, SMARCA4, STK11, TP53, (sequencing and deletion/duplication); EPCAM and GREM1 (deletion/duplication only).     5/1/2023 Biopsy    Right breast stereotactic biopsy  9 o'clock  Lobular carcinoma in situ, predominantly classical type focally approaching florid-type pattern     6/27/2023 Surgery    Right breast PUMA  directed lumpectomy (Dr. Rivera)  Benign fibrocystic changes with atypia consisting of extensive LCIS, focally florid and ALH with pagetoid ductal spread  Small intraductal papilloma  Changes consistent with prior biopsy/surgical site  New inferior margin specimen involved by same     8/30/2024 Biopsy    Right breast ultrasound-guided biopsy  10 o'clock, 7 cm from nipple (butterfly)  Lobular carcinoma in situ, predominately classical type  No invasive carcinoma identified    Concordant. Pathologist note indicates microcalcifications associated with the LCIS; the patient does have a relatively large area of microcalcifications in the right breast. Surgical consultation is recommended to discuss possible excision. A 6 MO F/U mammo was also recommended on 8/30/2024.         Review of Systems:  Review of Systems   Constitutional:  Negative for chills and fever.   HENT:  Negative for ear pain and sore throat.    Eyes:  Negative for pain and visual disturbance.   Respiratory:  Negative for cough and shortness of breath.    Cardiovascular:  Negative for chest pain and palpitations.   Gastrointestinal:  Negative for abdominal pain and vomiting.   Genitourinary:   "Negative for dysuria and hematuria.   Musculoskeletal:  Negative for arthralgias and back pain.   Skin:  Negative for color change and rash.   Neurological:  Negative for seizures and syncope.   All other systems reviewed and are negative.    Physical Exam:  /80 (BP Location: Left arm, Patient Position: Sitting, Cuff Size: Standard)   Pulse (!) 117   Temp (!) 97 °F (36.1 °C) (Temporal)   Resp 16   Ht 5' 9\" (1.753 m)   Wt 104 kg (229 lb)   SpO2 97%   BMI 33.82 kg/m²     Physical Exam  Vitals (Patient with tachycardia.  No associated chest pain.) reviewed.   Constitutional:       Appearance: Normal appearance.   HENT:      Head: Normocephalic and atraumatic.      Mouth/Throat:      Mouth: Mucous membranes are moist.   Cardiovascular:      Rate and Rhythm: Normal rate and regular rhythm.      Pulses: Normal pulses.      Heart sounds: Normal heart sounds.   Pulmonary:      Effort: Pulmonary effort is normal.      Breath sounds: Normal breath sounds.   Chest:   Breasts:     Right: Normal.      Left: Normal.          Comments: There is a well-healing incision in the lateral aspect of the right breast.  Steri-Strips were applied to the area.  There is no evidence of infection.  Patient had notably 2 other periareolar incisions that are well-healed.  Abdominal:      General: Abdomen is flat.      Palpations: Abdomen is soft.   Musculoskeletal:      Right lower leg: No edema.      Left lower leg: No edema.   Lymphadenopathy:      Upper Body:      Right upper body: No supraclavicular, axillary or pectoral adenopathy.      Left upper body: No supraclavicular, axillary or pectoral adenopathy.   Skin:     General: Skin is warm.   Neurological:      General: No focal deficit present.      Mental Status: She is alert and oriented to person, place, and time.   Psychiatric:         Mood and Affect: Mood normal.         Behavior: Behavior normal.         Thought Content: Thought content normal.         Laboratory: "   Admission on 10/31/2024, Discharged on 10/31/2024   Component Date Value Ref Range Status    Case Report 10/31/2024    Final                    Value:Surgical Pathology Report                         Case: [id number]                                  Authorizing Provider:  Cassandra Lynn Cardarelli, Collected:           10/31/2024 1631                                     MD                                                                           Ordering Location:     [facility]        Received:            10/31/2024 [address] [place] [address] Operating Room                                                      Pathologist:           Hao Santoro MD                                                       Specimen:    Breast, Right, Right breast lumpectomy- see comments                                       Final Diagnosis 10/31/2024    Final                    Value:A. Breast, Right, Right breast lumpectomy- see comments:  -Extensive lobular carcinoma in situ, classic type (LCIS)  , intermediate nuclear grade , involving fifteen of total twenty seven blocks   -Tumor foci span 1.1 x 1.1 x 0.7 cm area and separate microscopic foci  -Few foci of atypical lobular hyperplasia  -Fibrocystic changes  with microcalcifcation  -Intraductal papilloma , completely excised  Changes consistent with previous biopsy site   Margin  The tumor extends to anterior margin  The tumor  is located 0.1 cm from the superior & inferior margins  The remaining resection margins are negative for tumor by  more than 2 mm      Note:  Tumor cells are  positive for CKC , P120 and, negative for  Ecadherin  and surrounded by calponin positive myoepithelial cell layer Controls reacted appropriately . 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  These tests were developed and their performance characteristics determined by [facility] or appropriate performing facility, though some tests may be performed on tissues which have not been validated for performance characteristics (such as staining performed on alcohol exposed cell blocks and decalcified tissues).  Results should be interpreted with caution and in the context of the patients’ clinical condition. These tests may not be cleared or approved by the U.S. Food and Drug Administration, though the FDA has determined that such clearance or approval is not necessary. These tests are used for clinical purposes and they should not be regarded as investigational or for research. This laboratory has been approved by [place], designated as a high-complexity laboratory and is qualified to perform these tests.  .      Gross Description 10/31/2024    Final                    Value:A. The specimen is received in formalin, labeled with the patient's name and hospital number, and is designated \" right breast lumpectomy-see comments.\"  It consists of a breast lumpectomy specimen, oriented per the requisition with a short suture marking superior and a long suture marking lateral.  The specimen measures 2.4 cm from superior to inferior, 3.5 cm from medial to lateral, and 4.6 cm from anterior to posterior.  The specimen is inked as follows:  anterior-green, posterior-black, superior-orange, inferior-blue, medial-yellow and lateral-red.  The " specimen is serially sectioned from anterior to posterior into 10 slices, revealing a 0.8 x 0.7 x 0.7 cm cavitary biopsy site filled with gelatinous material in slices 8 and 9.  The biopsy site contains a metallic butterfly clip.  The biopsy site is 0.3 cm from the superior margin, 0.5 cm from the posterior margin, 0.7 cm from the inferior margin, 1.0 cm from the medial margin, 1.1 cm from the lateral margin, and greater than 2 cm                           from the anterior margin.  Adjacent to the biopsy site, in slices 6 and 7, is an area of white fibrous tissue containing 2 Shalini reflectors.  The fibrous area measures 1.1 x 0.4 x 0.3 cm.  It is 0.1 cm from the superior margin, 0.6 cm from the inferior margin, 0.7 cm from the medial margin, and greater than 1.5 cm from the lateral, anterior, and posterior margins.  The combined measurement of biopsy site and fibrous area is 1.1 x 1.1 x 0.7 cm.  The remaining cut surfaces are yellow, soft, and lobulated. Faxitron images are acquired.  The specimen is submitted entirely as follows:  1-5: Perpendicular anterior margin (slice 1)  6-7: Slice 2, bisected  8-9: Slice 3, bisected  10-11: Slice 4, bisected  12-13: Slice 5, bisected  14-15: Slice 6 with fibrous area, bisected  16-17: Slice 7 with fibrous area, bisected  18-19: Slice 8 with biopsy site, bisected  20-21: Slice 9 with biopsy site, bisected  22-27: Perpendicular posterior margin (slice 10)    The cold ischemia time based upon                           information provided by the submitting clinician and receiving staff in the laboratory is 1 minute.    Note: The estimated total formalin fixation time based upon information provided by the submitting clinician and the standard processing schedule is 31.5 hours.    MScheib      Clinical Information 10/31/2024    Final                    Value:Short suture marks superior; Long suture marks lateral       Pathology revealed:     A. Breast, Right, Right breast  lumpectomy- see comments:  -Extensive lobular carcinoma in situ, classic type (LCIS)  , intermediate nuclear grade , involving fifteen of total twenty seven blocks   -Tumor foci span 1.1 x 1.1 x 0.7 cm area and separate microscopic foci  -Few foci of atypical lobular hyperplasia  -Fibrocystic changes  with microcalcifcation  -Intraductal papilloma , completely excised  Changes consistent with previous biopsy site   Margin  The tumor extends to anterior margin  The tumor  is located 0.1 cm from the superior & inferior margins  The remaining resection margins are negative for tumor by  more than 2 mm    Staging: Cancer Staging   No matching staging information was found for the patient.    Imaging:  Pathology Department Faxitron Imaging Study  Result Date: 2024  Narrative: This is a non-diagnostic study. Please refer to the Pathology report.      OB History          3    Para   3    Term   3            AB        Living             SAB        IAB        Ectopic        Multiple        Live Births   3           Obstetric Comments   Menarche age 12  Age at first live birth 21  Menopause age 51                 The following portions of the patient's history were reviewed and updated as appropriate: allergies, current medications, past family history, past medical history, past social history, past surgical history, and problem list.    Problem List:   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    Mild intermittent asthma without complication    Obesity (BMI 30.0-34.9)    Rosacea    Increased risk of breast cancer    Hand eczema    Chronic insomnia     Hepatic steatosis    Anxiety associated with depression    Elevated hematocrit    Hepatosplenomegaly    Gallbladder polyp    Inflamed skin tag    Celiac disease    Stage 3a chronic kidney disease (HCC)    Osteopenia of multiple sites    Pain in both feet     Past Medical History:   Diagnosis Date    Allergic rhinitis     last assessed-10/2/2017    Asthma     Carotid bruit     R-last assessed-6/10/2014    Celiac disease     Dysphagia     last assessed-2013    GERD (gastroesophageal reflux disease)     Pneumonia     more than 10 yrs ago as of 10/10/24     Past Surgical History:   Procedure Laterality Date    BREAST BIOPSY Right 11/15/2017    percutaneous needle core    BREAST BIOPSY Right 2023    SLW Stereo 9:00    BREAST BIOPSY Right 2024    BREAST LUMPECTOMY Right 2017    LCIS    BREAST LUMPECTOMY Right 2023    Procedure: BREAST PUMA  DIRECTED LUMPECTOMY;  Surgeon: Marco Rivera MD;  [facility];  Service: Surgical Oncology     SECTION  1984    COLONOSCOPY      complete-12/3/2012-internal/external hemorrhoids, perianal or excoriation, mild sigmoid diverticulosis    MAMMO NEEDLE LOCALIZATION LEFT (ALL INC) Right 2023    MAMMO NEEDLE LOCALIZATION RIGHT (ALL INC) Right 2017    MAMMO NEEDLE LOCALIZATION RIGHT (ALL INC) EACH ADD Right 2017    MAMMO STEREOTACTIC BREAST BIOPSY RIGHT (ALL INC) Right 11/15/2017    MAMMO STEREOTACTIC BREAST BIOPSY RIGHT (ALL INC) Right 2023    MAMMO STEREOTACTIC BREAST BIOPSY RIGHT (ALL INC) EACH ADD Right 11/15/2017    MRI BREAST BIOPSY LEFT (ALL INCLUSIVE) Left 2019    MO EXC BREAST LES PREOP PLMT RAD MARKER OPEN 1 LES Right 10/31/2024    Procedure: RIGHT BREAST PUMA LOCALIZED EXCISIONAL BIOPSY;  Surgeon: Cassandra Lynn Cardarelli, MD;  [facility];  Service: Surgical Oncology    MO MASTECTOMY PARTIAL Right 10/31/2024    Procedure: RIGHT BREAST PUMA LOCALIZED EXCISIONAL BIOPSY;  Surgeon: Kaela Gill  Cardarelli, MD;  [facility];  Service: Surgical Oncology    PA PERQ DEVICE PLACEMENT BREAST LOC 1ST LES W/GDNCE Right 2017    Procedure: BREAST LUMPECTOMY; BREAST BRACKETED NEEDLE LOCALIZATION (BRACKETED NEEDLE LOC AT 0900);  Surgeon: Marco Rivera MD;  [facility];  Service: Surgical Oncology    UPPER GASTROINTESTINAL ENDOSCOPY      US BREAST CLIP NEEDLE LOC RIGHT Right 10/11/2024    US GUIDED BREAST BIOPSY RIGHT COMPLETE Right 2024    VAGINAL DILATATION      d&e     Family History   Problem Relation Age of Onset    Heart attack Mother         acute MI    Colon polyps Mother         polyps of the sigmoid colon    Colon cancer Mother     Breast cancer Sister 54    Uterine cancer Sister 64    No Known Problems Daughter     No Known Problems Daughter     Uterine cancer Maternal Grandmother         unknown age-maybe 60's    Uterine cancer Paternal Grandmother         unknown age , maybe 60's    Diabetes Paternal Grandfather     Liver cancer Brother 50    Colon cancer Brother 50    Lung cancer Brother     No Known Problems Brother     No Known Problems Brother     No Known Problems Brother     No Known Problems Brother     No Known Problems Son     Stroke Maternal Uncle     Kidney cancer Paternal Aunt 55    Pancreatic cancer Paternal Uncle 67    Breast cancer Cousin 57        maternal    Uterine cancer Cousin 55    Colon cancer Cousin 60     Social History     Socioeconomic History    Marital status: /Civil Union     Spouse name: Not on file    Number of children: Not on file    Years of education: Not on file    Highest education level: Not on file   Occupational History    Not on file   Tobacco Use    Smoking status: Former     Current packs/day: 0.00     Types: Cigarettes     Start date: 1974     Quit date: 1976     Years since quittin.6     Passive exposure: Never    Smokeless tobacco: Never   Vaping Use    Vaping status: Never Used   Substance and Sexual Activity     Alcohol use: Yes     Alcohol/week: 1.0 standard drink of alcohol     Types: 1 Glasses of wine per week     Comment: glass of wine weekly    Drug use: No    Sexual activity: Not Currently     Partners: Male     Birth control/protection: Post-menopausal   Other Topics Concern    Not on file   Social History Narrative    Not on file     Social Drivers of Health     Financial Resource Strain: Low Risk  (3/13/2023)    Overall Financial Resource Strain (CARDIA)     Difficulty of Paying Living Expenses: Not hard at all   Food Insecurity: No Food Insecurity (3/27/2024)    Nursing - Inadequate Food Risk Classification     Worried About Running Out of Food in the Last Year: Never true     Ran Out of Food in the Last Year: Never true     Ran Out of Food in the Last Year: Not on file   Transportation Needs: No Transportation Needs (3/27/2024)    PRAPARE - Transportation     Lack of Transportation (Medical): No     Lack of Transportation (Non-Medical): No   Physical Activity: Not on file   Stress: Not on file   Social Connections: Not on file   Intimate Partner Violence: Not on file   Housing Stability: Low Risk  (3/27/2024)    Housing Stability Vital Sign     Unable to Pay for Housing in the Last Year: No     Number of Times Moved in the Last Year: 1     Homeless in the Last Year: No     Current Outpatient Medications   Medication Sig Dispense Refill    albuterol (Ventolin HFA) 90 mcg/act inhaler Inhale 2 puffs every 4 (four) hours as needed for wheezing 18 g 5    aspirin 81 MG tablet Take 81 mg by mouth daily Started holding 10/8 for sx      calcium citrate (CALCITRATE) 950 MG tablet Take 1 tablet by mouth daily Last dose 10/9      fluocinonide (LIDEX) 0.05 % cream Apply topically 2 (two) times a day (Patient not taking: Reported on 8/14/2024) 30 g 1    Multiple Vitamin (MULTI-VITAMIN DAILY PO) Take by mouth      omeprazole (PriLOSEC) 40 MG capsule Take 1 capsule (40 mg total) by mouth daily 90 capsule 3    PARoxetine  (PAXIL) 30 mg tablet TAKE ONE TABLET BY MOUTH EVERY  tablet 1     No current facility-administered medications for this visit.     No Known Allergies

## 2024-11-14 ENCOUNTER — OFFICE VISIT (OUTPATIENT)
Dept: SURGICAL ONCOLOGY | Facility: CLINIC | Age: 69
End: 2024-11-14

## 2024-11-14 VITALS
HEIGHT: 69 IN | DIASTOLIC BLOOD PRESSURE: 80 MMHG | SYSTOLIC BLOOD PRESSURE: 150 MMHG | OXYGEN SATURATION: 97 % | TEMPERATURE: 97 F | WEIGHT: 229 LBS | RESPIRATION RATE: 16 BRPM | HEART RATE: 117 BPM | BODY MASS INDEX: 33.92 KG/M2

## 2024-11-14 DIAGNOSIS — Z12.31 ENCOUNTER FOR MAMMOGRAM TO ESTABLISH BASELINE MAMMOGRAM: ICD-10-CM

## 2024-11-14 DIAGNOSIS — D05.01 LOBULAR CARCINOMA IN SITU (LCIS) OF RIGHT BREAST: Primary | ICD-10-CM

## 2024-11-14 DIAGNOSIS — Z91.89 AT INCREASED RISK OF BREAST CANCER: ICD-10-CM

## 2024-11-14 DIAGNOSIS — Z12.31 ENCOUNTER FOR SCREENING MAMMOGRAM FOR BREAST CANCER: ICD-10-CM

## 2024-11-14 PROCEDURE — 99024 POSTOP FOLLOW-UP VISIT: CPT | Performed by: STUDENT IN AN ORGANIZED HEALTH CARE EDUCATION/TRAINING PROGRAM

## 2024-11-14 NOTE — ASSESSMENT & PLAN NOTE
I reviewed pathology report with patient.  A copy of the report was provided to patient at the visit.  We reviewed LCIS of the classic variant.  There was no evidence of invasion.  There was no evidence of DCIS.  I explained to patient that LCIS was diagnosed on her excisional biopsy and with classic LCIS present at the margin reexcision is not indicated.  Patient will return to her annual screening mammograms.  Her next annual screening mammogram is due in August 2025.  I order this at this visit.  Patient will follow-up with me following this imaging.

## 2024-11-26 NOTE — TELEPHONE ENCOUNTER
----- Message from Arlen Santiago MD sent at 6/20/2022  8:32 AM EDT -----  Please call patient with results (having a hard time accessing [facility])- labs normal (no evidence of nutritional deficiencies) and liver enzymes improving 
Called patient  Made aware of results  Patient verbalized understanding  Advised to contact our office with any questions or concerns  
English

## 2024-12-16 ENCOUNTER — OFFICE VISIT (OUTPATIENT)
Dept: GASTROENTEROLOGY | Facility: AMBULARY SURGERY CENTER | Age: 69
End: 2024-12-16
Payer: MEDICARE

## 2024-12-16 VITALS
HEIGHT: 69 IN | BODY MASS INDEX: 33.95 KG/M2 | WEIGHT: 229.2 LBS | OXYGEN SATURATION: 97 % | SYSTOLIC BLOOD PRESSURE: 134 MMHG | HEART RATE: 120 BPM | DIASTOLIC BLOOD PRESSURE: 86 MMHG

## 2024-12-16 DIAGNOSIS — K90.0 CELIAC DISEASE: Primary | ICD-10-CM

## 2024-12-16 DIAGNOSIS — R79.89 ELEVATED LFTS: ICD-10-CM

## 2024-12-16 DIAGNOSIS — K76.0 HEPATIC STEATOSIS: ICD-10-CM

## 2024-12-16 DIAGNOSIS — K21.00 GASTROESOPHAGEAL REFLUX DISEASE WITH ESOPHAGITIS WITHOUT HEMORRHAGE: ICD-10-CM

## 2024-12-16 PROBLEM — R16.2 HEPATOSPLENOMEGALY: Status: RESOLVED | Noted: 2022-02-25 | Resolved: 2024-12-16

## 2024-12-16 PROCEDURE — 99214 OFFICE O/P EST MOD 30 MIN: CPT | Performed by: INTERNAL MEDICINE

## 2024-12-16 RX ORDER — OMEPRAZOLE 40 MG/1
40 CAPSULE, DELAYED RELEASE ORAL DAILY
Qty: 90 CAPSULE | Refills: 3 | Status: SHIPPED | OUTPATIENT
Start: 2024-12-16

## 2024-12-16 NOTE — PROGRESS NOTES
Name: Angelia Caruso      : 1955      MRN: [id number]  Encounter Provider: Martha Hurt MD  Encounter Date: 2024   Encounter department: [facility]  :  Assessment & Plan  Celiac disease  Chronic disease > 12 months, managed with diet  EGD in  showed improvement in duodenal intraepithelial lymphocytosis, with improvement in TTG  Emphasized continued gluten-free diet  Will obtain celiac panel to monitor antibody levels  Orders:    Celiac Disease Panel; Future    Elevated LFTs  Chronic disease > 12 months, clinically stable  Multifactorial, suspect hepatic steatosis with celiac disease  Patient initially presented with elevated liver enzymes, hepatosplenomegaly seen on imaging-objective findings have improved since being on gluten-free diet  Liver enzymes remain mild hepatocellular elevation, continue to monitor every 6 to 12 months CMP  Will obtain ultrasound  Orders:    US right upper quadrant; Future    Hepatic steatosis  See above  Orders:    US right upper quadrant; Future    Gastroesophageal reflux disease with esophagitis without hemorrhage  Chronic disease >12 months, clinically managed with medications and lifestyle  Continue antireflux measures, avoid late-night eating  Refill of omeprazole AC Breakfast  Orders:    omeprazole (PriLOSEC) 40 MG capsule; Take 1 capsule (40 mg total) by mouth daily        History of Present Illness   HPI  Angelia Caruso is a 69 y.o. female who presents in office visit follow-up for celiac disease.  She initially presented with elevated liver enzymes, which have improved significantly since following gluten free diet.     EGD -intraepithelial duodenal lymphocytes, elevated TTG  EGD -intraepithelial lymphocytosis on duodenal biopsies, improved from previous, mild chronic inactive gastritis with intestinal metaplasia  Last TTG 6 in  (improved from > 100)  She is for the most part following a very strict  gluten-free diet.  She does note that if she gets exposed to gluten, she experiences abdominal cramping with stool urgency and loose stools.  The symptoms are rare.  She otherwise moves her bowels 1-2 times/ daily.    She is doing well with her reflux symptoms with omeprazole before breakfast.  She does note triggers after late-night eating which she attempts to avoid.    Colonoscopy 2023-subcentimeter tubular adenomas, recall 3 years        Review of Systems       Objective   There were no vitals taken for this visit.     Physical Exam  Vitals and nursing note reviewed.   Constitutional:       General: She is not in acute distress.     Appearance: Normal appearance.   HENT:      Head: Normocephalic and atraumatic.   Eyes:      Conjunctiva/sclera: Conjunctivae normal.   Pulmonary:      Effort: Pulmonary effort is normal. No respiratory distress.   Abdominal:      Palpations: Abdomen is soft.      Tenderness: There is no abdominal tenderness.   Musculoskeletal:         General: Normal range of motion.      Cervical back: Neck supple.   Skin:     General: Skin is warm and dry.   Neurological:      Mental Status: She is alert.   Psychiatric:         Mood and Affect: Mood normal.         Lab Results   Component Value Date    WBC 5.64 10/02/2024    HGB 14.4 10/02/2024    HCT 44.1 10/02/2024    MCV 85 10/02/2024     10/02/2024       Lab Results   Component Value Date     10/27/2017    SODIUM 140 10/02/2024    K 3.8 10/02/2024     10/02/2024    CO2 27 10/02/2024    AGAP 7 10/02/2024    BUN 14 10/02/2024    CREATININE 0.95 10/02/2024    GLUC 108 06/07/2023    GLUF 105 (H) 10/02/2024    CALCIUM 9.9 10/02/2024    AST 73 (H) 10/02/2024    ALT 62 (H) 10/02/2024    ALKPHOS 88 10/02/2024    PROT 7.0 10/27/2017    TP 7.5 10/02/2024    BILITOT 0.6 10/27/2017    TBILI 0.65 10/02/2024    EGFR 60 10/02/2024     I personally reviewed recent lab results

## 2024-12-16 NOTE — ASSESSMENT & PLAN NOTE
Chronic disease > 12 months, managed with diet  EGD in 2022 showed improvement in duodenal intraepithelial lymphocytosis, with improvement in TTG  Emphasized continued gluten-free diet  Will obtain celiac panel to monitor antibody levels  Orders:    Celiac Disease Panel; Future

## 2024-12-16 NOTE — ASSESSMENT & PLAN NOTE
Chronic disease > 12 months, clinically stable  Multifactorial, suspect hepatic steatosis with celiac disease  Patient initially presented with elevated liver enzymes, hepatosplenomegaly seen on imaging-objective findings have improved since being on gluten-free diet  Liver enzymes remain mild hepatocellular elevation, continue to monitor every 6 to 12 months CMP  Will obtain ultrasound  Orders:    US right upper quadrant; Future

## 2024-12-16 NOTE — ASSESSMENT & PLAN NOTE
Chronic disease >12 months, clinically managed with medications and lifestyle  Continue antireflux measures, avoid late-night eating  Refill of omeprazole AC Breakfast  Orders:    omeprazole (PriLOSEC) 40 MG capsule; Take 1 capsule (40 mg total) by mouth daily

## 2025-01-09 ENCOUNTER — OFFICE VISIT (OUTPATIENT)
Dept: CARDIOLOGY CLINIC | Facility: CLINIC | Age: 70
End: 2025-01-09
Payer: MEDICARE

## 2025-01-09 VITALS
WEIGHT: 227 LBS | BODY MASS INDEX: 33.62 KG/M2 | HEART RATE: 92 BPM | OXYGEN SATURATION: 98 % | HEIGHT: 69 IN | DIASTOLIC BLOOD PRESSURE: 78 MMHG | SYSTOLIC BLOOD PRESSURE: 134 MMHG

## 2025-01-09 DIAGNOSIS — E78.5 DYSLIPIDEMIA: Primary | ICD-10-CM

## 2025-01-09 DIAGNOSIS — R94.31 ABNORMAL EKG: ICD-10-CM

## 2025-01-09 PROCEDURE — 99213 OFFICE O/P EST LOW 20 MIN: CPT | Performed by: INTERNAL MEDICINE

## 2025-01-09 NOTE — PROGRESS NOTES
Cardiology Follow Up    Angelia Caruso  1955  [id number]  [facility] CARDIAC CATH LAB  [address] [facility] [address]  [phone]    1. Dyslipidemia        2. Abnormal EKG            Interval History: Cardiology follow-up.  Patient continues to do well from the cardiac review denies chest pain dyspnea.  No bleeding issues on chronic aspirin therapy.  Active but no regular exercise, she recently underwent breast biopsy, in situ carcinoma was removed.  She is not requiring any further treatments.  Lipids last checked total cholesterol 177, HDL 42, , slightly suboptimal, she does have chronic LFTs less than twice normal.  In the setting of liver steatosis.    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    Mild intermittent asthma without complication    Obesity (BMI 30.0-34.9)    Rosacea    Increased risk of breast cancer    Hand eczema    Chronic insomnia    Hepatic steatosis    Abnormal EKG    Anxiety associated with depression    Elevated hematocrit    Gallbladder polyp    Inflamed skin tag    Celiac disease    Stage 3a chronic kidney disease (HCC)    Osteopenia of multiple sites    Pain in both feet     Past Medical History:   Diagnosis Date    Allergic rhinitis     last assessed-10/2/2017    Asthma     Carotid bruit     R-last assessed-6/10/2014    Celiac disease     Dysphagia     last assessed-6/6/2013    GERD (gastroesophageal reflux disease)     Pneumonia     more than 10 yrs ago as of 10/10/24     Social History     Socioeconomic History    Marital status:  /Civil Union     Spouse name: Not on file    Number of children: Not on file    Years of education: Not on file    Highest education level: Not on file   Occupational History    Not on file   Tobacco Use    Smoking status: Former     Current packs/day: 0.00     Types: Cigarettes     Start date: 1974     Quit date: 1976     Years since quittin.7     Passive exposure: Never    Smokeless tobacco: Never   Vaping Use    Vaping status: Never Used   Substance and Sexual Activity    Alcohol use: Yes     Alcohol/week: 1.0 standard drink of alcohol     Types: 1 Glasses of wine per week     Comment: glass of wine weekly    Drug use: No    Sexual activity: Not Currently     Partners: Male     Birth control/protection: Post-menopausal   Other Topics Concern    Not on file   Social History Narrative    Not on file     Social Drivers of Health     Financial Resource Strain: Low Risk  (3/13/2023)    Overall Financial Resource Strain (CARDIA)     Difficulty of Paying Living Expenses: Not hard at all   Food Insecurity: No Food Insecurity (3/27/2024)    Nursing - Inadequate Food Risk Classification     Worried About Running Out of Food in the Last Year: Never true     Ran Out of Food in the Last Year: Never true     Ran Out of Food in the Last Year: Not on file   Transportation Needs: No Transportation Needs (3/27/2024)    PRAPARE - Transportation     Lack of Transportation (Medical): No     Lack of Transportation (Non-Medical): No   Physical Activity: Not on file   Stress: Not on file   Social Connections: Not on file   Intimate Partner Violence: Not on file   Housing Stability: Low Risk  (3/27/2024)    Housing Stability Vital Sign     Unable to Pay for Housing in the Last Year: No     Number of Times Moved in the Last Year: 1     Homeless in the Last Year: No      Family History   Problem Relation Age of Onset    Heart attack Mother         acute MI    Colon polyps Mother         polyps of the sigmoid colon     Colon cancer Mother     Breast cancer Sister 54    Uterine cancer Sister 64    No Known Problems Daughter     No Known Problems Daughter     Uterine cancer Maternal Grandmother         unknown age-maybe 60's    Uterine cancer Paternal Grandmother         unknown age , maybe 60's    Diabetes Paternal Grandfather     Liver cancer Brother 50    Colon cancer Brother 50    Lung cancer Brother     No Known Problems Brother     No Known Problems Brother     No Known Problems Brother     No Known Problems Brother     No Known Problems Son     Stroke Maternal Uncle     Kidney cancer Paternal Aunt 55    Pancreatic cancer Paternal Uncle 67    Breast cancer Cousin 57        maternal    Uterine cancer Cousin 55    Colon cancer Cousin 60     Past Surgical History:   Procedure Laterality Date    BREAST BIOPSY Right 11/15/2017    percutaneous needle core    BREAST BIOPSY Right 2023    SLW Stereo 9:00    BREAST BIOPSY Right 2024    BREAST LUMPECTOMY Right 2017    LCIS    BREAST LUMPECTOMY Right 2023    Procedure: BREAST PUMA  DIRECTED LUMPECTOMY;  Surgeon: Marco Rivera MD;  [facility];  Service: Surgical Oncology     SECTION      COLONOSCOPY      complete-12/3/2012-internal/external hemorrhoids, perianal or excoriation, mild sigmoid diverticulosis    MAMMO NEEDLE LOCALIZATION LEFT (ALL INC) Right 2023    MAMMO NEEDLE LOCALIZATION RIGHT (ALL INC) Right 2017    MAMMO NEEDLE LOCALIZATION RIGHT (ALL INC) EACH ADD Right 2017    MAMMO STEREOTACTIC BREAST BIOPSY RIGHT (ALL INC) Right 11/15/2017    MAMMO STEREOTACTIC BREAST BIOPSY RIGHT (ALL INC) Right 2023    MAMMO STEREOTACTIC BREAST BIOPSY RIGHT (ALL INC) EACH ADD Right 11/15/2017    MRI BREAST BIOPSY LEFT (ALL INCLUSIVE) Left 2019    SD EXC BREAST LES PREOP PLMT RAD MARKER OPEN 1 LES Right 10/31/2024    Procedure: RIGHT BREAST PUMA LOCALIZED EXCISIONAL BIOPSY;  Surgeon: Cassandra Lynn Cardarelli, MD;   [facility];  Service: Surgical Oncology    CT MASTECTOMY PARTIAL Right 10/31/2024    Procedure: RIGHT BREAST PUMA LOCALIZED EXCISIONAL BIOPSY;  Surgeon: Cassandra Lynn Cardarelli, MD;  [facility];  Service: Surgical Oncology    CT PERQ DEVICE PLACEMENT BREAST LOC 1ST LES W/GDNCE Right 12/19/2017    Procedure: BREAST LUMPECTOMY; BREAST BRACKETED NEEDLE LOCALIZATION (BRACKETED NEEDLE LOC AT 0900);  Surgeon: Marco Rivera MD;  [facility];  Service: Surgical Oncology    UPPER GASTROINTESTINAL ENDOSCOPY      US BREAST CLIP NEEDLE LOC RIGHT Right 10/11/2024    US GUIDED BREAST BIOPSY RIGHT COMPLETE Right 8/30/2024    VAGINAL DILATATION  1978    d&e       Current Outpatient Medications:     albuterol (Ventolin HFA) 90 mcg/act inhaler, Inhale 2 puffs every 4 (four) hours as needed for wheezing, Disp: 18 g, Rfl: 5    aspirin 81 MG tablet, Take 81 mg by mouth daily Started holding 10/8 for sx, Disp: , Rfl:     calcium citrate (CALCITRATE) 950 MG tablet, Take 1 tablet by mouth daily Last dose 10/9, Disp: , Rfl:     Multiple Vitamin (MULTI-VITAMIN DAILY PO), Take by mouth, Disp: , Rfl:     omeprazole (PriLOSEC) 40 MG capsule, Take 1 capsule (40 mg total) by mouth daily, Disp: 90 capsule, Rfl: 3    PARoxetine (PAXIL) 30 mg tablet, TAKE ONE TABLET BY MOUTH EVERY DAY, Disp: 100 tablet, Rfl: 1    fluocinonide (LIDEX) 0.05 % cream, Apply topically 2 (two) times a day (Patient not taking: Reported on 8/14/2024), Disp: 30 g, Rfl: 1  No Known Allergies    Labs:  Admission on 10/31/2024, Discharged on 10/31/2024   Component Date Value    Case Report 10/31/2024                      Value:Surgical Pathology Report                         Case: [id number]                                  Authorizing Provider:  Cassandra Lynn Cardarelli, Collected:           10/31/2024 1631                                     MD                                                                           Ordering Location:     [facility]  [facility]        Received:            10/31/2024 1929                                     [place] [facility]                                                      Pathologist:           Hao Santoro MD                                                       Specimen:    Breast, Right, Right breast lumpectomy- see comments                                       Final Diagnosis 10/31/2024                      Value:A. Breast, Right, Right breast lumpectomy- see comments:  -Extensive lobular carcinoma in situ, classic type (LCIS)  , intermediate nuclear grade , involving fifteen of total twenty seven blocks   -Tumor foci span 1.1 x 1.1 x 0.7 cm area and separate microscopic foci  -Few foci of atypical lobular hyperplasia  -Fibrocystic changes  with microcalcifcation  -Intraductal papilloma , completely excised  Changes consistent with previous biopsy site   Margin  The tumor extends to anterior margin  The tumor  is located 0.1 cm from the superior & inferior margins  The remaining resection margins are negative for tumor by  more than 2 mm      Note:  Tumor cells are  positive for CKC , P120 and, negative for  Ecadherin  and surrounded by calponin positive myoepithelial cell layer Controls reacted appropriately . 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  These tests were developed and their  "performance characteristics determined by [facility] or appropriate performing facility, though some tests may be performed on tissues which have not been validated for performance characteristics (such as staining performed on alcohol exposed cell blocks and decalcified tissues).  Results should be interpreted with caution and in the context of the patients’ clinical condition. These tests may not be cleared or approved by the U.S. Food and Drug Administration, though the FDA has determined that such clearance or approval is not necessary. These tests are used for clinical purposes and they should not be regarded as investigational or for research. This laboratory has been approved by [place] 88, designated as a high-complexity laboratory and is qualified to perform these tests.  .      Gross Description 10/31/2024                      Value:A. The specimen is received in formalin, labeled with the patient's name and hospital number, and is designated \" right breast lumpectomy-see comments.\"  It consists of a breast lumpectomy specimen, oriented per the requisition with a short suture marking superior and a long suture marking lateral.  The specimen measures 2.4 cm from superior to inferior, 3.5 cm from medial to lateral, and 4.6 cm from anterior to posterior.  The specimen is inked as follows:  anterior-green, posterior-black, superior-orange, inferior-blue, medial-yellow and lateral-red.  The specimen is serially sectioned from anterior to posterior into 10 slices, revealing a 0.8 x 0.7 x 0.7 cm cavitary biopsy site filled with gelatinous material in slices 8 and 9.  The biopsy site contains a metallic butterfly clip.  The biopsy site is 0.3 cm from the superior margin, 0.5 cm from the posterior margin, 0.7 cm from the inferior margin, 1.0 cm from the medial margin, 1.1 cm from the lateral margin, and greater than 2 cm                           from the anterior margin.  Adjacent to the biopsy " site, in slices 6 and 7, is an area of white fibrous tissue containing 2 Shalini reflectors.  The fibrous area measures 1.1 x 0.4 x 0.3 cm.  It is 0.1 cm from the superior margin, 0.6 cm from the inferior margin, 0.7 cm from the medial margin, and greater than 1.5 cm from the lateral, anterior, and posterior margins.  The combined measurement of biopsy site and fibrous area is 1.1 x 1.1 x 0.7 cm.  The remaining cut surfaces are yellow, soft, and lobulated. Faxitron images are acquired.  The specimen is submitted entirely as follows:  1-5: Perpendicular anterior margin (slice 1)  6-7: Slice 2, bisected  8-9: Slice 3, bisected  10-11: Slice 4, bisected  12-13: Slice 5, bisected  14-15: Slice 6 with fibrous area, bisected  16-17: Slice 7 with fibrous area, bisected  18-19: Slice 8 with biopsy site, bisected  20-21: Slice 9 with biopsy site, bisected  22-27: Perpendicular posterior margin (slice 10)    The cold ischemia time based upon                           information provided by the submitting clinician and receiving staff in the laboratory is 1 minute.    Note: The estimated total formalin fixation time based upon information provided by the submitting clinician and the standard processing schedule is 31.5 hours.    MScheib      Clinical Information 10/31/2024                      Value:Short suture marks superior; Long suture marks lateral   Appointment on 10/03/2024   Component Date Value    Ventricular Rate 10/03/2024 73     Atrial Rate 10/03/2024 73     ID Interval 10/03/2024 148     QRSD Interval 10/03/2024 84     QT Interval 10/03/2024 402     QTC Interval 10/03/2024 442     P Axis 10/03/2024 54     QRS Axis 10/03/2024 0     T Wave [place] 10/03/2024 39    Appointment on 10/02/2024   Component Date Value    Sodium 10/02/2024 140     Potassium 10/02/2024 3.8     Chloride 10/02/2024 106     CO2 10/02/2024 27     ANION GAP 10/02/2024 7     BUN 10/02/2024 14     Creatinine 10/02/2024 0.95     Glucose, Fasting  10/02/2024 105 (H)     Calcium 10/02/2024 9.9     AST 10/02/2024 73 (H)     ALT 10/02/2024 62 (H)     Alkaline Phosphatase 10/02/2024 88     Total Protein 10/02/2024 7.5     Albumin 10/02/2024 4.4     Total Bilirubin 10/02/2024 0.65     eGFR 10/02/2024 60     WBC 10/02/2024 5.64     RBC 10/02/2024 5.18 (H)     Hemoglobin 10/02/2024 14.4     Hematocrit 10/02/2024 44.1     MCV 10/02/2024 85     MCH 10/02/2024 27.8     MCHC 10/02/2024 32.7     RDW 10/02/2024 14.3     MPV 10/02/2024 11.0     Platelets 10/02/2024 243     nRBC 10/02/2024 0     Segmented % 10/02/2024 56     Immature Grans % 10/02/2024 0     Lymphocytes % 10/02/2024 34     Monocytes % 10/02/2024 6     Eosinophils Relative 10/02/2024 3     Basophils Relative 10/02/2024 1     Absolute Neutrophils 10/02/2024 3.15     Absolute Immature Grans 10/02/2024 0.02     Absolute Lymphocytes 10/02/2024 1.94     Absolute Monocytes 10/02/2024 0.32     Eosinophils Absolute 10/02/2024 0.17     Basophils Absolute 10/02/2024 0.04    Hospital Outpatient Visit on 08/30/2024   Component Date Value    Case Report 08/30/2024                      Value:Surgical Pathology Report                         Case: [id number]                                  Authorizing Provider:  Tammy Hernandez,   Collected:           08/30/2024 1346                                     CRNP                                                                         Ordering Location:     [facility] Received:            08/30/2024 1849                                     Center                                                                       Pathologist:           Becca Parson MD                                                       Specimen:    Breast, Right, us rt br bx 10 7cmfn 4 passes 12g [email or web address]                                   Final Diagnosis 08/30/2024                      Value:A. Breast, Right, ultrasound-guided biopsy of mass at 10:00, 7 cm from nipple:   -  "Lobular carcinoma in-situ (LCIS), predominantly classical type    - Confirmed by positive myoepithelial immunostains p63, SMMHC and loss of membranous staining for E-cadherin and p120  - Atypical lobular hyperplasia (ALH)  - No invasive carcinoma identified  - Microcalcifications: present, associated with lobular carcinoma in situ  - Deeper levels examined      Additional Information 08/30/2024                      Value:All reported additional testing was performed with appropriately reactive controls.  These tests were developed and their performance characteristics determined by [facility] or appropriate performing facility, though some tests may be performed on tissues which have not been validated for performance characteristics (such as staining performed on alcohol exposed cell blocks and decalcified tissues).  Results should be interpreted with caution and in the context of the patients’ clinical condition. These tests may not be cleared or approved by the U.S. Food and Drug Administration, though the FDA has determined that such clearance or approval is not necessary. These tests are used for clinical purposes and they should not be regarded as investigational or for research. This laboratory has been approved by CLIA 88, designated as a high-complexity laboratory and is qualified to perform these tests.    Interpretation performed at [facility]. [address] [place] [address]   .      Gross Description 08/30/2024                      Value:A. The specimen is received in formalin, labeled with the patient's name and hospital number, and is designated \" ultrasound right breast biopsy 10 7 CMFN 4 passes 12G Marquee\".  The specimen consists of 5 presumed fibrofatty cores of soft tissue measuring 0.8 to 1.8 cm in length, and ranging from 0.1 to 0.4 cm in diameter.  Entirely submitted.  3 cassettes.  The first 2 cassettes have 2 cores each and are between " sponges.  The third cassette has 1 core and is between sponges.    Note: The estimated total formalin fixation time based upon information provided by the submitting clinician and the standard processing schedule is 10.25 hours. The cold ischemia time based upon information provided by the submitting clinician and receiving staff in the laboratory is within 1 minute.    Alexey      Clinical Information 08/30/2024                      Value:Hypoechoic mass     Imaging: No results found.    Review of Systems:  Review of Systems   Constitutional:  Negative for activity change, chills, diaphoresis and fatigue.   HENT:  Negative for nosebleeds.    Respiratory:  Negative for apnea, shortness of breath, wheezing and stridor.    Cardiovascular:  Negative for chest pain, palpitations and leg swelling.   Gastrointestinal:  Negative for anal bleeding and blood in stool.   Neurological:  Negative for syncope.   Hematological:  Does not bruise/bleed easily.       Physical Exam:  Physical Exam  Vitals reviewed.   Constitutional:       General: She is not in acute distress.     Appearance: Normal appearance. She is obese. She is not ill-appearing, toxic-appearing or diaphoretic.   Eyes:      General: No scleral icterus.  Neck:      Vascular: No carotid bruit.   Cardiovascular:      Rate and Rhythm: Normal rate and regular rhythm.      Pulses: Normal pulses.      Heart sounds: Normal heart sounds. No murmur heard.     No friction rub. No gallop.   Pulmonary:      Effort: Pulmonary effort is normal. No respiratory distress.      Breath sounds: Normal breath sounds. No stridor. No wheezing, rhonchi or rales.   Musculoskeletal:      Right lower leg: No edema.      Left lower leg: No edema.   Skin:     General: Skin is warm and dry.      Capillary Refill: Capillary refill takes less than 2 seconds.      Coloration: Skin is not jaundiced or pale.      Findings: No bruising or erythema.   Neurological:      Mental Status: She is alert  and oriented to person, place, and time.   Psychiatric:         Mood and Affect: Mood normal.         Discussion/Summary:  Abnormal EKG.  Questionable inferior infarct.  Unchanged from EKG recently.  Noninvasive evaluation 2019, an echocardiogram revealed normal left ventricular systolic function, ejection fraction 55-60% mild left atrial enlargement, mild mitral and tricuspid insufficiency with estimated normal pulmonary pressures suggested by Doppler criteria, the aortic root was described as mildly dilated measuring 37 mm, personally reviewed, I do believe this is upper limits of normal.  Stress test, she only did 3 minutes of Josue protocol achieving target heart rate, there were no symptoms or EKG changes.  At the present time favor no interventions.  Continue aspirin therapy, favor no statin therapy she does have history of liver steatosis and elevated LFTs.  Previous ultrasound revealed hepatomegaly with liver steatosis. Regular exercise and weight management recommended.  Consider pharmacological testing if symptoms worsen.  She does have history of asthma which per her own account is mild, she does not use inhalers.       This note was completed in part utilizing [employer] direct voice recognition software.   Grammatical errors, random word insertion, spelling mistakes, and incomplete sentences may be an occasional consequence of the system secondary to software limitations, ambient noise and hardware issues. At the time of dictation, efforts were made to edit, clarify and /or correct errors.  Please read the chart carefully and recognize, using context, where substitutions have occurred.  If you have any questions or concerns about the context, text or information contained within the body of this dictation, please contact myself, the provider, for further clarification.

## 2025-02-25 ENCOUNTER — TELEPHONE (OUTPATIENT)
Dept: SURGICAL ONCOLOGY | Facility: CLINIC | Age: 70
End: 2025-02-25

## 2025-02-25 NOTE — TELEPHONE ENCOUNTER
Called the patient to re-time/reschedule her appointment with Dr. Cardarelli on 9/17/2025 due to a change in the provider's schedule. There was no answer so a message was left with the main office number provided.     When the patient returns the call, she should be offered an available afternoon follow up appointment time slot.   Patient would like the results transmitted by Mobile Phone    Following progress notes by Steven J. Heyden, MD on 1/5/2023    ASSESSMENT:  Acute upper GI bleed  (primary encounter diagnosis)  Essential hypertension  Type 2 diabetes mellitus with diabetic neuropathy, with long-term current use of insulin (CMS/[facility])  Left foot pain    PLAN:  Please see orders and medications.  Patient will continue on her present medications.  She is going to have a CBC today to make sure that her hemoglobin is slowly improving.  She will otherwise follow-up with me in three months at that point will be time for her to have repeat lab work for her diabetes and other chronic medical problems.      The indication, risks, benefits, potential side effects, and drug interactions of medication(s) were reviewed with the patient.  Alternative treatment options discussed and reviewed with the patient.  Medication instructions and consequences of not taking the medications were discussed with the patient.  Patient expressed understanding and he/she agreed to the plan.       ^^^^^^^^^^^^^^^^^^^^^^^^^^^^^^^^^^^^^^^^^^^^^^^^^^^^^^^^^^^^^^^^  ^^^^^^^^^^^^^^^^^^^^^^^^^^^^^^^^^^^^^^^^^^^^^^^^^^^^^^^^^^^^^^^^    Medical Assistant's notes reviewed and agreed with. Medical, surgical, family and social history reviewed with patient, along with medication and allergies.  Patient care team was reviewed.    SUBJECTIVE:  Madelaine is a 60 year old female who comes in today for follow up on her recent hospitalization where she had upper GI bleed.  Patient also has a longstanding history of multiple other medical problems including end-stage renal disease, gastroparesis, essential hypertension and diabetes.    Diabetes mellitus:  Patient states her diabetes has been doing really good control lately.  She has not had any significant hyperglycemic episodes.      HYPERTENSION:    Patient denies complaints of chest pain, shortness of breath, palpitations, dizziness,  headaches or other cardiac symptoms.    Madelaine states that she is tolerating her medications and has not been having any side effects.        Patient continues to have issues with her left foot.  She is going to have to have another surgery as they feel there is an infection in the area where the bone was repaired.    PROBLEM LIST:    Patient Active Problem List   Diagnosis   • Allergic rhinitis   • Onychomycosis   • Hyperlipidemia   • Proteinuria   • Retinal edema due to secondary diabetes (CMS/HCC)   • Diabetic neuropathy (CMS/[facility])   • GERD (gastroesophageal reflux disease)   • Essential hypertension   • Type 2 diabetes mellitus with diabetic neuropathy (CMS/HCC)   • Left shoulder pain   • Gastroparesis   • Tachycardia   • At risk for coronary artery disease   • SVT    • History of colon polyps   • Obstructive sleep apnea syndrome   • Anemia of chronic disease   • Morbid obesity with BMI of 40.0-44.9, adult (CMS/[facility])   • ESRD (end stage renal disease) on dialysis (CMS/[facility])   • Abscess of right breast unrelated to pregnancy or breastfeeding   • Surgical wound, non healing   • Hyperkalemia   • Hiatal hernia   • Left breast abscess   • Chest pain   • Hiatal hernia   • Myoclonic jerking   • Convulsions (CMS/HCC)   • Generalized anxiety disorder   • Secondary hyperparathyroidism of renal origin (CMS/[facility])   • Hematemesis with nausea       MEDICATIONS:    Current Outpatient Medications   Medication Sig Dispense Refill   • albuterol 108 (90 Base) MCG/ACT inhaler INHALE 2 PUFFS INTO THE LUNGS EVERY 4 HOURS AS NEEDED FOR SHORTNESS OF BREATH OR WHEEZING 18 g 3   • sucralfate (CARAFATE) 1 g tablet Take 1 tablet by mouth 4 times daily. 120 tablet 5   • aspirin 81 MG chewable tablet Do not start before December 19, 2022. Chew and swallow one tablet mouth daily 30 tablet 3   • acetaminophen (Tylenol) 325 MG tablet Take 2 tablets by mouth every 6 hours as needed for Pain. 20 tablet 0   • hydrALAZINE (APRESOLINE) 25 MG tablet Take  1 tablet by mouth in the morning and 1 tablet in the evening. 60 tablet 0   • calcitRIOL (ROCALTROL) 0.5 MCG capsule Take 2 capsules by mouth 3 days a week. Sunday, Monday, Tuesday 60 capsule 0   • rosuvastatin (CRESTOR) 10 MG tablet Take 1 tablet by mouth daily. 30 tablet 1   • pantoprazole (Protonix) 40 MG tablet Take 1 tablet by mouth in the morning and 1 tablet in the evening. 60 tablet 0   • Epoetin Alvaro (EPOGEN IJ) Inject 1,400 Units as directed 3 days a week. MWF at dialysis     • Iron Sucrose (VENOFER IV) Inject 50 mg into the vein as directed. Once weekly on Mondays at dialysis     • metoCLOPramide (REGLAN) 5 MG tablet Take 1 tablet by mouth as needed.     • FREESTYLE LITE test strip TEST BLOOD SUGAR FOUR TIMES DAILY 200 strip 4   • insulin glargine (Lantus SoloStar) 100 UNIT/ML pen-injector PRIME 2 UNITS BEFORE EACH DOSE. INJECT 17 UNITS UNDER THE SKIN EVERY MORNING 15 mL 0   • terazosin (HYTRIN) 5 MG capsule TAKE 1 CAPSULE BY MOUTH AT BEDTIME 90 capsule 1   • Vitamin D, Ergocalciferol, 1.25 mg (50,000 units) capsule TAKE ONE CAPSULE BY MOUTH EVERY WEEK 4 capsule 1   • fexofenadine (ALLEGRA) 60 MG tablet Take 1 tablet by mouth daily. (Patient taking differently: Take 60 mg by mouth daily as needed.) 30 tablet 5   • fluticasone (FLONASE) 50 MCG/ACT nasal spray Spray 2 sprays in each nostril daily. 3 each 3   • fluticasone-vilanterol (Breo Ellipta) 200-25 MCG/ACT inhaler Inhale 1 puff into the lungs daily. 90 each 3   • ipratropium (ATROVENT) 0.03 % nasal spray USE 2 SPRAYS IN EACH NOSTRIL EVERY 12 HOURS 60 mL 1   • lidocaine (LIDODERM) 5 % patch Place 2 patches onto the skin daily. Remove patch 12 hours after applying (Patient taking differently: Place 2 patches onto the skin daily as needed. Remove patch 12 hours after applying) 12 patch 2   • diphenoxylate-atropine (LOMOTIL) 2.5-0.025 MG tablet Take one tablet by mouth twice daily as needed for diarrhea. 30 tablet 1   • NovoLOG FlexPen 100 UNIT/ML  pen-injector INJECT INTO SKIN THREE TIMES DAILY BEFORE MEALS. SLIDING SCALE. = 8UNITS, 151-200= 10, 201-250= 12, 251-300= 14,>300= 16 45 mL 1   • Lancets (freestyle) Misc USE FOUR TIMES DAILY AS DIRECTED 100 each 4   • hydrOXYzine (ATARAX) 25 MG tablet Take 1 tablet by mouth every 8 hours as needed for Itching. 180 tablet 1   • losartan (COZAAR) 25 MG tablet Take 2 tablets by mouth daily. 180 tablet 1   • busPIRone (BUSPAR) 5 MG tablet Take 1 tablet by mouth in the morning and 1 tablet before bedtime. 60 tablet 5   • pramipexole (MIRAPEX) 0.25 MG tablet TAKE 1 TABLET BY MOUTH EVERY NIGHT 2 TO 3 HOURS BEFORE BEDTIME 30 tablet 0   • escitalopram (LEXAPRO) 20 MG tablet Take 1 tablet by mouth daily. 90 tablet 1   • sevelamer carbonate (RENVELA) 800 MG tablet TAKE 2 TABLET BY MOUTH THREE TIMES DAILY WITH MEALS     • midodrine (PROAMATINE) 5 MG tablet Take 1 tablet by mouth daily. Take 1 tablet predialysis q mwf, take additional tab  1 hour into dialysis if hypotensive. 90 tablet 2   • Blood Glucose Monitoring Suppl (FreeStyle Lite) Device Test 4 times a day 1 each 0   • Insulin Pen Needle (Pen Needles) 32G X 4 MM Misc Use with 4 insulin injections daily 200 each 11   • dilTIAZem (TIAZAC) 300 MG 24 hr capsule Take 1 capsule by mouth daily. (Patient taking differently: Take 300 mg by mouth daily (at noon).) 90 capsule 1   • folic acid (FOLATE) 1 MG tablet TAKE 1 TABLET BY MOUTH EVERY DAY 90 tablet 1   • docusate sodium (COLACE) 100 MG capsule Take 1 capsule by mouth daily as needed (constipation). 10 capsule 0   • Calcium Acetate 667 MG Tab Take 2 tablets by mouth 3 times daily (with meals). Take two tablets with each meal and one with a snack. 210 tablet 11   • ondansetron (Zofran ODT) 4 MG disintegrating tablet Place 1 tablet onto the tongue every 8 hours as needed for Nausea. 30 tablet 0   • magnesium 250 MG tablet Take 250 mg by mouth 3 days a week. Monday, Wednesday and Friday     • B Complex-C-Folic Acid  (DIALYVITE) tablet Take 1 tablet by mouth daily. 30 tablet 11     No current facility-administered medications for this visit.       SOCIAL HISTORY:    Social History     Tobacco Use   • Smoking status: Never   • Smokeless tobacco: Never   Substance Use Topics   • Alcohol use: Not Currently       ALLERGIES:  Gabapentin, Levofloxacin, Latex   (environmental), Penicillins, Adhesive   (environmental), Januvia [sitagliptin], Lipitor [atorvastatin calcium], Liraglutide, and Nicotine     REVIEW OF SYSTEMS:  As in history of present of illness, otherwise negative     OBJECTIVE:  Blood pressure 128/74, weight 99 kg (218 lb 4.1 oz), last menstrual period 01/01/2007.  WDWN in NAD  LUNGS: Chest symmetric with normal A/P diameter.  Lungs are clear to auscultation.  There is good aeration.  There are no wheezes, rales or rhonchi noted.  HEART: S1,S2, regular rate and rhythm no murmur,S3, or S4 auscultated

## 2025-03-07 NOTE — TELEPHONE ENCOUNTER
Called the patient for the second time to re-time/reschedule her appointment with Dr. Cardarelli on 9/17/2025. The patient accepted the new appointment time that was offered at 2:30 and she verified appointment details.

## 2025-04-10 ENCOUNTER — APPOINTMENT (OUTPATIENT)
Dept: LAB | Facility: AMBULARY SURGERY CENTER | Age: 70
End: 2025-04-10
Payer: MEDICARE

## 2025-04-10 ENCOUNTER — OFFICE VISIT (OUTPATIENT)
Dept: FAMILY MEDICINE CLINIC | Facility: CLINIC | Age: 70
End: 2025-04-10
Payer: MEDICARE

## 2025-04-10 VITALS
SYSTOLIC BLOOD PRESSURE: 128 MMHG | HEART RATE: 61 BPM | RESPIRATION RATE: 16 BRPM | OXYGEN SATURATION: 96 % | DIASTOLIC BLOOD PRESSURE: 82 MMHG | WEIGHT: 229.8 LBS | BODY MASS INDEX: 34.04 KG/M2 | TEMPERATURE: 96.6 F | HEIGHT: 69 IN

## 2025-04-10 DIAGNOSIS — M85.89 OSTEOPENIA OF MULTIPLE SITES: ICD-10-CM

## 2025-04-10 DIAGNOSIS — D05.01 LOBULAR CARCINOMA IN SITU (LCIS) OF RIGHT BREAST: ICD-10-CM

## 2025-04-10 DIAGNOSIS — E78.5 DYSLIPIDEMIA: ICD-10-CM

## 2025-04-10 DIAGNOSIS — K21.00 GASTROESOPHAGEAL REFLUX DISEASE WITH ESOPHAGITIS WITHOUT HEMORRHAGE: ICD-10-CM

## 2025-04-10 DIAGNOSIS — J45.20 MILD INTERMITTENT ASTHMA WITHOUT COMPLICATION: ICD-10-CM

## 2025-04-10 DIAGNOSIS — K90.0 CELIAC DISEASE: ICD-10-CM

## 2025-04-10 DIAGNOSIS — N18.31 STAGE 3A CHRONIC KIDNEY DISEASE (HCC): ICD-10-CM

## 2025-04-10 DIAGNOSIS — N18.31 STAGE 3A CHRONIC KIDNEY DISEASE (HCC): Primary | ICD-10-CM

## 2025-04-10 DIAGNOSIS — F33.41 RECURRENT MAJOR DEPRESSIVE DISORDER, IN PARTIAL REMISSION (HCC): ICD-10-CM

## 2025-04-10 DIAGNOSIS — F41.8 ANXIETY ASSOCIATED WITH DEPRESSION: ICD-10-CM

## 2025-04-10 DIAGNOSIS — Z23 NEED FOR COVID-19 VACCINE: ICD-10-CM

## 2025-04-10 DIAGNOSIS — K76.0 HEPATIC STEATOSIS: ICD-10-CM

## 2025-04-10 PROBLEM — F51.04 CHRONIC INSOMNIA: Status: RESOLVED | Noted: 2019-05-08 | Resolved: 2025-04-10

## 2025-04-10 PROBLEM — L91.8 INFLAMED SKIN TAG: Status: RESOLVED | Noted: 2022-03-02 | Resolved: 2025-04-10

## 2025-04-10 PROBLEM — E04.1 LEFT THYROID NODULE: Status: RESOLVED | Noted: 2017-10-02 | Resolved: 2025-04-10

## 2025-04-10 PROBLEM — L30.9 HAND ECZEMA: Status: RESOLVED | Noted: 2019-04-09 | Resolved: 2025-04-10

## 2025-04-10 PROBLEM — K82.4 GALLBLADDER POLYP: Status: RESOLVED | Noted: 2022-02-25 | Resolved: 2025-04-10

## 2025-04-10 LAB
ALBUMIN SERPL BCG-MCNC: 4.5 G/DL (ref 3.5–5)
ALP SERPL-CCNC: 100 U/L (ref 34–104)
ALT SERPL W P-5'-P-CCNC: 65 U/L (ref 7–52)
ANION GAP SERPL CALCULATED.3IONS-SCNC: 13 MMOL/L (ref 4–13)
AST SERPL W P-5'-P-CCNC: 86 U/L (ref 13–39)
BASOPHILS # BLD AUTO: 0.04 THOUSANDS/ÂΜL (ref 0–0.1)
BASOPHILS NFR BLD AUTO: 1 % (ref 0–1)
BILIRUB SERPL-MCNC: 0.61 MG/DL (ref 0.2–1)
BUN SERPL-MCNC: 13 MG/DL (ref 5–25)
CALCIUM SERPL-MCNC: 9.9 MG/DL (ref 8.4–10.2)
CHLORIDE SERPL-SCNC: 106 MMOL/L (ref 96–108)
CHOLEST SERPL-MCNC: 200 MG/DL (ref ?–200)
CO2 SERPL-SCNC: 23 MMOL/L (ref 21–32)
CREAT SERPL-MCNC: 1.09 MG/DL (ref 0.6–1.3)
EOSINOPHIL # BLD AUTO: 0.18 THOUSAND/ÂΜL (ref 0–0.61)
EOSINOPHIL NFR BLD AUTO: 3 % (ref 0–6)
ERYTHROCYTE [DISTWIDTH] IN BLOOD BY AUTOMATED COUNT: 14.3 % (ref 11.6–15.1)
GFR SERPL CREATININE-BSD FRML MDRD: 51 ML/MIN/1.73SQ M
GLUCOSE P FAST SERPL-MCNC: 111 MG/DL (ref 65–99)
HCT VFR BLD AUTO: 45.7 % (ref 34.8–46.1)
HDLC SERPL-MCNC: 42 MG/DL
HGB BLD-MCNC: 14.8 G/DL (ref 11.5–15.4)
IGA SERPL-MCNC: 251 MG/DL (ref 66–433)
IMM GRANULOCYTES # BLD AUTO: 0.01 THOUSAND/UL (ref 0–0.2)
IMM GRANULOCYTES NFR BLD AUTO: 0 % (ref 0–2)
LDLC SERPL CALC-MCNC: 133 MG/DL (ref 0–100)
LYMPHOCYTES # BLD AUTO: 1.44 THOUSANDS/ÂΜL (ref 0.6–4.47)
LYMPHOCYTES NFR BLD AUTO: 26 % (ref 14–44)
MCH RBC QN AUTO: 27.8 PG (ref 26.8–34.3)
MCHC RBC AUTO-ENTMCNC: 32.4 G/DL (ref 31.4–37.4)
MCV RBC AUTO: 86 FL (ref 82–98)
MONOCYTES # BLD AUTO: 0.31 THOUSAND/ÂΜL (ref 0.17–1.22)
MONOCYTES NFR BLD AUTO: 6 % (ref 4–12)
NEUTROPHILS # BLD AUTO: 3.64 THOUSANDS/ÂΜL (ref 1.85–7.62)
NEUTS SEG NFR BLD AUTO: 64 % (ref 43–75)
NONHDLC SERPL-MCNC: 158 MG/DL
NRBC BLD AUTO-RTO: 0 /100 WBCS
PLATELET # BLD AUTO: 249 THOUSANDS/UL (ref 149–390)
PMV BLD AUTO: 11.4 FL (ref 8.9–12.7)
POTASSIUM SERPL-SCNC: 4 MMOL/L (ref 3.5–5.3)
PROT SERPL-MCNC: 7.5 G/DL (ref 6.4–8.4)
RBC # BLD AUTO: 5.33 MILLION/UL (ref 3.81–5.12)
SODIUM SERPL-SCNC: 142 MMOL/L (ref 135–147)
TRIGL SERPL-MCNC: 124 MG/DL (ref ?–150)
WBC # BLD AUTO: 5.62 THOUSAND/UL (ref 4.31–10.16)

## 2025-04-10 PROCEDURE — 85025 COMPLETE CBC W/AUTO DIFF WBC: CPT

## 2025-04-10 PROCEDURE — 36415 COLL VENOUS BLD VENIPUNCTURE: CPT

## 2025-04-10 PROCEDURE — G2211 COMPLEX E/M VISIT ADD ON: HCPCS | Performed by: NURSE PRACTITIONER

## 2025-04-10 PROCEDURE — 80053 COMPREHEN METABOLIC PANEL: CPT

## 2025-04-10 PROCEDURE — 82784 ASSAY IGA/IGD/IGG/IGM EACH: CPT

## 2025-04-10 PROCEDURE — 91320 SARSCV2 VAC 30MCG TRS-SUC IM: CPT

## 2025-04-10 PROCEDURE — 80061 LIPID PANEL: CPT

## 2025-04-10 PROCEDURE — 90480 ADMN SARSCOV2 VAC 1/ONLY CMP: CPT

## 2025-04-10 PROCEDURE — 86364 TISS TRNSGLTMNASE EA IG CLAS: CPT

## 2025-04-10 PROCEDURE — G0439 PPPS, SUBSEQ VISIT: HCPCS | Performed by: NURSE PRACTITIONER

## 2025-04-10 PROCEDURE — 99214 OFFICE O/P EST MOD 30 MIN: CPT | Performed by: NURSE PRACTITIONER

## 2025-04-10 NOTE — ASSESSMENT & PLAN NOTE
Lab Results   Component Value Date    EGFR 60 10/02/2024    EGFR 61 03/25/2024    EGFR 57 06/07/2023    CREATININE 0.95 10/02/2024    CREATININE 0.95 03/25/2024    CREATININE 1.00 06/07/2023     Kidney function stable. Blood work done this morning still pending.

## 2025-04-10 NOTE — ASSESSMENT & PLAN NOTE
Up to date with DEXA scan.  Continue over the counter vitamin D and calcium.  Weight bearing exercises recommended      2/21/2024  IMPRESSION:     1. Low bone mass (osteopenia). Based on the left femoral neck     2.  Since a DXA study from 12/21/2021, there has been:  A  STATISTICALLY SIGNIFICANT DECREASE in bone mineral density of 0.049 g/cm2 (4.7%) in the lumbar spine.  A  STATISTICALLY SIGNIFICANT DECREASE in bone mineral density of 0.028 g/cm2 (2.8%) in the left total hip.

## 2025-04-10 NOTE — ASSESSMENT & PLAN NOTE
PHQ-2/9 Depression Screening    Little interest or pleasure in doing things: 1 - several days  Feeling down, depressed, or hopeless: 0 - not at all  Trouble falling or staying asleep, or sleeping too much: 1 - several days  Feeling tired or having little energy: 1 - several days  Poor appetite or overeatin - several days  Feeling bad about yourself - or that you are a failure or have let yourself or your family down: 0 - not at all  Trouble concentrating on things, such as reading the newspaper or watching television: 0 - not at all  Moving or speaking so slowly that other people could have noticed. Or the opposite - being so fidgety or restless that you have been moving around a lot more than usual: 0 - not at all  Thoughts that you would be better off dead, or of hurting yourself in some way: 0 - not at all  PHQ-9 Score: 4  PHQ-9 Interpretation: No or Minimal depression       Continues with paxil with good results

## 2025-04-10 NOTE — ASSESSMENT & PLAN NOTE
Continue to follow with surgical oncology. She will be due for a mammogram in August of 2025.

## 2025-04-10 NOTE — PROGRESS NOTES
Name: Angelia Caruso      : 1955      MRN: [id number]  Encounter Provider: MURRAY Carias  Encounter Date: 4/10/2025   Encounter department: [facility]  Chief Complaint   Patient presents with    Medicare Wellness Visit     Subsequent Visit     Follow-up     6 month f/u      Health Maintenance   Topic Date Due    RSV Vaccine for Pregnant Patients and Patients Age 60+ Years (1 - Risk 60-74 years 1-dose series) Never done    Zoster Vaccine (2 of 3) 2014    COVID-19 Vaccine ( season) 2025    Fall Risk  2025    Urinary Incontinence Screening  2025    Medicare Annual Wellness Visit (AWV)  2025    Depression Screening  10/02/2025    Depression Follow-up Plan  10/02/2025    Colorectal Cancer Screening  2026    Breast Cancer Screening: Mammogram  2026    Hepatitis C Screening  Completed    Osteoporosis Screening  Completed    Pneumococcal Vaccine: 65+ Years  Completed    Influenza Vaccine  Completed    Meningococcal B Vaccine  Aged Out    RSV Vaccine age 0-20 Months  Aged Out    HIB Vaccine  Aged Out    IPV Vaccine  Aged Out    Hepatitis A Vaccine  Aged Out    Meningococcal ACWY Vaccine  Aged Out    HPV Vaccine  Aged Out     Assessment & Plan  Need for COVID-19 vaccine    Orders:    COVID-19 Pfizer mRNA vaccine 12 yr and older (Comirnaty pre-filled syringe)    Stage 3a chronic kidney disease (HCC)  Lab Results   Component Value Date    EGFR 60 10/02/2024    EGFR 61 2024    EGFR 57 2023    CREATININE 0.95 10/02/2024    CREATININE 0.95 2024    CREATININE 1.00 2023     Kidney function stable. Blood work done this morning still pending.        Mild intermittent asthma without complication  Asthma well controlled. Continue with rescue inhaler as needed.          Celiac disease  Continues to follow with gastroenterology. Following gluten free diet.          Gastroesophageal reflux disease with esophagitis without  hemorrhage  Continue on prilosec daily          Osteopenia of multiple sites  Up to date with DEXA scan.  Continue over the counter vitamin D and calcium.  Weight bearing exercises recommended      2024  IMPRESSION:     1. Low bone mass (osteopenia). Based on the left femoral neck     2.  Since a DXA study from 2021, there has been:  A  STATISTICALLY SIGNIFICANT DECREASE in bone mineral density of 0.049 g/cm2 (4.7%) in the lumbar spine.  A  STATISTICALLY SIGNIFICANT DECREASE in bone mineral density of 0.028 g/cm2 (2.8%) in the left total hip.            Anxiety associated with depression  PHQ-2/9 Depression Screening    Little interest or pleasure in doing things: 1 - several days  Feeling down, depressed, or hopeless: 0 - not at all  Trouble falling or staying asleep, or sleeping too much: 1 - several days  Feeling tired or having little energy: 1 - several days  Poor appetite or overeatin - several days  Feeling bad about yourself - or that you are a failure or have let yourself or your family down: 0 - not at all  Trouble concentrating on things, such as reading the newspaper or watching television: 0 - not at all  Moving or speaking so slowly that other people could have noticed. Or the opposite - being so fidgety or restless that you have been moving around a lot more than usual: 0 - not at all  Thoughts that you would be better off dead, or of hurting yourself in some way: 0 - not at all  PHQ-9 Score: 4  PHQ-9 Interpretation: No or Minimal depression       Continues with paxil with good results         Recurrent major depressive disorder, in partial remission (HCC)  Continue paxil 30 mg daily          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  Continue to follow with surgical oncology. She will be due for a mammogram in 2025.         Dyslipidemia  Lipid panel done this morning still pending. Continue low fat diet.          BMI 33.0-33.9,adult  BMI stable. Dietary modifications  and exercise recommended.            Depression Screening and Follow-up Plan: Patient was screened for depression during today's encounter. They screened negative with a PHQ-9 score of 4.      Urinary Incontinence Plan of Care: counseling topics discussed: use restroom every 2 hours, limit alcohol, caffeine, spicy foods, and acidic foods, limiting fluid intake 3-4 hours before bed and weight loss.       Preventive health issues were discussed with patient, and age appropriate screening tests were ordered as noted in patient's After Visit Summary. Personalized health advice and appropriate referrals for health education or preventive services given if needed, as noted in patient's After Visit Summary.    History of Present Illness     Patient presents to the office today for Annual Medicare Wellness visit and follow up. She continues to follow with surgical oncology and cardiology and gastroenterology .  No new concerns. Blood work done this morning still pending. Up to date with mammogram and DEXA scan.  Due for colonoscopy next year. Covid booster provided to patient today.        Patient Care Team:  MURRAY Gillis as PCP - General (Internal Medicine)  MURRAY Woo CRNP as Nurse Practitioner (Surgical Oncology)  MURRAY Weber as Nurse Practitioner (Surgical Oncology)  Cassandra Lynn Cardarelli, MD as Surgeon (Surgical Oncology)  Elmer Velez MD as Medical Oncologist (Hematology)    Review of Systems   Constitutional:  Negative for activity change, fatigue and fever.   HENT:  Negative for congestion, hearing loss, rhinorrhea, trouble swallowing and voice change.    Eyes:  Negative for photophobia, pain, discharge and visual disturbance.   Respiratory:  Negative for cough, chest tightness and shortness of breath.    Cardiovascular:  Negative for chest pain, palpitations and leg swelling.   Gastrointestinal:  Negative for abdominal pain, blood in stool,  constipation, nausea and vomiting.   Endocrine: Negative for cold intolerance and heat intolerance.   Genitourinary:  Negative for difficulty urinating, frequency, hematuria, urgency, vaginal bleeding and vaginal discharge.   Musculoskeletal:  Negative for arthralgias and myalgias.   Skin: Negative.    Neurological:  Negative for dizziness, weakness, numbness and headaches.   Psychiatric/Behavioral:  Negative for decreased concentration. The patient is not nervous/anxious.      Medical History Reviewed by provider this encounter:       Annual Wellness Visit Questionnaire   [place]ia is here for her Subsequent Wellness visit. Last Medicare Wellness visit information reviewed, patient interviewed, no change since last AWV.     Health Risk Assessment:   Patient rates overall health as good. Patient feels that their physical health rating is same. Patient is satisfied with their life. Eyesight was rated as same. Hearing was rated as slightly worse. Patient feels that their emotional and mental health rating is same. Patients states they are never, rarely angry. Patient states they are sometimes unusually tired/fatigued. Pain experienced in the last 7 days has been none. Patient states that she has experienced no weight loss or gain in last 6 months.     Depression Screening:   PHQ-9 Score: 4      Fall Risk Screening:   In the past year, patient has experienced: history of falling in past year    Number of falls: 1  Injured during fall?: No    Feels unsteady when standing or walking?: No    Worried about falling?: No      Urinary Incontinence Screening:   Patient has leaked urine accidently in the last six months. Occasional    Home Safety:  Patient does not have trouble with stairs inside or outside of their home. Patient has working smoke alarms and has no working carbon monoxide detector. Home safety hazards include: none.     Nutrition:   Current diet is Frequent junk food.     Medications:   Patient is currently  taking over-the-counter supplements. OTC medications include: see medication list. Patient is able to manage medications.     Activities of Daily Living (ADLs)/Instrumental Activities of Daily Living (IADLs):   Walk and transfer into and out of bed and chair?: Yes  Dress and groom yourself?: Yes    Bathe or shower yourself?: Yes    Feed yourself? Yes  Do your laundry/housekeeping?: Yes  Manage your money, pay your bills and track your expenses?: Yes  Make your own meals?: Yes    Do your own shopping?: Yes    Previous Hospitalizations:   Any hospitalizations or ED visits within the last 12 months?: Yes    How many hospitalizations have you had in the last year?: 1-2    Hospitalization Comments: lumpectomy right side 10-31-24    Advance Care Planning:   Living will: No    Durable POA for healthcare: No    Advanced directive: No    ACP document given: Yes      Comments: Documents given to patient     Cognitive Screening:   Provider or family/friend/caregiver concerned regarding cognition?: No    Preventive Screenings      Cardiovascular Screening:    General: Screening Current      Diabetes Screening:     General: Screening Current      Colorectal Cancer Screening:     General: Screening Current      Breast Cancer Screening:     General: Screening Current      Cervical Cancer Screening:    General: Screening Not Indicated      Osteoporosis Screening:    General: Screening Current      Abdominal Aortic Aneurysm (AAA) Screening:        General: Screening Not Indicated      Lung Cancer Screening:     General: Screening Not Indicated      Hepatitis C Screening:    General: Screening Current    Immunizations:  - Immunizations due: Zoster (Shingrix)    Screening, Brief Intervention, and Referral to Treatment (SBIRT)     Screening  Typical number of drinks in a day: 0  Typical number of drinks in a week: 1  Interpretation: Low risk drinking behavior.    AUDIT-C Screenin) How often did you have a drink containing alcohol  "in the past year? 2 to 4 times a month  2) How many drinks did you have on a typical day when you were drinking in the past year? 0  3) How often did you have 6 or more drinks on one occasion in the past year? never    AUDIT-C Score: 2  Interpretation: Score 0-2 (female): Negative screen for alcohol misuse    Single Item Drug Screening:  How often have you used an illegal drug (including marijuana) or a prescription medication for non-medical reasons in the past year? never    Single Item Drug Screen Score: 0  Interpretation: Negative screen for possible drug use disorder    Social Drivers of Health     Financial Resource Strain: Low Risk  (3/13/2023)    Overall Financial Resource Strain (CARDIA)     Difficulty of Paying Living Expenses: Not hard at all   Food Insecurity: No Food Insecurity (4/9/2025)    Hunger Vital Sign     Worried About Running Out of Food in the Last Year: Never true     Ran Out of Food in the Last Year: Never true   Transportation Needs: No Transportation Needs (4/9/2025)    PRAPARE - Transportation     Lack of Transportation (Medical): No     Lack of Transportation (Non-Medical): No   Housing Stability: Low Risk  (4/9/2025)    Housing Stability Vital Sign     Unable to Pay for Housing in the Last Year: No     Number of Times Moved in the Last Year: 0     Homeless in the Last Year: No   Utilities: Not At Risk (4/9/2025)    [facility] Utilities     Threatened with loss of utilities: No     No results found.    Objective   Ht 5' 9\" (1.753 m)   BMI 33.52 kg/m²     Physical Exam  Vitals reviewed.   Constitutional:       Appearance: Normal appearance. She is obese.   HENT:      Head: Normocephalic.      Nose: Nose normal.      Mouth/Throat:      Mouth: Mucous membranes are moist.      Pharynx: Oropharynx is clear.   Eyes:      Extraocular Movements: Extraocular movements intact.      Pupils: Pupils are equal, round, and reactive to light.   Cardiovascular:      Rate and Rhythm: Normal rate and regular " rhythm.      Pulses: Normal pulses.      Heart sounds: Normal heart sounds.   Pulmonary:      Effort: Pulmonary effort is normal.      Breath sounds: Normal breath sounds.   Musculoskeletal:         General: Normal range of motion.   Skin:     General: Skin is warm and dry.      Capillary Refill: Capillary refill takes less than 2 seconds.   Neurological:      General: No focal deficit present.      Mental Status: She is alert and oriented to person, place, and time. Mental status is at baseline.   Psychiatric:         Mood and Affect: Mood normal.         Behavior: Behavior normal.         Thought Content: Thought content normal.         Judgment: Judgment normal.

## 2025-04-10 NOTE — PATIENT INSTRUCTIONS
Medicare Preventive Visit Patient Instructions  Thank you for completing your Welcome to Medicare Visit or Medicare Annual Wellness Visit today. Your next wellness visit will be due in one year (4/11/2026).  The screening/preventive services that you may require over the next 5-10 years are detailed below. Some tests may not apply to you based off risk factors and/or age. Screening tests ordered at today's visit but not completed yet may show as past due. Also, please note that scanned in results may not display below.  Preventive Screenings:  Service Recommendations Previous Testing/Comments   Colorectal Cancer Screening  * Colonoscopy    * Fecal Occult Blood Test (FOBT)/Fecal Immunochemical Test (FIT)  * Fecal DNA/Cologuard Test  * Flexible Sigmoidoscopy Age: 45-75 years old   Colonoscopy: every 10 years (may be performed more frequently if at higher risk)  OR  FOBT/FIT: every 1 year  OR  Cologuard: every 3 years  OR  Sigmoidoscopy: every 5 years  Screening may be recommended earlier than age 45 if at higher risk for colorectal cancer. Also, an individualized decision between you and your healthcare provider will decide whether screening between the ages of 76-85 would be appropriate. Colonoscopy: 03/28/2023  FOBT/FIT: Not on file  Cologuard: Not on file  Sigmoidoscopy: Not on file    Screening Current     Breast Cancer Screening Age: 40+ years old  Frequency: every 1-2 years  Not required if history of left and right mastectomy Mammogram: 08/20/2024    Screening Current   Cervical Cancer Screening Between the ages of 21-29, pap smear recommended once every 3 years.   Between the ages of 30-65, can perform pap smear with HPV co-testing every 5 years.   Recommendations may differ for women with a history of total hysterectomy, cervical cancer, or abnormal pap smears in past. Pap Smear: 08/14/2024    Screening Not Indicated   Hepatitis C Screening Once for adults born between 1945 and 1965  More frequently in  patients at high risk for Hepatitis C Hep C Antibody: 08/20/2019    Screening Current   Diabetes Screening 1-2 times per year if you're at risk for diabetes or have pre-diabetes Fasting glucose: 105 mg/dL (10/2/2024)  A1C: No results in last 5 years (No results in last 5 years)  Screening Current   Cholesterol Screening Once every 5 years if you don't have a lipid disorder. May order more often based on risk factors. Lipid panel: 04/10/2025    Screening Current     Other Preventive Screenings Covered by Medicare:  Abdominal Aortic Aneurysm (AAA) Screening: covered once if your at risk. You're considered to be at risk if you have a family history of AAA.  Lung Cancer Screening: covers low dose CT scan once per year if you meet all of the following conditions: (1) Age 55-77; (2) No signs or symptoms of lung cancer; (3) Current smoker or have quit smoking within the last 15 years; (4) You have a tobacco smoking history of at least 20 pack years (packs per day multiplied by number of years you smoked); (5) You get a written order from a healthcare provider.  Glaucoma Screening: covered annually if you're considered high risk: (1) You have diabetes OR (2) Family history of glaucoma OR (3)  aged 50 and older OR (4)  American aged 65 and older  Osteoporosis Screening: covered every 2 years if you meet one of the following conditions: (1) You're estrogen deficient and at risk for osteoporosis based off medical history and other findings; (2) Have a vertebral abnormality; (3) On glucocorticoid therapy for more than 3 months; (4) Have primary hyperparathyroidism; (5) On osteoporosis medications and need to assess response to drug therapy.   Last bone density test (DXA Scan): 02/21/2024.  HIV Screening: covered annually if you're between the age of 15-65. Also covered annually if you are younger than 15 and older than 65 with risk factors for HIV infection. For pregnant patients, it is covered up to 3  times per pregnancy.    Immunizations:  Immunization Recommendations   Influenza Vaccine Annual influenza vaccination during flu season is recommended for all persons aged >= 6 months who do not have contraindications   Pneumococcal Vaccine   * Pneumococcal conjugate vaccine = PCV13 (Prevnar 13), PCV15 (Vaxneuvance), PCV20 (Prevnar 20)  * Pneumococcal polysaccharide vaccine = PPSV23 (Pneumovax) Adults 19-63 yo with certain risk factors or if 65+ yo  If never received any pneumonia vaccine: recommend Prevnar 20 (PCV20)  Give PCV20 if previously received 1 dose of PCV13 or PPSV23   Hepatitis B Vaccine 3 dose series if at intermediate or high risk (ex: diabetes, end stage renal disease, liver disease)   Respiratory syncytial virus (RSV) Vaccine - COVERED BY MEDICARE PART D  * RSVPreF3 (Arexvy) CDC recommends that adults 60 years of age and older may receive a single dose of RSV vaccine using shared clinical decision-making (SCDM)   Tetanus (Td) Vaccine - COST NOT COVERED BY MEDICARE PART B Following completion of primary series, a booster dose should be given every 10 years to maintain immunity against tetanus. Td may also be given as tetanus wound prophylaxis.   Tdap Vaccine - COST NOT COVERED BY MEDICARE PART B Recommended at least once for all adults. For pregnant patients, recommended with each pregnancy.   Shingles Vaccine (Shingrix) - COST NOT COVERED BY MEDICARE PART B  2 shot series recommended in those 19 years and older who have or will have weakened immune systems or those 50 years and older     Health Maintenance Due:      Topic Date Due    Colorectal Cancer Screening  03/27/2026    Breast Cancer Screening: Mammogram  08/20/2026    Hepatitis C Screening  Completed     Immunizations Due:      Topic Date Due    COVID-19 Vaccine (7 - 2024-25 season) 03/23/2025     Advance Directives   What are advance directives?  Advance directives are legal documents that state your wishes and plans for medical care. These  plans are made ahead of time in case you lose your ability to make decisions for yourself. Advance directives can apply to any medical decision, such as the treatments you want, and if you want to donate organs.   What are the types of advance directives?  There are many types of advance directives, and each state has rules about how to use them. You may choose a combination of any of the following:  Living will:  This is a written record of the treatment you want. You can also choose which treatments you do not want, which to limit, and which to stop at a certain time. This includes surgery, medicine, IV fluid, and tube feedings.   Durable power of  for healthcare (DPAHC):  This is a written record that states who you want to make healthcare choices for you when you are unable to make them for yourself. This person, called a proxy, is usually a family member or a friend. You may choose more than 1 proxy.  Do not resuscitate (DNR) order:  A DNR order is used in case your heart stops beating or you stop breathing. It is a request not to have certain forms of treatment, such as CPR. A DNR order may be included in other types of advance directives.  Medical directive:  This covers the care that you want if you are in a coma, near death, or unable to make decisions for yourself. You can list the treatments you want for each condition. Treatment may include pain medicine, surgery, blood transfusions, dialysis, IV or tube feedings, and a ventilator (breathing machine).  Values history:  This document has questions about your views, beliefs, and how you feel and think about life. This information can help others choose the care that you would choose.  Why are advance directives important?  An advance directive helps you control your care. Although spoken wishes may be used, it is better to have your wishes written down. Spoken wishes can be misunderstood, or not followed. Treatments may be given even if you do not  want them. An advance directive may make it easier for your family to make difficult choices about your care.   Fall Prevention    Fall prevention  includes ways to make your home and other areas safer. It also includes ways you can move more carefully to prevent a fall. Health conditions that cause changes in your blood pressure, vision, or muscle strength and coordination may increase your risk for falls. Medicines may also increase your risk for falls if they make you dizzy, weak, or sleepy.   Fall prevention tips:   Stand or sit up slowly.    Use assistive devices as directed.    Wear shoes that fit well and have soles that .    Wear a personal alarm.    Stay active.    Manage your medical conditions.    Home Safety Tips:  Add items to prevent falls in the bathroom.    Keep paths clear.    Install bright lights in your home.    Keep items you use often on shelves within reach.    Paint or place reflective tape on the edges of your stairs.    Urinary Incontinence   Urinary incontinence (UI)  is when you lose control of your bladder. UI develops because your bladder cannot store or empty urine properly. The 3 most common types of UI are stress incontinence, urge incontinence, or both.  Medicines:   May be given to help strengthen your bladder control. Report any side effects of medication to your healthcare provider.  Do pelvic muscle exercises often:  Your pelvic muscles help you stop urinating. Squeeze these muscles tight for 5 seconds, then relax for 5 seconds. Gradually work up to squeezing for 10 seconds. Do 3 sets of 15 repetitions a day, or as directed. This will help strengthen your pelvic muscles and improve bladder control.  Train your bladder:  Go to the bathroom at set times, such as every 2 hours, even if you do not feel the urge to go. You can also try to hold your urine when you feel the urge to go. For example, hold your urine for 5 minutes when you feel the urge to go. As that becomes easier,  hold your urine for 10 minutes.   Self-care:   Keep a UI record.  Write down how often you leak urine and how much you leak. Make a note of what you were doing when you leaked urine.  Drink liquids as directed. You may need to limit the amount of liquid you drink to help control your urine leakage. Do not drink any liquid right before you go to bed. Limit or do not have drinks that contain caffeine or alcohol.   Prevent constipation.  Eat a variety of high-fiber foods. Good examples are high-fiber cereals, beans, vegetables, and whole-grain breads. Walking is the best way to trigger your intestines to have a bowel movement.  Exercise regularly and maintain a healthy weight.  Weight loss and exercise will decrease pressure on your bladder and help you control your leakage.   Use a catheter as directed  to help empty your bladder. A catheter is a tiny, plastic tube that is put into your bladder to drain your urine.   Go to behavior therapy as directed.  Behavior therapy may be used to help you learn to control your urge to urinate.    Weight Management   Why it is important to manage your weight:  Being overweight increases your risk of health conditions such as heart disease, high blood pressure, type 2 diabetes, and certain types of cancer. It can also increase your risk for osteoarthritis, sleep apnea, and other respiratory problems. Aim for a slow, steady weight loss. Even a small amount of weight loss can lower your risk of health problems.  How to lose weight safely:  A safe and healthy way to lose weight is to eat fewer calories and get regular exercise. You can lose up about 1 pound a week by decreasing the number of calories you eat by 500 calories each day.   Healthy meal plan for weight management:  A healthy meal plan includes a variety of foods, contains fewer calories, and helps you stay healthy. A healthy meal plan includes the following:  Eat whole-grain foods more often.  A healthy meal plan should  contain fiber. Fiber is the part of grains, fruits, and vegetables that is not broken down by your body. Whole-grain foods are healthy and provide extra fiber in your diet. Some examples of whole-grain foods are whole-wheat breads and pastas, oatmeal, brown rice, and bulgur.  Eat a variety of vegetables every day.  Include dark, leafy greens such as spinach, kale, bryce greens, and mustard greens. Eat yellow and orange vegetables such as carrots, sweet potatoes, and winter squash.   Eat a variety of fruits every day.  Choose fresh or canned fruit (canned in its own juice or light syrup) instead of juice. Fruit juice has very little or no fiber.  Eat low-fat dairy foods.  Drink fat-free (skim) milk or 1% milk. Eat fat-free yogurt and low-fat cottage cheese. Try low-fat cheeses such as mozzarella and other reduced-fat cheeses.  Choose meat and other protein foods that are low in fat.  Choose beans or other legumes such as split peas or lentils. Choose fish, skinless poultry (chicken or turkey), or lean cuts of red meat (beef or pork). Before you cook meat or poultry, cut off any visible fat.   Use less fat and oil.  Try baking foods instead of frying them. Add less fat, such as margarine, sour cream, regular salad dressing and mayonnaise to foods. Eat fewer high-fat foods. Some examples of high-fat foods include french fries, doughnuts, ice cream, and cakes.  Eat fewer sweets.  Limit foods and drinks that are high in sugar. This includes candy, cookies, regular soda, and sweetened drinks.  Exercise:  Exercise at least 30 minutes per day on most days of the week. Some examples of exercise include walking, biking, dancing, and swimming. You can also fit in more physical activity by taking the stairs instead of the elevator or parking farther away from stores. Ask your healthcare provider about the best exercise plan for you.      © Copyright [employer] 2018 Information is for End User's use only and may not be  sold, redistributed or otherwise used for commercial purposes. All illustrations and images included in CareNotes® are the copyrighted property of [employer] or [employer]  FOODS THAT MAY BE BLADDER IRRITANTS    The following selections show the most common foods which may worsen or irritate the bladder in patients with overactive bladder or interstitial cystitis. This does not mean that you need to avoid all of these foods, but you should pay close attention to how you feel after eating them. If you feel worse, then there is a good chance that this is a trigger food for you.    Cranberry Juices and Extracts:   Cranberry juice may be the most frequent irritant in a patient’s diet.   Recommended for consumption during urinary tract infections, cranberry juice can be very difficult for an overactive bladder to tolerate.  If you don’t wish to give it up entirely, try diluting it with water by half or more.    Coffee and Tea Products:   In a sensitive bladder, coffee is believed to be the top bladder irritant.   Some people can tolerate low acid coffees, while others try teas.   For the most sensitive patient, the best option for a hot drink is hot water with honey.    Carbonated beverages an sodas of any type (diet and regular):   The most difficult soda to tolerate appears to be diet cola, which may consist of carbonation, caffeine, aspartame, and cocoa derivatives, four known bladder irritants.   If you must have soda, try a clear, non-diet soda like Sprite.    Tomatoes, Tofu, and some Beans:   Red tomatoes can be very acidic.   Some patients can tolerate pizza and tomato sauces for pasta, while others cannot.   Low acid yellow tomatoes may be good substitutes in pasta and salads.  Ruthy, black, lima and soy beans are also potential irritants, due to their high acid content.    Herbal teas:   Patients with overactive bladder can be sensitive to herb teas, particularly those that have many ingredients.   If  you cannot avoid herbal tea, try experimenting with one or two ingredients at a time, so you can tell if a particular herb bothers you.    Tobacco.    Alcohol and Vinegars:   Wine, beer, champagne and even wine sauces could be irritants due to their alcohol content.  Some patients find white vero more tolerable than red vero (due to their reduced histamine content).    Chocolate  Chocolate is considered one of the worst irritants for the body.    Strawberries and Acidic Fruits  Lemon, orange, grapefruit, pineapple, strawberries and plums have been known to inflame sensitive bladders.   Fruit juices seem to be particularly difficult for some patients to tolerate because each can of juice may contain more acid than contained in one piece of fruit.  Try low acid fruit juice, or diluted juice.    Food Additives and Seasonings  Food additives are known to cause chemical sensitivities and allergic reactions in a small percentage of the general public, particularly monosodium glutamate (MSG), nitrates, and butylated hydroxytolene (BHT).  Monosodium Glutamate (MSG) is frequently found in prepared foods and mixes.  Nitrates, found in prepared meats, have long been suspected as bladder irritants.  Butylated hydroxytolene (BHT), is commonly found in cereals and breads.  A careful review of ingredient labels will help identify foods that should be limited or eliminated from your diet.

## 2025-04-11 LAB — TTG IGA SER IA-ACNC: 14 U/ML (ref ?–10)

## 2025-04-14 ENCOUNTER — RESULTS FOLLOW-UP (OUTPATIENT)
Age: 70
End: 2025-04-14

## 2025-04-15 NOTE — PROGRESS NOTES
Status:  Genetic testing pending, estimated turn around time: 3 weeks    Summary:    The patient was seen for genetic counseling regarding possible genetic testing, relative to her family history of cancer  Family information was reviewed and a three generation pedigree drawn  The patient was recently diagnosed with LCIS, she has no personal history of cancer  The  family history is significant forvher mother who was diagnosed with colon cancer at 39, her sister who was diagnosed with breast cancer at the age of 47 and endometrial cancer at the age of 58, a brother who was diagnosed with colon cancer at 48, and a niece who was diagnosed with rhabdomyosarcoma at the age of 3  In addition, multiple extended relatives have also had cancer,  please see pedigree for additional family history details  We reviewed the genetics of cancer, hereditary and familial risks, and the main benefits and limitation of genetic testing for susceptibility to cancer  The patient's sister underwent genetic testing and two variants of uncertain significance were identified in  CHEK2 (v [id number]) and MLH1 (c -279A>)  We reviewed cancer risks associated with these genes, options for medical management, and potential risks for family members  We discussed the limitations of testing for variants of unknown significance  A VUS is neither a positive nor a negative result  At this time there is not enough information available about these particular changes to determine if eitheris associated with an increased risk for developing cancer  Until the variant is reclassified, clinical management should be based on  personal and family history       Genetic Testing: We reviewed the genetic testing process, insurance concerns, and possible results  The patient elected to pursue genetic testing for genes associated with hereditary cancer  Informed consent was obtained and a DNA sample was collected    The sample was sent to [facility] for [facility] testing  [facility]s billing policy was explained to the patient  If the patient is expected to owe anything out of pocket over $100, the patient will be contacted by the lab  Test results should be available in approximately 3 weeks  The patient elected to have results disclosed by phone and she will be contacted once results are available  No. SONJA screening performed.  STOP BANG Legend: 0-2 = LOW Risk; 3-4 = INTERMEDIATE Risk; 5-8 = HIGH Risk

## 2025-06-14 ENCOUNTER — OFFICE VISIT (OUTPATIENT)
Dept: URGENT CARE | Age: 70
End: 2025-06-14
Payer: MEDICARE

## 2025-06-14 VITALS
HEART RATE: 119 BPM | TEMPERATURE: 98 F | OXYGEN SATURATION: 95 % | DIASTOLIC BLOOD PRESSURE: 58 MMHG | RESPIRATION RATE: 18 BRPM | SYSTOLIC BLOOD PRESSURE: 131 MMHG

## 2025-06-14 DIAGNOSIS — R05.1 ACUTE COUGH: Primary | ICD-10-CM

## 2025-06-14 PROCEDURE — 99213 OFFICE O/P EST LOW 20 MIN: CPT

## 2025-06-14 PROCEDURE — G0463 HOSPITAL OUTPT CLINIC VISIT: HCPCS

## 2025-06-14 RX ORDER — PREDNISONE 10 MG/1
TABLET ORAL
Qty: 15 TABLET | Refills: 0 | Status: SHIPPED | OUTPATIENT
Start: 2025-06-14 | End: 2025-06-19

## 2025-06-14 RX ORDER — FLUTICASONE PROPIONATE 50 MCG
1 SPRAY, SUSPENSION (ML) NASAL DAILY
Qty: 11.1 ML | Refills: 0 | Status: SHIPPED | OUTPATIENT
Start: 2025-06-14

## 2025-06-14 RX ORDER — DM/ACETAMINOPHEN/DOXYLAMINE 10-325/15
30 LIQUID (ML) ORAL 3 TIMES DAILY PRN
Qty: 355 ML | Refills: 0 | Status: SHIPPED | OUTPATIENT
Start: 2025-06-14

## 2025-06-14 NOTE — PATIENT INSTRUCTIONS
Please begin steroid taper, Coricidin and Flonase as directed.   Ensure good hydration-hot shower steam/cool mist humidifier may be soothing.   Follow up with PCP if no relief within one week.

## 2025-06-14 NOTE — PROGRESS NOTES
[facility]  Name: Angelia aCruso      : 1955      MRN: [id number]  Encounter Provider: MURRAY Howell  Encounter Date: 2025   Encounter department: [facility] [place]  :  Please begin steroid taper, Coricidin and Flonase as directed.   Ensure good hydration-hot shower steam/cool mist humidifier may be soothing.   Follow up with PCP if no relief within one week.   Assessment & Plan  Acute cough    Orders:    predniSONE 10 mg tablet; Take 5 tablets (50 mg total) by mouth daily for 1 day, THEN 4 tablets (40 mg total) daily for 1 day, THEN 3 tablets (30 mg total) daily for 1 day, THEN 2 tablets (20 mg total) daily for 1 day, THEN 1 tablet (10 mg total) daily for 1 day.    Dextromethorphan-GG-APAP (Coricidin HBP Cold/Cough/Flu) -325 MG/15ML LIQD; Take 30 mL by mouth 3 (three) times a day as needed (cough, congestion)    fluticasone (FLONASE) 50 mcg/act nasal spray; 1 spray into each nostril daily        Patient Instructions  Patient Education     Cough, Adult ED   General Information   You came to the Emergency Department (ED) for a cough. The doctors feel it is safe for you to recover at home. Many things can cause your cough. A cold or allergies can cause a cough. Other times, asthma or smoking can cause your cough. You can have a cough from mucus that drips down the back of your throat or from stomach acid that backs up into your throat. Sometimes a cough is a side effect from a drug. You may be waiting on some test results. If so, the staff will contact you if there are concerning results.  What care is needed at home?   Call your regular doctor to let them know you were in the ED. Make a follow-up appointment if you were told to.  To help you feel better:  Use a cool mist humidifier to avoid breathing dry air.  Use hard candy or cough drops to soothe sore throat and cough.  Gargle with salt water (mix 1/2 teaspoon salt with 1 cup warm water) a few times a day.  Spray  saltwater mist in each nostril. Any normal saline spray works.  Sip warm liquids to keep your throat moist.  Take warm, steamy showers to help soothe the cough.  Do not smoke or be in smoke-filled places. Avoid things that may cause breathing problems like vaping, fumes, pollution, dust, and other common allergens.  You may want to use over-the-counter medicines for allergies or acid reflux if your cough is due to one of these problems.  You can also use an over-the-counter cough medicine.  When do I need to get emergency help?   Return to the ED if:   You have chest pain when you cough or trouble breathing.  You start to cough up blood or yellow or green mucus.  When do I need to call the doctor?   You have a fever of 100.4°F (38°C) or higher or chills.  You cough so hard you throw up.  You are still coughing in 10 days.  You have new or worsening symptoms.  Last Reviewed Date   2020-07-15  Consumer Information Use and Disclaimer   This generalized information is a limited summary of diagnosis, treatment, and/or medication information. It is not meant to be comprehensive and should be used as a tool to help the user understand and/or assess potential diagnostic and treatment options. It does NOT include all information about conditions, treatments, medications, side effects, or risks that may apply to a specific patient. It is not intended to be medical advice or a substitute for the medical advice, diagnosis, or treatment of a health care provider based on the health care provider's examination and assessment of a patient’s specific and unique circumstances. Patients must speak with a health care provider for complete information about their health, medical questions, and treatment options, including any risks or benefits regarding use of medications. This information does not endorse any treatments or medications as safe, effective, or approved for treating a specific patient. [employer] and its affiliates  disclaim any warranty or liability relating to this information or the use thereof. The use of this information is governed by the Terms of Use, available at https://www.[employer].com/en/know/clinical-effectiveness-terms   Copyright   Follow up with PCP in 3-5 days.  Proceed to  ER if symptoms worsen.    If tests are performed, our office will contact you with results only if changes need to made to the care plan discussed with you at the visit. You can review your full results on [facility]'s MyChart.    Chief Complaint:   Chief Complaint   Patient presents with    Cough    Wheezing    Earache     Productive coughing. Symptoms started Tuesday. Hx asthma and bronchitis.      History of Present Illness   Cough  This is a new problem. The current episode started in the past 7 days (x 4 days). The problem has been unchanged. The cough is Productive of sputum. Associated symptoms include nasal congestion, postnasal drip, sweats and wheezing. Pertinent negatives include no chest pain, chills, ear congestion, ear pain, eye redness, fever, headaches, heartburn, hemoptysis, myalgias, rash, rhinorrhea, sore throat, shortness of breath or weight loss. Nothing aggravates the symptoms. She has tried OTC cough suppressant (Mucinex) for the symptoms. The treatment provided no relief. Her past medical history is significant for asthma. There is no history of bronchiectasis, bronchitis, COPD, emphysema, environmental allergies or pneumonia.   Wheezing  Associated symptoms include coughing, sweats and wheezing. Pertinent negatives include no chest pain, dizziness, fatigue, palpitations, rhinorrhea, sore throat or stridor. Her past medical history is significant for asthma.   Earache   Associated symptoms include coughing. Pertinent negatives include no diarrhea, ear discharge, headaches, neck pain, rash, rhinorrhea, sore throat or vomiting.         Review of Systems   Constitutional:  Negative for chills, fatigue, fever and weight  loss.   HENT:  Positive for congestion and postnasal drip. Negative for ear discharge, ear pain, rhinorrhea, sinus pressure, sinus pain, sneezing and sore throat.    Eyes: Negative.  Negative for pain, discharge, redness and itching.   Respiratory:  Positive for cough and wheezing. Negative for apnea, hemoptysis, choking, chest tightness, shortness of breath and stridor.    Cardiovascular: Negative.  Negative for chest pain and palpitations.   Gastrointestinal: Negative.  Negative for diarrhea, heartburn, nausea and vomiting.   Endocrine: Negative.  Negative for polydipsia, polyphagia and polyuria.   Genitourinary: Negative.  Negative for decreased urine volume and flank pain.   Musculoskeletal: Negative.  Negative for arthralgias, back pain, myalgias, neck pain and neck stiffness.   Skin: Negative.  Negative for color change and rash.   Allergic/Immunologic: Negative.  Negative for environmental allergies.   Neurological: Negative.  Negative for dizziness, facial asymmetry, light-headedness, numbness and headaches.   Hematological: Negative.  Negative for adenopathy.   Psychiatric/Behavioral: Negative.       Past Medical History   Past Medical History[1]  Past Surgical History[2]  Family History[3]  she reports that she quit smoking about 49 years ago. Her smoking use included cigarettes. She started smoking about 53 years ago. She has a 1 pack-year smoking history. She has been exposed to tobacco smoke. She has never used smokeless tobacco. She reports current alcohol use of about 1.0 standard drink of alcohol per week. She reports that she does not use drugs.  Current Outpatient Medications   Medication Instructions    albuterol (Ventolin HFA) 90 mcg/act inhaler 2 puffs, Inhalation, Every 4 hours PRN    aspirin 81 mg, Daily    calcium citrate (CALCITRATE) 950 MG tablet 1 tablet, Daily    Dextromethorphan-GG-APAP (Coricidin HBP Cold/Cough/Flu) -325 MG/15ML LIQD 30 mL, Oral, 3 times daily PRN    fluticasone  (FLONASE) 50 mcg/act nasal spray 1 spray, Nasal, Daily    Multiple Vitamin (MULTI-VITAMIN DAILY PO) Take by mouth    omeprazole (PRILOSEC) 40 mg, Oral, Daily    PARoxetine (PAXIL) 30 mg tablet TAKE ONE TABLET BY MOUTH EVERY DAY    predniSONE 10 mg tablet Take 5 tablets (50 mg total) by mouth daily for 1 day, THEN 4 tablets (40 mg total) daily for 1 day, THEN 3 tablets (30 mg total) daily for 1 day, THEN 2 tablets (20 mg total) daily for 1 day, THEN 1 tablet (10 mg total) daily for 1 day.   Allergies[4]     Objective   /58   Pulse (!) 119   Temp 98 °F (36.7 °C)   Resp 18   SpO2 95%      Physical Exam  Vitals and nursing note reviewed.   Constitutional:       General: She is not in acute distress.     Appearance: She is well-developed. She is not ill-appearing, toxic-appearing or diaphoretic.      Interventions: She is not intubated.  HENT:      Head: Normocephalic and atraumatic.      Right Ear: Hearing, tympanic membrane, ear canal and external ear normal. Tympanic membrane is not erythematous, retracted or bulging.      Left Ear: Hearing, tympanic membrane, ear canal and external ear normal. Tympanic membrane is not erythematous, retracted or bulging.      Mouth/Throat:      Pharynx: Oropharynx is clear. Uvula midline. No pharyngeal swelling, oropharyngeal exudate, posterior oropharyngeal erythema, uvula swelling or postnasal drip.      Tonsils: No tonsillar exudate or tonsillar abscesses. 1+ on the right. 1+ on the left.     Eyes:      Conjunctiva/sclera: Conjunctivae normal.     Neck:      Thyroid: No thyroid mass, thyromegaly or thyroid tenderness.     Cardiovascular:      Rate and Rhythm: Normal rate and regular rhythm.      Pulses: Normal pulses.      Heart sounds: Normal heart sounds, S1 normal and S2 normal. Heart sounds not distant. No murmur heard.     No friction rub. No gallop.   Pulmonary:      Effort: Pulmonary effort is normal. No tachypnea, bradypnea, accessory muscle usage, prolonged  "expiration, respiratory distress or retractions. She is not intubated.      Breath sounds: Normal breath sounds. No stridor, decreased air movement or transmitted upper airway sounds. No decreased breath sounds, wheezing, rhonchi or rales.   Abdominal:      Palpations: Abdomen is soft.      Tenderness: There is no abdominal tenderness.     Musculoskeletal:         General: No swelling.      Cervical back: Full passive range of motion without pain, normal range of motion and neck supple. No rigidity. No spinous process tenderness or muscular tenderness. Normal range of motion.   Lymphadenopathy:      Cervical: No cervical adenopathy.      Right cervical: No superficial cervical adenopathy.     Left cervical: No superficial cervical adenopathy.     Skin:     General: Skin is warm and dry.      Capillary Refill: Capillary refill takes less than 2 seconds.     Neurological:      Mental Status: She is alert.     Psychiatric:         Mood and Affect: Mood normal.         Portions of the record may have been created with voice recognition software.  Occasional wrong word or \"sound a like\" substitutions may have occurred due to the inherent limitations of voice recognition software.  Read the chart carefully and recognize, using context, where substitutions have occurred.       [1]   Past Medical History:  Diagnosis Date    Allergic     Allergic rhinitis     last assessed-10/2/2017    Asthma     BRCA1 negative     BRCA2 negative     Cancer (HCC)     Carotid bruit     R-last assessed-6/10/2014    Celiac disease     Chronic insomnia 05/08/2019    Depression     Dysphagia     last assessed-6/6/2013    Fibrocystic breast     Gallbladder polyp 02/25/2022    GERD (gastroesophageal reflux disease)     Hand eczema 04/09/2019    Inflamed skin tag 03/02/2022    Left thyroid nodule 10/02/2017    Obesity     Pneumonia     more than 10 yrs ago as of 10/10/24   [2]   Past Surgical History:  Procedure Laterality Date    BREAST BIOPSY " Right 2023    percutaneous needle core    BREAST BIOPSY Right 2023    SLW Stereo 9:00    BREAST BIOPSY Right 2023    BREAST LUMPECTOMY Right 2017    LCIS    BREAST LUMPECTOMY Right 2023    Procedure: BREAST PUMA  DIRECTED LUMPECTOMY;  Surgeon: Marco Rivera MD;  [facility];  Service: Surgical Oncology     SECTION  1984    COLONOSCOPY      complete-12/3/2012-internal/external hemorrhoids, perianal or excoriation, mild sigmoid diverticulosis    MAMMO NEEDLE LOCALIZATION LEFT (ALL INC) Right 2023    MAMMO NEEDLE LOCALIZATION RIGHT (ALL INC) Right 2017    MAMMO NEEDLE LOCALIZATION RIGHT (ALL INC) EACH ADD Right 2017    MAMMO STEREOTACTIC BREAST BIOPSY RIGHT (ALL INC) Right 11/15/2017    MAMMO STEREOTACTIC BREAST BIOPSY RIGHT (ALL INC) Right 2023    MAMMO STEREOTACTIC BREAST BIOPSY RIGHT (ALL INC) EACH ADD Right 11/15/2017    MRI BREAST BIOPSY LEFT (ALL INCLUSIVE) Left 2019    VT EXC BREAST LES PREOP PLMT RAD MARKER OPEN 1 LES Right 10/31/2024    Procedure: RIGHT BREAST PUMA LOCALIZED EXCISIONAL BIOPSY;  Surgeon: Cassandra Lynn Cardarelli, MD;  [facility];  Service: Surgical Oncology    VT MASTECTOMY PARTIAL Right 10/31/2024    Procedure: RIGHT BREAST PUMA LOCALIZED EXCISIONAL BIOPSY;  Surgeon: Cassandra Lynn Cardarelli, MD;  [facility];  Service: Surgical Oncology    VT PERQ DEVICE PLACEMENT BREAST LOC 1ST LES W/GDNCE Right 2017    Procedure: BREAST LUMPECTOMY; BREAST BRACKETED NEEDLE LOCALIZATION (BRACKETED NEEDLE LOC AT 0900);  Surgeon: Marco Rivera MD;  [facility];  Service: Surgical Oncology    UPPER GASTROINTESTINAL ENDOSCOPY      US BREAST CLIP NEEDLE LOC RIGHT Right 10/11/2024    US GUIDED BREAST BIOPSY RIGHT COMPLETE Right 2024    VAGINAL DILATATION      d&e   [3]   Family History  Problem Relation Name Age of Onset    Heart attack Mother Qing         acute MI    Colon polyps Mother Qing          polyps of the sigmoid colon    Colon cancer Mother Qing     Cancer Mother Qing     Hyperlipidemia Mother Qing     Hearing loss Mother Qing     Vision loss Father Cirilo P.         Glaucoma    Breast cancer Sister shaista 54    Uterine cancer Sister shaista 64    Endometrial cancer Sister shaista     Cancer Sister shaista     Asthma Daughter ronaldo     Asthma Daughter sourav     Uterine cancer Maternal Grandmother Tegan SHERIFF.         unknown age-maybe 60's    Uterine cancer Paternal Grandmother Tegan HINSON.         unknown age , maybe 60's    Diabetes Paternal Grandfather Isaak     Liver cancer Brother sherine 50    Colon cancer Brother sherine 50    Cancer Brother sherine     Lung cancer Brother altagracia     No Known Problems Brother      Cancer Brother Altagracia P.         lung cancer    No Known Problems Brother      No Known Problems Brother      No Known Problems Son fara     Stroke Maternal Uncle Pro     Kidney cancer Paternal Aunt  55    Pancreatic cancer Paternal Uncle  67    Breast cancer Cousin lynda 57        maternal    Uterine cancer Cousin lynda 55    Colon cancer Cousin hector 60    Completed Suicide  Sister Bibiana RAJPUT         1971    COPD Maternal Aunt Catherine     COPD Maternal Aunt Catherine    [4] No Known Allergies

## 2025-06-21 DIAGNOSIS — F41.8 DEPRESSION WITH ANXIETY: ICD-10-CM

## 2025-06-22 RX ORDER — PAROXETINE 30 MG/1
30 TABLET, FILM COATED ORAL DAILY
Qty: 100 TABLET | Refills: 1 | Status: SHIPPED | OUTPATIENT
Start: 2025-06-22

## 2025-06-23 ENCOUNTER — OFFICE VISIT (OUTPATIENT)
Dept: FAMILY MEDICINE CLINIC | Facility: CLINIC | Age: 70
End: 2025-06-23
Payer: MEDICARE

## 2025-06-23 VITALS
BODY MASS INDEX: 33.84 KG/M2 | WEIGHT: 228.5 LBS | DIASTOLIC BLOOD PRESSURE: 72 MMHG | OXYGEN SATURATION: 96 % | RESPIRATION RATE: 18 BRPM | HEART RATE: 77 BPM | SYSTOLIC BLOOD PRESSURE: 124 MMHG | TEMPERATURE: 97.6 F | HEIGHT: 69 IN

## 2025-06-23 DIAGNOSIS — J06.9 ACUTE URI: Primary | ICD-10-CM

## 2025-06-23 DIAGNOSIS — J45.20 MILD INTERMITTENT ASTHMA WITHOUT COMPLICATION: ICD-10-CM

## 2025-06-23 DIAGNOSIS — N39.3 STRESS INCONTINENCE: ICD-10-CM

## 2025-06-23 PROCEDURE — G2211 COMPLEX E/M VISIT ADD ON: HCPCS

## 2025-06-23 PROCEDURE — 99214 OFFICE O/P EST MOD 30 MIN: CPT

## 2025-06-23 RX ORDER — AZITHROMYCIN 250 MG/1
TABLET, FILM COATED ORAL
Qty: 6 TABLET | Refills: 0 | Status: SHIPPED | OUTPATIENT
Start: 2025-06-23 | End: 2025-06-27

## 2025-06-23 RX ORDER — ALBUTEROL SULFATE 90 UG/1
2 INHALANT RESPIRATORY (INHALATION) EVERY 4 HOURS PRN
Qty: 18 G | Refills: 0 | Status: SHIPPED | OUTPATIENT
Start: 2025-06-23

## 2025-06-23 NOTE — PROGRESS NOTES
Name: Angelia Caruso      : 1955      MRN: [id number]  Encounter Provider: Jie Armijo DO  Encounter Date: 2025   Encounter department: [facility]  :  Assessment & Plan  Acute URI  Z-Jeremie as below.  Albuterol every 4-6 hours as needed for cough/shortness of breath.  Recommend scheduling cough medicine and supportive care with cough drops, honey, warm fluids, etc.  ED/return precautions reviewed  Orders:    azithromycin (ZITHROMAX) 250 mg tablet; Take 2 tablets today then 1 tablet daily x 4 days    Mild intermittent asthma without complication  Albuterol every 4-6 hours as needed as below  Orders:    albuterol (Ventolin HFA) 90 mcg/act inhaler; Inhale 2 puffs every 4 (four) hours as needed for wheezing    Stress incontinence  Noted due to increased coughing episodes.  Recommend Kegel exercises, scheduling bathroom breaks limiting/avoiding bladder irritants, etc.              History of Present Illness   HPI    Chief Complaint   Patient presents with    Establish Care    Cold Like Symptoms     For about two weeks, bringing up mucus with a cough, wheezing in chest, shortness of breath. Went to urgent care last Saturday, put on a steroid for symptoms. Taking Cough medicine to help with symptoms.Sweats on and off. no fever/chills     Patient with 2 weeks of productive cough, expiratory wheeze, intermittent shortness of breath.  Went to urgent care  and was placed on steroid burst, cough medicine, Flonase with some improvement.  Continues have slightly productive cough.  Still notices some wheezing at the end of breathing.  No fevers but notes some chills.  Also reports some stress incontinence due to the cough.    Review of Systems   Constitutional:  Negative for chills and fever.   HENT:  Positive for congestion. Negative for rhinorrhea.    Eyes:  Negative for visual disturbance.   Respiratory:  Positive for cough. Negative for shortness of breath.    Cardiovascular:  Negative  "for chest pain and palpitations.   Gastrointestinal:  Negative for abdominal pain, constipation, diarrhea, nausea and vomiting.   Genitourinary:  Negative for dysuria.   Musculoskeletal:  Negative for arthralgias.   Skin:  Negative for rash.   Neurological:  Negative for dizziness, light-headedness and headaches.       Objective   /72 (BP Location: Left arm, Patient Position: Sitting, Cuff Size: Standard)   Pulse 77   Temp 97.6 °F (36.4 °C) (Temporal)   Resp 18   Ht 5' 9\" (1.753 m)   Wt 104 kg (228 lb 8 oz)   SpO2 96%   BMI 33.74 kg/m²      Physical Exam  Vitals reviewed.   Constitutional:       Appearance: Normal appearance.   HENT:      Head: Normocephalic and atraumatic.      Comments: No tenderness palpation of the sinuses     Right Ear: External ear normal.      Left Ear: External ear normal.      Nose: Nose normal.      Mouth/Throat:      Mouth: Mucous membranes are moist.      Pharynx: Oropharynx is clear. No oropharyngeal exudate or posterior oropharyngeal erythema.     Eyes:      Extraocular Movements: Extraocular movements intact.      Conjunctiva/sclera: Conjunctivae normal.       Cardiovascular:      Rate and Rhythm: Normal rate and regular rhythm.      Pulses: Normal pulses.      Heart sounds: Normal heart sounds.   Pulmonary:      Effort: Pulmonary effort is normal. No respiratory distress.      Breath sounds: Wheezing present. No rhonchi or rales.      Comments: Mild scattered expiratory wheezes  Abdominal:      Palpations: Abdomen is soft.     Musculoskeletal:      Cervical back: Neck supple.      Right lower leg: No edema.      Left lower leg: No edema.     Skin:     General: Skin is warm.     Neurological:      Mental Status: She is alert and oriented to person, place, and time.     Psychiatric:         Mood and Affect: Mood normal.         Behavior: Behavior normal.         Thought Content: Thought content normal.         Judgment: Judgment normal.         "

## 2025-06-23 NOTE — ASSESSMENT & PLAN NOTE
Albuterol every 4-6 hours as needed as below  Orders:    albuterol (Ventolin HFA) 90 mcg/act inhaler; Inhale 2 puffs every 4 (four) hours as needed for wheezing

## (undated) DEVICE — ADHESIVE SKIN HIGH VISCOSITY EXOFIN 1ML

## (undated) DEVICE — MEDI-VAC YANK SUCT HNDL W/TPRD BULBOUS TIP: Brand: CARDINAL HEALTH

## (undated) DEVICE — GLOVE SRG BIOGEL 7

## (undated) DEVICE — DECANTER: Brand: UNBRANDED

## (undated) DEVICE — REM POLYHESIVE ADULT PATIENT RETURN ELECTRODE: Brand: VALLEYLAB

## (undated) DEVICE — LIGACLIP MCA MULTIPLE CLIP APPLIERS, 20 SMALL CLIPS: Brand: LIGACLIP

## (undated) DEVICE — PROVE COVER: Brand: UNBRANDED

## (undated) DEVICE — INTENDED FOR TISSUE SEPARATION, AND OTHER PROCEDURES THAT REQUIRE A SHARP SURGICAL BLADE TO PUNCTURE OR CUT.: Brand: BARD-PARKER SAFETY BLADES SIZE 15, STERILE

## (undated) DEVICE — SYRINGE 1ML TB 27G X 3/8 SAFETY

## (undated) DEVICE — STRL UNIVERSAL MINOR GENERAL: Brand: CARDINAL HEALTH

## (undated) DEVICE — VIAL DECANTER

## (undated) DEVICE — NEEDLE 25G X 1 1/2

## (undated) DEVICE — SMOKE EVACUATION TUBING WITH 8 IN INTEGRAL WAND AND SPONGE GUARD: Brand: BUFFALO FILTER

## (undated) DEVICE — SUT MONOCRYL 4-0 PS-2 18 IN Y496G

## (undated) DEVICE — MARGIN MARKER SET

## (undated) DEVICE — SUT PDS II 4-0 PS-2 18 IN Z496G

## (undated) DEVICE — SCD SEQUENTIAL COMPRESSION COMFORT SLEEVE MEDIUM KNEE LENGTH: Brand: KENDALL SCD

## (undated) DEVICE — CHLORAPREP HI-LITE 26ML ORANGE

## (undated) DEVICE — SUT VICRYL 3-0 SH 27 IN J416H

## (undated) DEVICE — 3M™ TEGADERM™ TRANSPARENT FILM DRESSING FRAME STYLE, 1628, 6 IN X 8 IN (15 CM X 20 CM), 10/CT 8CT/CASE: Brand: 3M™ TEGADERM™

## (undated) DEVICE — SUT MONOCRYL PLUS 3-0 PS-2 27 IN MCP427H

## (undated) DEVICE — BETHLEHEM UNIVERSAL MINOR GEN: Brand: CARDINAL HEALTH

## (undated) DEVICE — DRAPE EQUIPMENT RF WAND

## (undated) DEVICE — HANDPIECE, SINGLE-USE, SAVI SCOUT®: Brand: SAVI SCOUT®

## (undated) DEVICE — EXOFIN PRECISION PEN HIGH VISCOSITY TOPICAL SKIN ADHESIVE: Brand: EXOFIN PRECISION PEN, 1G

## (undated) DEVICE — TUBING SUCTION 5MM X 12 FT

## (undated) DEVICE — PLUMEPEN PRO 10FT

## (undated) DEVICE — DRAPE PROBE NEO-PROBE/ULTRASOUND

## (undated) DEVICE — TELFA ADHESIVE ISLAND DRESSING: Brand: TELFA

## (undated) DEVICE — 3M™ STERI-STRIP™ REINFORCED ADHESIVE SKIN CLOSURES, R1547, 1/2 IN X 4 IN (12 MM X 100 MM), 6 STRIPS/ENVELOPE: Brand: 3M™ STERI-STRIP™

## (undated) DEVICE — LIGHT HANDLE COVER SLEEVE DISP BLUE STELLAR

## (undated) DEVICE — GLOVE INDICATOR PI UNDERGLOVE SZ 6.5 BLUE

## (undated) DEVICE — ADHESIVE SKN CLSR HISTOACRYL FLEX 0.5ML LF

## (undated) DEVICE — GLOVE SRG BIOGEL 6.5

## (undated) DEVICE — PENCIL ELECTROSURG E-Z CLEAN -0035H